# Patient Record
Sex: MALE | Race: WHITE | NOT HISPANIC OR LATINO | Employment: OTHER | URBAN - NONMETROPOLITAN AREA
[De-identification: names, ages, dates, MRNs, and addresses within clinical notes are randomized per-mention and may not be internally consistent; named-entity substitution may affect disease eponyms.]

---

## 2017-03-28 ENCOUNTER — LAB (OUTPATIENT)
Dept: LAB | Facility: OTHER | Age: 67
End: 2017-03-28

## 2017-03-28 DIAGNOSIS — Z12.5 ENCOUNTER FOR SCREENING FOR MALIGNANT NEOPLASM OF PROSTATE: ICD-10-CM

## 2017-03-28 DIAGNOSIS — I10 ESSENTIAL HYPERTENSION: ICD-10-CM

## 2017-03-28 DIAGNOSIS — E11.9 TYPE 2 DIABETES MELLITUS WITHOUT COMPLICATION, WITHOUT LONG-TERM CURRENT USE OF INSULIN (HCC): ICD-10-CM

## 2017-03-28 DIAGNOSIS — E03.9 HYPOTHYROIDISM, UNSPECIFIED TYPE: ICD-10-CM

## 2017-03-28 DIAGNOSIS — N40.0 BENIGN NON-NODULAR PROSTATIC HYPERPLASIA, PRESENCE OF LOWER URINARY TRACT SYMPTOMS UNSPECIFIED: ICD-10-CM

## 2017-03-28 DIAGNOSIS — E78.5 HYPERLIPIDEMIA, UNSPECIFIED HYPERLIPIDEMIA TYPE: ICD-10-CM

## 2017-03-28 LAB
ALBUMIN SERPL-MCNC: 4.1 G/DL (ref 3.2–5.5)
ALBUMIN/GLOB SERPL: 1.2 G/DL (ref 1–3)
ALP SERPL-CCNC: 75 U/L (ref 15–121)
ALT SERPL W P-5'-P-CCNC: 55 U/L (ref 10–60)
ANION GAP SERPL CALCULATED.3IONS-SCNC: 10 MMOL/L (ref 5–15)
AST SERPL-CCNC: 31 U/L (ref 10–60)
BASOPHILS # BLD AUTO: 0.03 10*3/MM3 (ref 0–0.2)
BASOPHILS NFR BLD AUTO: 0.4 % (ref 0–2)
BILIRUB SERPL-MCNC: 0.4 MG/DL (ref 0.2–1)
BUN BLD-MCNC: 15 MG/DL (ref 8–25)
BUN/CREAT SERPL: 15 (ref 7–25)
CALCIUM SPEC-SCNC: 9.5 MG/DL (ref 8.4–10.8)
CHLORIDE SERPL-SCNC: 103 MMOL/L (ref 100–112)
CHOLEST SERPL-MCNC: 130 MG/DL (ref 150–200)
CO2 SERPL-SCNC: 26 MMOL/L (ref 20–32)
CREAT BLD-MCNC: 1 MG/DL (ref 0.4–1.3)
DEPRECATED RDW RBC AUTO: 47.7 FL (ref 35.1–43.9)
EOSINOPHIL # BLD AUTO: 0.41 10*3/MM3 (ref 0–0.7)
EOSINOPHIL NFR BLD AUTO: 4.9 % (ref 0–7)
ERYTHROCYTE [DISTWIDTH] IN BLOOD BY AUTOMATED COUNT: 14.3 % (ref 11.5–14.5)
GFR SERPL CREATININE-BSD FRML MDRD: 74 ML/MIN/1.73 (ref 49–113)
GLOBULIN UR ELPH-MCNC: 3.4 GM/DL (ref 2.5–4.6)
GLUCOSE BLD-MCNC: 128 MG/DL (ref 70–100)
HCT VFR BLD AUTO: 46.4 % (ref 39–49)
HDLC SERPL-MCNC: 24 MG/DL (ref 35–100)
HGB BLD-MCNC: 15.3 G/DL (ref 13.7–17.3)
LDLC SERPL CALC-MCNC: 70 MG/DL
LDLC/HDLC SERPL: 2.9 {RATIO}
LYMPHOCYTES # BLD AUTO: 2.58 10*3/MM3 (ref 0.6–4.2)
LYMPHOCYTES NFR BLD AUTO: 30.8 % (ref 10–50)
MCH RBC QN AUTO: 31 PG (ref 26.5–34)
MCHC RBC AUTO-ENTMCNC: 33 G/DL (ref 31.5–36.3)
MCV RBC AUTO: 93.9 FL (ref 80–98)
MONOCYTES # BLD AUTO: 0.72 10*3/MM3 (ref 0–0.9)
MONOCYTES NFR BLD AUTO: 8.6 % (ref 0–12)
NEUTROPHILS # BLD AUTO: 4.63 10*3/MM3 (ref 2–8.6)
NEUTROPHILS NFR BLD AUTO: 55.3 % (ref 37–80)
PLATELET # BLD AUTO: 281 10*3/MM3 (ref 150–450)
PMV BLD AUTO: 10.8 FL (ref 8–12)
POTASSIUM BLD-SCNC: 4.1 MMOL/L (ref 3.4–5.4)
PROT SERPL-MCNC: 7.5 G/DL (ref 6.7–8.2)
RBC # BLD AUTO: 4.94 10*6/MM3 (ref 4.37–5.74)
SODIUM BLD-SCNC: 139 MMOL/L (ref 134–146)
TRIGL SERPL-MCNC: 182 MG/DL (ref 35–160)
VLDLC SERPL-MCNC: 36.4 MG/DL
WBC NRBC COR # BLD: 8.37 10*3/MM3 (ref 3.2–9.8)

## 2017-03-28 PROCEDURE — 36415 COLL VENOUS BLD VENIPUNCTURE: CPT | Performed by: FAMILY MEDICINE

## 2017-03-28 PROCEDURE — 80061 LIPID PANEL: CPT | Performed by: FAMILY MEDICINE

## 2017-03-28 PROCEDURE — 84443 ASSAY THYROID STIM HORMONE: CPT | Performed by: FAMILY MEDICINE

## 2017-03-28 PROCEDURE — 84439 ASSAY OF FREE THYROXINE: CPT | Performed by: FAMILY MEDICINE

## 2017-03-28 PROCEDURE — 83036 HEMOGLOBIN GLYCOSYLATED A1C: CPT | Performed by: FAMILY MEDICINE

## 2017-03-28 PROCEDURE — 80053 COMPREHEN METABOLIC PANEL: CPT | Performed by: FAMILY MEDICINE

## 2017-03-28 PROCEDURE — 85025 COMPLETE CBC W/AUTO DIFF WBC: CPT | Performed by: FAMILY MEDICINE

## 2017-03-28 PROCEDURE — G0103 PSA SCREENING: HCPCS | Performed by: FAMILY MEDICINE

## 2017-03-29 LAB
HBA1C MFR BLD: 6.22 % (ref 4–5.6)
PSA SERPL-MCNC: 0.85 NG/ML (ref 0–4)
T4 FREE SERPL-MCNC: 1.33 NG/DL (ref 0.78–2.19)
TSH SERPL DL<=0.05 MIU/L-ACNC: 3.53 MIU/ML (ref 0.46–4.68)

## 2017-04-10 ENCOUNTER — OFFICE VISIT (OUTPATIENT)
Dept: FAMILY MEDICINE CLINIC | Facility: CLINIC | Age: 67
End: 2017-04-10

## 2017-04-10 VITALS
SYSTOLIC BLOOD PRESSURE: 138 MMHG | HEIGHT: 67 IN | BODY MASS INDEX: 42.38 KG/M2 | DIASTOLIC BLOOD PRESSURE: 82 MMHG | TEMPERATURE: 97.9 F | OXYGEN SATURATION: 95 % | WEIGHT: 270 LBS | HEART RATE: 69 BPM

## 2017-04-10 DIAGNOSIS — I10 ESSENTIAL HYPERTENSION: Primary | ICD-10-CM

## 2017-04-10 DIAGNOSIS — E78.5 HYPERLIPIDEMIA, UNSPECIFIED HYPERLIPIDEMIA TYPE: ICD-10-CM

## 2017-04-10 DIAGNOSIS — J45.909 UNCOMPLICATED ASTHMA, UNSPECIFIED ASTHMA SEVERITY: ICD-10-CM

## 2017-04-10 DIAGNOSIS — E11.9 TYPE 2 DIABETES MELLITUS WITHOUT COMPLICATION, WITHOUT LONG-TERM CURRENT USE OF INSULIN (HCC): ICD-10-CM

## 2017-04-10 DIAGNOSIS — E03.9 HYPOTHYROIDISM, UNSPECIFIED TYPE: ICD-10-CM

## 2017-04-10 PROCEDURE — 99214 OFFICE O/P EST MOD 30 MIN: CPT | Performed by: FAMILY MEDICINE

## 2017-04-10 RX ORDER — ALBUTEROL SULFATE 90 UG/1
2 AEROSOL, METERED RESPIRATORY (INHALATION) EVERY 4 HOURS PRN
Qty: 18 G | Refills: 3 | Status: SHIPPED | OUTPATIENT
Start: 2017-04-10 | End: 2017-09-25 | Stop reason: SDUPTHER

## 2017-04-10 NOTE — PROGRESS NOTES
Subjective   Nick Austin is a 66 y.o. male who presents to the office for follow-up and review of labs.     History of Present Illness   Patient with history of hypertension, hyperlipidemia, and type 2 diabetes mellitus is in today for reevaluation and to review labs.  Patient is doing well but has noticed some increased cough and shortness of breath.  He denies any chest pain PND orthopnea.  Only using his albuterol.  The following portions of the patient's history were reviewed and updated as appropriate: allergies, current medications, past family history, past medical history, past social history, past surgical history and problem list.    Review of Systems   Constitutional: Negative.    HENT: Negative.    Eyes: Negative.    Respiratory: Positive for cough and shortness of breath.    Cardiovascular: Negative.    Gastrointestinal: Negative.    Endocrine: Negative.    Genitourinary: Negative.    Musculoskeletal: Negative.    Skin: Negative.    Allergic/Immunologic: Negative.    Neurological: Negative.    Hematological: Negative.    Psychiatric/Behavioral: Negative.    All other systems reviewed and are negative.      Objective   Physical Exam   Constitutional: He is oriented to person, place, and time. He appears well-developed and well-nourished.   HENT:   Head: Normocephalic and atraumatic.   Right Ear: External ear normal.   Left Ear: External ear normal.   Nose: Nose normal.   Mouth/Throat: Oropharynx is clear and moist.   Eyes: Conjunctivae and EOM are normal. Pupils are equal, round, and reactive to light.   Neck: Normal range of motion. Neck supple.   Cardiovascular: Normal rate, regular rhythm, normal heart sounds and intact distal pulses.  Exam reveals no gallop and no friction rub.    No murmur heard.  Pulmonary/Chest: Effort normal. He has wheezes. He has no rales.   Abdominal: Soft. Bowel sounds are normal. He exhibits no mass. There is no tenderness. There is no rebound and no guarding.    Obese     Musculoskeletal: Normal range of motion.    Nick had a diabetic foot exam performed today.   During the foot exam he had a monofilament test performed.    Neurological Sensory Findings -  Unaltered sharp/dull right ankle/foot discrimination and unaltered sharp/dull left ankle/foot discrimination.    Vascular Status -  His exam exhibits right foot vasculature normal. His exam exhibits left foot vasculature normal.   Skin Integrity  - He has left foot onychomycosis..  Neurological: He is alert and oriented to person, place, and time. He has normal reflexes. No cranial nerve deficit. He exhibits normal muscle tone.   Skin: Skin is warm and dry. No rash noted.   Psychiatric: He has a normal mood and affect. His behavior is normal. Judgment and thought content normal.   Nursing note and vitals reviewed.      Assessment/Plan   Nick was seen today for hyperlipidemia, hypertension and diabetes.    Diagnoses and all orders for this visit:    Essential hypertension    Hyperlipidemia, unspecified hyperlipidemia type  -     LDL Cholesterol, Direct; Future    Hypothyroidism, unspecified type  -     TSH; Future  -     T4, Free; Future    Type 2 diabetes mellitus without complication, without long-term current use of insulin  -     Comprehensive Metabolic Panel; Future  -     Hemoglobin A1c; Future  -     LDL Cholesterol, Direct; Future  -     MicroAlbumin, Urine, Random; Future    Uncomplicated asthma, unspecified asthma severity    Other orders  -     Fluticasone Furoate-Vilanterol (BREO ELLIPTA) 100-25 MCG/INH aerosol powder ; Inhale 1 dose Daily.  -     albuterol (PROVENTIL HFA;VENTOLIN HFA) 108 (90 BASE) MCG/ACT inhaler; Inhale 2 puffs Every 4 (Four) Hours As Needed (asthma). Take 30 minutes before P.E or exercise.         Labs are reviewed with patient.    PHQ-9 Depression Screening 4/10/2017   Little interest or pleasure in doing things 2   Feeling down, depressed, or hopeless 0   Trouble falling or  staying asleep, or sleeping too much 0   Feeling tired or having little energy 1   Poor appetite or overeating 2   Feeling bad about yourself - or that you are a failure or have let yourself or your family down 0   Trouble concentrating on things, such as reading the newspaper or watching television 0   Moving or speaking so slowly that other people could have noticed. Or the opposite - being so fidgety or restless that you have been moving around a lot more than usual 0   Thoughts that you would be better off dead, or of hurting yourself in some way 0   PHQ-9 Total Score 5   If you checked off any problems, how difficult have these problems made it for you to do your work, take care of things at home, or get along with other people? Not difficult at all         Lab on 03/28/2017   Component Date Value Ref Range Status   • Glucose 03/28/2017 128* 70 - 100 mg/dL Final   • BUN 03/28/2017 15  8 - 25 mg/dL Final   • Creatinine 03/28/2017 1.00  0.40 - 1.30 mg/dL Final   • Sodium 03/28/2017 139  134 - 146 mmol/L Final   • Potassium 03/28/2017 4.1  3.4 - 5.4 mmol/L Final   • Chloride 03/28/2017 103  100 - 112 mmol/L Final   • CO2 03/28/2017 26.0  20.0 - 32.0 mmol/L Final   • Calcium 03/28/2017 9.5  8.4 - 10.8 mg/dL Final   • Total Protein 03/28/2017 7.5  6.7 - 8.2 g/dL Final   • Albumin 03/28/2017 4.10  3.20 - 5.50 g/dL Final   • ALT (SGPT) 03/28/2017 55  10 - 60 U/L Final   • AST (SGOT) 03/28/2017 31  10 - 60 U/L Final   • Alkaline Phosphatase 03/28/2017 75  15 - 121 U/L Final   • Total Bilirubin 03/28/2017 0.4  0.2 - 1.0 mg/dL Final   • eGFR Non African Amer 03/28/2017 74  49 - 113 mL/min/1.73 Final   • Globulin 03/28/2017 3.4  2.5 - 4.6 gm/dL Final   • A/G Ratio 03/28/2017 1.2  1.0 - 3.0 g/dL Final   • BUN/Creatinine Ratio 03/28/2017 15.0  7.0 - 25.0 Final   • Anion Gap 03/28/2017 10.0  5.0 - 15.0 mmol/L Final   • Total Cholesterol 03/28/2017 130* 150 - 200 mg/dL Final   • Triglycerides 03/28/2017 182* 35 - 160 mg/dL  Final   • HDL Cholesterol 03/28/2017 24* 35 - 100 mg/dL Final   • LDL Cholesterol  03/28/2017 70  mg/dL Final   • VLDL Cholesterol 03/28/2017 36.4  mg/dL Final   • LDL/HDL Ratio 03/28/2017 2.90   Final   • Hemoglobin A1C 03/28/2017 6.22* 4 - 5.6 % Final   • TSH 03/28/2017 3.530  0.460 - 4.680 mIU/mL Final   • Free T4 03/28/2017 1.33  0.78 - 2.19 ng/dL Final   • PSA 03/28/2017 0.851  0.000 - 4.000 ng/mL Final   • WBC 03/28/2017 8.37  3.20 - 9.80 10*3/mm3 Final   • RBC 03/28/2017 4.94  4.37 - 5.74 10*6/mm3 Final   • Hemoglobin 03/28/2017 15.3  13.7 - 17.3 g/dL Final   • Hematocrit 03/28/2017 46.4  39.0 - 49.0 % Final   • MCV 03/28/2017 93.9  80.0 - 98.0 fL Final   • MCH 03/28/2017 31.0  26.5 - 34.0 pg Final   • MCHC 03/28/2017 33.0  31.5 - 36.3 g/dL Final   • RDW 03/28/2017 14.3  11.5 - 14.5 % Final   • RDW-SD 03/28/2017 47.7* 35.1 - 43.9 fl Final   • MPV 03/28/2017 10.8  8.0 - 12.0 fL Final   • Platelets 03/28/2017 281  150 - 450 10*3/mm3 Final   • Neutrophil % 03/28/2017 55.3  37.0 - 80.0 % Final   • Lymphocyte % 03/28/2017 30.8  10.0 - 50.0 % Final   • Monocyte % 03/28/2017 8.6  0.0 - 12.0 % Final   • Eosinophil % 03/28/2017 4.9  0.0 - 7.0 % Final   • Basophil % 03/28/2017 0.4  0.0 - 2.0 % Final   • Neutrophils, Absolute 03/28/2017 4.63  2.00 - 8.60 10*3/mm3 Final   • Lymphocytes, Absolute 03/28/2017 2.58  0.60 - 4.20 10*3/mm3 Final   • Monocytes, Absolute 03/28/2017 0.72  0.00 - 0.90 10*3/mm3 Final   • Eosinophils, Absolute 03/28/2017 0.41  0.00 - 0.70 10*3/mm3 Final   • Basophils, Absolute 03/28/2017 0.03  0.00 - 0.20 10*3/mm3 Final   ]

## 2017-04-24 RX ORDER — LISINOPRIL AND HYDROCHLOROTHIAZIDE 25; 20 MG/1; MG/1
TABLET ORAL
Qty: 90 TABLET | Refills: 0 | Status: SHIPPED | OUTPATIENT
Start: 2017-04-24 | End: 2017-06-17 | Stop reason: SDUPTHER

## 2017-05-16 ENCOUNTER — OFFICE VISIT (OUTPATIENT)
Dept: FAMILY MEDICINE CLINIC | Facility: CLINIC | Age: 67
End: 2017-05-16

## 2017-05-16 VITALS
WEIGHT: 270 LBS | BODY MASS INDEX: 42.38 KG/M2 | TEMPERATURE: 97.7 F | RESPIRATION RATE: 16 BRPM | HEART RATE: 84 BPM | SYSTOLIC BLOOD PRESSURE: 140 MMHG | HEIGHT: 67 IN | OXYGEN SATURATION: 94 % | DIASTOLIC BLOOD PRESSURE: 80 MMHG

## 2017-05-16 DIAGNOSIS — J45.40 MODERATE PERSISTENT ASTHMA WITHOUT COMPLICATION: ICD-10-CM

## 2017-05-16 DIAGNOSIS — I10 ESSENTIAL HYPERTENSION: ICD-10-CM

## 2017-05-16 DIAGNOSIS — J32.9 SINUSITIS, UNSPECIFIED CHRONICITY, UNSPECIFIED LOCATION: Primary | ICD-10-CM

## 2017-05-16 PROCEDURE — 99213 OFFICE O/P EST LOW 20 MIN: CPT | Performed by: FAMILY MEDICINE

## 2017-05-16 RX ORDER — ASPIRIN 325 MG
325 TABLET ORAL NIGHTLY
COMMUNITY
End: 2017-09-18 | Stop reason: HOSPADM

## 2017-05-16 RX ORDER — PREDNISONE 20 MG/1
20 TABLET ORAL DAILY
Qty: 14 TABLET | Refills: 0 | Status: SHIPPED | OUTPATIENT
Start: 2017-05-16 | End: 2017-08-29

## 2017-05-16 RX ORDER — CLINDAMYCIN HYDROCHLORIDE 150 MG/1
150 CAPSULE ORAL 3 TIMES DAILY
Qty: 21 CAPSULE | Refills: 0 | Status: SHIPPED | OUTPATIENT
Start: 2017-05-16 | End: 2017-08-29

## 2017-06-19 RX ORDER — LISINOPRIL AND HYDROCHLOROTHIAZIDE 25; 20 MG/1; MG/1
TABLET ORAL
Qty: 90 TABLET | Refills: 0 | Status: ON HOLD | OUTPATIENT
Start: 2017-06-19 | End: 2017-09-07

## 2017-07-23 RX ORDER — ATORVASTATIN CALCIUM 20 MG/1
TABLET, FILM COATED ORAL
Qty: 90 TABLET | Refills: 2 | Status: ON HOLD | OUTPATIENT
Start: 2017-07-23 | End: 2017-09-07

## 2017-07-24 RX ORDER — LEVOTHYROXINE SODIUM 88 MCG
88 TABLET ORAL DAILY
Qty: 90 TABLET | Refills: 2 | Status: SHIPPED | OUTPATIENT
Start: 2017-07-24 | End: 2018-03-22 | Stop reason: SDUPTHER

## 2017-08-29 ENCOUNTER — OFFICE VISIT (OUTPATIENT)
Dept: CARDIOLOGY | Facility: CLINIC | Age: 67
End: 2017-08-29

## 2017-08-29 ENCOUNTER — PREP FOR SURGERY (OUTPATIENT)
Dept: OTHER | Facility: HOSPITAL | Age: 67
End: 2017-08-29

## 2017-08-29 VITALS
HEART RATE: 75 BPM | DIASTOLIC BLOOD PRESSURE: 90 MMHG | WEIGHT: 270 LBS | BODY MASS INDEX: 42.38 KG/M2 | SYSTOLIC BLOOD PRESSURE: 150 MMHG | HEIGHT: 67 IN

## 2017-08-29 DIAGNOSIS — E78.5 HYPERLIPIDEMIA, UNSPECIFIED HYPERLIPIDEMIA TYPE: ICD-10-CM

## 2017-08-29 DIAGNOSIS — I10 ESSENTIAL HYPERTENSION: Primary | ICD-10-CM

## 2017-08-29 DIAGNOSIS — I20.8 ANGINA EFFORT: ICD-10-CM

## 2017-08-29 DIAGNOSIS — I20.9 ANGINA PECTORIS (HCC): Primary | ICD-10-CM

## 2017-08-29 DIAGNOSIS — R06.09 DYSPNEA ON EXERTION: ICD-10-CM

## 2017-08-29 DIAGNOSIS — Z95.1 S/P CABG X 1: ICD-10-CM

## 2017-08-29 PROCEDURE — 93000 ELECTROCARDIOGRAM COMPLETE: CPT | Performed by: INTERNAL MEDICINE

## 2017-08-29 PROCEDURE — 99204 OFFICE O/P NEW MOD 45 MIN: CPT | Performed by: INTERNAL MEDICINE

## 2017-08-29 RX ORDER — METOPROLOL SUCCINATE 25 MG/1
25 TABLET, EXTENDED RELEASE ORAL DAILY
Qty: 90 TABLET | Refills: 3 | Status: SHIPPED | OUTPATIENT
Start: 2017-08-29 | End: 2017-08-29 | Stop reason: SDUPTHER

## 2017-08-29 RX ORDER — CHLORAL HYDRATE 500 MG
1000 CAPSULE ORAL
COMMUNITY
End: 2017-09-12

## 2017-08-29 RX ORDER — METOPROLOL SUCCINATE 25 MG/1
TABLET, EXTENDED RELEASE ORAL
Qty: 30 TABLET | Refills: 3 | Status: SHIPPED | OUTPATIENT
Start: 2017-08-29 | End: 2017-09-12

## 2017-08-29 RX ORDER — ISOSORBIDE MONONITRATE 30 MG/1
30 TABLET, EXTENDED RELEASE ORAL DAILY
Qty: 30 TABLET | Refills: 6 | Status: SHIPPED | OUTPATIENT
Start: 2017-08-29 | End: 2017-08-29 | Stop reason: SDUPTHER

## 2017-08-29 RX ORDER — SODIUM CHLORIDE 0.9 % (FLUSH) 0.9 %
1-10 SYRINGE (ML) INJECTION AS NEEDED
Status: CANCELLED | OUTPATIENT
Start: 2017-09-07

## 2017-08-29 RX ORDER — ISOSORBIDE MONONITRATE 30 MG/1
30 TABLET, EXTENDED RELEASE ORAL DAILY
Qty: 30 TABLET | Refills: 6 | Status: SHIPPED | OUTPATIENT
Start: 2017-08-29 | End: 2017-09-07 | Stop reason: HOSPADM

## 2017-08-29 RX ORDER — SODIUM CHLORIDE 9 MG/ML
125 INJECTION, SOLUTION INTRAVENOUS CONTINUOUS
Status: CANCELLED | OUTPATIENT
Start: 2017-09-07

## 2017-08-29 NOTE — PROGRESS NOTES
Nick Austin  66 y.o. male    08/29/2017  1. Essential hypertension    2. Hyperlipidemia, unspecified hyperlipidemia type    3. Angina effort    4. S/P CABG x 1    5. Dyspnea on exertion        History of Present Illness    Mr. Austin is a 66-year-old  male who is being seen by me for the first time.  He presents for evaluation of chest pressure and shortness of breath which has been going on for the past 3 months.  His dyspnea is NYHA class II and chest pressure was triggered by activity though it has occurred on bending forward.  His history is remarkable for document of coronary artery disease and back in 1989 underwent CABG ×1 vessel in [facility].  He is done well since then and has not followed up with cardiology in more than 20 years.  His risk factors include hypertension, hyperlipidemia, hypothyroidism, obesity and premature coronary artery disease.  He is a nonsmoker.  His records do mention type 2 diabetes though the patient denies it.  He denied any recent fevers or chills and no gastrointestinal or urinary symptoms were noted.  EKG today showed sinus rhythm with no ST-T wave changes diagnostic of ischemia.        SUBJECTIVE    Allergies   Allergen Reactions   • Tetanus Toxoids      And vaccines         Past Medical History:   Diagnosis Date   • Acute bronchitis    • Backache    • Chronic pain    • Coronary arteriosclerosis    • Essential hypertension    • GERD (gastroesophageal reflux disease)    • Hyperglycemia    • Hyperlipidemia    • Hypothyroidism    • Type 2 diabetes mellitus          Past Surgical History:   Procedure Laterality Date   • CHOLECYSTECTOMY     • CORONARY ARTERY BYPASS GRAFT     • INJECTION OF MEDICATION  02/23/2015    dexamethasone   • LEG SURGERY      extensive lower leg   • SHOULDER SURGERY           Family History   Problem Relation Age of Onset   • No Known Problems Mother    • No Known Problems Father    • No Known Problems Sister    • No Known  Problems Brother    • No Known Problems Daughter    • No Known Problems Son    • No Known Problems Maternal Aunt    • No Known Problems Maternal Uncle    • No Known Problems Paternal Aunt    • No Known Problems Paternal Uncle    • No Known Problems Maternal Grandmother    • No Known Problems Maternal Grandfather    • No Known Problems Paternal Grandmother    • No Known Problems Paternal Grandfather          Social History     Social History   • Marital status:      Spouse name: Jocelyne   • Number of children: 2   • Years of education: N/A     Occupational History   • Not on file.     Social History Main Topics   • Smoking status: Never Smoker   • Smokeless tobacco: Never Used   • Alcohol use No   • Drug use: No   • Sexual activity: Yes     Partners: Female     Other Topics Concern   • Not on file     Social History Narrative         Current Outpatient Prescriptions   Medication Sig Dispense Refill   • albuterol (PROVENTIL HFA;VENTOLIN HFA) 108 (90 BASE) MCG/ACT inhaler Inhale 2 puffs Every 4 (Four) Hours As Needed (asthma). Take 30 minutes before P.E or exercise. 18 g 3   • aspirin 325 MG tablet Take 325 mg by mouth Daily.     • atorvastatin (LIPITOR) 20 MG tablet TAKE 1 TABLET DAILY 90 tablet 2   • Fluticasone Furoate-Vilanterol (BREO ELLIPTA) 100-25 MCG/INH aerosol powder  Inhale 1 dose Daily. 3 each 3   • lisinopril-hydrochlorothiazide (PRINZIDE,ZESTORETIC) 20-25 MG per tablet TAKE 1 TABLET DAILY 90 tablet 0   • Omega-3 Fatty Acids (FISH OIL) 1000 MG capsule capsule Take 1,000 mg by mouth Daily With Breakfast.     • omeprazole (priLOSEC) 40 MG capsule TAKE 1 CAPSULE DAILY 90 capsule 2   • SYNTHROID 88 MCG tablet Take 1 tablet by mouth Daily. 90 tablet 2   • tamsulosin (FLOMAX) 0.4 MG capsule 24 hr capsule TAKE 1 CAPSULE DAILY 90 capsule 2   • isosorbide mononitrate (IMDUR) 30 MG 24 hr tablet Take 1 tablet by mouth Daily. 30 tablet 6   • metoprolol succinate XL (TOPROL-XL) 25 MG 24 hr tablet q hs 30 tablet  "3     No current facility-administered medications for this visit.          OBJECTIVE    /90  Pulse 75  Ht 67\" (170.2 cm)  Wt 270 lb (122 kg)  BMI 42.29 kg/m2        Review of Systems     Constitutional:  Denies recent weight loss, weight gain, fever or chills    HENT:  Denies any hearing loss, epistaxis, hoarseness, or difficulty speaking.     Eyes: Wears eyeglasses or contact lenses     Respiratory:  Dyspnea with exertion,no cough, wheezing, or hemoptysis.     Cardiovascular: See history of present illness    Gastrointestinal:  Denies change in bowel habits, dyspepsia, ulcer disease, hematochezia, or melena.     Endocrine: Negative for cold intolerance, heat intolerance, polydipsia, polyphagia and polyuria.    Genitourinary: Negative.      Musculoskeletal: Denies any history of arthritic symptoms or back problems     Skin:  Denies any change in hair or nails, rashes, or skin lesions.     Allergic/Immunologic: Negative.  Negative for environmental allergies, food allergies and immunocompromised state.     Neurological:  Denies any history of recurrent headaches, strokes, TIA, or seizure disorder.     Hematological: Denies any food allergies, seasonal allergies, bleeding disorders, or lymphadenopathy.     Psychiatric/Behavioral: Denies any history of depression, substance abuse, or change in cognitive function.         Physical Exam     Constitutional: Cooperative, alert and oriented, well-developed, in no acute distress.  obese    HENT:   Head: Normocephalic, normal hair patterns, no masses or tenderness.  Ears, Nose, and Throat: No gross abnormalities. No pallor or cyanosis.  Eyes: EOMS intact, PERRL, conjunctivae and lids unremarkable. Fundoscopic exam and visual fields not performed.   Neck: No palpable masses or adenopathy, no thyromegaly, no JVD, carotid pulses are full and equal bilaterally and without  Bruits.     Cardiovascular: Regular rhythm, S1 and S2 normal, no S3 or S4. No murmurs, gallops, " or rubs detected.     Pulmonary/Chest: Chest: normal symmetry, no tenderness to palpation, normal respiratory excursion, no intercostal retraction, no use of accessory muscles.            Pulmonary: Normal breath sounds. No rales or ronchi.    Abdominal: Abdomen soft, bowel sounds normoactive, no masses, no hepatosplenomegaly, non-tender, no bruits.     Musculoskeletal: No deformities, clubbing, cyanosis, erythema, or edema observed. There are no spinal abnormalities noted. Normal muscle strength and tone. Pulses full and equal in all extremities, no bruits auscultated.     Neurological: No gross motor or sensory deficits noted, affect appropriate, oriented to time, person, place.     Skin: Warm and dry to the touch, no apparent skin lesions or masses noted.     Psychiatric: He has a normal mood and affect. His behavior is normal. Judgment and thought content normal.           ECG 12 Lead  Date/Time: 8/29/2017 12:08 PM  Performed by: JAZMINE EARL  Authorized by: JAZMINE EARL   Comparison: not compared with previous ECG   Rhythm: sinus rhythm  Rate: normal  QRS axis: normal  Clinical impression: normal ECG              Lab Results   Component Value Date    WBC 8.37 03/28/2017    HGB 15.3 03/28/2017    HCT 46.4 03/28/2017    MCV 93.9 03/28/2017     03/28/2017     Lab Results   Component Value Date    GLUCOSE 128 (H) 03/28/2017    BUN 15 03/28/2017    CREATININE 1.00 03/28/2017    EGFRIFNONA 74 03/28/2017    BCR 15.0 03/28/2017    CO2 26.0 03/28/2017    CALCIUM 9.5 03/28/2017    ALBUMIN 4.10 03/28/2017    LABIL2 1.2 03/28/2017    AST 31 03/28/2017    ALT 55 03/28/2017     Lab Results   Component Value Date    CHOL 130 (L) 03/28/2017     Lab Results   Component Value Date    TRIG 182 (H) 03/28/2017    TRIG 134 03/21/2016    TRIG 287 (H) 03/11/2015     Lab Results   Component Value Date    HDL 24 (L) 03/28/2017    HDL 25.0 (L) 03/21/2016    HDL 30.0 (L) 03/11/2015     Lab Results    Component Value Date    LDLCALC 70 03/28/2017    LDLCALC 85 03/21/2016    LDLCALC 66 03/11/2015     No results found for: LDL  No results found for: HDLLDLRATIO  No components found for: CHOLHDL  Lab Results   Component Value Date    HGBA1C 6.22 (H) 03/28/2017     Lab Results   Component Value Date    TSH 3.530 03/28/2017    THYROIDAB 7 04/24/2014           ASSESSMENT AND PLAN    Mr. Austin has multiple risk factors for coronary artery disease including obesity, hypertension, hyperlipidemia and previous coronary artery bypass surgery with single-vessel CABG in 1989 and [facility].  His new onset chest pressure and dyspnea is suspicious for exertional angina and to further evaluate his symptoms and echocardiogram to assess left ventricular and valvular function is being arranged.  I believe that definitive evaluation by cardiac catheterization would be appropriate in the setting and all risks and benefits have been explained to him in detail.  He will be an ASA class II and Mallampati II.  Further recommendations will follow.  I have started him on isosorbide mononitrate 30 mg daily and metoprolol succinate but he 5 mg daily.  Lisinopril HCTZ for management of hypertension and statin therapy with Lipitor and antiplatelet therapy with aspirin have been continued.    Diagnoses and all orders for this visit:    Essential hypertension  -     Adult Transthoracic Echo Complete; Future    Hyperlipidemia, unspecified hyperlipidemia type  -     Adult Transthoracic Echo Complete; Future    Angina effort  -     Adult Transthoracic Echo Complete; Future    S/P CABG x 1  -     Adult Transthoracic Echo Complete; Future    Dyspnea on exertion    Other orders  -     Discontinue: isosorbide mononitrate (IMDUR) 30 MG 24 hr tablet; Take 1 tablet by mouth Daily.  -     Discontinue: metoprolol succinate XL (TOPROL-XL) 25 MG 24 hr tablet; Take 1 tablet by mouth Daily.        Perla Jay MD  8/29/2017  12:07  PM

## 2017-08-30 ENCOUNTER — DOCUMENTATION (OUTPATIENT)
Dept: CARDIOLOGY | Facility: CLINIC | Age: 67
End: 2017-08-30

## 2017-08-30 NOTE — PROGRESS NOTES
PT came in today c/o dizziness b/p 130/80 hr 74 Pt instructed to continue Toprol at HS, will stop Imdur per Dr Jay

## 2017-09-05 ENCOUNTER — TELEPHONE (OUTPATIENT)
Dept: CARDIOLOGY | Facility: CLINIC | Age: 67
End: 2017-09-05

## 2017-09-07 ENCOUNTER — PREP FOR SURGERY (OUTPATIENT)
Dept: OTHER | Facility: HOSPITAL | Age: 67
End: 2017-09-07

## 2017-09-07 ENCOUNTER — HOSPITAL ENCOUNTER (OUTPATIENT)
Facility: HOSPITAL | Age: 67
Setting detail: HOSPITAL OUTPATIENT SURGERY
Discharge: HOME | End: 2017-09-07
Attending: INTERNAL MEDICINE | Admitting: INTERNAL MEDICINE

## 2017-09-07 ENCOUNTER — APPOINTMENT (OUTPATIENT)
Dept: GENERAL RADIOLOGY | Facility: HOSPITAL | Age: 67
End: 2017-09-07

## 2017-09-07 ENCOUNTER — APPOINTMENT (OUTPATIENT)
Dept: CT IMAGING | Facility: HOSPITAL | Age: 67
End: 2017-09-07

## 2017-09-07 VITALS
HEART RATE: 61 BPM | OXYGEN SATURATION: 97 % | BODY MASS INDEX: 42.35 KG/M2 | HEIGHT: 67 IN | WEIGHT: 269.84 LBS | SYSTOLIC BLOOD PRESSURE: 125 MMHG | TEMPERATURE: 97 F | RESPIRATION RATE: 18 BRPM | DIASTOLIC BLOOD PRESSURE: 77 MMHG

## 2017-09-07 DIAGNOSIS — I79.8 OTHER DISORDERS OF ARTERIES, ARTERIOLES AND CAPILLARIES IN DISEASES CLASSIFIED ELSEWHERE (HCC): ICD-10-CM

## 2017-09-07 DIAGNOSIS — I20.9 ANGINA PECTORIS (HCC): ICD-10-CM

## 2017-09-07 DIAGNOSIS — I25.118 CORONARY ARTERY DISEASE OF NATIVE ARTERY OF NATIVE HEART WITH STABLE ANGINA PECTORIS (HCC): Primary | ICD-10-CM

## 2017-09-07 PROBLEM — I25.119 CORONARY ARTERY DISEASE INVOLVING NATIVE CORONARY ARTERY OF NATIVE HEART WITH ANGINA PECTORIS: Status: ACTIVE | Noted: 2017-09-07

## 2017-09-07 LAB
ABO GROUP BLD: NORMAL
ALBUMIN SERPL-MCNC: 4.3 G/DL (ref 3.4–4.8)
ALBUMIN/GLOB SERPL: 1.3 G/DL (ref 1.1–1.8)
ALP SERPL-CCNC: 71 U/L (ref 38–126)
ALT SERPL W P-5'-P-CCNC: 56 U/L (ref 21–72)
ANION GAP SERPL CALCULATED.3IONS-SCNC: 11 MMOL/L (ref 5–15)
AST SERPL-CCNC: 36 U/L (ref 17–59)
BILIRUB SERPL-MCNC: 0.8 MG/DL (ref 0.2–1.3)
BLD GP AB SCN SERPL QL: NEGATIVE
BUN BLD-MCNC: 14 MG/DL (ref 7–21)
BUN/CREAT SERPL: 14.1 (ref 7–25)
CALCIUM SPEC-SCNC: 8.9 MG/DL (ref 8.4–10.2)
CHLORIDE SERPL-SCNC: 103 MMOL/L (ref 95–110)
CO2 SERPL-SCNC: 26 MMOL/L (ref 22–31)
CREAT BLD-MCNC: 0.99 MG/DL (ref 0.7–1.3)
DEPRECATED RDW RBC AUTO: 46.4 FL (ref 35.1–43.9)
ERYTHROCYTE [DISTWIDTH] IN BLOOD BY AUTOMATED COUNT: 13.7 % (ref 11.5–14.5)
GFR SERPL CREATININE-BSD FRML MDRD: 75 ML/MIN/1.73 (ref 49–113)
GLOBULIN UR ELPH-MCNC: 3.2 GM/DL (ref 2.3–3.5)
GLUCOSE BLD-MCNC: 118 MG/DL (ref 60–100)
HBA1C MFR BLD: 6.1 % (ref 4–5.6)
HCT VFR BLD AUTO: 44.9 % (ref 39–49)
HGB BLD-MCNC: 15.2 G/DL (ref 13.7–17.3)
INR PPP: 0.98 (ref 0.8–1.2)
Lab: NORMAL
MCH RBC QN AUTO: 31.3 PG (ref 26.5–34)
MCHC RBC AUTO-ENTMCNC: 33.9 G/DL (ref 31.5–36.3)
MCV RBC AUTO: 92.6 FL (ref 80–98)
MRSA DNA SPEC QL NAA+PROBE: NEGATIVE
PLATELET # BLD AUTO: 255 10*3/MM3 (ref 150–450)
PMV BLD AUTO: 12.2 FL (ref 8–12)
POTASSIUM BLD-SCNC: 3.7 MMOL/L (ref 3.5–5.1)
PROT SERPL-MCNC: 7.5 G/DL (ref 6.3–8.6)
PROTHROMBIN TIME: 12.9 SECONDS (ref 11.1–15.3)
RBC # BLD AUTO: 4.85 10*6/MM3 (ref 4.37–5.74)
RH BLD: POSITIVE
SODIUM BLD-SCNC: 140 MMOL/L (ref 137–145)
TSH SERPL DL<=0.05 MIU/L-ACNC: 2.42 MIU/ML (ref 0.46–4.68)
WBC NRBC COR # BLD: 6.75 10*3/MM3 (ref 3.2–9.8)

## 2017-09-07 PROCEDURE — 71250 CT THORAX DX C-: CPT

## 2017-09-07 PROCEDURE — 85610 PROTHROMBIN TIME: CPT | Performed by: INTERNAL MEDICINE

## 2017-09-07 PROCEDURE — 93459 L HRT ART/GRFT ANGIO: CPT | Performed by: INTERNAL MEDICINE

## 2017-09-07 PROCEDURE — C1760 CLOSURE DEV, VASC: HCPCS | Performed by: INTERNAL MEDICINE

## 2017-09-07 PROCEDURE — 99204 OFFICE O/P NEW MOD 45 MIN: CPT | Performed by: NURSE PRACTITIONER

## 2017-09-07 PROCEDURE — 25010000002 MIDAZOLAM PER 1 MG: Performed by: INTERNAL MEDICINE

## 2017-09-07 PROCEDURE — 86850 RBC ANTIBODY SCREEN: CPT | Performed by: NURSE PRACTITIONER

## 2017-09-07 PROCEDURE — 85027 COMPLETE CBC AUTOMATED: CPT | Performed by: INTERNAL MEDICINE

## 2017-09-07 PROCEDURE — 86900 BLOOD TYPING SEROLOGIC ABO: CPT | Performed by: NURSE PRACTITIONER

## 2017-09-07 PROCEDURE — 86901 BLOOD TYPING SEROLOGIC RH(D): CPT | Performed by: NURSE PRACTITIONER

## 2017-09-07 PROCEDURE — 84443 ASSAY THYROID STIM HORMONE: CPT | Performed by: NURSE PRACTITIONER

## 2017-09-07 PROCEDURE — C1769 GUIDE WIRE: HCPCS | Performed by: INTERNAL MEDICINE

## 2017-09-07 PROCEDURE — 80053 COMPREHEN METABOLIC PANEL: CPT | Performed by: INTERNAL MEDICINE

## 2017-09-07 PROCEDURE — 0 IOPAMIDOL PER 1 ML: Performed by: INTERNAL MEDICINE

## 2017-09-07 PROCEDURE — 87641 MR-STAPH DNA AMP PROBE: CPT | Performed by: INTERNAL MEDICINE

## 2017-09-07 PROCEDURE — C1894 INTRO/SHEATH, NON-LASER: HCPCS | Performed by: INTERNAL MEDICINE

## 2017-09-07 PROCEDURE — 83036 HEMOGLOBIN GLYCOSYLATED A1C: CPT | Performed by: NURSE PRACTITIONER

## 2017-09-07 PROCEDURE — 71020 HC CHEST PA AND LATERAL: CPT

## 2017-09-07 RX ORDER — SODIUM CHLORIDE 0.9 % (FLUSH) 0.9 %
1-10 SYRINGE (ML) INJECTION AS NEEDED
Status: CANCELLED | OUTPATIENT
Start: 2017-09-14

## 2017-09-07 RX ORDER — CHLORHEXIDINE GLUCONATE 500 MG/1
1 CLOTH TOPICAL EVERY 12 HOURS PRN
Status: CANCELLED | OUTPATIENT
Start: 2017-09-07

## 2017-09-07 RX ORDER — ATORVASTATIN CALCIUM 20 MG/1
20 TABLET, FILM COATED ORAL NIGHTLY
COMMUNITY
End: 2018-04-24 | Stop reason: SDUPTHER

## 2017-09-07 RX ORDER — MIDAZOLAM HYDROCHLORIDE 1 MG/ML
INJECTION INTRAMUSCULAR; INTRAVENOUS AS NEEDED
Status: DISCONTINUED | OUTPATIENT
Start: 2017-09-07 | End: 2017-09-07 | Stop reason: HOSPADM

## 2017-09-07 RX ORDER — OMEPRAZOLE 40 MG/1
40 CAPSULE, DELAYED RELEASE ORAL NIGHTLY
COMMUNITY
End: 2018-03-23 | Stop reason: SDUPTHER

## 2017-09-07 RX ORDER — TAMSULOSIN HYDROCHLORIDE 0.4 MG/1
1 CAPSULE ORAL NIGHTLY
COMMUNITY
End: 2017-12-27 | Stop reason: SDUPTHER

## 2017-09-07 RX ORDER — CHLORHEXIDINE GLUCONATE 0.12 MG/ML
15 RINSE ORAL EVERY 12 HOURS
Status: CANCELLED | OUTPATIENT
Start: 2017-09-07 | End: 2017-09-08

## 2017-09-07 RX ORDER — SODIUM CHLORIDE 9 MG/ML
125 INJECTION, SOLUTION INTRAVENOUS CONTINUOUS
Status: DISCONTINUED | OUTPATIENT
Start: 2017-09-07 | End: 2017-09-07 | Stop reason: HOSPADM

## 2017-09-07 RX ORDER — SODIUM CHLORIDE 9 MG/ML
100 INJECTION, SOLUTION INTRAVENOUS CONTINUOUS
Status: CANCELLED | OUTPATIENT
Start: 2017-09-14

## 2017-09-07 RX ORDER — NITROGLYCERIN 0.4 MG/1
0.4 TABLET SUBLINGUAL
Status: CANCELLED | OUTPATIENT
Start: 2017-09-14

## 2017-09-07 RX ORDER — LISINOPRIL AND HYDROCHLOROTHIAZIDE 25; 20 MG/1; MG/1
1 TABLET ORAL NIGHTLY
COMMUNITY
End: 2017-09-18 | Stop reason: HOSPADM

## 2017-09-07 RX ORDER — LIDOCAINE HYDROCHLORIDE 20 MG/ML
INJECTION, SOLUTION INFILTRATION; PERINEURAL AS NEEDED
Status: DISCONTINUED | OUTPATIENT
Start: 2017-09-07 | End: 2017-09-07 | Stop reason: HOSPADM

## 2017-09-07 RX ORDER — ACETAMINOPHEN 325 MG/1
650 TABLET ORAL EVERY 4 HOURS PRN
Status: CANCELLED | OUTPATIENT
Start: 2017-09-14

## 2017-09-07 RX ORDER — SODIUM CHLORIDE 0.9 % (FLUSH) 0.9 %
1-10 SYRINGE (ML) INJECTION AS NEEDED
Status: DISCONTINUED | OUTPATIENT
Start: 2017-09-07 | End: 2017-09-07 | Stop reason: HOSPADM

## 2017-09-07 RX ADMIN — SODIUM CHLORIDE 125 ML/HR: 9 INJECTION, SOLUTION INTRAVENOUS at 06:45

## 2017-09-07 NOTE — DISCHARGE INSTRUCTIONS
May remove dressing in 24 hours.  May shower once dressing is removed.  No tub baths, hot tubs, or swimming pools for 1 week.  Patient to be seen by thoracic surgery today.  Discussed with Dr. Morgan.  Patient to go home only after seen by CT surgery.

## 2017-09-07 NOTE — H&P (VIEW-ONLY)
Nick Austin  66 y.o. male    08/29/2017  1. Essential hypertension    2. Hyperlipidemia, unspecified hyperlipidemia type    3. Angina effort    4. S/P CABG x 1    5. Dyspnea on exertion        History of Present Illness    Mr. Austin is a 66-year-old  male who is being seen by me for the first time.  He presents for evaluation of chest pressure and shortness of breath which has been going on for the past 3 months.  His dyspnea is NYHA class II and chest pressure was triggered by activity though it has occurred on bending forward.  His history is remarkable for document of coronary artery disease and back in 1989 underwent CABG ×1 vessel in [facility].  He is done well since then and has not followed up with cardiology in more than 20 years.  His risk factors include hypertension, hyperlipidemia, hypothyroidism, obesity and premature coronary artery disease.  He is a nonsmoker.  His records do mention type 2 diabetes though the patient denies it.  He denied any recent fevers or chills and no gastrointestinal or urinary symptoms were noted.  EKG today showed sinus rhythm with no ST-T wave changes diagnostic of ischemia.        SUBJECTIVE    Allergies   Allergen Reactions   • Tetanus Toxoids      And vaccines         Past Medical History:   Diagnosis Date   • Acute bronchitis    • Backache    • Chronic pain    • Coronary arteriosclerosis    • Essential hypertension    • GERD (gastroesophageal reflux disease)    • Hyperglycemia    • Hyperlipidemia    • Hypothyroidism    • Type 2 diabetes mellitus          Past Surgical History:   Procedure Laterality Date   • CHOLECYSTECTOMY     • CORONARY ARTERY BYPASS GRAFT     • INJECTION OF MEDICATION  02/23/2015    dexamethasone   • LEG SURGERY      extensive lower leg   • SHOULDER SURGERY           Family History   Problem Relation Age of Onset   • No Known Problems Mother    • No Known Problems Father    • No Known Problems Sister    • No Known  Problems Brother    • No Known Problems Daughter    • No Known Problems Son    • No Known Problems Maternal Aunt    • No Known Problems Maternal Uncle    • No Known Problems Paternal Aunt    • No Known Problems Paternal Uncle    • No Known Problems Maternal Grandmother    • No Known Problems Maternal Grandfather    • No Known Problems Paternal Grandmother    • No Known Problems Paternal Grandfather          Social History     Social History   • Marital status:      Spouse name: Jocelyne   • Number of children: 2   • Years of education: N/A     Occupational History   • Not on file.     Social History Main Topics   • Smoking status: Never Smoker   • Smokeless tobacco: Never Used   • Alcohol use No   • Drug use: No   • Sexual activity: Yes     Partners: Female     Other Topics Concern   • Not on file     Social History Narrative         Current Outpatient Prescriptions   Medication Sig Dispense Refill   • albuterol (PROVENTIL HFA;VENTOLIN HFA) 108 (90 BASE) MCG/ACT inhaler Inhale 2 puffs Every 4 (Four) Hours As Needed (asthma). Take 30 minutes before P.E or exercise. 18 g 3   • aspirin 325 MG tablet Take 325 mg by mouth Daily.     • atorvastatin (LIPITOR) 20 MG tablet TAKE 1 TABLET DAILY 90 tablet 2   • Fluticasone Furoate-Vilanterol (BREO ELLIPTA) 100-25 MCG/INH aerosol powder  Inhale 1 dose Daily. 3 each 3   • lisinopril-hydrochlorothiazide (PRINZIDE,ZESTORETIC) 20-25 MG per tablet TAKE 1 TABLET DAILY 90 tablet 0   • Omega-3 Fatty Acids (FISH OIL) 1000 MG capsule capsule Take 1,000 mg by mouth Daily With Breakfast.     • omeprazole (priLOSEC) 40 MG capsule TAKE 1 CAPSULE DAILY 90 capsule 2   • SYNTHROID 88 MCG tablet Take 1 tablet by mouth Daily. 90 tablet 2   • tamsulosin (FLOMAX) 0.4 MG capsule 24 hr capsule TAKE 1 CAPSULE DAILY 90 capsule 2   • isosorbide mononitrate (IMDUR) 30 MG 24 hr tablet Take 1 tablet by mouth Daily. 30 tablet 6   • metoprolol succinate XL (TOPROL-XL) 25 MG 24 hr tablet q hs 30 tablet  "3     No current facility-administered medications for this visit.          OBJECTIVE    /90  Pulse 75  Ht 67\" (170.2 cm)  Wt 270 lb (122 kg)  BMI 42.29 kg/m2        Review of Systems     Constitutional:  Denies recent weight loss, weight gain, fever or chills    HENT:  Denies any hearing loss, epistaxis, hoarseness, or difficulty speaking.     Eyes: Wears eyeglasses or contact lenses     Respiratory:  Dyspnea with exertion,no cough, wheezing, or hemoptysis.     Cardiovascular: See history of present illness    Gastrointestinal:  Denies change in bowel habits, dyspepsia, ulcer disease, hematochezia, or melena.     Endocrine: Negative for cold intolerance, heat intolerance, polydipsia, polyphagia and polyuria.    Genitourinary: Negative.      Musculoskeletal: Denies any history of arthritic symptoms or back problems     Skin:  Denies any change in hair or nails, rashes, or skin lesions.     Allergic/Immunologic: Negative.  Negative for environmental allergies, food allergies and immunocompromised state.     Neurological:  Denies any history of recurrent headaches, strokes, TIA, or seizure disorder.     Hematological: Denies any food allergies, seasonal allergies, bleeding disorders, or lymphadenopathy.     Psychiatric/Behavioral: Denies any history of depression, substance abuse, or change in cognitive function.         Physical Exam     Constitutional: Cooperative, alert and oriented, well-developed, in no acute distress.  obese    HENT:   Head: Normocephalic, normal hair patterns, no masses or tenderness.  Ears, Nose, and Throat: No gross abnormalities. No pallor or cyanosis.  Eyes: EOMS intact, PERRL, conjunctivae and lids unremarkable. Fundoscopic exam and visual fields not performed.   Neck: No palpable masses or adenopathy, no thyromegaly, no JVD, carotid pulses are full and equal bilaterally and without  Bruits.     Cardiovascular: Regular rhythm, S1 and S2 normal, no S3 or S4. No murmurs, gallops, " or rubs detected.     Pulmonary/Chest: Chest: normal symmetry, no tenderness to palpation, normal respiratory excursion, no intercostal retraction, no use of accessory muscles.            Pulmonary: Normal breath sounds. No rales or ronchi.    Abdominal: Abdomen soft, bowel sounds normoactive, no masses, no hepatosplenomegaly, non-tender, no bruits.     Musculoskeletal: No deformities, clubbing, cyanosis, erythema, or edema observed. There are no spinal abnormalities noted. Normal muscle strength and tone. Pulses full and equal in all extremities, no bruits auscultated.     Neurological: No gross motor or sensory deficits noted, affect appropriate, oriented to time, person, place.     Skin: Warm and dry to the touch, no apparent skin lesions or masses noted.     Psychiatric: He has a normal mood and affect. His behavior is normal. Judgment and thought content normal.           ECG 12 Lead  Date/Time: 8/29/2017 12:08 PM  Performed by: JAZMINE EARL  Authorized by: JAZMINE EARL   Comparison: not compared with previous ECG   Rhythm: sinus rhythm  Rate: normal  QRS axis: normal  Clinical impression: normal ECG              Lab Results   Component Value Date    WBC 8.37 03/28/2017    HGB 15.3 03/28/2017    HCT 46.4 03/28/2017    MCV 93.9 03/28/2017     03/28/2017     Lab Results   Component Value Date    GLUCOSE 128 (H) 03/28/2017    BUN 15 03/28/2017    CREATININE 1.00 03/28/2017    EGFRIFNONA 74 03/28/2017    BCR 15.0 03/28/2017    CO2 26.0 03/28/2017    CALCIUM 9.5 03/28/2017    ALBUMIN 4.10 03/28/2017    LABIL2 1.2 03/28/2017    AST 31 03/28/2017    ALT 55 03/28/2017     Lab Results   Component Value Date    CHOL 130 (L) 03/28/2017     Lab Results   Component Value Date    TRIG 182 (H) 03/28/2017    TRIG 134 03/21/2016    TRIG 287 (H) 03/11/2015     Lab Results   Component Value Date    HDL 24 (L) 03/28/2017    HDL 25.0 (L) 03/21/2016    HDL 30.0 (L) 03/11/2015     Lab Results    Component Value Date    LDLCALC 70 03/28/2017    LDLCALC 85 03/21/2016    LDLCALC 66 03/11/2015     No results found for: LDL  No results found for: HDLLDLRATIO  No components found for: CHOLHDL  Lab Results   Component Value Date    HGBA1C 6.22 (H) 03/28/2017     Lab Results   Component Value Date    TSH 3.530 03/28/2017    THYROIDAB 7 04/24/2014           ASSESSMENT AND PLAN    Mr. Austin has multiple risk factors for coronary artery disease including obesity, hypertension, hyperlipidemia and previous coronary artery bypass surgery with single-vessel CABG in 1989 and [facility].  His new onset chest pressure and dyspnea is suspicious for exertional angina and to further evaluate his symptoms and echocardiogram to assess left ventricular and valvular function is being arranged.  I believe that definitive evaluation by cardiac catheterization would be appropriate in the setting and all risks and benefits have been explained to him in detail.  He will be an ASA class II and Mallampati II.  Further recommendations will follow.  I have started him on isosorbide mononitrate 30 mg daily and metoprolol succinate but he 5 mg daily.  Lisinopril HCTZ for management of hypertension and statin therapy with Lipitor and antiplatelet therapy with aspirin have been continued.    Diagnoses and all orders for this visit:    Essential hypertension  -     Adult Transthoracic Echo Complete; Future    Hyperlipidemia, unspecified hyperlipidemia type  -     Adult Transthoracic Echo Complete; Future    Angina effort  -     Adult Transthoracic Echo Complete; Future    S/P CABG x 1  -     Adult Transthoracic Echo Complete; Future    Dyspnea on exertion    Other orders  -     Discontinue: isosorbide mononitrate (IMDUR) 30 MG 24 hr tablet; Take 1 tablet by mouth Daily.  -     Discontinue: metoprolol succinate XL (TOPROL-XL) 25 MG 24 hr tablet; Take 1 tablet by mouth Daily.        Perla Jay MD  8/29/2017  12:07  PM

## 2017-09-07 NOTE — CONSULTS
CVTS CONSULTATION          Patient Care Team:  Nick Morgan MD as PCP - General (Family Medicine)  Nick Morgan MD as PCP - Claims Attributed  Requesting Provider: Perla Jay MD    Chief complaint: CAD      SUBJECTIVE       History of Present Illness:  Mr. Austin is a 66-year-old  male who is being seen by me for the first time.  He presents for evaluation of chest pressure and shortness of breath which has been going on for the past 3 months.  His dyspnea is NYHA class II and chest pressure was triggered by activity though it has occurred on bending forward.  His history is remarkable for document of coronary artery disease and back in 1989 underwent CABG ×1 vessel in [facility].  He is done well since then and has not followed up with cardiology in more than 20 years.  His risk factors include hypertension, hyperlipidemia, hypothyroidism, obesity and premature coronary artery disease.  He is a nonsmoker. Mr. Austin underwent elective cardiac cath today demonstrating severe coronary disease LAD 90%, CIRCUMFLEX 100%, RCA 90%. Dr. Morgan was consulted for consideration of redo revascularization surgery.      The following portions of the patient's history were reviewed and updated as appropriate: allergies, current medications, past family history, past medical history, past social history, past surgical history and problem list.  Recent images independently reviewed.  Available laboratory values reviewed.    Review of Systems   Constitutional: Positive for fatigue. Negative for diaphoresis.   Respiratory: Positive for chest tightness and shortness of breath.    Cardiovascular: Negative for palpitations and leg swelling.   Gastrointestinal: Negative for abdominal pain.   Genitourinary: Negative for dysuria.   Musculoskeletal: Negative for back pain.   Skin: Negative.    Neurological: Negative for dizziness, speech difficulty and numbness.   Hematological: Does not  bruise/bleed easily.   All other systems reviewed and are negative.       Past Medical History:   Diagnosis Date   • Acute bronchitis    • Backache    • Chronic pain    • Coronary arteriosclerosis    • Essential hypertension    • GERD (gastroesophageal reflux disease)    • Hyperglycemia    • Hyperlipidemia    • Hypothyroidism    • Type 2 diabetes mellitus      Past Surgical History:   Procedure Laterality Date   • CHOLECYSTECTOMY     • CORONARY ARTERY BYPASS GRAFT     • INJECTION OF MEDICATION  02/23/2015    dexamethasone   • LEG SURGERY      extensive lower leg   • SHOULDER SURGERY       Family History   Problem Relation Age of Onset   • No Known Problems Mother    • No Known Problems Father    • No Known Problems Sister    • No Known Problems Brother    • No Known Problems Daughter    • No Known Problems Son    • No Known Problems Maternal Aunt    • No Known Problems Maternal Uncle    • No Known Problems Paternal Aunt    • No Known Problems Paternal Uncle    • No Known Problems Maternal Grandmother    • No Known Problems Maternal Grandfather    • No Known Problems Paternal Grandmother    • No Known Problems Paternal Grandfather      Social History   Substance Use Topics   • Smoking status: Never Smoker   • Smokeless tobacco: Never Used   • Alcohol use No     Prescriptions Prior to Admission   Medication Sig Dispense Refill Last Dose   • albuterol (PROVENTIL HFA;VENTOLIN HFA) 108 (90 BASE) MCG/ACT inhaler Inhale 2 puffs Every 4 (Four) Hours As Needed (asthma). Take 30 minutes before P.E or exercise. 18 g 3 9/6/2017 at 1800   • aspirin 325 MG tablet Take 325 mg by mouth Daily.   9/6/2017 at 1800   • atorvastatin (LIPITOR) 20 MG tablet Take 20 mg by mouth Every Night.   9/6/2017 at 1800   • Fluticasone Furoate-Vilanterol (BREO ELLIPTA) 100-25 MCG/INH aerosol powder  Inhale 1 dose Daily. 3 each 3 9/6/2017 at 1800   • isosorbide mononitrate (IMDUR) 30 MG 24 hr tablet Take 1 tablet by mouth Daily. 30 tablet 6 9/6/2017  "at 1800   • lisinopril-hydrochlorothiazide (PRINZIDE,ZESTORETIC) 20-25 MG per tablet Take 1 tablet by mouth Daily.   9/6/2017 at 1800   • metoprolol succinate XL (TOPROL-XL) 25 MG 24 hr tablet q hs (Patient taking differently: 25 mg Daily. q hs) 30 tablet 3 9/6/2017 at 1800   • Omega-3 Fatty Acids (FISH OIL) 1000 MG capsule capsule Take 1,000 mg by mouth Daily With Breakfast.   9/6/2017 at 1800   • omeprazole (priLOSEC) 40 MG capsule Take 40 mg by mouth Daily.   9/6/2017 at 1800   • SYNTHROID 88 MCG tablet Take 1 tablet by mouth Daily. 90 tablet 2 9/6/2017 at 1800   • tamsulosin (FLOMAX) 0.4 MG capsule 24 hr capsule Take 1 capsule by mouth Every Night.   9/6/2017 at 1800        Allergies:  Tetanus toxoids    OBJECTIVE        Vital Signs  Temp:  [97.3 °F (36.3 °C)-97.4 °F (36.3 °C)] 97.3 °F (36.3 °C)  Heart Rate:  [60-64] 63  Resp:  [16-18] 18  BP: ()/(51-76) 107/58    Flowsheet Rows         First Filed Value    Admission Height  67\" (170.2 cm) Documented at 09/07/2017 0635    Admission Weight  269 lb 13.5 oz (122 kg) Documented at 09/07/2017 0635        67\" (170.2 cm)    Physical Exam   Constitutional: He is oriented to person, place, and time. He appears well-developed.   HENT:   Head: Normocephalic.   Eyes: Pupils are equal, round, and reactive to light.   Neck: Neck supple. Carotid bruit is not present.   Cardiovascular: Normal rate and normal heart sounds.    Pulses:       Dorsalis pedis pulses are 2+ on the right side, and 2+ on the left side.        Posterior tibial pulses are 2+ on the right side, and 2+ on the left side.   Pulmonary/Chest: Effort normal and breath sounds normal. No respiratory distress.   Abdominal: Soft. Bowel sounds are normal.   Musculoskeletal: Normal range of motion. He exhibits no edema.   Neurological: He is alert and oriented to person, place, and time. No cranial nerve deficit.   Skin: Skin is warm and dry.   Psychiatric: He has a normal mood and affect. Thought content " normal.   Vitals reviewed.      Results Review:   Lab Results   Component Value Date    WBC 8.37 03/28/2017    HGB 15.3 03/28/2017    HCT 46.4 03/28/2017    MCV 93.9 03/28/2017     03/28/2017     Lab Results   Component Value Date    GLUCOSE 118 (H) 09/07/2017    BUN 14 09/07/2017    CREATININE 0.99 09/07/2017    EGFRIFNONA 75 09/07/2017    BCR 14.1 09/07/2017    K 3.7 09/07/2017    CO2 26.0 09/07/2017    CALCIUM 8.9 09/07/2017    ALBUMIN 4.30 09/07/2017    LABIL2 1.3 09/07/2017    AST 36 09/07/2017    ALT 56 09/07/2017     Results for orders placed during the hospital encounter of 08/30/17   Adult Transthoracic Echo Complete    Narrative · Mild tricuspid valve regurgitation is present.  · Mild pulmonic valve regurgitation is present.  · Left ventricular wall thickness is consistent with borderline concentric   hypertrophy.  · Left atrial cavity size is mildly dilated.  · Left ventricular systolic function is normal. Estimated EF = 55%.  · Left ventricular diastolic dysfunction (grade I) consistent with   impaired relaxation.                ASSESSMENT/PLAN       Principal Problem:    Coronary artery disease involving native coronary artery of native heart with angina pectoris  Active Problems:    Angina pectoris      Assessment:    Condition: In stable condition.   (Mr. Matt cardiac cath demonstrates severe CAD and currently chest pain free. Dr. Morgan independently reviewed cath films and discussed with  and Mr. Matt regarding options for medical management, PCI, CABG.  ).     Plan:   (Coronary Artery Bypass Grafting x 3 vessels is advisable with endoscopic vein harvesting. Mr. Matt is aware of the risks involved, potential for complications, and hoped for benefits. He understands and wishes to proceed with surgical revascularization on 9/14/17. Currently we will complete pre-op work-up: Carotid Duplex Scan, BLE Vein Mapping, 5 Meter Walk Test, Type & Screen, CT Chest. Preoperative  teaching has been completed and all questions have been answered.    ).       Thank you for the opportunity to participate in this patient's care.              This document has been electronically signed by RANDEE Brown on September 7, 2017 11:52 AM

## 2017-09-12 ENCOUNTER — ANESTHESIA EVENT (OUTPATIENT)
Dept: PERIOP | Facility: HOSPITAL | Age: 67
End: 2017-09-12

## 2017-09-12 ENCOUNTER — APPOINTMENT (OUTPATIENT)
Dept: PREADMISSION TESTING | Facility: HOSPITAL | Age: 67
End: 2017-09-12

## 2017-09-12 VITALS
WEIGHT: 267 LBS | OXYGEN SATURATION: 95 % | HEART RATE: 62 BPM | DIASTOLIC BLOOD PRESSURE: 70 MMHG | SYSTOLIC BLOOD PRESSURE: 120 MMHG | HEIGHT: 66 IN | BODY MASS INDEX: 42.91 KG/M2 | RESPIRATION RATE: 18 BRPM

## 2017-09-12 DIAGNOSIS — I25.118 CORONARY ARTERY DISEASE OF NATIVE ARTERY OF NATIVE HEART WITH STABLE ANGINA PECTORIS (HCC): Primary | ICD-10-CM

## 2017-09-12 RX ORDER — METOPROLOL SUCCINATE 25 MG/1
25 TABLET, EXTENDED RELEASE ORAL NIGHTLY
COMMUNITY
End: 2018-02-20 | Stop reason: SINTOL

## 2017-09-12 NOTE — ANESTHESIA PREPROCEDURE EVALUATION
Anesthesia Evaluation     Patient summary reviewed and Nursing notes reviewed   NPO Solid Status: > 8 hours       Airway   Mallampati: II  TM distance: >3 FB  Neck ROM: full  possible difficult intubation  Dental    (+) poor dentation    Comment: Upper caps.     Pulmonary - normal exam    breath sounds clear to auscultation  (+) asthma, shortness of breath,   Cardiovascular - normal exam    ECG reviewed  Patient on routine beta blocker and Beta blocker given within 24 hours of surgery  Rhythm: regular  Rate: normal    (+) hypertension well controlled, CAD, CABG ([address] [place] [address]) > 6 Months, angina, hyperlipidemia    ROS comment: EKG:NSR. EF 55%    Neuro/Psych- negative ROS  GI/Hepatic/Renal/Endo    (+) obesity,  GERD well controlled, diabetes mellitus type 2, hypothyroidism,     Musculoskeletal     Abdominal   (+) obese,    Substance History - negative use     OB/GYN negative ob/gyn ROS         Other   (+) arthritis                                   Anesthesia Plan    ASA 3     general   (Discussed central line, [place] monitoring,arterial line,post op ventilation and patient,wife understand possible complications,risks and agrees.)  intravenous induction   Anesthetic plan and risks discussed with patient and spouse/significant other.

## 2017-09-14 ENCOUNTER — HOSPITAL ENCOUNTER (INPATIENT)
Facility: HOSPITAL | Age: 67
LOS: 4 days | Discharge: HOME HEALTH | End: 2017-09-18
Attending: THORACIC SURGERY (CARDIOTHORACIC VASCULAR SURGERY) | Admitting: THORACIC SURGERY (CARDIOTHORACIC VASCULAR SURGERY)

## 2017-09-14 ENCOUNTER — ANESTHESIA (OUTPATIENT)
Dept: PERIOP | Facility: HOSPITAL | Age: 67
End: 2017-09-14

## 2017-09-14 ENCOUNTER — APPOINTMENT (OUTPATIENT)
Dept: GENERAL RADIOLOGY | Facility: HOSPITAL | Age: 67
End: 2017-09-14

## 2017-09-14 DIAGNOSIS — I25.119 CORONARY ARTERY DISEASE INVOLVING NATIVE CORONARY ARTERY OF NATIVE HEART WITH ANGINA PECTORIS (HCC): Primary | ICD-10-CM

## 2017-09-14 DIAGNOSIS — Z78.9 IMPAIRED MOBILITY AND ADLS: ICD-10-CM

## 2017-09-14 DIAGNOSIS — Z74.09 IMPAIRED PHYSICAL MOBILITY: ICD-10-CM

## 2017-09-14 DIAGNOSIS — R26.89 IMPAIRED GAIT AND MOBILITY: ICD-10-CM

## 2017-09-14 DIAGNOSIS — Z74.09 IMPAIRED MOBILITY AND ADLS: ICD-10-CM

## 2017-09-14 DIAGNOSIS — I25.118 CORONARY ARTERY DISEASE OF NATIVE ARTERY OF NATIVE HEART WITH STABLE ANGINA PECTORIS (HCC): ICD-10-CM

## 2017-09-14 PROBLEM — I25.10 CAD (CORONARY ARTERY DISEASE): Status: ACTIVE | Noted: 2017-09-14

## 2017-09-14 LAB
ABO GROUP BLD: NORMAL
ALBUMIN SERPL-MCNC: 2.9 G/DL (ref 3.4–4.8)
ANION GAP SERPL CALCULATED.3IONS-SCNC: 10 MMOL/L (ref 5–15)
APTT PPP: 38.8 SECONDS (ref 20–40.3)
ARTERIAL PATENCY WRIST A: ABNORMAL
ATMOSPHERIC PRESS: ABNORMAL MMHG
BASE EXCESS BLDA CALC-SCNC: -1.6 MMOL/L (ref -2.4–2.4)
BASE EXCESS BLDA CALC-SCNC: -1.9 MMOL/L (ref -2.4–2.4)
BASE EXCESS BLDA CALC-SCNC: -2 MMOL/L
BASE EXCESS BLDA CALC-SCNC: -3.6 MMOL/L (ref -2.4–2.4)
BASE EXCESS BLDA CALC-SCNC: -3.9 MMOL/L (ref -2.4–2.4)
BASE EXCESS BLDA CALC-SCNC: -5 MMOL/L (ref -2.4–2.4)
BASE EXCESS BLDA CALC-SCNC: -6.9 MMOL/L (ref -2.4–2.4)
BASE EXCESS BLDA CALC-SCNC: 2 MMOL/L
BASE EXCESS BLDA CALC-SCNC: 2 MMOL/L
BASE EXCESS BLDA CALC-SCNC: 3 MMOL/L
BASE EXCESS BLDA CALC-SCNC: 3 MMOL/L
BASE EXCESS BLDA CALC-SCNC: 4 MMOL/L
BASE EXCESS BLDA CALC-SCNC: 5 MMOL/L
BASE EXCESS BLDA CALC-SCNC: 5 MMOL/L
BASE EXCESS BLDA CALC-SCNC: 6 MMOL/L
BASE EXCESS BLDV CALC-SCNC: -2.4 MMOL/L (ref -2.4–2.4)
BASOPHILS # BLD AUTO: 0.01 10*3/MM3 (ref 0–0.2)
BASOPHILS NFR BLD AUTO: 0.1 % (ref 0–2)
BDY SITE: ABNORMAL
BLD GP AB SCN SERPL QL: NEGATIVE
BUN BLD-MCNC: 14 MG/DL (ref 7–21)
BUN/CREAT SERPL: 11.8 (ref 7–25)
CA-I BLD-MCNC: 3.8 MG/DL (ref 4.5–4.9)
CA-I BLD-MCNC: 4.1 MG/DL (ref 4.5–4.9)
CA-I BLD-MCNC: 4.1 MG/DL (ref 4.5–4.9)
CA-I BLD-MCNC: 4.2 MG/DL (ref 4.5–4.9)
CA-I BLD-MCNC: 4.2 MG/DL (ref 4.5–4.9)
CA-I BLD-MCNC: 4.3 MG/DL (ref 4.5–4.9)
CA-I BLD-MCNC: 4.3 MG/DL (ref 4.5–4.9)
CA-I BLD-MCNC: 4.5 MG/DL (ref 4.5–4.9)
CA-I BLDA-SCNC: 3.7 MMOL/L
CA-I BLDA-SCNC: 3.8 MMOL/L
CA-I BLDA-SCNC: 3.8 MMOL/L
CA-I BLDA-SCNC: 3.9 MMOL/L
CA-I BLDA-SCNC: 4.5 MMOL/L
CA-I BLDA-SCNC: 4.7 MMOL/L
CALCIUM SPEC-SCNC: 7.4 MG/DL (ref 8.4–10.2)
CHLORIDE SERPL-SCNC: 104 MMOL/L (ref 95–110)
CO2 BLDA-SCNC: 21.6 MMOL/L (ref 23–27)
CO2 BLDA-SCNC: 22.1 MMOL/L (ref 23–27)
CO2 BLDA-SCNC: 22.3 MMOL/L (ref 23–27)
CO2 BLDA-SCNC: 22.6 MMOL/L (ref 23–27)
CO2 BLDA-SCNC: 24.5 MMOL/L (ref 23–27)
CO2 BLDA-SCNC: 25 MMOL/L (ref 23–27)
CO2 BLDA-SCNC: 25.6 MMOL/L (ref 23–27)
CO2 BLDA-SCNC: 25.9 MMOL/L (ref 23–27)
CO2 BLDA-SCNC: 28 MMOL/L (ref 23–27)
CO2 BLDA-SCNC: 29 MMOL/L (ref 23–27)
CO2 BLDA-SCNC: 29 MMOL/L (ref 23–27)
CO2 BLDA-SCNC: 30 MMOL/L (ref 23–27)
CO2 BLDA-SCNC: 30 MMOL/L (ref 23–27)
CO2 BLDA-SCNC: 32 MMOL/L (ref 23–27)
CO2 SERPL-SCNC: 24 MMOL/L (ref 22–31)
CREAT BLD-MCNC: 1.19 MG/DL (ref 0.7–1.3)
DEPRECATED RDW RBC AUTO: 44.7 FL (ref 35.1–43.9)
EOSINOPHIL # BLD AUTO: 0.02 10*3/MM3 (ref 0–0.7)
EOSINOPHIL NFR BLD AUTO: 0.2 % (ref 0–7)
ERYTHROCYTE [DISTWIDTH] IN BLOOD BY AUTOMATED COUNT: 13.6 % (ref 11.5–14.5)
GFR SERPL CREATININE-BSD FRML MDRD: 61 ML/MIN/1.73 (ref 49–113)
GLUCOSE BLD-MCNC: 135 MG/DL (ref 60–100)
GLUCOSE BLDA-MCNC: 146 MMOL/L
GLUCOSE BLDA-MCNC: 169 MMOL/L
GLUCOSE BLDA-MCNC: 180 MMOL/L
GLUCOSE BLDA-MCNC: 191 MMOL/L
GLUCOSE BLDA-MCNC: 191 MMOL/L
GLUCOSE BLDA-MCNC: 196 MMOL/L
GLUCOSE BLDC GLUCOMTR-MCNC: 119 MG/DL (ref 70–130)
GLUCOSE BLDC GLUCOMTR-MCNC: 125 MG/DL (ref 70–130)
GLUCOSE BLDC GLUCOMTR-MCNC: 126 MG/DL (ref 70–130)
GLUCOSE BLDC GLUCOMTR-MCNC: 126 MG/DL (ref 70–130)
GLUCOSE BLDC GLUCOMTR-MCNC: 128 MG/DL (ref 70–130)
GLUCOSE BLDC GLUCOMTR-MCNC: 128 MG/DL (ref 70–130)
GLUCOSE BLDC GLUCOMTR-MCNC: 130 MG/DL (ref 70–130)
GLUCOSE BLDC GLUCOMTR-MCNC: 148 MG/DL (ref 70–130)
GLUCOSE BLDC GLUCOMTR-MCNC: 170 MG/DL (ref 70–130)
HCO3 BLDA-SCNC: 20.1 MMOL/L (ref 22–26)
HCO3 BLDA-SCNC: 21 MMOL/L (ref 22–26)
HCO3 BLDA-SCNC: 21 MMOL/L (ref 22–26)
HCO3 BLDA-SCNC: 21.5 MMOL/L (ref 22–26)
HCO3 BLDA-SCNC: 23.2 MMOL/L (ref 22–26)
HCO3 BLDA-SCNC: 23.4 MMOL/L
HCO3 BLDA-SCNC: 24.2 MMOL/L (ref 22–26)
HCO3 BLDA-SCNC: 27 MMOL/L
HCO3 BLDA-SCNC: 27.7 MMOL/L
HCO3 BLDA-SCNC: 27.7 MMOL/L
HCO3 BLDA-SCNC: 28.5 MMOL/L
HCO3 BLDA-SCNC: 28.7 MMOL/L
HCO3 BLDA-SCNC: 30.3 MMOL/L
HCO3 BLDA-SCNC: 30.3 MMOL/L
HCO3 BLDA-SCNC: 30.4 MMOL/L
HCO3 BLDV-SCNC: 24.3 MMOL/L
HCT VFR BLD AUTO: 28.7 % (ref 39–49)
HCT VFR BLD CALC: 29 % (ref 40–54)
HCT VFR BLD CALC: 30 % (ref 40–54)
HCT VFR BLD CALC: 31 % (ref 40–54)
HCT VFR BLD CALC: 31 % (ref 40–54)
HCT VFR BLDA CALC: 27 %
HCT VFR BLDA CALC: 28 %
HCT VFR BLDA CALC: 28 %
HCT VFR BLDA CALC: 29 %
HCT VFR BLDA CALC: 30 %
HCT VFR BLDA CALC: 40 %
HGB BLD-MCNC: 9.7 G/DL (ref 13.7–17.3)
HGB BLDA-MCNC: 10 G/DL (ref 14–18)
HGB BLDA-MCNC: 10 G/DL (ref 14–18)
HGB BLDA-MCNC: 10.2 G/DL
HGB BLDA-MCNC: 10.3 G/DL (ref 14–18)
HGB BLDA-MCNC: 10.5 G/DL (ref 14–18)
HGB BLDA-MCNC: 10.6 G/DL (ref 14–18)
HGB BLDA-MCNC: 13.6 G/DL
HGB BLDA-MCNC: 9.2 G/DL
HGB BLDA-MCNC: 9.5 G/DL
HGB BLDA-MCNC: 9.5 G/DL
HGB BLDA-MCNC: 9.7 G/DL (ref 14–18)
HGB BLDA-MCNC: 9.9 G/DL
HGB BLDA-MCNC: 9.9 G/DL (ref 14–18)
IMM GRANULOCYTES # BLD: 0.06 10*3/MM3 (ref 0–0.02)
IMM GRANULOCYTES NFR BLD: 0.5 % (ref 0–0.5)
INR PPP: 1.59 (ref 0.8–1.2)
LYMPHOCYTES # BLD AUTO: 0.55 10*3/MM3 (ref 0.6–4.2)
LYMPHOCYTES NFR BLD AUTO: 4.5 % (ref 10–50)
Lab: NORMAL
MAGNESIUM SERPL-MCNC: 2.1 MG/DL (ref 1.6–2.3)
MAGNESIUM SERPL-MCNC: 2.7 MG/DL (ref 1.6–2.3)
MCH RBC QN AUTO: 30.9 PG (ref 26.5–34)
MCHC RBC AUTO-ENTMCNC: 33.8 G/DL (ref 31.5–36.3)
MCV RBC AUTO: 91.4 FL (ref 80–98)
MODALITY: ABNORMAL
MONOCYTES # BLD AUTO: 0.46 10*3/MM3 (ref 0–0.9)
MONOCYTES NFR BLD AUTO: 3.8 % (ref 0–12)
NEUTROPHILS # BLD AUTO: 11.15 10*3/MM3 (ref 2–8.6)
NEUTROPHILS NFR BLD AUTO: 90.9 % (ref 37–80)
PCO2 BLDA: 37.2 MM HG (ref 35–45)
PCO2 BLDA: 38.6 MM HG (ref 35–45)
PCO2 BLDA: 40.9 MM HG (ref 35–45)
PCO2 BLDA: 42.7 MM HG (ref 35–45)
PCO2 BLDA: 44.2 MM HG
PCO2 BLDA: 45.1 MM HG
PCO2 BLDA: 45.3 MM HG (ref 35–45)
PCO2 BLDA: 46.7 MM HG
PCO2 BLDA: 47.3 MM HG (ref 35–45)
PCO2 BLDA: 47.7 MM HG
PCO2 BLDA: 48.3 MM HG
PCO2 BLDA: 48.6 MM HG
PCO2 BLDA: 50.6 MM HG
PCO2 BLDA: 53.9 MM HG
PCO2 BLDA: 54.8 MM HG
PCO2 BLDV: 51 MM HG
PH BLDA: 7.25 PH UNITS (ref 7.35–7.45)
PH BLDA: 7.31 PH UNITS (ref 7.35–7.45)
PH BLDA: 7.33 PH UNITS
PH BLDA: 7.33 PH UNITS
PH BLDA: 7.34 PH UNITS (ref 7.35–7.45)
PH BLDA: 7.36 PH UNITS
PH BLDA: 7.36 PH UNITS (ref 7.35–7.45)
PH BLDA: 7.37 PH UNITS
PH BLDA: 7.37 PH UNITS (ref 7.35–7.45)
PH BLDA: 7.37 PH UNITS (ref 7.35–7.45)
PH BLDA: 7.38 PH UNITS
PH BLDA: 7.39 PH UNITS
PH BLDA: 7.4 PH UNITS
PH BLDV: 7.3 PH UNITS (ref 7.31–7.42)
PHOSPHATE SERPL-MCNC: 3.6 MG/DL (ref 2.4–4.4)
PLATELET # BLD AUTO: 126 10*3/MM3 (ref 150–450)
PMV BLD AUTO: 11.1 FL (ref 8–12)
PO2 BLDA: 106.1 MM HG (ref 80–105)
PO2 BLDA: 110.7 MM HG (ref 80–105)
PO2 BLDA: 157 MMHG
PO2 BLDA: 251 MMHG
PO2 BLDA: 369 MMHG
PO2 BLDA: 399 MMHG
PO2 BLDA: 422 MMHG
PO2 BLDA: 458 MMHG
PO2 BLDA: 474 MMHG
PO2 BLDA: 481 MMHG
PO2 BLDA: 51 MMHG
PO2 BLDA: 84 MM HG (ref 80–105)
PO2 BLDA: 90.6 MM HG (ref 80–105)
PO2 BLDA: 91.4 MM HG (ref 80–105)
PO2 BLDA: 96 MM HG (ref 80–105)
PO2 BLDV: 38.9 MM HG
POTASSIUM BLD-SCNC: 3.3 MMOL/L (ref 3.5–5.1)
POTASSIUM BLDA-SCNC: 2.8 MMOL/L (ref 3.5–4.9)
POTASSIUM BLDA-SCNC: 3 MMOL/L (ref 3.5–4.9)
POTASSIUM BLDA-SCNC: 3 MMOL/L (ref 3.5–4.9)
POTASSIUM BLDA-SCNC: 3.2 MMOL/L (ref 3.5–4.9)
POTASSIUM BLDA-SCNC: 3.31 MMOL/L (ref 3.6–4.9)
POTASSIUM BLDA-SCNC: 3.49 MMOL/L (ref 3.6–4.9)
POTASSIUM BLDA-SCNC: 3.51 MMOL/L (ref 3.6–4.9)
POTASSIUM BLDA-SCNC: 3.6 MMOL/L (ref 3.5–4.9)
POTASSIUM BLDA-SCNC: 3.6 MMOL/L (ref 3.5–4.9)
POTASSIUM BLDA-SCNC: 3.7 MMOL/L (ref 3.5–4.9)
POTASSIUM BLDA-SCNC: 3.74 MMOL/L (ref 3.6–4.9)
POTASSIUM BLDA-SCNC: 3.8 MMOL/L (ref 3.5–4.9)
POTASSIUM BLDA-SCNC: 3.8 MMOL/L (ref 3.5–4.9)
POTASSIUM BLDA-SCNC: 3.89 MMOL/L (ref 3.6–4.9)
POTASSIUM BLDA-SCNC: 3.93 MMOL/L (ref 3.6–4.9)
POTASSIUM BLDV-SCNC: 3.3 MMOL/L (ref 3.5–4.9)
PROTHROMBIN TIME: 19 SECONDS (ref 11.1–15.3)
RBC # BLD AUTO: 3.14 10*6/MM3 (ref 4.37–5.74)
RH BLD: POSITIVE
SAO2 % BLDCOA: 100 %
SAO2 % BLDCOA: 82 %
SAO2 % BLDCOA: 94.4 % (ref 94–100)
SAO2 % BLDCOA: 94.6 %
SAO2 % BLDCOA: 95.5 % (ref 94–100)
SAO2 % BLDCOA: 96.8 %
SAO2 % BLDCOA: 97.2 %
SAO2 % BLDCOA: 97.3 % (ref 94–100)
SAO2 % BLDCOA: 99 %
SAO2 % BLDCOV: 62.9 %
SODIUM BLD-SCNC: 136 MMOL/L (ref 138–146)
SODIUM BLD-SCNC: 138 MMOL/L (ref 137–145)
SODIUM BLD-SCNC: 138 MMOL/L (ref 138–146)
SODIUM BLD-SCNC: 138 MMOL/L (ref 138–146)
SODIUM BLD-SCNC: 139 MMOL/L (ref 138–146)
SODIUM BLD-SCNC: 140 MMOL/L (ref 138–146)
SODIUM BLD-SCNC: 142 MMOL/L (ref 138–146)
SODIUM BLDA-SCNC: 139.8 MMOL/L (ref 138–146)
SODIUM BLDA-SCNC: 140 MMOL/L (ref 138–146)
SODIUM BLDA-SCNC: 140.2 MMOL/L (ref 138–146)
SODIUM BLDA-SCNC: 141.2 MMOL/L (ref 138–146)
SODIUM BLDA-SCNC: 141.2 MMOL/L (ref 138–146)
SODIUM BLDA-SCNC: 141.4 MMOL/L (ref 138–146)
SODIUM BLDV-SCNC: 140.6 MMOL/L (ref 120–160)
WBC NRBC COR # BLD: 12.25 10*3/MM3 (ref 3.2–9.8)

## 2017-09-14 PROCEDURE — 82947 ASSAY GLUCOSE BLOOD QUANT: CPT | Performed by: THORACIC SURGERY (CARDIOTHORACIC VASCULAR SURGERY)

## 2017-09-14 PROCEDURE — 85025 COMPLETE CBC W/AUTO DIFF WBC: CPT | Performed by: NURSE PRACTITIONER

## 2017-09-14 PROCEDURE — 86923 COMPATIBILITY TEST ELECTRIC: CPT

## 2017-09-14 PROCEDURE — 87070 CULTURE OTHR SPECIMN AEROBIC: CPT | Performed by: THORACIC SURGERY (CARDIOTHORACIC VASCULAR SURGERY)

## 2017-09-14 PROCEDURE — 82330 ASSAY OF CALCIUM: CPT | Performed by: THORACIC SURGERY (CARDIOTHORACIC VASCULAR SURGERY)

## 2017-09-14 PROCEDURE — 87147 CULTURE TYPE IMMUNOLOGIC: CPT | Performed by: THORACIC SURGERY (CARDIOTHORACIC VASCULAR SURGERY)

## 2017-09-14 PROCEDURE — P9019 PLATELETS, EACH UNIT: HCPCS

## 2017-09-14 PROCEDURE — 85014 HEMATOCRIT: CPT | Performed by: THORACIC SURGERY (CARDIOTHORACIC VASCULAR SURGERY)

## 2017-09-14 PROCEDURE — 94799 UNLISTED PULMONARY SVC/PX: CPT

## 2017-09-14 PROCEDURE — 25010000002 DOBUTAMINE PER 250 MG: Performed by: NURSE ANESTHETIST, CERTIFIED REGISTERED

## 2017-09-14 PROCEDURE — 25010000002 HEPARIN (PORCINE) PER 1000 UNITS: Performed by: NURSE ANESTHETIST, CERTIFIED REGISTERED

## 2017-09-14 PROCEDURE — 82803 BLOOD GASES ANY COMBINATION: CPT | Performed by: THORACIC SURGERY (CARDIOTHORACIC VASCULAR SURGERY)

## 2017-09-14 PROCEDURE — 06BQ4ZZ EXCISION OF LEFT SAPHENOUS VEIN, PERCUTANEOUS ENDOSCOPIC APPROACH: ICD-10-PCS | Performed by: THORACIC SURGERY (CARDIOTHORACIC VASCULAR SURGERY)

## 2017-09-14 PROCEDURE — 25010000002 MORPHINE SULFATE (PF) 2 MG/ML SOLUTION: Performed by: NURSE PRACTITIONER

## 2017-09-14 PROCEDURE — 25010000002 FENTANYL CITRATE (PF) 250 MCG/5ML SOLUTION: Performed by: NURSE ANESTHETIST, CERTIFIED REGISTERED

## 2017-09-14 PROCEDURE — 25010000002 PROPOFOL 1000 MG/100ML EMULSION: Performed by: NURSE ANESTHETIST, CERTIFIED REGISTERED

## 2017-09-14 PROCEDURE — 25010000002 ALBUMIN HUMAN 5% PER 50 ML: Performed by: THORACIC SURGERY (CARDIOTHORACIC VASCULAR SURGERY)

## 2017-09-14 PROCEDURE — 93005 ELECTROCARDIOGRAM TRACING: CPT | Performed by: NURSE PRACTITIONER

## 2017-09-14 PROCEDURE — 94003 VENT MGMT INPAT SUBQ DAY: CPT

## 2017-09-14 PROCEDURE — C1725 CATH, TRANSLUMIN NON-LASER: HCPCS | Performed by: THORACIC SURGERY (CARDIOTHORACIC VASCULAR SURGERY)

## 2017-09-14 PROCEDURE — 021009W BYPASS CORONARY ARTERY, ONE ARTERY FROM AORTA WITH AUTOLOGOUS VENOUS TISSUE, OPEN APPROACH: ICD-10-PCS | Performed by: THORACIC SURGERY (CARDIOTHORACIC VASCULAR SURGERY)

## 2017-09-14 PROCEDURE — 25010000002 AMIODARONE IN DEXTROSE 5% 150-4.21 MG/100ML-% SOLUTION: Performed by: NURSE ANESTHETIST, CERTIFIED REGISTERED

## 2017-09-14 PROCEDURE — 94760 N-INVAS EAR/PLS OXIMETRY 1: CPT

## 2017-09-14 PROCEDURE — 87077 CULTURE AEROBIC IDENTIFY: CPT | Performed by: THORACIC SURGERY (CARDIOTHORACIC VASCULAR SURGERY)

## 2017-09-14 PROCEDURE — 94002 VENT MGMT INPAT INIT DAY: CPT

## 2017-09-14 PROCEDURE — A4648 IMPLANTABLE TISSUE MARKER: HCPCS | Performed by: THORACIC SURGERY (CARDIOTHORACIC VASCULAR SURGERY)

## 2017-09-14 PROCEDURE — 82962 GLUCOSE BLOOD TEST: CPT

## 2017-09-14 PROCEDURE — C1713 ANCHOR/SCREW BN/BN,TIS/BN: HCPCS | Performed by: THORACIC SURGERY (CARDIOTHORACIC VASCULAR SURGERY)

## 2017-09-14 PROCEDURE — 86901 BLOOD TYPING SEROLOGIC RH(D): CPT | Performed by: ANESTHESIOLOGY

## 2017-09-14 PROCEDURE — 82495 ASSAY OF CHROMIUM: CPT | Performed by: THORACIC SURGERY (CARDIOTHORACIC VASCULAR SURGERY)

## 2017-09-14 PROCEDURE — 33530 CORONARY ARTERY BYPASS/REOP: CPT | Performed by: THORACIC SURGERY (CARDIOTHORACIC VASCULAR SURGERY)

## 2017-09-14 PROCEDURE — 25010000002 AMIODARONE PER 30 MG: Performed by: NURSE ANESTHETIST, CERTIFIED REGISTERED

## 2017-09-14 PROCEDURE — C1894 INTRO/SHEATH, NON-LASER: HCPCS | Performed by: THORACIC SURGERY (CARDIOTHORACIC VASCULAR SURGERY)

## 2017-09-14 PROCEDURE — 02100Z9 BYPASS CORONARY ARTERY, ONE ARTERY FROM LEFT INTERNAL MAMMARY, OPEN APPROACH: ICD-10-PCS | Performed by: THORACIC SURGERY (CARDIOTHORACIC VASCULAR SURGERY)

## 2017-09-14 PROCEDURE — 25010000002 DOBUTAMINE PER 250 MG: Performed by: NURSE PRACTITIONER

## 2017-09-14 PROCEDURE — 25010000002 HEPARIN (PORCINE) PER 1000 UNITS: Performed by: THORACIC SURGERY (CARDIOTHORACIC VASCULAR SURGERY)

## 2017-09-14 PROCEDURE — 63710000001 INSULIN DETEMIR PER 5 UNITS: Performed by: NURSE PRACTITIONER

## 2017-09-14 PROCEDURE — 87205 SMEAR GRAM STAIN: CPT | Performed by: THORACIC SURGERY (CARDIOTHORACIC VASCULAR SURGERY)

## 2017-09-14 PROCEDURE — 63710000001 INSULIN REGULAR HUMAN PER 5 UNITS: Performed by: NURSE ANESTHETIST, CERTIFIED REGISTERED

## 2017-09-14 PROCEDURE — P9041 ALBUMIN (HUMAN),5%, 50ML: HCPCS | Performed by: THORACIC SURGERY (CARDIOTHORACIC VASCULAR SURGERY)

## 2017-09-14 PROCEDURE — C1896 LEAD, AICD, NON SING/DUAL: HCPCS | Performed by: THORACIC SURGERY (CARDIOTHORACIC VASCULAR SURGERY)

## 2017-09-14 PROCEDURE — 25010000003 CEFAZOLIN PER 500 MG: Performed by: NURSE PRACTITIONER

## 2017-09-14 PROCEDURE — 93010 ELECTROCARDIOGRAM REPORT: CPT | Performed by: INTERNAL MEDICINE

## 2017-09-14 PROCEDURE — 83735 ASSAY OF MAGNESIUM: CPT | Performed by: NURSE PRACTITIONER

## 2017-09-14 PROCEDURE — 36430 TRANSFUSION BLD/BLD COMPNT: CPT

## 2017-09-14 PROCEDURE — 25010000002 PROTAMINE SULFATE PER 10 MG: Performed by: NURSE ANESTHETIST, CERTIFIED REGISTERED

## 2017-09-14 PROCEDURE — 82803 BLOOD GASES ANY COMBINATION: CPT | Performed by: NURSE PRACTITIONER

## 2017-09-14 PROCEDURE — 85018 HEMOGLOBIN: CPT | Performed by: THORACIC SURGERY (CARDIOTHORACIC VASCULAR SURGERY)

## 2017-09-14 PROCEDURE — 25010000002 MIDAZOLAM PER 1 MG: Performed by: NURSE ANESTHETIST, CERTIFIED REGISTERED

## 2017-09-14 PROCEDURE — 84132 ASSAY OF SERUM POTASSIUM: CPT | Performed by: THORACIC SURGERY (CARDIOTHORACIC VASCULAR SURGERY)

## 2017-09-14 PROCEDURE — 86927 PLASMA FRESH FROZEN: CPT

## 2017-09-14 PROCEDURE — 85730 THROMBOPLASTIN TIME PARTIAL: CPT | Performed by: NURSE PRACTITIONER

## 2017-09-14 PROCEDURE — 33512 CABG VEIN THREE: CPT | Performed by: THORACIC SURGERY (CARDIOTHORACIC VASCULAR SURGERY)

## 2017-09-14 PROCEDURE — 84295 ASSAY OF SERUM SODIUM: CPT

## 2017-09-14 PROCEDURE — P9017 PLASMA 1 DONOR FRZ W/IN 8 HR: HCPCS

## 2017-09-14 PROCEDURE — 25010000002 ONDANSETRON PER 1 MG: Performed by: NURSE PRACTITIONER

## 2017-09-14 PROCEDURE — 25010000002 AMIODARONE IN DEXTROSE 5% 360-4.14 MG/200ML-% SOLUTION: Performed by: THORACIC SURGERY (CARDIOTHORACIC VASCULAR SURGERY)

## 2017-09-14 PROCEDURE — 25010000002 SUCCINYLCHOLINE PER 20 MG: Performed by: NURSE ANESTHETIST, CERTIFIED REGISTERED

## 2017-09-14 PROCEDURE — 25010000002 PHENYLEPHRINE PER 1 ML: Performed by: NURSE ANESTHETIST, CERTIFIED REGISTERED

## 2017-09-14 PROCEDURE — 82330 ASSAY OF CALCIUM: CPT | Performed by: NURSE PRACTITIONER

## 2017-09-14 PROCEDURE — C1751 CATH, INF, PER/CENT/MIDLINE: HCPCS | Performed by: ANESTHESIOLOGY

## 2017-09-14 PROCEDURE — 25010000002 INSULIN REGULAR HUMAN PER 5 UNITS: Performed by: NURSE PRACTITIONER

## 2017-09-14 PROCEDURE — 86850 RBC ANTIBODY SCREEN: CPT | Performed by: ANESTHESIOLOGY

## 2017-09-14 PROCEDURE — 33508 ENDOSCOPIC VEIN HARVEST: CPT | Performed by: THORACIC SURGERY (CARDIOTHORACIC VASCULAR SURGERY)

## 2017-09-14 PROCEDURE — 80069 RENAL FUNCTION PANEL: CPT | Performed by: NURSE PRACTITIONER

## 2017-09-14 PROCEDURE — 71010 HC CHEST PA OR AP: CPT

## 2017-09-14 PROCEDURE — 5A1221Z PERFORMANCE OF CARDIAC OUTPUT, CONTINUOUS: ICD-10-PCS | Performed by: THORACIC SURGERY (CARDIOTHORACIC VASCULAR SURGERY)

## 2017-09-14 PROCEDURE — 25010000003 POTASSIUM CHLORIDE 10 MEQ/100ML SOLUTION: Performed by: THORACIC SURGERY (CARDIOTHORACIC VASCULAR SURGERY)

## 2017-09-14 PROCEDURE — 87186 SC STD MICRODIL/AGAR DIL: CPT | Performed by: THORACIC SURGERY (CARDIOTHORACIC VASCULAR SURGERY)

## 2017-09-14 PROCEDURE — 25010000002 AMIODARONE IN DEXTROSE 5% 360-4.14 MG/200ML-% SOLUTION: Performed by: NURSE PRACTITIONER

## 2017-09-14 PROCEDURE — 36600 WITHDRAWAL OF ARTERIAL BLOOD: CPT

## 2017-09-14 PROCEDURE — C1729 CATH, DRAINAGE: HCPCS | Performed by: THORACIC SURGERY (CARDIOTHORACIC VASCULAR SURGERY)

## 2017-09-14 PROCEDURE — 86900 BLOOD TYPING SEROLOGIC ABO: CPT | Performed by: ANESTHESIOLOGY

## 2017-09-14 PROCEDURE — 25010000002 EPINEPHRINE 1 MG/ML SOLUTION 1 ML VIAL: Performed by: THORACIC SURGERY (CARDIOTHORACIC VASCULAR SURGERY)

## 2017-09-14 PROCEDURE — 85610 PROTHROMBIN TIME: CPT | Performed by: NURSE PRACTITIONER

## 2017-09-14 DEVICE — IMPLANTABLE DEVICE: Type: IMPLANTABLE DEVICE | Site: HEART | Status: FUNCTIONAL

## 2017-09-14 RX ORDER — EPINEPHRINE 0.1 MG/ML
SYRINGE (ML) INJECTION AS NEEDED
Status: DISCONTINUED | OUTPATIENT
Start: 2017-09-14 | End: 2017-09-18 | Stop reason: HOSPADM

## 2017-09-14 RX ORDER — ONDANSETRON 2 MG/ML
4 INJECTION INTRAMUSCULAR; INTRAVENOUS EVERY 6 HOURS PRN
Status: DISCONTINUED | OUTPATIENT
Start: 2017-09-14 | End: 2017-09-18 | Stop reason: HOSPADM

## 2017-09-14 RX ORDER — SODIUM CHLORIDE 9 MG/ML
100 INJECTION, SOLUTION INTRAVENOUS CONTINUOUS
Status: DISCONTINUED | OUTPATIENT
Start: 2017-09-14 | End: 2017-09-14

## 2017-09-14 RX ORDER — MIDAZOLAM HYDROCHLORIDE 1 MG/ML
INJECTION INTRAMUSCULAR; INTRAVENOUS AS NEEDED
Status: DISCONTINUED | OUTPATIENT
Start: 2017-09-14 | End: 2017-09-14 | Stop reason: SURG

## 2017-09-14 RX ORDER — SODIUM CHLORIDE 0.9 % (FLUSH) 0.9 %
30 SYRINGE (ML) INJECTION ONCE AS NEEDED
Status: DISCONTINUED | OUTPATIENT
Start: 2017-09-14 | End: 2017-09-15

## 2017-09-14 RX ORDER — SODIUM CHLORIDE, SODIUM GLUCONATE, SODIUM ACETATE, POTASSIUM CHLORIDE, AND MAGNESIUM CHLORIDE 526; 502; 368; 37; 30 MG/100ML; MG/100ML; MG/100ML; MG/100ML; MG/100ML
INJECTION, SOLUTION INTRAVENOUS CONTINUOUS PRN
Status: DISCONTINUED | OUTPATIENT
Start: 2017-09-14 | End: 2017-09-14 | Stop reason: SURG

## 2017-09-14 RX ORDER — SODIUM CHLORIDE 9 MG/ML
30 INJECTION, SOLUTION INTRAVENOUS CONTINUOUS PRN
Status: DISCONTINUED | OUTPATIENT
Start: 2017-09-14 | End: 2017-09-15

## 2017-09-14 RX ORDER — SUCCINYLCHOLINE CHLORIDE 20 MG/ML
INJECTION INTRAMUSCULAR; INTRAVENOUS AS NEEDED
Status: DISCONTINUED | OUTPATIENT
Start: 2017-09-14 | End: 2017-09-14 | Stop reason: SURG

## 2017-09-14 RX ORDER — SODIUM CHLORIDE 0.9 % (FLUSH) 0.9 %
1-10 SYRINGE (ML) INJECTION AS NEEDED
Status: DISCONTINUED | OUTPATIENT
Start: 2017-09-14 | End: 2017-09-14 | Stop reason: HOSPADM

## 2017-09-14 RX ORDER — MORPHINE SULFATE 2 MG/ML
2 INJECTION, SOLUTION INTRAMUSCULAR; INTRAVENOUS
Status: DISCONTINUED | OUTPATIENT
Start: 2017-09-14 | End: 2017-09-15

## 2017-09-14 RX ORDER — PROTAMINE SULFATE 10 MG/ML
INJECTION, SOLUTION INTRAVENOUS AS NEEDED
Status: DISCONTINUED | OUTPATIENT
Start: 2017-09-14 | End: 2017-09-14 | Stop reason: SURG

## 2017-09-14 RX ORDER — CHLORHEXIDINE GLUCONATE 500 MG/1
1 CLOTH TOPICAL EVERY 12 HOURS PRN
Status: DISCONTINUED | OUTPATIENT
Start: 2017-09-14 | End: 2017-09-14 | Stop reason: RX

## 2017-09-14 RX ORDER — HEPARIN SODIUM 1000 [USP'U]/ML
INJECTION, SOLUTION INTRAVENOUS; SUBCUTANEOUS AS NEEDED
Status: DISCONTINUED | OUTPATIENT
Start: 2017-09-14 | End: 2017-09-14 | Stop reason: SURG

## 2017-09-14 RX ORDER — MAGNESIUM SULFATE HEPTAHYDRATE 40 MG/ML
2 INJECTION, SOLUTION INTRAVENOUS AS NEEDED
Status: DISCONTINUED | OUTPATIENT
Start: 2017-09-14 | End: 2017-09-15

## 2017-09-14 RX ORDER — SENNA AND DOCUSATE SODIUM 50; 8.6 MG/1; MG/1
1 TABLET, FILM COATED ORAL 2 TIMES DAILY
Status: DISCONTINUED | OUTPATIENT
Start: 2017-09-14 | End: 2017-09-18 | Stop reason: HOSPADM

## 2017-09-14 RX ORDER — HYDROCODONE BITARTRATE AND ACETAMINOPHEN 10; 325 MG/1; MG/1
1 TABLET ORAL EVERY 4 HOURS PRN
Status: DISCONTINUED | OUTPATIENT
Start: 2017-09-14 | End: 2017-09-16

## 2017-09-14 RX ORDER — PROPOFOL 10 MG/ML
INJECTION, EMULSION INTRAVENOUS AS NEEDED
Status: DISCONTINUED | OUTPATIENT
Start: 2017-09-14 | End: 2017-09-14 | Stop reason: SURG

## 2017-09-14 RX ORDER — AMIODARONE HYDROCHLORIDE 50 MG/ML
INJECTION, SOLUTION INTRAVENOUS AS NEEDED
Status: DISCONTINUED | OUTPATIENT
Start: 2017-09-14 | End: 2017-09-14 | Stop reason: SURG

## 2017-09-14 RX ORDER — ALBUMIN, HUMAN INJ 5% 5 %
25 SOLUTION INTRAVENOUS ONCE
Status: COMPLETED | OUTPATIENT
Start: 2017-09-14 | End: 2017-09-14

## 2017-09-14 RX ORDER — ROCURONIUM BROMIDE 10 MG/ML
INJECTION, SOLUTION INTRAVENOUS AS NEEDED
Status: DISCONTINUED | OUTPATIENT
Start: 2017-09-14 | End: 2017-09-14 | Stop reason: SURG

## 2017-09-14 RX ORDER — NITROGLYCERIN 0.4 MG/1
0.4 TABLET SUBLINGUAL
Status: DISCONTINUED | OUTPATIENT
Start: 2017-09-14 | End: 2017-09-14 | Stop reason: HOSPADM

## 2017-09-14 RX ORDER — CHLORHEXIDINE GLUCONATE 0.12 MG/ML
15 RINSE ORAL EVERY 12 HOURS
Status: DISCONTINUED | OUTPATIENT
Start: 2017-09-14 | End: 2017-09-14

## 2017-09-14 RX ORDER — ACETAMINOPHEN 325 MG/1
650 TABLET ORAL EVERY 4 HOURS PRN
Status: DISCONTINUED | OUTPATIENT
Start: 2017-09-14 | End: 2017-09-14 | Stop reason: HOSPADM

## 2017-09-14 RX ORDER — VECURONIUM BROMIDE 20 MG/20ML
INJECTION, POWDER, LYOPHILIZED, FOR SOLUTION INTRAVENOUS AS NEEDED
Status: DISCONTINUED | OUTPATIENT
Start: 2017-09-14 | End: 2017-09-14 | Stop reason: SURG

## 2017-09-14 RX ORDER — POTASSIUM CHLORIDE 7.45 MG/ML
10 INJECTION INTRAVENOUS
Status: DISCONTINUED | OUTPATIENT
Start: 2017-09-14 | End: 2017-09-15

## 2017-09-14 RX ORDER — ASPIRIN 81 MG/1
81 TABLET ORAL DAILY
Status: DISCONTINUED | OUTPATIENT
Start: 2017-09-15 | End: 2017-09-18 | Stop reason: HOSPADM

## 2017-09-14 RX ORDER — HYDROCODONE BITARTRATE AND ACETAMINOPHEN 5; 325 MG/1; MG/1
1 TABLET ORAL EVERY 4 HOURS PRN
Status: DISCONTINUED | OUTPATIENT
Start: 2017-09-14 | End: 2017-09-18 | Stop reason: HOSPADM

## 2017-09-14 RX ORDER — FENTANYL CITRATE 50 UG/ML
INJECTION, SOLUTION INTRAMUSCULAR; INTRAVENOUS AS NEEDED
Status: DISCONTINUED | OUTPATIENT
Start: 2017-09-14 | End: 2017-09-14 | Stop reason: SURG

## 2017-09-14 RX ORDER — DOBUTAMINE HYDROCHLORIDE 100 MG/100ML
INJECTION INTRAVENOUS CONTINUOUS PRN
Status: DISCONTINUED | OUTPATIENT
Start: 2017-09-14 | End: 2017-09-14 | Stop reason: SURG

## 2017-09-14 RX ORDER — POTASSIUM CHLORIDE 29.8 MG/ML
20 INJECTION INTRAVENOUS
Status: DISCONTINUED | OUTPATIENT
Start: 2017-09-14 | End: 2017-09-14 | Stop reason: RX

## 2017-09-14 RX ORDER — DEXTROSE AND SODIUM CHLORIDE 5; .45 G/100ML; G/100ML
30 INJECTION, SOLUTION INTRAVENOUS CONTINUOUS
Status: DISCONTINUED | OUTPATIENT
Start: 2017-09-14 | End: 2017-09-15

## 2017-09-14 RX ORDER — DOBUTAMINE HYDROCHLORIDE 100 MG/100ML
2-20 INJECTION INTRAVENOUS CONTINUOUS PRN
Status: DISCONTINUED | OUTPATIENT
Start: 2017-09-14 | End: 2017-09-15 | Stop reason: CLARIF

## 2017-09-14 RX ORDER — NITROGLYCERIN 20 MG/100ML
5-200 INJECTION INTRAVENOUS CONTINUOUS PRN
Status: DISCONTINUED | OUTPATIENT
Start: 2017-09-14 | End: 2017-09-15

## 2017-09-14 RX ORDER — SODIUM CHLORIDE, SODIUM LACTATE, POTASSIUM CHLORIDE, CALCIUM CHLORIDE 600; 310; 30; 20 MG/100ML; MG/100ML; MG/100ML; MG/100ML
INJECTION, SOLUTION INTRAVENOUS CONTINUOUS PRN
Status: DISCONTINUED | OUTPATIENT
Start: 1840-12-31 | End: 2017-09-18 | Stop reason: HOSPADM

## 2017-09-14 RX ADMIN — FENTANYL CITRATE 400 MCG: 50 INJECTION, SOLUTION INTRAMUSCULAR; INTRAVENOUS at 07:34

## 2017-09-14 RX ADMIN — DEXTROSE AND SODIUM CHLORIDE 30 ML/HR: 5; 450 INJECTION, SOLUTION INTRAVENOUS at 16:31

## 2017-09-14 RX ADMIN — DEXMEDETOMIDINE HYDROCHLORIDE 0.4 MCG/KG/HR: 100 INJECTION, SOLUTION INTRAVENOUS at 20:29

## 2017-09-14 RX ADMIN — AMIODARONE HYDROCHLORIDE 1 MG/MIN: 1.8 INJECTION, SOLUTION INTRAVENOUS at 16:30

## 2017-09-14 RX ADMIN — MORPHINE SULFATE 2 MG: 2 INJECTION, SOLUTION INTRAMUSCULAR; INTRAVENOUS at 19:59

## 2017-09-14 RX ADMIN — POTASSIUM CHLORIDE 10 MEQ: 7.46 INJECTION, SOLUTION INTRAVENOUS at 16:24

## 2017-09-14 RX ADMIN — SODIUM CHLORIDE 100 ML/HR: 9 INJECTION, SOLUTION INTRAVENOUS at 05:52

## 2017-09-14 RX ADMIN — ONDANSETRON 4 MG: 2 INJECTION INTRAMUSCULAR; INTRAVENOUS at 21:28

## 2017-09-14 RX ADMIN — INSULIN DETEMIR 36 UNITS: 100 INJECTION, SOLUTION SUBCUTANEOUS at 21:45

## 2017-09-14 RX ADMIN — MIDAZOLAM 2 MG: 1 INJECTION INTRAMUSCULAR; INTRAVENOUS at 07:24

## 2017-09-14 RX ADMIN — POTASSIUM CHLORIDE 10 MEQ: 7.46 INJECTION, SOLUTION INTRAVENOUS at 19:22

## 2017-09-14 RX ADMIN — CEFAZOLIN SODIUM 2 G: 1 INJECTION, POWDER, FOR SOLUTION INTRAMUSCULAR; INTRAVENOUS at 23:17

## 2017-09-14 RX ADMIN — MIDAZOLAM 2 MG: 1 INJECTION INTRAMUSCULAR; INTRAVENOUS at 14:39

## 2017-09-14 RX ADMIN — MIDAZOLAM 2 MG: 1 INJECTION INTRAMUSCULAR; INTRAVENOUS at 07:30

## 2017-09-14 RX ADMIN — PHENYLEPHRINE HYDROCHLORIDE 0.2 MCG/KG/MIN: 10 INJECTION INTRAVENOUS at 16:33

## 2017-09-14 RX ADMIN — EPHEDRINE SULFATE 10 MG: 50 INJECTION INTRAMUSCULAR; INTRAVENOUS; SUBCUTANEOUS at 07:46

## 2017-09-14 RX ADMIN — FENTANYL CITRATE 50 MCG: 50 INJECTION, SOLUTION INTRAMUSCULAR; INTRAVENOUS at 13:43

## 2017-09-14 RX ADMIN — VECURONIUM BROMIDE 20 MG: 1 INJECTION, POWDER, LYOPHILIZED, FOR SOLUTION INTRAVENOUS at 07:50

## 2017-09-14 RX ADMIN — EPHEDRINE SULFATE 10 MG: 50 INJECTION INTRAMUSCULAR; INTRAVENOUS; SUBCUTANEOUS at 07:40

## 2017-09-14 RX ADMIN — FENTANYL CITRATE 100 MCG: 50 INJECTION, SOLUTION INTRAMUSCULAR; INTRAVENOUS at 09:20

## 2017-09-14 RX ADMIN — DOBUTAMINE HYDROCHLORIDE 5 MCG/KG/MIN: 100 INJECTION INTRAVENOUS at 13:11

## 2017-09-14 RX ADMIN — FENTANYL CITRATE 200 MCG: 50 INJECTION, SOLUTION INTRAMUSCULAR; INTRAVENOUS at 08:43

## 2017-09-14 RX ADMIN — DOBUTAMINE HYDROCHLORIDE 5 MCG/KG/MIN: 100 INJECTION INTRAVENOUS at 20:41

## 2017-09-14 RX ADMIN — ALBUMIN HUMAN 25 G: 0.05 INJECTION, SOLUTION INTRAVENOUS at 18:43

## 2017-09-14 RX ADMIN — POTASSIUM CHLORIDE 10 MEQ: 7.46 INJECTION, SOLUTION INTRAVENOUS at 17:47

## 2017-09-14 RX ADMIN — MUPIROCIN 1 APPLICATION: 20 OINTMENT TOPICAL at 06:06

## 2017-09-14 RX ADMIN — PROTAMINE SULFATE 300 MG: 10 INJECTION, SOLUTION INTRAVENOUS at 13:35

## 2017-09-14 RX ADMIN — SODIUM CHLORIDE, SODIUM GLUCONATE, SODIUM ACETATE, POTASSIUM CHLORIDE, AND MAGNESIUM CHLORIDE: 526; 502; 368; 37; 30 INJECTION, SOLUTION INTRAVENOUS at 08:04

## 2017-09-14 RX ADMIN — AMIODARONE HYDROCHLORIDE 300 MG: 50 INJECTION, SOLUTION INTRAVENOUS at 13:07

## 2017-09-14 RX ADMIN — FENTANYL CITRATE 150 MCG: 50 INJECTION, SOLUTION INTRAMUSCULAR; INTRAVENOUS at 08:50

## 2017-09-14 RX ADMIN — POTASSIUM CHLORIDE 10 MEQ: 7.46 INJECTION, SOLUTION INTRAVENOUS at 23:53

## 2017-09-14 RX ADMIN — POTASSIUM CHLORIDE 10 MEQ: 7.46 INJECTION, SOLUTION INTRAVENOUS at 20:09

## 2017-09-14 RX ADMIN — HEPARIN SODIUM 5000 UNITS: 1000 INJECTION, SOLUTION INTRAVENOUS; SUBCUTANEOUS at 08:29

## 2017-09-14 RX ADMIN — PHENYLEPHRINE HYDROCHLORIDE 0.1 MCG/KG/MIN: 10 INJECTION INTRAVENOUS at 08:09

## 2017-09-14 RX ADMIN — SODIUM CHLORIDE 4 UNITS/HR: 9 INJECTION, SOLUTION INTRAVENOUS at 16:33

## 2017-09-14 RX ADMIN — AMIODARONE HYDROCHLORIDE 0.5 MG/MIN: 1.8 INJECTION, SOLUTION INTRAVENOUS at 19:58

## 2017-09-14 RX ADMIN — POTASSIUM CHLORIDE 10 MEQ: 7.46 INJECTION, SOLUTION INTRAVENOUS at 17:10

## 2017-09-14 RX ADMIN — SUCCINYLCHOLINE CHLORIDE 160 MG: 20 INJECTION, SOLUTION INTRAMUSCULAR; INTRAVENOUS at 07:35

## 2017-09-14 RX ADMIN — PROPOFOL 90 MG: 10 INJECTION, EMULSION INTRAVENOUS at 07:35

## 2017-09-14 RX ADMIN — HEPARIN SODIUM 30000 UNITS: 1000 INJECTION, SOLUTION INTRAVENOUS; SUBCUTANEOUS at 10:06

## 2017-09-14 RX ADMIN — CEFAZOLIN SODIUM 2 G: 1 INJECTION, POWDER, FOR SOLUTION INTRAMUSCULAR; INTRAVENOUS at 08:05

## 2017-09-14 RX ADMIN — DOBUTAMINE HYDROCHLORIDE 5 MCG/KG/MIN: 100 INJECTION INTRAVENOUS at 16:29

## 2017-09-14 RX ADMIN — ROCURONIUM BROMIDE 20 MG: 10 INJECTION INTRAVENOUS at 12:36

## 2017-09-14 RX ADMIN — CEFAZOLIN SODIUM 2 G: 1 INJECTION, POWDER, FOR SOLUTION INTRAMUSCULAR; INTRAVENOUS at 18:27

## 2017-09-14 RX ADMIN — AMIODARONE HYDROCHLORIDE 1000 MCG/MIN: 1.5 INJECTION, SOLUTION INTRAVENOUS at 13:10

## 2017-09-14 RX ADMIN — DEXMEDETOMIDINE HYDROCHLORIDE 0.2 MCG/KG/HR: 100 INJECTION, SOLUTION INTRAVENOUS at 16:29

## 2017-09-14 RX ADMIN — MIDAZOLAM 1 MG: 1 INJECTION INTRAMUSCULAR; INTRAVENOUS at 14:08

## 2017-09-14 RX ADMIN — FENTANYL CITRATE 50 MCG: 50 INJECTION, SOLUTION INTRAMUSCULAR; INTRAVENOUS at 14:01

## 2017-09-14 RX ADMIN — SODIUM CHLORIDE, SODIUM GLUCONATE, SODIUM ACETATE, POTASSIUM CHLORIDE, AND MAGNESIUM CHLORIDE: 526; 502; 368; 37; 30 INJECTION, SOLUTION INTRAVENOUS at 14:36

## 2017-09-14 RX ADMIN — FENTANYL CITRATE 150 MCG: 50 INJECTION, SOLUTION INTRAMUSCULAR; INTRAVENOUS at 08:57

## 2017-09-14 RX ADMIN — CALCIUM CHLORIDE 1 G: 100 INJECTION, SOLUTION INTRAVENOUS at 23:17

## 2017-09-14 RX ADMIN — POTASSIUM CHLORIDE 10 MEQ: 7.46 INJECTION, SOLUTION INTRAVENOUS at 22:15

## 2017-09-14 RX ADMIN — DEXMEDETOMIDINE HYDROCHLORIDE 0.2 MCG/HR: 100 INJECTION, SOLUTION INTRAVENOUS at 14:30

## 2017-09-14 RX ADMIN — AMINOCAPROIC ACID 6 G/HR: 250 INJECTION, SOLUTION INTRAVENOUS at 08:08

## 2017-09-14 RX ADMIN — SODIUM CHLORIDE 2 UNITS/HR: 9 INJECTION, SOLUTION INTRAVENOUS at 08:33

## 2017-09-14 RX ADMIN — MIDAZOLAM 1 MG: 1 INJECTION INTRAMUSCULAR; INTRAVENOUS at 13:38

## 2017-09-14 RX ADMIN — CALCIUM CHLORIDE 1 G: 100 INJECTION, SOLUTION INTRAVENOUS at 15:48

## 2017-09-14 RX ADMIN — SODIUM CHLORIDE 500 ML: 9 INJECTION, SOLUTION INTRAVENOUS at 16:32

## 2017-09-14 RX ADMIN — SODIUM BICARBONATE 50 MEQ: 84 INJECTION, SOLUTION INTRAVENOUS at 18:29

## 2017-09-14 RX ADMIN — SODIUM BICARBONATE 50 MEQ: 84 INJECTION, SOLUTION INTRAVENOUS at 17:25

## 2017-09-14 RX ADMIN — POTASSIUM CHLORIDE 10 MEQ: 7.46 INJECTION, SOLUTION INTRAVENOUS at 21:14

## 2017-09-14 NOTE — OP NOTE
OPERATIVE NOTE  Nick Austin  1950 9/14/2017    PREOP DIAGNOSES:  Coronary artery disease of native artery of native heart with stable angina pectoris [I25.118]  Prior CABG    POSTOP DIAGNOSES:  Coronary artery disease of native artery of native heart with stable angina pectoris [I25.118]    PROCEDURE:   REDO CORONARY ARTERY BYPASS GRAFTING  ENDOSCOPIC VEIN HARVEST   SAPHENOUS VEIN GRAFT FROM ASCENDING AORTA TO FIRST DIAGONAL BRANCH  SAPHENOUS VEIN GRAFT FROM ASCENDING AORTA TO RAMUS INTERMEDIUS BRANCH  SAPHENOUS VEIN GRAFT FROM ASCENDING AORTA TO POSTERIOR LATERAL BRANCH   RIGHT common femoral arterial line  Vascular ultrasound guidance    SURGEON: JOSE Morgan MD FACS RPVI    ASSISTANT: Kenny Camarena CFA    ANESTHESIA: General ET  General    ESTIMATED BLOOD LOSS: 500 ml     COMPLICATIONS: none    DESCRIPTION OF OPERATION:   Patient taken to the operating room placed in supine position. general endotracheal anesthesia was induced. patient was prepped and draped in sterile fashion. radial arterial line and right internal jugular central venous line and pulmonary catheter were placed by anesthesia.Vascular ultrasound used to identify RIGHT common femoral artery which was 9mm diameter, mild posterior plaque.  Catheter placed RIGHT femoral artery via modified seldinger technique under ultrasound guidance. Good quality saphenous vein was harvested from LEFT leg using endoscopic technique.  Secondary median sternotomy was performed. All transverse wires removed.   Sternum dissected 3cm on each side. Dense adhesions in pericardial space.  LIMA-LAD graft dissected out carefully for short segment.  Inferior wall dissected free from diaphragm.  Blunt and sharp dissection used to free RIGHT lateral wall, aorta, anterolateral  and posterior wall. systemic heparin was given. Aorta was cannulated with 8fr soft flow aortic cannula. Venous cannulation was performed with 34/46 two-stage venous cannula.  15Fr Retrograde cardioplegia cannula was inserted into the coronary sinus. Cardiopulmonary bypass was instituted at a flow of 2.2L/min/m2 186min with the patient at 32°F throughout much of the perfusion. Aorta was crossclamped for a total of 171min. the antegrade cardioplegia needle was inserted into the anterolateral aspect ascending aorta. Cold blood cardioplegia was infused into the aortic root and the coronary sinus for a total of 500 mL each. subsequent doses of antegrade, retrograde and vein graft cardioplegia were given at 15-20 minute intervals. A warm dose of antegrade and retrograde cartilage were given prior to removal of the cross-clamp. The left ventricle was enlarged grade 2, right ventricle enlarged grade 3. LIMA-LAD graft was patent.  D1 was 2mm and moderately diffusely diseased, ramus intermedius was 1.75 mm and moderately diffusely diseased, PLB was 1.75mm, moderately diffusely diseased.  M2 was less than 1mm and not graftable. Suction device was placed to the apex to expose PLB. End segment of vein was anastomosed end to side to the PLB using 6-0 Prolene. Heart was returned to neutral position, Vein graft cut to length at the ascending aorta. Suction device was placed to the apex to expose ramus intermedius branch. End segment of vein was anastomosed end to side to the ramus intermedius using 6-0 Prolene. Heart was returned to neutral position, Vein graft cut to length at the ascending aorta .Suction device was placed to the apex to expose D1. End segment of vein was anastomosed end to side to the D1 using 6-0 Prolene. Heart was returned to neutral position, Vein graft cut to length at the ascending aorta. The proximal end of the vein graft was anastomosed to a punched-out site on the ascending aorta using 6-0 Prolene. Usual debubbling techniques were employed. .vein graft marker was used. hot shot was given. 2 atrial pacing wires were placed, 2 ventricular pacing wires were placed. Two 28 Italian  multihole chest tubes were placed in the mediastinum and one in the inferior pericardium one in the anterior pericardium , a right pleural tube was placed. After resumption of regular rhythm, cardiopulmonary bypass was gradually discontinued, the antegrade and retrograde cannulas were removed. the venous cannula was removed. after maintaining satisfactory hemodynamics, the aortic cannula was removed. Full dose protamine reversal was used, Amicar infusion was used. The left pericardium and pleura were repaired, mediastinal fat was closed over the ascending aorta and graft. hemostasis was obtained. incision was closed in layers of sternal wires, double wires, 2-0 PDS, 2-0 PDS, 4-0 Monocryl in the skin. Moderate tissue edema.  Prevena negative pressure wound dressing was applied. patient transferred to CCU in critical condition.          This document has been electronically signed by Greg Morgan MD on September 14, 2017 2:19 PM

## 2017-09-14 NOTE — ANESTHESIA PROCEDURE NOTES
Arterial Line    Patient location during procedure: OR   Line placed for hemodynamic monitoring, ABGs/Labs/ISTAT and MD/Surgeon request.  Performed By   Anesthesiologist: CADEN GONZALEZ  Preanesthetic Checklist  Completed: patient identified, site marked, surgical consent, pre-op evaluation, timeout performed, IV checked, risks and benefits discussed and monitors and equipment checked  Arterial Line Prep   Sterile Tech: gloves, gown, mask and cap  Prep: ChloraPrep  Patient monitoring: blood pressure monitoring, continuous pulse oximetry and EKG  Arterial Line Procedure   Laterality:left  Location:  radial artery  Catheter size: 20 G   Guidance: palpation technique  Number of attempts: 1  Successful placement: yes

## 2017-09-14 NOTE — ANESTHESIA PROCEDURE NOTES
Central Line    Patient location during procedure: OR  Indications: vascular access, central pressure monitoring and MD/Surgeon request  Staff  CRNA: MARY TREJO  Preanesthetic Checklist  Completed: patient identified, site marked, surgical consent, pre-op evaluation, timeout performed, IV checked, risks and benefits discussed and monitors and equipment checked  Central Line Prep  Sterile Tech:cap, gloves, gown, mask and sterile barriers  Prep: chloraprep  Patient monitoring: EKG, continuous pulse oximetry and blood pressure monitoring  Central Line Procedure  Laterality:right  Location:internal jugular  Catheter Type:Cordis  Catheter Size:9 Fr  Guidance:ultrasound guided  PROCEDURE NOTE/ULTRASOUND INTERPRETATION.  Using ultrasound guidance the potential vascular sites for insertion of the catheter were visualized to determine the patency of the vessel to be used for vascular access.  After selecting the appropriate site for insertion, the needle was visualized under ultrasound being inserted into the internal jugular vein, followed by ultrasound confirmation of wire and catheter placement. There were no abnormalities seen on ultrasound; an image was taken; and the patient tolerated the procedure with no complications.   Assessment  Post procedure:biopatch applied, line sutured and occlusive dressing applied  Assessement:blood return through all ports and free fluid flow  Complications:no  Patient Tolerance:patient tolerated the procedure well with no apparent complications

## 2017-09-14 NOTE — INTERVAL H&P NOTE
H&P reviewed. The patient was examined and there are no changes to the H&P.  Detailed discussion with Mr Austin regarding situation options and plans.  severe symptomatic CAD.  Coronary Artery Bypass Grafting is advisable.      Risks including but not limited to Mortality 2-3%, Stroke 1-2%, bleeding (return to surgery), transfusion, infection, pulmonary (prolonged mechanical ventilation, tracheostomy), renal dysfunction (dialysis).  Benefits:  relief of symptoms, reduction in cardiac events and hospitalization. improved survival.  Options:  medical therapy, multivessel PCI discussed. Understands and wishes to proceed.    Redo Coronary Artery Bypass Grafting, , SVG to RI, SVG to M2, SVG to PDA, ON, amicar, ancef, radial arterial line, pulmonary artery catheter.  GEN.  SDS.  9/14/2017

## 2017-09-14 NOTE — ANESTHESIA PROCEDURE NOTES
Airway  Urgency: elective    Airway not difficult    General Information and Staff    Patient location during procedure: OR  CRNA: MARY TREJO    Indications and Patient Condition  Indications for airway management: airway protection    Preoxygenated: yes  MILS maintained throughout  Mask difficulty assessment: 2 - vent by mask + OA or adjuvant +/- NMBA    Final Airway Details  Final airway type: endotracheal airway      Successful airway: ETT  Cuffed: yes   Successful intubation technique: video laryngoscopy  Endotracheal tube insertion site: oral  Blade: Steph  Blade size: #3  ETT size: 8.0 mm  Cormack-Lehane Classification: grade I - full view of glottis  Placement verified by: chest auscultation and capnometry   Cuff volume (mL): 7  Measured from: lips  ETT to lips (cm): 20  Number of attempts at approach: 1

## 2017-09-14 NOTE — ANESTHESIA POSTPROCEDURE EVALUATION
Patient: Nick Austin    Procedure Summary     Date Anesthesia Start Anesthesia Stop Room / Location    09/14/17 0725 1456  [facility] OR 04 / [facility]       Procedure Diagnosis Surgeon Provider    REDO CORONARY ARTERY BYPASS GRAFTINGX X 3-4, ENDOSCOPIC VEIN HARVEST      (CELL SAVER)  (N/A Chest) Coronary artery disease of native artery of native heart with stable angina pectoris  (Coronary artery disease of native artery of native heart with stable angina pectoris [I25.118]) MD Nakul Cisneros MD          Anesthesia Type: general  Last vitals  BP        Temp        Pulse       Resp        SpO2          Post Anesthesia Care and Evaluation    Patient location during evaluation: ICU  Patient participation: complete - patient cannot participate  Pain management: adequate  Airway patency: patent  Anesthetic complications: No anesthetic complications    Cardiovascular status: acceptable  Respiratory status: ETT and intubated  Hydration status: stable    Comments: Patient unqable to participate. Intubated and sedated.

## 2017-09-14 NOTE — ANESTHESIA PROCEDURE NOTES
Central Line    Patient location during procedure: OR  Indications: vascular access, central pressure monitoring and MD/Surgeon request  Staff  CRNA: MARY TREJO  Preanesthetic Checklist  Completed: patient identified, site marked, surgical consent, pre-op evaluation, timeout performed, IV checked, risks and benefits discussed and monitors and equipment checked  Central Line Prep  Sterile Tech:cap, gloves, gown, mask and sterile barriers  Prep: chloraprep  Patient monitoring: EKG, continuous pulse oximetry and blood pressure monitoring  Central Line Procedure  Laterality:right  Location:internal jugular  Catheter Type:[place]  PROCEDURE NOTE/ULTRASOUND INTERPRETATION.  Using ultrasound guidance the potential vascular sites for insertion of the catheter were visualized to determine the patency of the vessel to be used for vascular access.  After selecting the appropriate site for insertion, the needle was visualized under ultrasound being inserted into the internal jugular vein, followed by ultrasound confirmation of wire and catheter placement. There were no abnormalities seen on ultrasound; an image was taken; and the patient tolerated the procedure with no complications.   Assessment  Post procedure:biopatch applied, line sutured and occlusive dressing applied  Assessement:blood return through all ports, free fluid flow and chest x-ray ordered  Complications:no  Patient Tolerance:patient tolerated the procedure well with no apparent complications

## 2017-09-14 NOTE — H&P (VIEW-ONLY)
CVTS CONSULTATION          Patient Care Team:  Nick Morgan MD as PCP - General (Family Medicine)  Nick Morgan MD as PCP - Claims Attributed  Requesting Provider: Perla Jay MD    Chief complaint: CAD      SUBJECTIVE       History of Present Illness:  Mr. Austin is a 66-year-old  male who is being seen by me for the first time.  He presents for evaluation of chest pressure and shortness of breath which has been going on for the past 3 months.  His dyspnea is NYHA class II and chest pressure was triggered by activity though it has occurred on bending forward.  His history is remarkable for document of coronary artery disease and back in 1989 underwent CABG ×1 vessel in [facility].  He is done well since then and has not followed up with cardiology in more than 20 years.  His risk factors include hypertension, hyperlipidemia, hypothyroidism, obesity and premature coronary artery disease.  He is a nonsmoker. Mr. Austin underwent elective cardiac cath today demonstrating severe coronary disease LAD 90%, CIRCUMFLEX 100%, RCA 90%. Dr. Morgan was consulted for consideration of redo revascularization surgery.      The following portions of the patient's history were reviewed and updated as appropriate: allergies, current medications, past family history, past medical history, past social history, past surgical history and problem list.  Recent images independently reviewed.  Available laboratory values reviewed.    Review of Systems   Constitutional: Positive for fatigue. Negative for diaphoresis.   Respiratory: Positive for chest tightness and shortness of breath.    Cardiovascular: Negative for palpitations and leg swelling.   Gastrointestinal: Negative for abdominal pain.   Genitourinary: Negative for dysuria.   Musculoskeletal: Negative for back pain.   Skin: Negative.    Neurological: Negative for dizziness, speech difficulty and numbness.   Hematological: Does not  bruise/bleed easily.   All other systems reviewed and are negative.       Past Medical History:   Diagnosis Date   • Acute bronchitis    • Backache    • Chronic pain    • Coronary arteriosclerosis    • Essential hypertension    • GERD (gastroesophageal reflux disease)    • Hyperglycemia    • Hyperlipidemia    • Hypothyroidism    • Type 2 diabetes mellitus      Past Surgical History:   Procedure Laterality Date   • CHOLECYSTECTOMY     • CORONARY ARTERY BYPASS GRAFT     • INJECTION OF MEDICATION  02/23/2015    dexamethasone   • LEG SURGERY      extensive lower leg   • SHOULDER SURGERY       Family History   Problem Relation Age of Onset   • No Known Problems Mother    • No Known Problems Father    • No Known Problems Sister    • No Known Problems Brother    • No Known Problems Daughter    • No Known Problems Son    • No Known Problems Maternal Aunt    • No Known Problems Maternal Uncle    • No Known Problems Paternal Aunt    • No Known Problems Paternal Uncle    • No Known Problems Maternal Grandmother    • No Known Problems Maternal Grandfather    • No Known Problems Paternal Grandmother    • No Known Problems Paternal Grandfather      Social History   Substance Use Topics   • Smoking status: Never Smoker   • Smokeless tobacco: Never Used   • Alcohol use No     Prescriptions Prior to Admission   Medication Sig Dispense Refill Last Dose   • albuterol (PROVENTIL HFA;VENTOLIN HFA) 108 (90 BASE) MCG/ACT inhaler Inhale 2 puffs Every 4 (Four) Hours As Needed (asthma). Take 30 minutes before P.E or exercise. 18 g 3 9/6/2017 at 1800   • aspirin 325 MG tablet Take 325 mg by mouth Daily.   9/6/2017 at 1800   • atorvastatin (LIPITOR) 20 MG tablet Take 20 mg by mouth Every Night.   9/6/2017 at 1800   • Fluticasone Furoate-Vilanterol (BREO ELLIPTA) 100-25 MCG/INH aerosol powder  Inhale 1 dose Daily. 3 each 3 9/6/2017 at 1800   • isosorbide mononitrate (IMDUR) 30 MG 24 hr tablet Take 1 tablet by mouth Daily. 30 tablet 6 9/6/2017  "at 1800   • lisinopril-hydrochlorothiazide (PRINZIDE,ZESTORETIC) 20-25 MG per tablet Take 1 tablet by mouth Daily.   9/6/2017 at 1800   • metoprolol succinate XL (TOPROL-XL) 25 MG 24 hr tablet q hs (Patient taking differently: 25 mg Daily. q hs) 30 tablet 3 9/6/2017 at 1800   • Omega-3 Fatty Acids (FISH OIL) 1000 MG capsule capsule Take 1,000 mg by mouth Daily With Breakfast.   9/6/2017 at 1800   • omeprazole (priLOSEC) 40 MG capsule Take 40 mg by mouth Daily.   9/6/2017 at 1800   • SYNTHROID 88 MCG tablet Take 1 tablet by mouth Daily. 90 tablet 2 9/6/2017 at 1800   • tamsulosin (FLOMAX) 0.4 MG capsule 24 hr capsule Take 1 capsule by mouth Every Night.   9/6/2017 at 1800        Allergies:  Tetanus toxoids    OBJECTIVE        Vital Signs  Temp:  [97.3 °F (36.3 °C)-97.4 °F (36.3 °C)] 97.3 °F (36.3 °C)  Heart Rate:  [60-64] 63  Resp:  [16-18] 18  BP: ()/(51-76) 107/58    Flowsheet Rows         First Filed Value    Admission Height  67\" (170.2 cm) Documented at 09/07/2017 0635    Admission Weight  269 lb 13.5 oz (122 kg) Documented at 09/07/2017 0635        67\" (170.2 cm)    Physical Exam   Constitutional: He is oriented to person, place, and time. He appears well-developed.   HENT:   Head: Normocephalic.   Eyes: Pupils are equal, round, and reactive to light.   Neck: Neck supple. Carotid bruit is not present.   Cardiovascular: Normal rate and normal heart sounds.    Pulses:       Dorsalis pedis pulses are 2+ on the right side, and 2+ on the left side.        Posterior tibial pulses are 2+ on the right side, and 2+ on the left side.   Pulmonary/Chest: Effort normal and breath sounds normal. No respiratory distress.   Abdominal: Soft. Bowel sounds are normal.   Musculoskeletal: Normal range of motion. He exhibits no edema.   Neurological: He is alert and oriented to person, place, and time. No cranial nerve deficit.   Skin: Skin is warm and dry.   Psychiatric: He has a normal mood and affect. Thought content " normal.   Vitals reviewed.      Results Review:   Lab Results   Component Value Date    WBC 8.37 03/28/2017    HGB 15.3 03/28/2017    HCT 46.4 03/28/2017    MCV 93.9 03/28/2017     03/28/2017     Lab Results   Component Value Date    GLUCOSE 118 (H) 09/07/2017    BUN 14 09/07/2017    CREATININE 0.99 09/07/2017    EGFRIFNONA 75 09/07/2017    BCR 14.1 09/07/2017    K 3.7 09/07/2017    CO2 26.0 09/07/2017    CALCIUM 8.9 09/07/2017    ALBUMIN 4.30 09/07/2017    LABIL2 1.3 09/07/2017    AST 36 09/07/2017    ALT 56 09/07/2017     Results for orders placed during the hospital encounter of 08/30/17   Adult Transthoracic Echo Complete    Narrative · Mild tricuspid valve regurgitation is present.  · Mild pulmonic valve regurgitation is present.  · Left ventricular wall thickness is consistent with borderline concentric   hypertrophy.  · Left atrial cavity size is mildly dilated.  · Left ventricular systolic function is normal. Estimated EF = 55%.  · Left ventricular diastolic dysfunction (grade I) consistent with   impaired relaxation.                ASSESSMENT/PLAN       Principal Problem:    Coronary artery disease involving native coronary artery of native heart with angina pectoris  Active Problems:    Angina pectoris      Assessment:    Condition: In stable condition.   (Mr. Matt cardiac cath demonstrates severe CAD and currently chest pain free. Dr. Morgan independently reviewed cath films and discussed with  and Mr. Matt regarding options for medical management, PCI, CABG.  ).     Plan:   (Coronary Artery Bypass Grafting x 3 vessels is advisable with endoscopic vein harvesting. Mr. Matt is aware of the risks involved, potential for complications, and hoped for benefits. He understands and wishes to proceed with surgical revascularization on 9/14/17. Currently we will complete pre-op work-up: Carotid Duplex Scan, BLE Vein Mapping, 5 Meter Walk Test, Type & Screen, CT Chest. Preoperative  teaching has been completed and all questions have been answered.    ).       Thank you for the opportunity to participate in this patient's care.              This document has been electronically signed by RANDEE Brown on September 7, 2017 11:52 AM

## 2017-09-15 ENCOUNTER — APPOINTMENT (OUTPATIENT)
Dept: GENERAL RADIOLOGY | Facility: HOSPITAL | Age: 67
End: 2017-09-15

## 2017-09-15 LAB
ABO + RH BLD: NORMAL
ANION GAP SERPL CALCULATED.3IONS-SCNC: 10 MMOL/L (ref 5–15)
BASOPHILS # BLD AUTO: 0 10*3/MM3 (ref 0–0.2)
BASOPHILS NFR BLD AUTO: 0 % (ref 0–2)
BH BB BLOOD EXPIRATION DATE: NORMAL
BH BB BLOOD TYPE BARCODE: 5100
BH BB BLOOD TYPE BARCODE: 5100
BH BB BLOOD TYPE BARCODE: NORMAL
BH BB DISPENSE STATUS: NORMAL
BH BB PRODUCT CODE: NORMAL
BH BB UNIT NUMBER: NORMAL
BUN BLD-MCNC: 13 MG/DL (ref 7–21)
BUN/CREAT SERPL: 12.9 (ref 7–25)
CA-I BLD-MCNC: 4.3 MG/DL (ref 4.5–4.9)
CALCIUM SPEC-SCNC: 8.2 MG/DL (ref 8.4–10.2)
CHLORIDE SERPL-SCNC: 106 MMOL/L (ref 95–110)
CO2 SERPL-SCNC: 25 MMOL/L (ref 22–31)
CREAT BLD-MCNC: 1.01 MG/DL (ref 0.7–1.3)
DEPRECATED RDW RBC AUTO: 46.4 FL (ref 35.1–43.9)
EOSINOPHIL # BLD AUTO: 0 10*3/MM3 (ref 0–0.7)
EOSINOPHIL NFR BLD AUTO: 0 % (ref 0–7)
ERYTHROCYTE [DISTWIDTH] IN BLOOD BY AUTOMATED COUNT: 13.9 % (ref 11.5–14.5)
GFR SERPL CREATININE-BSD FRML MDRD: 73 ML/MIN/1.73 (ref 49–113)
GLUCOSE BLD-MCNC: 136 MG/DL (ref 60–100)
GLUCOSE BLDC GLUCOMTR-MCNC: 116 MG/DL (ref 70–130)
GLUCOSE BLDC GLUCOMTR-MCNC: 126 MG/DL (ref 70–130)
GLUCOSE BLDC GLUCOMTR-MCNC: 132 MG/DL (ref 70–130)
GLUCOSE BLDC GLUCOMTR-MCNC: 133 MG/DL (ref 70–130)
GLUCOSE BLDC GLUCOMTR-MCNC: 136 MG/DL (ref 70–130)
GLUCOSE BLDC GLUCOMTR-MCNC: 139 MG/DL (ref 70–130)
GLUCOSE BLDC GLUCOMTR-MCNC: 140 MG/DL (ref 70–130)
GLUCOSE BLDC GLUCOMTR-MCNC: 142 MG/DL (ref 70–130)
GLUCOSE BLDC GLUCOMTR-MCNC: 151 MG/DL (ref 70–130)
GLUCOSE BLDC GLUCOMTR-MCNC: 153 MG/DL (ref 70–130)
GLUCOSE BLDC GLUCOMTR-MCNC: 155 MG/DL (ref 70–130)
GLUCOSE BLDC GLUCOMTR-MCNC: 162 MG/DL (ref 70–130)
GLUCOSE BLDC GLUCOMTR-MCNC: 186 MG/DL (ref 70–130)
GLUCOSE BLDC GLUCOMTR-MCNC: 192 MG/DL (ref 70–130)
HCT VFR BLD AUTO: 25.6 % (ref 39–49)
HGB BLD-MCNC: 8.6 G/DL (ref 13.7–17.3)
IMM GRANULOCYTES # BLD: 0.05 10*3/MM3 (ref 0–0.02)
IMM GRANULOCYTES NFR BLD: 0.4 % (ref 0–0.5)
LYMPHOCYTES # BLD AUTO: 0.49 10*3/MM3 (ref 0.6–4.2)
LYMPHOCYTES NFR BLD AUTO: 4.1 % (ref 10–50)
MAGNESIUM SERPL-MCNC: 1.9 MG/DL (ref 1.6–2.3)
MCH RBC QN AUTO: 30.8 PG (ref 26.5–34)
MCHC RBC AUTO-ENTMCNC: 33.6 G/DL (ref 31.5–36.3)
MCV RBC AUTO: 91.8 FL (ref 80–98)
MONOCYTES # BLD AUTO: 0.77 10*3/MM3 (ref 0–0.9)
MONOCYTES NFR BLD AUTO: 6.4 % (ref 0–12)
NEUTROPHILS # BLD AUTO: 10.74 10*3/MM3 (ref 2–8.6)
NEUTROPHILS NFR BLD AUTO: 89.1 % (ref 37–80)
PLATELET # BLD AUTO: 144 10*3/MM3 (ref 150–450)
PMV BLD AUTO: 11.2 FL (ref 8–12)
POTASSIUM BLD-SCNC: 4 MMOL/L (ref 3.5–5.1)
RBC # BLD AUTO: 2.79 10*6/MM3 (ref 4.37–5.74)
SODIUM BLD-SCNC: 141 MMOL/L (ref 137–145)
UNIT  ABO: NORMAL
UNIT  RH: NORMAL
WBC NRBC COR # BLD: 12.05 10*3/MM3 (ref 3.2–9.8)

## 2017-09-15 PROCEDURE — 71010 HC CHEST PA OR AP: CPT

## 2017-09-15 PROCEDURE — 25010000003 POTASSIUM CHLORIDE 10 MEQ/100ML SOLUTION: Performed by: THORACIC SURGERY (CARDIOTHORACIC VASCULAR SURGERY)

## 2017-09-15 PROCEDURE — G8978 MOBILITY CURRENT STATUS: HCPCS

## 2017-09-15 PROCEDURE — 25010000003 CEFAZOLIN PER 500 MG: Performed by: NURSE PRACTITIONER

## 2017-09-15 PROCEDURE — 97530 THERAPEUTIC ACTIVITIES: CPT

## 2017-09-15 PROCEDURE — G8987 SELF CARE CURRENT STATUS: HCPCS

## 2017-09-15 PROCEDURE — 63710000001 INSULIN DETEMIR PER 5 UNITS: Performed by: NURSE PRACTITIONER

## 2017-09-15 PROCEDURE — 94760 N-INVAS EAR/PLS OXIMETRY 1: CPT

## 2017-09-15 PROCEDURE — 80048 BASIC METABOLIC PNL TOTAL CA: CPT | Performed by: NURSE PRACTITIONER

## 2017-09-15 PROCEDURE — 25010000002 ONDANSETRON PER 1 MG: Performed by: NURSE PRACTITIONER

## 2017-09-15 PROCEDURE — 94640 AIRWAY INHALATION TREATMENT: CPT

## 2017-09-15 PROCEDURE — 82962 GLUCOSE BLOOD TEST: CPT

## 2017-09-15 PROCEDURE — 94799 UNLISTED PULMONARY SVC/PX: CPT

## 2017-09-15 PROCEDURE — G8988 SELF CARE GOAL STATUS: HCPCS

## 2017-09-15 PROCEDURE — 97162 PT EVAL MOD COMPLEX 30 MIN: CPT

## 2017-09-15 PROCEDURE — 83735 ASSAY OF MAGNESIUM: CPT | Performed by: NURSE PRACTITIONER

## 2017-09-15 PROCEDURE — 93005 ELECTROCARDIOGRAM TRACING: CPT | Performed by: NURSE PRACTITIONER

## 2017-09-15 PROCEDURE — G8979 MOBILITY GOAL STATUS: HCPCS

## 2017-09-15 PROCEDURE — 85025 COMPLETE CBC W/AUTO DIFF WBC: CPT | Performed by: NURSE PRACTITIONER

## 2017-09-15 PROCEDURE — 97166 OT EVAL MOD COMPLEX 45 MIN: CPT

## 2017-09-15 PROCEDURE — 82330 ASSAY OF CALCIUM: CPT | Performed by: THORACIC SURGERY (CARDIOTHORACIC VASCULAR SURGERY)

## 2017-09-15 PROCEDURE — 93010 ELECTROCARDIOGRAM REPORT: CPT | Performed by: INTERNAL MEDICINE

## 2017-09-15 PROCEDURE — 63710000001 INSULIN ASPART PER 5 UNITS: Performed by: NURSE PRACTITIONER

## 2017-09-15 RX ORDER — DEXTROSE MONOHYDRATE 25 G/50ML
25 INJECTION, SOLUTION INTRAVENOUS
Status: DISCONTINUED | OUTPATIENT
Start: 2017-09-15 | End: 2017-09-18 | Stop reason: HOSPADM

## 2017-09-15 RX ORDER — DOBUTAMINE HYDROCHLORIDE 200 MG/100ML
2-20 INJECTION INTRAVENOUS CONTINUOUS PRN
Status: DISCONTINUED | OUTPATIENT
Start: 2017-09-15 | End: 2017-09-15

## 2017-09-15 RX ORDER — NICOTINE POLACRILEX 4 MG
15 LOZENGE BUCCAL
Status: DISCONTINUED | OUTPATIENT
Start: 2017-09-15 | End: 2017-09-18 | Stop reason: HOSPADM

## 2017-09-15 RX ORDER — TAMSULOSIN HYDROCHLORIDE 0.4 MG/1
0.4 CAPSULE ORAL NIGHTLY
Status: DISCONTINUED | OUTPATIENT
Start: 2017-09-15 | End: 2017-09-18 | Stop reason: HOSPADM

## 2017-09-15 RX ORDER — CLOPIDOGREL BISULFATE 75 MG/1
75 TABLET ORAL DAILY
Status: DISCONTINUED | OUTPATIENT
Start: 2017-09-15 | End: 2017-09-17

## 2017-09-15 RX ORDER — BUDESONIDE AND FORMOTEROL FUMARATE DIHYDRATE 80; 4.5 UG/1; UG/1
2 AEROSOL RESPIRATORY (INHALATION)
Status: DISCONTINUED | OUTPATIENT
Start: 2017-09-15 | End: 2017-09-18 | Stop reason: HOSPADM

## 2017-09-15 RX ORDER — ASCORBIC ACID 500 MG
500 TABLET ORAL 2 TIMES DAILY
Status: DISCONTINUED | OUTPATIENT
Start: 2017-09-15 | End: 2017-09-18 | Stop reason: HOSPADM

## 2017-09-15 RX ORDER — AMIODARONE HYDROCHLORIDE 200 MG/1
400 TABLET ORAL EVERY 12 HOURS SCHEDULED
Status: DISCONTINUED | OUTPATIENT
Start: 2017-09-15 | End: 2017-09-18 | Stop reason: HOSPADM

## 2017-09-15 RX ORDER — ATORVASTATIN CALCIUM 20 MG/1
20 TABLET, FILM COATED ORAL NIGHTLY
Status: DISCONTINUED | OUTPATIENT
Start: 2017-09-15 | End: 2017-09-18 | Stop reason: HOSPADM

## 2017-09-15 RX ORDER — DOBUTAMINE HYDROCHLORIDE 200 MG/100ML
2-20 INJECTION INTRAVENOUS CONTINUOUS PRN
Status: DISCONTINUED | OUTPATIENT
Start: 2017-09-15 | End: 2017-09-15 | Stop reason: CLARIF

## 2017-09-15 RX ORDER — LEVOTHYROXINE SODIUM 88 UG/1
88 TABLET ORAL NIGHTLY
Status: DISCONTINUED | OUTPATIENT
Start: 2017-09-15 | End: 2017-09-18 | Stop reason: HOSPADM

## 2017-09-15 RX ORDER — PRENATAL VIT/IRON FUM/FOLIC AC 27MG-0.8MG
1 TABLET ORAL DAILY
Status: DISCONTINUED | OUTPATIENT
Start: 2017-09-15 | End: 2017-09-18 | Stop reason: HOSPADM

## 2017-09-15 RX ORDER — IPRATROPIUM BROMIDE AND ALBUTEROL SULFATE 2.5; .5 MG/3ML; MG/3ML
3 SOLUTION RESPIRATORY (INHALATION)
Status: DISCONTINUED | OUTPATIENT
Start: 2017-09-15 | End: 2017-09-18 | Stop reason: HOSPADM

## 2017-09-15 RX ADMIN — TAMSULOSIN HYDROCHLORIDE 0.4 MG: 0.4 CAPSULE ORAL at 21:00

## 2017-09-15 RX ADMIN — ASPIRIN 81 MG: 81 TABLET ORAL at 09:57

## 2017-09-15 RX ADMIN — BUDESONIDE AND FORMOTEROL FUMARATE DIHYDRATE 2 PUFF: 80; 4.5 AEROSOL RESPIRATORY (INHALATION) at 18:38

## 2017-09-15 RX ADMIN — CEFAZOLIN SODIUM 2 G: 1 INJECTION, POWDER, FOR SOLUTION INTRAMUSCULAR; INTRAVENOUS at 09:56

## 2017-09-15 RX ADMIN — AMIODARONE HYDROCHLORIDE 400 MG: 200 TABLET ORAL at 21:46

## 2017-09-15 RX ADMIN — OXYCODONE HYDROCHLORIDE AND ACETAMINOPHEN 500 MG: 500 TABLET ORAL at 17:09

## 2017-09-15 RX ADMIN — INSULIN ASPART 6 UNITS: 100 INJECTION, SOLUTION INTRAVENOUS; SUBCUTANEOUS at 09:57

## 2017-09-15 RX ADMIN — TAMSULOSIN HYDROCHLORIDE 0.4 MG: 0.4 CAPSULE ORAL at 17:09

## 2017-09-15 RX ADMIN — MUPIROCIN 1 APPLICATION: 20 OINTMENT TOPICAL at 09:57

## 2017-09-15 RX ADMIN — IPRATROPIUM BROMIDE AND ALBUTEROL SULFATE 3 ML: 2.5; .5 SOLUTION RESPIRATORY (INHALATION) at 14:47

## 2017-09-15 RX ADMIN — LEVOTHYROXINE SODIUM 88 MCG: 88 TABLET ORAL at 21:46

## 2017-09-15 RX ADMIN — BUDESONIDE AND FORMOTEROL FUMARATE DIHYDRATE 2 PUFF: 80; 4.5 AEROSOL RESPIRATORY (INHALATION) at 14:53

## 2017-09-15 RX ADMIN — HYDROCODONE BITARTRATE AND ACETAMINOPHEN 1 TABLET: 10; 325 TABLET ORAL at 00:11

## 2017-09-15 RX ADMIN — CEFAZOLIN SODIUM 2 G: 1 INJECTION, POWDER, FOR SOLUTION INTRAMUSCULAR; INTRAVENOUS at 17:09

## 2017-09-15 RX ADMIN — HYDROCODONE BITARTRATE AND ACETAMINOPHEN 1 TABLET: 5; 325 TABLET ORAL at 21:45

## 2017-09-15 RX ADMIN — CLOPIDOGREL BISULFATE 75 MG: 75 TABLET ORAL at 17:08

## 2017-09-15 RX ADMIN — HYDROCODONE BITARTRATE AND ACETAMINOPHEN 1 TABLET: 10; 325 TABLET ORAL at 07:59

## 2017-09-15 RX ADMIN — INSULIN DETEMIR 36 UNITS: 100 INJECTION, SOLUTION SUBCUTANEOUS at 22:03

## 2017-09-15 RX ADMIN — METOPROLOL TARTRATE 12.5 MG: 25 TABLET, FILM COATED ORAL at 21:46

## 2017-09-15 RX ADMIN — HYDROCODONE BITARTRATE AND ACETAMINOPHEN 1 TABLET: 5; 325 TABLET ORAL at 13:19

## 2017-09-15 RX ADMIN — AMIODARONE HYDROCHLORIDE 400 MG: 200 TABLET ORAL at 09:57

## 2017-09-15 RX ADMIN — POTASSIUM CHLORIDE 10 MEQ: 7.46 INJECTION, SOLUTION INTRAVENOUS at 02:30

## 2017-09-15 RX ADMIN — SENNOSIDES AND DOCUSATE SODIUM 1 TABLET: 8.6; 5 TABLET ORAL at 09:57

## 2017-09-15 RX ADMIN — POTASSIUM CHLORIDE 10 MEQ: 7.46 INJECTION, SOLUTION INTRAVENOUS at 01:28

## 2017-09-15 RX ADMIN — POTASSIUM CHLORIDE 10 MEQ: 7.46 INJECTION, SOLUTION INTRAVENOUS at 03:29

## 2017-09-15 RX ADMIN — HYDROCODONE BITARTRATE AND ACETAMINOPHEN 1 TABLET: 5; 325 TABLET ORAL at 17:08

## 2017-09-15 RX ADMIN — MAGNESIUM OXIDE TAB 400 MG (241.3 MG ELEMENTAL MG) 400 MG: 400 (241.3 MG) TAB at 17:08

## 2017-09-15 RX ADMIN — ATORVASTATIN CALCIUM 20 MG: 20 TABLET, FILM COATED ORAL at 21:46

## 2017-09-15 RX ADMIN — IPRATROPIUM BROMIDE AND ALBUTEROL SULFATE 3 ML: 2.5; .5 SOLUTION RESPIRATORY (INHALATION) at 18:38

## 2017-09-15 RX ADMIN — ONDANSETRON 4 MG: 2 INJECTION INTRAMUSCULAR; INTRAVENOUS at 09:57

## 2017-09-15 RX ADMIN — SENNOSIDES AND DOCUSATE SODIUM 1 TABLET: 8.6; 5 TABLET ORAL at 17:09

## 2017-09-15 NOTE — PROGRESS NOTES
Discharge Planning Assessment  [facility]     Patient Name: Nick Austin  MRN: [id number]  Today's Date: 9/15/2017    Admit Date: 9/14/2017          Discharge Needs Assessment       09/15/17 0916    Living Environment    Lives With spouse    Living Arrangements house    Home Accessibility no concerns    Transportation Available family or friend will provide;car    Living Environment    Provides Primary Care For no one    Quality Of Family Relationships supportive;involved;helpful    Able to Return to Prior Living Arrangements yes    Living Arrangement Comments Pt resides at home with his spouse. pt reports good family support system.    Discharge Needs Assessment    Concerns To Be Addressed adjustment to diagnosis/illness concerns    Equipment Currently Used at Home none    Discharge Facility/Level Of Care Needs home with home health    Current Discharge Risk chronically ill    Discharge Disposition still a patient            Discharge Plan       09/15/17 0918    Case Management/Social Work Plan    Additional Comments LSW assesment complete. pt resides at home with spouse. pt reports good family support system. pt plans to return home at d/c and may benefit from home health follow up. LSW awaiting additional recomendations from MD and therapy. LSW/case mgt will follow up as consulted and complete arrangements as ordered.        Discharge Placement     No information found        Expected Discharge Date and Time     Expected Discharge Date Expected Discharge Time    Sep 18, 2017               Demographic Summary       09/15/17 0914    Referral Information    Admission Type inpatient    Referral Source high risk screening;physician    Reason For Consult discharge planning    Record Reviewed medical record    Contact Information    Permission Granted to Share Information With     Primary Care Physician Information    Name Elizabeth Morgan            Functional Status       09/15/17 0915     Functional Status Prior    Ambulation 0-->independent    Transferring 0-->independent    Toileting 0-->independent    Bathing 0-->independent    Dressing 0-->independent    Eating 0-->independent    Communication 0-->understands/communicates without difficulty    Swallowing 0-->swallows foods/liquids without difficulty    IADL    Medications independent    Meal Preparation independent    Housekeeping assistive person    Laundry assistive person    Shopping assistive person    Oral Care independent    Activity Tolerance    Current Activity Limitations --   S/P CABG    Usual Activity Tolerance moderate    Current Activity Tolerance poor    Cognitive/Perceptual/Developmental    Current Mental Status/Cognitive Functioning no deficits noted    Recent Changes in Mental Status/Cognitive Functioning no changes    Developmental Stage (Eriksson's Stages of Development) Stage 8 (65 years-death/Late Adulthood) Integrity vs. Despair            Psychosocial     None            Abuse/Neglect     None            Legal     None            Substance Abuse     None            Patient Forms     None          PATI Watkins

## 2017-09-15 NOTE — CONSULTS
"Adult Nutrition  Assessment    Patient Name:  Nick Austin  YOB: 1950  MRN: [id number]  Admit Date:  9/14/2017    Assessment Date:  9/15/2017          Reason for Assessment       09/15/17 1542    Reason for Assessment    Reason For Assessment/Visit identified at risk by screening criteria    Identified At Risk By Screening Criteria MST SCORE 2+;diagnosis    Cardiac CAD                Nutrition/Diet History       09/15/17 1543    Nutrition/Diet History    Typical Food/Fluid Intake Pt alert and states that he intentionally tried to cut down on his eating pta.  Does not really follow any diet at home              Labs/Tests/Procedures/Meds       09/15/17 1543    Labs/Tests/Procedures/Meds    Labs/Tests Review Reviewed    Medication Review Reviewed, pertinent            Physical Findings       09/15/17 1543    Physical Findings/Assessment    Additional Documentation Physical Appearance (Group)    Physical Appearance    Overall Physical Appearance on oxygen therapy            Estimated/Assessed Needs       09/15/17 1543    Calculation Measurements    Height Used for Calculations 1.715 m (5' 7.5\")            Nutrition Prescription Ordered       09/15/17 1543    Nutrition Prescription PO    Current PO Diet Full Liquid   to begin at lunch              Comments:  Pt is s/p Re-Do CABG.  He is currently NPO but full liquids to begin at lunch.  He reports that he follows a regular diet at home.  His spouse is present and she wanted information on Cardiac diet.  Information provided with written diet copy and contact number provided.  Encouraged nutrient dense foods after d/c from the hospital to promote healing.  Rd will monitor        Electronically signed by:  Rachel Vargas RD  09/15/17 3:46 PM  "

## 2017-09-15 NOTE — PROGRESS NOTES
"  Cardiothoracic - Vascular Surgery Daily Note        LOS: 1 day   Patient Care Team:  Nick Morgan MD as PCP - General (Family Medicine)  Nick Morgan MD as PCP - Claims Attributed    POD1: Redo CABG x 3  REDO CORONARY ARTERY BYPASS GRAFTING  ENDOSCOPIC VEIN HARVEST   SAPHENOUS VEIN GRAFT FROM ASCENDING AORTA TO FIRST DIAGONAL BRANCH  SAPHENOUS VEIN GRAFT FROM ASCENDING AORTA TO RAMUS INTERMEDIUS BRANCH  SAPHENOUS VEIN GRAFT FROM ASCENDING AORTA TO POSTERIOR LATERAL BRANCH   RIGHT common femoral arterial line  Vascular ultrasound guidance      Chief Complaint: CAD, Nausea      Subjective         Subjective:  Symptoms:  Stable.  He reports chest pain (surgical).  No shortness of breath.    Diet:  Adequate intake.  He reports  nausea.    Activity level: Impaired due to weakness.    Pain:  He complains of pain that is moderate.  Pain is well controlled and requiring pain medication.          Objective     Vital Signs  Temp:  [98.8 °F (37.1 °C)-99 °F (37.2 °C)] 98.8 °F (37.1 °C)  Heart Rate:  [68-95] 83  Resp:  [14-20] 18  BP: ()/(54-61) 118/58  Arterial Line BP: ()/(43-61) 113/56  FiO2 (%):  [40 %-70 %] 40 %  Body mass index is 42.76 kg/(m^2).    Intake/Output Summary (Last 24 hours) at 09/15/17 0856  Last data filed at 09/15/17 0500   Gross per 24 hour   Intake          5885.77 ml   Output             3798 ml   Net          2087.77 ml          Wt Readings from Last 3 Encounters:   09/15/17 277 lb 1.9 oz (126 kg)   09/12/17 267 lb (121 kg)   09/07/17 269 lb 13.5 oz (122 kg)     CT 765cc serosanginous. No air leak     Physical Exam   Objective:  General Appearance:  Comfortable.    Vital signs: (most recent): Blood pressure 118/58, pulse 83, temperature 98.8 °F (37.1 °C), temperature source Core, resp. rate 18, height 67.5\" (171.5 cm), weight 277 lb 1.9 oz (126 kg), SpO2 94 %.  Vital signs are normal.  No fever.    Output: Producing urine and no stool output.    HEENT: Normal HEENT exam.  "   Lungs:  Normal respiratory rate and normal effort.  There are decreased breath sounds (LLL).    Heart: Normal rate.  Regular rhythm.    Chest: (CT No Air leak. -20sx  AV Wires to chest)  Abdomen: Abdomen is soft and non-distended.  Bowel sounds are normal.     Extremities: Decreased range of motion.  There is dependent edema.  (D/t surgical pain)  Pulses: Distal pulses are intact.    Neurological: Patient is alert and oriented to person, place and time.    Skin:  Warm and dry.  (M/S INC: [place]. Intact  LLE EVH: ACE. CDI)              Results Review:      Results from last 7 days  Lab Units 09/15/17  0448 09/14/17  2236 09/14/17  2025  09/14/17  1511 09/14/17  1509   SODIUM mmol/L 141  --   --   --  138  --    SODIUM, VENOUS mmol/L  --   --   --   --   --  140.6   SODIUM, ARTERIAL mmol/L  --  141.2 141.4  < >  --  139.8   POTASSIUM mmol/L 4.0  --   --   --  3.3*  --    POTASSIUM, VENOUS mmol/L  --   --   --   --   --  3.3*   CHLORIDE mmol/L 106  --   --   --  104  --    CO2 mmol/L 25.0  --   --   --  24.0  --    BUN mg/dL 13  --   --   --  14  --    CREATININE mg/dL 1.01  --   --   --  1.19  --    CALCIUM mg/dL 8.2*  --   --   --  7.4*  --    GLUCOSE mg/dL 136*  --   --   --  135*  --    GLUCOSE, ARTERIAL mmol/L  --  169 196  < >  --  146   < > = values in this interval not displayed.    Estimated Creatinine Clearance: 89.9 mL/min (by C-G formula based on Cr of 1.01).      Results from last 7 days  Lab Units 09/15/17  0448 09/14/17  2026 09/14/17  1511   MAGNESIUM mg/dL 1.9 2.1 2.7*   PHOSPHORUS mg/dL  --   --  3.6               Results from last 7 days  Lab Units 09/15/17  0448 09/14/17  1511 09/14/17  1343 09/14/17  1255 09/14/17  1226   WBC 10*3/mm3 12.05* 12.25*  --   --   --    HEMOGLOBIN g/dL 8.6* 9.7*  --   --   --    HEMOGLOBIN, POC g/dL  --   --  9.2 9.2 9.2   PLATELETS 10*3/mm3 144* 126*  --   --   --          Results from last 7 days  Lab Units 09/14/17  1511   INR  1.59*       Imaging Results (last  24 hours)     Procedure Component Value Units Date/Time    XR Chest 1 View [[id number]] Collected:  09/14/17 1539     Updated:  09/14/17 1609    Narrative:       Patient Name:  MR. ARIANE ROBERTSON  Patient ID:  [id number]   Ordering:  ROSS KENT  Attending:  VIOLA SNYDER  Referring:  ROSS KENT  ------------------------------------------------    PORTABLE CHEST    HISTORY: Immediate postoperative study    Portable AP supine film of the chest was obtained at 3:47 PM.  COMPARISON: September 7, 2017    Now postoperative changes in the sternum and mediastinum.  Endotracheal tube with tip well above the shaheen.  Nasogastric tube with distal end looped in the proximal stomach.  Right internal jugular [place] catheter with tip in the main  pulmonary artery outflow tract.  Thoracotomy tubes or mediastinal drainage tubes in place.  Widened mediastinum with prominence of the aortic arch.  Subsegmental atelectasis, infiltrate or edema left lung base.  The heart size is upper normal.  The pulmonary vasculature is not increased.  No pleural effusion.  No pneumothorax.  No acute osseous abnormality.      Impression:       CONCLUSION:  Immediate postop coronary artery bypass.  Endotracheal tube with tip well above the shaheen.  Nasogastric tube with distal end looped in the proximal stomach.  Right internal jugular [place] catheter with tip in the main  pulmonary artery outflow tract.  Thoracotomy tubes or mediastinal drainage tubes in place.  Widened mediastinum with prominence of the aortic arch.  Could consider CT for further evaluation.  Subsegmental atelectasis, infiltrate or edema left lung base.    [id number]    Electronically signed by:  Adán Sevilla MD  9/14/2017 4:08 PM CDT  Workstation: [id number]    XR Chest 1 View [[id number]] Collected:  09/15/17 0440     Updated:  09/15/17 0602    Narrative:         Chest single view on  9/15/2017     CLINICAL INDICATION: Postop open heart  surgery    COMPARISON: 9/14/2017    FINDINGS: ET tube and NG tube have been removed. The patient is  status post median sternotomy and CABG. Right IJ [place]  catheter has been retracted and now likely terminates in the  right atrium. Mediastinal drain and right chest tube are noted in  place. Cardiomegaly is noted. There is marked prominence in the  left upper mediastinum along the aortic arch that may be  postoperative in nature. There has been no significant change in  left lower lung opacity consistent with likely atelectasis and  small left pleural effusion. There is no pneumothorax.      Impression:       Interval extubation with retraction of the [place]  catheter as above with otherwise no significant change.    Electronically signed by:  Lc Crain  9/15/2017 6:01 AM  CDT Workstation: [id number]                                amiodarone 0.5 mg/min Intravenous Once   aspirin 81 mg Oral Daily   ceFAZolin 2 g Intravenous Q8H   insulin detemir 36 Units Subcutaneous Nightly   metoprolol tartrate 12.5 mg Oral Q12H   mupirocin 1 application Topical Daily   sennosides-docusate sodium 1 tablet Oral BID       dexmedetomidine 0.2-1.5 mcg/kg/hr Last Rate: Stopped (09/15/17 0329)   dextrose 5 % and sodium chloride 0.45 % 30 mL/hr Last Rate: 30 mL/hr (09/14/17 1631)   DOBUTamine 2-20 mcg/kg/min Last Rate: 5 mcg/kg/min (09/15/17 0044)   insulin regular infusion 1 unit/mL (CCU use) 0-50 Units/hr Last Rate: 7.2 Units/hr (09/15/17 0618)   lactated ringers     nitroglycerin 5-200 mcg/min    phenylephrine 0.5-3 mcg/kg/min Last Rate: Stopped (09/15/17 0001)   sodium chloride 30 mL/hr              ASSESSMENT/PLAN     Principal Problem:    Coronary artery disease of native artery of native heart with stable angina pectoris  Active Problems:    CAD (coronary artery disease)      Assessment:    Condition: In stable condition.   (Stable Post op CABG: Progressing well. OOB chair.     CAD: ASA. Start PLAVIX, STATIN.  BETA as tolerated with BP. Hold ACE.     Resp: Wean O2, Incentive. Nebs. CT H2O seal. Extubated 2125    Pain: [place]    Rehab: OOB, Ambulate. PT/OT    Transient Post Op Hyperglycemia: DC Insulin gtt. FSBS 126. SSI Coverage. Continue Levemir).     Plan:   Transfer to floor.  Encourage ambulation and out of bed and up to chair.  Wean off oxygen, start/continue incentive spirometry and continue respiratory treatments.  Advance diet as tolerated.  Resume oral medications and administer medications as ordered.   (Stable transfer 3W telemetry. ).                 This document has been electronically signed by RANDEE Brown on September 15, 2017 8:56 AM
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
## 2017-09-15 NOTE — PLAN OF CARE
Problem: Patient Care Overview (Adult)  Goal: Plan of Care Review  Outcome: Ongoing (interventions implemented as appropriate)    09/15/17 1640   Coping/Psychosocial Response Interventions   Plan Of Care Reviewed With patient;spouse   Outcome Evaluation   Outcome Summary/Follow up Plan PT evaluation completed. Completed bed evaluation only as pt just recently back to bed from chair . Function limtied primarily by decreased strength and tolerance for functional mobility and activities following CABG redo. Pt will benefit from skilled PT to gradually regain lost function. Anticipate pt will require home with assistance at discharge to progress to cardiac rehab when MD deems appropriate.         Problem: Inpatient Physical Therapy  Goal: Transfer Training Goal 1 LTG- PT  Outcome: Ongoing (interventions implemented as appropriate)    09/15/17 1640   Transfer Training PT LTG   Transfer Training PT LTG, Date Established 09/15/17   Transfer Training PT LTG, Time to Achieve by discharge   Transfer Training PT LTG, Activity Type bed to chair /chair to bed;sit to stand/stand to sit   Transfer Training PT LTG, [place] Level independent;conditional independence   Transfer Training PT LTG, Outcome goal ongoing       Goal: Gait Training Goal LTG- PT  Outcome: Ongoing (interventions implemented as appropriate)    09/15/17 1640   Gait Training PT LTG   Gait Training Goal PT LTG, Date Established 09/15/17   Gait Training Goal PT LTG, Time to Achieve by discharge   Gait Training Goal PT LTG, [place] Level independent;conditional independence   Gait Training Goal PT LTG, Distance to Achieve 300ft   Gait Training Goal PT LTG, Outcome goal ongoing       Goal: Stair Training Goal LTG- PT  Outcome: Ongoing (interventions implemented as appropriate)    09/15/17 1640   Stair Training PT LTG   Stair Training Goal PT LTG, Date Established 09/15/17   Stair Training Goal PT LTG, Time to Achieve by discharge   Stair Training Goal PT  LTG, Number of Steps 3   Stair Training Goal PT LTG, [place] Level conditional independence   Stair Training Goal PT LTG, Assist Device 1 handrail;2 handrails       Goal: Patient Education Goal LTG- PT  Outcome: Ongoing (interventions implemented as appropriate)    09/15/17 1640   Patient Education PT LTG   Patient Education PT LTG, Date Established 09/15/17   Patient Education PT LTG, Time to Achieve by discharge   Patient Education PT LTG, Education Type HEP;precaution per surgeon;gait;transfers;home safety;benefits of activity   Patient Education PT LTG Outcome goal ongoing

## 2017-09-15 NOTE — PLAN OF CARE
Problem: Patient Care Overview (Adult)  Goal: Plan of Care Review  Outcome: Ongoing (interventions implemented as appropriate)    09/15/17 0767   Coping/Psychosocial Response Interventions   Plan Of Care Reviewed With patient   Patient Care Overview   Progress improving   Outcome Evaluation   Outcome Summary/Follow up Plan pt had uneventful night, hemodynamically stable, pt extubated at 2125, dangled at 0145 , neosynephrine dc'd, precedex dc'd, electrolytes replaced through night. managed pain appropirately. skin w,d,p. no resp distress. pt monitoring will continue.        Goal: Adult Individualization and Mutuality  Outcome: Ongoing (interventions implemented as appropriate)    Problem: Perioperative Period (Adult)  Goal: Signs and Symptoms of Listed Potential Problems Will be Absent or Manageable (Perioperative Period)  Outcome: Ongoing (interventions implemented as appropriate)    Problem: Pressure Ulcer Risk (Joo Scale) (Adult,Obstetrics,Pediatric)  Goal: Identify Related Risk Factors and Signs and Symptoms  Outcome: Outcome(s) achieved Date Met:  09/15/17  Goal: Skin Integrity  Outcome: Ongoing (interventions implemented as appropriate)    Problem: Fall Risk (Adult)  Goal: Identify Related Risk Factors and Signs and Symptoms  Outcome: Outcome(s) achieved Date Met:  09/15/17  Goal: Absence of Falls  Outcome: Ongoing (interventions implemented as appropriate)    Problem: Cardiac Surgery (Adult)  Goal: Signs and Symptoms of Listed Potential Problems Will be Absent or Manageable (Cardiac Surgery)  Outcome: Ongoing (interventions implemented as appropriate)
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
## 2017-09-15 NOTE — PLAN OF CARE
Problem: Patient Care Overview (Adult)  Goal: Plan of Care Review  Outcome: Ongoing (interventions implemented as appropriate)    09/15/17 1014   Coping/Psychosocial Response Interventions   Plan Of Care Reviewed With patient   Outcome Evaluation   Outcome Summary/Follow up Plan OT evaluation completed this date. Pt. required average mod A (through observation) with t/f, bed mobility, and functional mobility. Pt. O2 sat levels decreased with increased movement and with speech. Required mod VC for adaptive breathing techniques. ROM limited by UE edema, but grossly WFL with GM movements, unable to make full fist in B hands. Pt. would benefit from continued skilled OT to address safety and adherence to CABG precautions with t/f, ADL performance, bed mobility, and functional mobility, as well as independence with daily tasks. Recommend d/c home with 24/7 care and  therapy.          Problem: Inpatient Occupational Therapy  Goal: Bed Mobility Goal LTG- OT  Outcome: Ongoing (interventions implemented as appropriate)    09/15/17 1014   Bed Mobility OT LTG   Bed Mobility OT LTG, Date Established 09/15/17   Bed Mobility OT LTG, Time to Achieve by discharge   Bed Mobility OT LTG, Activity Type all bed mobility   Bed Mobility OT LTG, [place] Level conditional independence       Goal: Transfer Training Goal 1 LTG- OT  Outcome: Ongoing (interventions implemented as appropriate)  Goal: Patient Education Goal LTG- OT  Outcome: Ongoing (interventions implemented as appropriate)    09/15/17 1014   Patient Education OT LTG   Patient Education OT LTG, Date Established 09/15/17   Patient Education OT LTG, Time to Achieve by discharge   Patient Education OT LTG, Education Type HEP;positioning;posture/body mechanics;home safety;adaptive equipment mgmt;energy conservation;adaptive breathing   Patient Education OT LTG, Education Understanding independent;demonstrates adequately;verbalizes understanding       Goal: ADL Goal LTG-  OT  Outcome: Ongoing (interventions implemented as appropriate)    09/15/17 1014   ADL OT LTG   ADL OT LTG, Date Established 09/15/17   ADL OT LTG, Time to Achieve by discharge   ADL OT LTG, Activity Type ADL skills   ADL OT LTG, Additional Goal Set-up A with self-feeding, grooming; mod I with UB dressing and bathing and toileting; min/mod A for LB dressing and bathing

## 2017-09-16 ENCOUNTER — APPOINTMENT (OUTPATIENT)
Dept: GENERAL RADIOLOGY | Facility: HOSPITAL | Age: 67
End: 2017-09-16

## 2017-09-16 LAB
GLUCOSE BLDC GLUCOMTR-MCNC: 118 MG/DL (ref 70–130)
GLUCOSE BLDC GLUCOMTR-MCNC: 122 MG/DL (ref 70–130)
GLUCOSE BLDC GLUCOMTR-MCNC: 125 MG/DL (ref 70–130)
GLUCOSE BLDC GLUCOMTR-MCNC: 149 MG/DL (ref 70–130)
MAGNESIUM SERPL-MCNC: 2.2 MG/DL (ref 1.6–2.3)
POTASSIUM BLD-SCNC: 3.6 MMOL/L (ref 3.5–5.1)
WHOLE BLOOD HOLD SPECIMEN: NORMAL

## 2017-09-16 PROCEDURE — 25010000002 FUROSEMIDE PER 20 MG: Performed by: NURSE PRACTITIONER

## 2017-09-16 PROCEDURE — 82962 GLUCOSE BLOOD TEST: CPT

## 2017-09-16 PROCEDURE — 97110 THERAPEUTIC EXERCISES: CPT

## 2017-09-16 PROCEDURE — 97535 SELF CARE MNGMENT TRAINING: CPT

## 2017-09-16 PROCEDURE — 84132 ASSAY OF SERUM POTASSIUM: CPT | Performed by: NURSE PRACTITIONER

## 2017-09-16 PROCEDURE — 25010000003 CEFAZOLIN PER 500 MG: Performed by: NURSE PRACTITIONER

## 2017-09-16 PROCEDURE — 25010000002 ONDANSETRON PER 1 MG: Performed by: NURSE PRACTITIONER

## 2017-09-16 PROCEDURE — 94799 UNLISTED PULMONARY SVC/PX: CPT

## 2017-09-16 PROCEDURE — 97116 GAIT TRAINING THERAPY: CPT

## 2017-09-16 PROCEDURE — 97530 THERAPEUTIC ACTIVITIES: CPT

## 2017-09-16 PROCEDURE — 71010 HC CHEST PA OR AP: CPT

## 2017-09-16 PROCEDURE — 99024 POSTOP FOLLOW-UP VISIT: CPT | Performed by: NURSE PRACTITIONER

## 2017-09-16 PROCEDURE — 94760 N-INVAS EAR/PLS OXIMETRY 1: CPT

## 2017-09-16 PROCEDURE — 83735 ASSAY OF MAGNESIUM: CPT | Performed by: NURSE PRACTITIONER

## 2017-09-16 RX ORDER — TRAMADOL HYDROCHLORIDE 50 MG/1
50 TABLET ORAL EVERY 4 HOURS PRN
Status: DISCONTINUED | OUTPATIENT
Start: 2017-09-16 | End: 2017-09-18 | Stop reason: HOSPADM

## 2017-09-16 RX ORDER — BISACODYL 10 MG
10 SUPPOSITORY, RECTAL RECTAL ONCE
Status: COMPLETED | OUTPATIENT
Start: 2017-09-16 | End: 2017-09-17

## 2017-09-16 RX ORDER — FUROSEMIDE 10 MG/ML
20 INJECTION INTRAMUSCULAR; INTRAVENOUS ONCE
Status: COMPLETED | OUTPATIENT
Start: 2017-09-16 | End: 2017-09-16

## 2017-09-16 RX ADMIN — IPRATROPIUM BROMIDE AND ALBUTEROL SULFATE 3 ML: 2.5; .5 SOLUTION RESPIRATORY (INHALATION) at 11:45

## 2017-09-16 RX ADMIN — AMIODARONE HYDROCHLORIDE 400 MG: 200 TABLET ORAL at 21:20

## 2017-09-16 RX ADMIN — SENNOSIDES AND DOCUSATE SODIUM 1 TABLET: 8.6; 5 TABLET ORAL at 09:07

## 2017-09-16 RX ADMIN — OXYCODONE HYDROCHLORIDE AND ACETAMINOPHEN 500 MG: 500 TABLET ORAL at 17:45

## 2017-09-16 RX ADMIN — BUDESONIDE AND FORMOTEROL FUMARATE DIHYDRATE 2 PUFF: 80; 4.5 AEROSOL RESPIRATORY (INHALATION) at 06:45

## 2017-09-16 RX ADMIN — LEVOTHYROXINE SODIUM 88 MCG: 88 TABLET ORAL at 21:20

## 2017-09-16 RX ADMIN — IPRATROPIUM BROMIDE AND ALBUTEROL SULFATE 3 ML: 2.5; .5 SOLUTION RESPIRATORY (INHALATION) at 19:12

## 2017-09-16 RX ADMIN — IPRATROPIUM BROMIDE AND ALBUTEROL SULFATE 3 ML: 2.5; .5 SOLUTION RESPIRATORY (INHALATION) at 06:40

## 2017-09-16 RX ADMIN — BUDESONIDE AND FORMOTEROL FUMARATE DIHYDRATE 2 PUFF: 80; 4.5 AEROSOL RESPIRATORY (INHALATION) at 19:12

## 2017-09-16 RX ADMIN — AMIODARONE HYDROCHLORIDE 400 MG: 200 TABLET ORAL at 09:07

## 2017-09-16 RX ADMIN — ASPIRIN 81 MG: 81 TABLET ORAL at 09:08

## 2017-09-16 RX ADMIN — METOPROLOL TARTRATE 12.5 MG: 25 TABLET, FILM COATED ORAL at 09:07

## 2017-09-16 RX ADMIN — TAMSULOSIN HYDROCHLORIDE 0.4 MG: 0.4 CAPSULE ORAL at 21:20

## 2017-09-16 RX ADMIN — ATORVASTATIN CALCIUM 20 MG: 20 TABLET, FILM COATED ORAL at 21:20

## 2017-09-16 RX ADMIN — PRENATAL VIT W/ FE FUMARATE-FA TAB 27-0.8 MG 1 TABLET: 27-0.8 TAB at 09:07

## 2017-09-16 RX ADMIN — FUROSEMIDE 20 MG: 10 INJECTION, SOLUTION INTRAVENOUS at 09:54

## 2017-09-16 RX ADMIN — SENNOSIDES AND DOCUSATE SODIUM 1 TABLET: 8.6; 5 TABLET ORAL at 17:45

## 2017-09-16 RX ADMIN — CEFAZOLIN SODIUM 2 G: 1 INJECTION, POWDER, FOR SOLUTION INTRAMUSCULAR; INTRAVENOUS at 00:19

## 2017-09-16 RX ADMIN — ONDANSETRON 4 MG: 2 INJECTION INTRAMUSCULAR; INTRAVENOUS at 09:08

## 2017-09-16 RX ADMIN — MAGNESIUM OXIDE TAB 400 MG (241.3 MG ELEMENTAL MG) 400 MG: 400 (241.3 MG) TAB at 09:08

## 2017-09-16 RX ADMIN — METOPROLOL TARTRATE 12.5 MG: 25 TABLET, FILM COATED ORAL at 21:21

## 2017-09-16 RX ADMIN — CLOPIDOGREL BISULFATE 75 MG: 75 TABLET ORAL at 09:07

## 2017-09-16 RX ADMIN — HYDROCODONE BITARTRATE AND ACETAMINOPHEN 1 TABLET: 5; 325 TABLET ORAL at 05:28

## 2017-09-16 RX ADMIN — CEFAZOLIN SODIUM 2 G: 1 INJECTION, POWDER, FOR SOLUTION INTRAMUSCULAR; INTRAVENOUS at 09:54

## 2017-09-16 RX ADMIN — OXYCODONE HYDROCHLORIDE AND ACETAMINOPHEN 500 MG: 500 TABLET ORAL at 09:08

## 2017-09-16 RX ADMIN — MAGNESIUM OXIDE TAB 400 MG (241.3 MG ELEMENTAL MG) 400 MG: 400 (241.3 MG) TAB at 17:46

## 2017-09-16 RX ADMIN — IPRATROPIUM BROMIDE AND ALBUTEROL SULFATE 3 ML: 2.5; .5 SOLUTION RESPIRATORY (INHALATION) at 14:29

## 2017-09-16 NOTE — THERAPY TREATMENT NOTE
Acute Care - Physical Therapy Treatment Note  [facility]     Patient Name: Nick Austin  : 1950  MRN: [id number]  Today's Date: 2017  Onset of Illness/Injury or Date of Surgery Date: 17  Date of Referral to PT: 17  Referring Physician: RANDEE Knott    Admit Date: 2017    Visit Dx:    ICD-10-CM ICD-9-CM   1. Coronary artery disease involving native coronary artery of native heart with angina pectoris I25.119 414.01     413.9   2. Coronary artery disease of native artery of native heart with stable angina pectoris I25.118 414.01     413.9   3. Impaired mobility and ADLs Z74.09 799.89   4. Impaired physical mobility Z74.09 781.99     Patient Active Problem List   Diagnosis   • Essential hypertension   • Hyperlipidemia   • Hypothyroidism   • Type 2 diabetes mellitus without complication, without long-term current use of insulin   • Benign non-nodular prostatic hyperplasia   • Generalized OA   • Uncomplicated asthma   • Dyspnea on exertion   • Angina effort   • S/P CABG x 1   • Angina pectoris   • Coronary artery disease of native artery of native heart with stable angina pectoris   • Coronary artery disease involving native coronary artery of native heart with angina pectoris   • CAD (coronary artery disease)               Adult Rehabilitation Note       17 1040 17 0753       Rehab Assessment/Intervention    Discipline physical therapy assistant  -EM occupational therapy assistant  -CS     Document Type therapy note (daily note)  -EM therapy note (daily note)  -CS     Subjective Information agree to therapy  -EM agree to therapy  -CS     Precautions/Limitations cardiac precautions;oxygen therapy device and L/min;other (see comments)   wound vac  -EM cardiac precautions;fall precautions;oxygen therapy device and L/min;spinal precautions  -CS     Recorded by [EM] Vu Sevilla PTA [CS] DEBBI Louis/L     Vital Signs    Pre Systolic BP Rehab 124   -  -CS     Pre Treatment Diastolic BP 65  -EM 52  -CS     Pretreatment Heart Rate (beats/min) 83  -EM 82  -CS     Posttreatment Heart Rate (beats/min)  82  -CS     Pre SpO2 (%) 94  -EM 92  -CS     O2 Delivery Pre Treatment supplemental O2  -EM supplemental O2  -CS     Post SpO2 (%)  92  -CS     O2 Delivery Post Treatment  supplemental O2  -CS     Pre Patient Position Sitting  -EM Sitting  -CS     Post Patient Position  Sitting  -CS     Recorded by [EM] Vu Sevilla PTA [CS] DEBBI Louis/SELENE     Pain Assessment    Pain Assessment 0-10  -EM 0-10  -CS     Pain Score 5  -EM 2  -CS     Post Pain Score 4  -EM 4  -CS     Pain Type Surgical pain  -EM Surgical pain  -CS     Pain Location Chest  -EM Chest  -CS     Recorded by [EM] Vu Sevilla PTA [CS] DEBBI Louis/L     Cognitive Assessment/Intervention    Current Cognitive/Communication Assessment functional  -EM functional  -CS     Orientation Status oriented x 4  -EM oriented x 4  -CS     Follows Commands/Answers Questions 100% of the time   Pt reports hallucinations of spiders on the wall.   -% of the time  -CS     Recorded by [EM] Vu Sevilla PTA [CS] DEBBI Louis/L     Bed Mobility, Assessment/Treatment    Bed Mob, Supine to Sit, [place] minimum assist (75% patient effort)  -EM      Recorded by [EM] Vu Sevilla PTA      Transfer Assessment/Treatment    Transfers, Chair-Bed [place] contact guard assist  -EM      Transfers, Bed-Chair-Bed, Assist Device rolling walker  -EM      Toilet Transfer, [place] contact guard assist  -EM      Toilet Transfer, Assistive Device rolling walker  -EM      Recorded by [EM] Vu Sevilla PTA      Gait Assessment/Treatment    Gait, [place] Level contact guard assist;1 person + 1 person to manage equipment  -EM      Gait, Assistive Device rolling walker   followed with recliner  -EM      Gait, Distance (Feet) 84   3' EOB to bedside commode  -EM      Gait, Comment  followed with chair  -EM      Recorded by [EM] Vu Sevilla PTA      Grooming Assessment/Training    Grooming Assess/Train, Position  sitting  -CS     Grooming Assess/Train, Indepen Level  set up required  -CS     Recorded by  [CS] DEAN Louis     Therapy Exercises    Bilateral Lower Extremities AROM:;20 reps;sitting;ankle pumps/circles;LAQ;hip flexion  -EM      Bilateral Upper Extremity  AROM:;20 reps;sitting;elbow flexion/extension;hand pumps;pronation/supination;shoulder extension/flexion;shoulder ER/IR  -CS     BUE Resistance  manual resistance- minimal  -CS     Recorded by [EM] Vu Sevilla PTA [CS] DEAN Louis     Positioning and Restraints    Pre-Treatment Position in bed  -EM sitting in chair/recliner  -CS     Post Treatment Position chair  -EM chair  -CS     In Chair notified nsg;sitting;call light within reach;encouraged to call for assist  -EM reclined;call light within reach;with family/caregiver  -CS     Recorded by [EM] Vu Sevilla PTA [CS] DEAN Louis       User Key  (r) = Recorded By, (t) = Taken By, (c) = Cosigned By    Initials Name Effective Dates    EM Vu Sevilla PTA 08/11/15 -     CS DEAN Louis 10/17/16 -                 IP PT Goals       09/15/17 1640          Transfer Training PT LTG    Transfer Training PT LTG, Date Established 09/15/17  -NICKOLAS      Transfer Training PT LTG, Time to Achieve by discharge  -NICKOLAS      Transfer Training PT LTG, Activity Type bed to chair /chair to bed;sit to stand/stand to sit  -NICKOLAS      Transfer Training PT LTG, [place] Level independent;conditional independence  -NICKOLAS      Transfer Training PT LTG, Outcome goal ongoing  -NICKOLAS      Gait Training PT LTG    Gait Training Goal PT LTG, Date Established 09/15/17  -NICKOLAS      Gait Training Goal PT LTG, Time to Achieve by discharge  -NICKOLAS      Gait Training Goal PT LTG, [place] Level independent;conditional independence  -NICKOLAS      Gait Training Goal PT LTG,  Distance to Achieve 300ft  -      Gait Training Goal PT LTG, Outcome goal ongoing  -      Stair Training PT LTG    Stair Training Goal PT LTG, Date Established 09/15/17  -      Stair Training Goal PT LTG, Time to Achieve by discharge  -      Stair Training Goal PT LTG, Number of Steps 3  -      Stair Training Goal PT LTG, [place] Level conditional independence  -      Stair Training Goal PT LTG, Assist Device 1 handrail;2 handrails  -      Patient Education PT LTG    Patient Education PT LTG, Date Established 09/15/17  -      Patient Education PT LTG, Time to Achieve by discharge  -      Patient Education PT LTG, Education Type HEP;precaution per surgeon;gait;transfers;home safety;benefits of activity  -      Patient Education PT LTG Outcome goal ongoing  -        User Key  (r) = Recorded By, (t) = Taken By, (c) = Cosigned By    Initials Name Provider Type    NICKOLAS Torres PT Physical Therapist          Physical Therapy Education     Title: PT OT SLP Therapies (Active)     Topic: Physical Therapy (Active)     Point: Precautions (Active)    Learning Progress Summary    Learner Readiness Method Response Comment Documented by Status   Patient Acceptance E NR   09/15/17 1639 Active   Significant Other Acceptance E NR   09/15/17 1639 Active                      User Key     Initials Effective Dates Name Provider Type Discipline     10/17/16 -  Luli Torres PT Physical Therapist PT                    PT Recommendation and Plan  Anticipated Discharge Disposition: home with assist  Planned Therapy Interventions: balance training, bed mobility training, gait training, home exercise program, patient/family education, stair training, strengthening, transfer training  PT Frequency: other (see comments) (5-14 times per week)  Plan of Care Review  Outcome Summary/Follow up Plan: Pt shine tx well with significant improvements with mobility and gt distance/quality. Pt progressing torwards unmet  goals.           Outcome Measures       09/16/17 1100 09/16/17 1040 09/15/17 1113    How much help from another person do you currently need...    Turning from your back to your side while in flat bed without using bedrails?  3  -EM 2  -NICKOLAS    Moving from lying on back to sitting on the side of a flat bed without bedrails?  3  -EM 2  -NICKOLAS    Moving to and from a bed to a chair (including a wheelchair)?  3  -EM 2  -NICKOLAS    Standing up from a chair using your arms (e.g., wheelchair, bedside chair)?  3  -EM 2  -NICKOLAS    Climbing 3-5 steps with a railing?  2  -EM 1  -NICKOLAS    To walk in hospital room?  3  -EM 2  -NICKOLAS    AM-PAC 6 Clicks Score  17  -EM 11  -NICKOLAS    How much help from another is currently needed...    Putting on and taking off regular lower body clothing? 2  -CS      Bathing (including washing, rinsing, and drying) 2  -CS      Toileting (which includes using toilet bed pan or urinal) 2  -CS      Putting on and taking off regular upper body clothing 2  -CS      Taking care of personal grooming (such as brushing teeth) 3  -CS      Eating meals 3  -CS      Score 14  -CS      Functional Assessment    Outcome Measure Options AM-PAC 6 Clicks Daily Activity (OT)  -CS AM-PAC 6 Clicks Basic Mobility (PT)  -[facility]-PAC 6 Clicks Basic Mobility (PT)  -NICKOLAS      09/15/17 1014          How much help from another is currently needed...    Putting on and taking off regular lower body clothing? 2  -BH (r) SP (t) BH (c)      Bathing (including washing, rinsing, and drying) 2  -BH (r) SP (t) BH (c)      Toileting (which includes using toilet bed pan or urinal) 2  -BH (r) SP (t) BH (c)      Putting on and taking off regular upper body clothing 2  -BH (r) SP (t) BH (c)      Taking care of personal grooming (such as brushing teeth) 3  -BH (r) SP (t) BH (c)      Eating meals 3  -BH (r) SP (t) BH (c)      Score 14  -BH (r) SP (t)      Functional Assessment    Outcome Measure Options AM-PAC 6 Clicks Daily Activity (OT)  -BH (r) SP (t) BH (c)         User Key  (r) = Recorded By, (t) = Taken By, (c) = Cosigned By    Initials Name Provider Type    EM Vu Sevilla PTA Physical Therapy Assistant     Ольга Bell, OTR/L Occupational Therapist    NICKOLAS Torres, PT Physical Therapist    CS Diana French, WERNER/L Occupational Therapy Assistant    SP Argenis Garcia, OT Student OT Student           Time Calculation:         PT Charges       09/16/17 1323          Time Calculation    Start Time 1040  -EM      Stop Time 1122  -EM      Time Calculation (min) 42 min  -EM      Time Calculation- PT    Total Timed Code Minutes- PT 42 minute(s)  -EM        User Key  (r) = Recorded By, (t) = Taken By, (c) = Cosigned By    Initials Name Provider Type    EM Vu Sevilla PTA Physical Therapy Assistant          Therapy Charges for Today     Code Description Service Date Service Provider Modifiers Qty    60055007257 HC PT THER PROC EA 15 MIN 9/16/2017 Vu Sevilla, PTA GP 1    56307736872 HC PT THERAPEUTIC ACT EA 15 MIN 9/16/2017 Vu Sevilla PTA GP 1    30960701627 HC GAIT TRAINING EA 15 MIN 9/16/2017 Vu Sevilla, PTA GP 1          PT G-Codes  PT Professional Judgement Used?: Yes  Outcome Measure Options: AM-PAC 6 Clicks Daily Activity (OT)  Score: 11  Functional Limitation: Mobility: Walking and moving around  Mobility: Walking and Moving Around Current Status (): At least 60 percent but less than 80 percent impaired, limited or restricted  Mobility: Walking and Moving Around Goal Status (): 0 percent impaired, limited or restricted    Vu Sevilla PTA  9/16/2017

## 2017-09-16 NOTE — PLAN OF CARE
Problem: Patient Care Overview (Adult)  Goal: Plan of Care Review  Outcome: Ongoing (interventions implemented as appropriate)    09/16/17 1129   Coping/Psychosocial Response Interventions   Plan Of Care Reviewed With patient   Patient Care Overview   Progress improving   Outcome Evaluation   Outcome Summary/Follow up Plan Pt tolerated tx well this date. Pt completed an grooming task; set-up required. No goals met this date. Continue POC.         Problem: Inpatient Occupational Therapy  Goal: Bed Mobility Goal LTG- OT  Outcome: Ongoing (interventions implemented as appropriate)    09/15/17 1014 09/16/17 1129   Bed Mobility OT LTG   Bed Mobility OT LTG, Date Established 09/15/17 --    Bed Mobility OT LTG, Time to Achieve by discharge --    Bed Mobility OT LTG, Activity Type all bed mobility --    Bed Mobility OT LTG, [place] Level conditional independence --    Bed Mobility OT LTG, Date Goal Reviewed --  09/16/17   Bed Mobility OT LTG, Outcome --  goal ongoing       Goal: Transfer Training Goal 1 LTG- OT  Outcome: Ongoing (interventions implemented as appropriate)    09/15/17 1014 09/16/17 1129   Transfer Training OT LTG   Transfer Training OT LTG, Date Established 09/15/17 --    Transfer Training OT LTG, Time to Achieve by discharge --    Transfer Training OT LTG, Activity Type all transfers --    Transfer Training OT LTG, [place] Level supervision required;contact guard assist --    Transfer Training OT LTG, Date Goal Reviewed --  09/16/17   Transfer Training OT LTG, Outcome --  goal ongoing       Goal: Patient Education Goal LTG- OT  Outcome: Ongoing (interventions implemented as appropriate)    09/15/17 1014 09/16/17 1129   Patient Education OT LTG   Patient Education OT LTG, Date Established 09/15/17 --    Patient Education OT LTG, Time to Achieve by discharge --    Patient Education OT LTG, Education Type HEP;positioning;posture/body mechanics;home safety;adaptive equipment mgmt;energy  conservation;adaptive breathing --    Patient Education OT LTG, Education Understanding independent;demonstrates adequately;verbalizes understanding --    Patient Education OT LTG, Date Goal Reviewed --  09/16/17   Patient Education OT LTG Outcome --  goal ongoing       Goal: ADL Goal LTG- OT  Outcome: Ongoing (interventions implemented as appropriate)    09/15/17 1014 09/16/17 1129   ADL OT LTG   ADL OT LTG, Date Established 09/15/17 --    ADL OT LTG, Time to Achieve by discharge --    ADL OT LTG, Activity Type ADL skills --    ADL OT LTG, Additional Goal Set-up A with self-feeding, grooming; mod I with UB dressing and bathing and toileting; min/mod A for LB dressing and bathing --    ADL OT LTG, Date Goal Reviewed --  09/16/17   ADL OT LTG, Outcome --  goal ongoing

## 2017-09-16 NOTE — PLAN OF CARE
Problem: Patient Care Overview (Adult)  Goal: Discharge Needs Assessment  Outcome: Ongoing (interventions implemented as appropriate)    09/15/17 0727 09/15/17 0916 09/15/17 1113   Discharge Needs Assessment   Concerns To Be Addressed --  adjustment to diagnosis/illness concerns --    Readmission Within The Last 30 Days no previous admission in last 30 days --  --    Discharge Facility/Level Of Care Needs --  home with home health --    Current Discharge Risk --  chronically ill --    Discharge Disposition --  still a patient --    Current Health   Anticipated Changes Related to Illness inability to care for self --  --    Self-Care   Equipment Currently Used at Home --  --  grab bar;raised toilet;shower chair  (has RW used by wife in the past)   Living Environment   Transportation Available --  --  family or friend will provide         Problem: Perioperative Period (Adult)  Goal: Signs and Symptoms of Listed Potential Problems Will be Absent or Manageable (Perioperative Period)  Outcome: Ongoing (interventions implemented as appropriate)    09/16/17 0358   Perioperative Period   Problems Assessed (Perioperative Period) all   Problems Present (Perioperative Period) pain         Problem: Pressure Ulcer Risk (Joo Scale) (Adult,Obstetrics,Pediatric)  Goal: Skin Integrity  Outcome: Ongoing (interventions implemented as appropriate)    Problem: Fall Risk (Adult)  Goal: Absence of Falls  Outcome: Ongoing (interventions implemented as appropriate)    09/16/17 0358   Fall Risk (Adult)   Absence of Falls making progress toward outcome         Problem: Cardiac Surgery (Adult)  Goal: Signs and Symptoms of Listed Potential Problems Will be Absent or Manageable (Cardiac Surgery)  Outcome: Ongoing (interventions implemented as appropriate)    09/16/17 0358   Cardiac Surgery   Problems Assessed (Cardiac Surgery) all   Problems Present (Cardiac Surgery) fluid imbalance

## 2017-09-16 NOTE — PROGRESS NOTES
"  Cardiothoracic - Vascular Surgery Daily Note        LOS: 2 days   Patient Care Team:  Nick Morgan MD as PCP - General (Family Medicine)  Nick Morgan MD as PCP - Claims Attributed    POD2: Redo CABG x 3  REDO CORONARY ARTERY BYPASS GRAFTING  ENDOSCOPIC VEIN HARVEST   SAPHENOUS VEIN GRAFT FROM ASCENDING AORTA TO FIRST DIAGONAL BRANCH  SAPHENOUS VEIN GRAFT FROM ASCENDING AORTA TO RAMUS INTERMEDIUS BRANCH  SAPHENOUS VEIN GRAFT FROM ASCENDING AORTA TO POSTERIOR LATERAL BRANCH   RIGHT common femoral arterial line  Vascular ultrasound guidance      Chief Complaint: CAD, Nausea      Subjective         Subjective:  Symptoms:  Stable.  He reports chest pain (surgical).  No shortness of breath.    Diet:  Adequate intake.    Activity level: Impaired due to weakness.    Pain:  He complains of pain that is moderate.  Pain is well controlled and requiring pain medication.          Objective     Vital Signs  Temp:  [97.4 °F (36.3 °C)-98.1 °F (36.7 °C)] 97.6 °F (36.4 °C)  Heart Rate:  [72-91] 82  Resp:  [18-22] 18  BP: ()/(52-74) 110/52  Body mass index is 43.67 kg/(m^2).    Intake/Output Summary (Last 24 hours) at 09/16/17 1000  Last data filed at 09/16/17 0500   Gross per 24 hour   Intake              900 ml   Output             1310 ml   Net             -410 ml          Wt Readings from Last 3 Encounters:   09/16/17 283 lb (128 kg)   09/12/17 267 lb (121 kg)   09/07/17 269 lb 13.5 oz (122 kg)     CT 260cc serous No air leak     Physical Exam   Objective:  General Appearance:  Comfortable.    Vital signs: (most recent): Blood pressure 110/52, pulse 82, temperature 97.6 °F (36.4 °C), temperature source Temporal Artery , resp. rate 18, height 67.5\" (171.5 cm), weight 283 lb (128 kg), SpO2 92 %.  Vital signs are normal.  No fever.    Output: Producing urine and no stool output.    HEENT: Normal HEENT exam.    Lungs:  Normal respiratory rate and normal effort.  There are decreased breath sounds (LLL).    Heart: " Normal rate.  Regular rhythm.    Chest: (CT No Air leak. - DC'd  AV Wires-DC'd)  Abdomen: Abdomen is soft and non-distended.  Bowel sounds are normal.     Extremities: Decreased range of motion.  There is dependent edema.  (D/t surgical pain)  Pulses: Distal pulses are intact.    Neurological: Patient is alert and oriented to person, place and time.    Skin:  Warm and dry.  (M/S INC: [place]. Intact  LLE EVH:  CDI)              Results Review:      Results from last 7 days  Lab Units 09/16/17  0857 09/15/17  0448 09/14/17  2236 09/14/17 2025 09/14/17  1511   SODIUM mmol/L  --  141  --   --   --  138   SODIUM, ARTERIAL mmol/L  --   --  141.2 141.4  < >  --    POTASSIUM mmol/L 3.6 4.0  --   --   --  3.3*   CHLORIDE mmol/L  --  106  --   --   --  104   CO2 mmol/L  --  25.0  --   --   --  24.0   BUN mg/dL  --  13  --   --   --  14   CREATININE mg/dL  --  1.01  --   --   --  1.19   CALCIUM mg/dL  --  8.2*  --   --   --  7.4*   GLUCOSE mg/dL  --  136*  --   --   --  135*   GLUCOSE, ARTERIAL mmol/L  --   --  169 196  < >  --    < > = values in this interval not displayed.    Estimated Creatinine Clearance: 90.7 mL/min (by C-G formula based on Cr of 1.01).      Results from last 7 days  Lab Units 09/16/17  0857 09/15/17  0448 09/14/17  2026 09/14/17  1511   MAGNESIUM mg/dL 2.2 1.9 2.1 2.7*   PHOSPHORUS mg/dL  --   --   --  3.6               Results from last 7 days  Lab Units 09/15/17  0448 09/14/17  1511 09/14/17  1343 09/14/17  1255 09/14/17  1226   WBC 10*3/mm3 12.05* 12.25*  --   --   --    HEMOGLOBIN g/dL 8.6* 9.7*  --   --   --    HEMOGLOBIN, POC g/dL  --   --  9.2 9.2 9.2   PLATELETS 10*3/mm3 144* 126*  --   --   --          Results from last 7 days  Lab Units 09/14/17  1511   INR  1.59*       Imaging Results (last 24 hours)     ** No results found for the last 24 hours. **                                  amiodarone 400 mg Oral Q12H   aspirin 81 mg Oral Daily   atorvastatin 20 mg Oral Nightly   bisacodyl 10 mg  Rectal Once   budesonide-formoterol 2 puff Inhalation BID - RT   ceFAZolin 2 g Intravenous Q8H   clopidogrel 75 mg Oral Daily   insulin aspart 0-24 Units Subcutaneous 4x Daily AC & at Bedtime   insulin detemir 36 Units Subcutaneous Nightly   ipratropium-albuterol 3 mL Nebulization 4x Daily - RT   levothyroxine 88 mcg Oral Nightly   magnesium oxide 400 mg Oral BID   metoprolol tartrate 12.5 mg Oral Q12H   prenatal vitamin 27-0.8 1 tablet Oral Daily   sennosides-docusate sodium 1 tablet Oral BID   tamsulosin 0.4 mg Oral Nightly   vitamin C 500 mg Oral BID       lactated ringers              ASSESSMENT/PLAN     Principal Problem:    Coronary artery disease of native artery of native heart with stable angina pectoris  Active Problems:    CAD (coronary artery disease)      Assessment:    Condition: In stable condition.   (Stable Post op CABG: Progressing well. OOB chair.     CAD: ASA. Start PLAVIX, STATIN. BETA as tolerated with BP. Hold ACE.     Resp: Wean O2, Incentive. Nebs. CT DC. lasix Extubated 2125    Pain: Ultram/Jewell    Rehab: OOB, Ambulate. PT/OT    Transient Post Op Hyperglycemia: SSI Coverage. Continue Levemir).     Plan:   Encourage ambulation and out of bed and up to chair.  Wean off oxygen, start/continue incentive spirometry and continue respiratory treatments.  Advance diet as tolerated.  Resume oral medications and administer medications as ordered.   (Stable continue 3W telemetry. CXR today. Labs/EKG AM. Dc Planning for AM).                 This document has been electronically signed by RANDEE Brown on September 16, 2017 10:00 AM

## 2017-09-16 NOTE — PLAN OF CARE
Problem: Patient Care Overview (Adult)  Goal: Plan of Care Review    09/16/17 1129 09/16/17 1323   Coping/Psychosocial Response Interventions   Plan Of Care Reviewed With patient --    Patient Care Overview   Progress improving --    Outcome Evaluation   Outcome Summary/Follow up Plan --  Pt shine tx well with significant improvements with mobility and gt distance/quality. Pt progressing torwards unmet goals.

## 2017-09-16 NOTE — THERAPY TREATMENT NOTE
Acute Care - Occupational Therapy Treatment Note  [facility]     Patient Name: Nick Austin  : 1950  MRN: [id number]  Today's Date: 2017  Onset of Illness/Injury or Date of Surgery Date: 17  Date of Referral to OT: 17  Referring Physician: RANDEE Knott      Admit Date: 2017    Visit Dx:     ICD-10-CM ICD-9-CM   1. Coronary artery disease involving native coronary artery of native heart with angina pectoris I25.119 414.01     413.9   2. Coronary artery disease of native artery of native heart with stable angina pectoris I25.118 414.01     413.9   3. Impaired mobility and ADLs Z74.09 799.89   4. Impaired physical mobility Z74.09 781.99     Patient Active Problem List   Diagnosis   • Essential hypertension   • Hyperlipidemia   • Hypothyroidism   • Type 2 diabetes mellitus without complication, without long-term current use of insulin   • Benign non-nodular prostatic hyperplasia   • Generalized OA   • Uncomplicated asthma   • Dyspnea on exertion   • Angina effort   • S/P CABG x 1   • Angina pectoris   • Coronary artery disease of native artery of native heart with stable angina pectoris   • Coronary artery disease involving native coronary artery of native heart with angina pectoris   • CAD (coronary artery disease)             Adult Rehabilitation Note       17 0753          Rehab Assessment/Intervention    Discipline occupational therapy assistant  -CS      Document Type therapy note (daily note)  -CS      Subjective Information agree to therapy  -CS      Precautions/Limitations cardiac precautions;fall precautions;oxygen therapy device and L/min;spinal precautions  -CS      Recorded by [CS] DEBBI Louis/SELENE      Vital Signs    Pre Systolic BP Rehab 110  -CS      Pre Treatment Diastolic BP 52  -CS      Pretreatment Heart Rate (beats/min) 82  -CS      Posttreatment Heart Rate (beats/min) 82  -CS      Pre SpO2 (%) 92  -CS      O2 Delivery Pre Treatment  supplemental O2  -CS      Post SpO2 (%) 92  -CS      O2 Delivery Post Treatment supplemental O2  -CS      Pre Patient Position Sitting  -CS      Post Patient Position Sitting  -CS      Recorded by [CS] DEAN Louis      Pain Assessment    Pain Assessment 0-10  -CS      Pain Score 2  -CS      Post Pain Score 4  -CS      Pain Type Surgical pain  -CS      Pain Location Chest  -CS      Recorded by [CS] DEAN Louis      Cognitive Assessment/Intervention    Current Cognitive/Communication Assessment functional  -CS      Orientation Status oriented x 4  -CS      Follows Commands/Answers Questions 100% of the time  -CS      Recorded by [CS] DEAN Louis      Grooming Assessment/Training    Grooming Assess/Train, Position sitting  -CS      Grooming Assess/Train, Indepen Level set up required  -CS      Recorded by [CS] DEAN Louis      Therapy Exercises    Bilateral Upper Extremity AROM:;20 reps;sitting;elbow flexion/extension;hand pumps;pronation/supination;shoulder extension/flexion;shoulder ER/IR  -CS      BUE Resistance manual resistance- minimal  -CS      Recorded by [CS] DEAN Louis      Positioning and Restraints    Pre-Treatment Position sitting in chair/recliner  -CS      Post Treatment Position chair  -CS      In Chair reclined;call light within reach;with family/caregiver  -CS      Recorded by [CS] DEAN Louis        User Key  (r) = Recorded By, (t) = Taken By, (c) = Cosigned By    Initials Name Effective Dates     DEAN Louis 10/17/16 -                 OT Goals       09/16/17 1129 09/15/17 1014       Bed Mobility OT LTG    Bed Mobility OT LTG, Date Established  09/15/17  -BH (r) SP (t) BH (c)     Bed Mobility OT LTG, Time to Achieve  by discharge  -BH (r) SP (t) BH (c)     Bed Mobility OT LTG, Activity Type  all bed mobility  -BH (r) SP (t) BH (c)     Bed Mobility OT LTG, [place] Level  conditional independence  -BH (r)  SP (t) BH (c)     Bed Mobility OT LTG, Date Goal Reviewed 09/16/17  -CS      Bed Mobility OT LTG, Outcome goal ongoing  -CS      Transfer Training OT LTG    Transfer Training OT LTG, Date Established  09/15/17  -BH (r) SP (t) BH (c)     Transfer Training OT LTG, Time to Achieve  by discharge  -BH (r) SP (t) BH (c)     Transfer Training OT LTG, Activity Type  all transfers  -BH (r) SP (t) BH (c)     Transfer Training OT LTG, [place] Level  supervision required;contact guard assist  -BH (r) SP (t) BH (c)     Transfer Training OT LTG, Date Goal Reviewed 09/16/17  -CS      Transfer Training OT LTG, Outcome goal ongoing  -CS      Patient Education OT LTG    Patient Education OT LTG, Date Established  09/15/17  -BH (r) SP (t) BH (c)     Patient Education OT LTG, Time to Achieve  by discharge  -BH (r) SP (t) BH (c)     Patient Education OT LTG, Education Type  HEP;positioning;posture/body mechanics;home safety;adaptive equipment mgmt;energy conservation;adaptive breathing  -BH (r) SP (t) BH (c)     Patient Education OT LTG, Education Understanding  independent;demonstrates adequately;verbalizes understanding  -BH (r) SP (t) BH (c)     Patient Education OT LTG, Date Goal Reviewed 09/16/17  -CS      Patient Education OT LTG Outcome goal ongoing  -CS      ADL OT LTG    ADL OT LTG, Date Established  09/15/17  -BH (r) SP (t) BH (c)     ADL OT LTG, Time to Achieve  by discharge  -BH (r) SP (t) BH (c)     ADL OT LTG, Activity Type  ADL skills  -BH (r) SP (t) BH (c)     ADL OT LTG, Additional Goal  Set-up A with self-feeding, grooming; mod I with UB dressing and bathing and toileting; min/mod A for LB dressing and bathing  -BH (r) SP (t) BH (c)     ADL OT LTG, Date Goal Reviewed 09/16/17  -CS      ADL OT LTG, Outcome goal ongoing  -CS        User Key  (r) = Recorded By, (t) = Taken By, (c) = Cosigned By    Initials Name Provider Type    ISMAEL Bell, OTR/L Occupational Therapist    DEBBI Heredia/L  Occupational Therapy Assistant    SABRINA Garcia, OT Student OT Student          Occupational Therapy Education     Title: PT OT SLP Therapies (Active)     Topic: Occupational Therapy (Active)     Point: ADL training (Active)    Description: Instruct learner(s) on proper safety adaptation and remediation techniques during self care or transfers.   Instruct in proper use of assistive devices.    Learning Progress Summary    Learner Readiness Method Response Comment Documented by Status   Patient Acceptance E,TB,H NR Pt was educated on cardiac precautions and home safety.  09/16/17 1128 Active    Acceptance E VU,NR Educated about OT and POC. Extensively educated about CABG precautions. Educated about safety with ADL, t/f, functional mobility. Educated about using appropriate AE/AD. Educated to call for assist.  09/15/17 1334 Done   Significant Other Acceptance E VU,NR Educated about OT and POC. Extensively educated about CABG precautions. Educated about safety with ADL, t/f, functional mobility. Educated about using appropriate AE/AD. Educated to call for assist.  09/15/17 1334 Done               Point: Home exercise program (Active)    Description: Instruct learner(s) on appropriate technique for monitoring, assisting and/or progressing therapeutic exercises/activities.    Learning Progress Summary    Learner Readiness Method Response Comment Documented by Status   Patient Acceptance E,TB,H NR Pt was educated on cardiac precautions and home safety.  09/16/17 1128 Active               Point: Precautions (Active)    Description: Instruct learner(s) on prescribed precautions during self-care and functional transfers.    Learning Progress Summary    Learner Readiness Method Response Comment Documented by Status   Patient Acceptance E,TB,H NR Pt was educated on cardiac precautions and home safety.  09/16/17 1128 Active    Acceptance E VU,NR Educated about OT and POC. Extensively educated about CABG  precautions. Educated about safety with ADL, t/f, functional mobility. Educated about using appropriate AE/AD. Educated to call for assist.  09/15/17 1334 Done   Significant Other Acceptance E VU,NR Educated about OT and POC. Extensively educated about CABG precautions. Educated about safety with ADL, t/f, functional mobility. Educated about using appropriate AE/AD. Educated to call for assist.  09/15/17 1334 Done               Point: Body mechanics (Active)    Description: Instruct learner(s) on proper positioning and spine alignment during self-care, functional mobility activities and/or exercises.    Learning Progress Summary    Learner Readiness Method Response Comment Documented by Status   Patient Acceptance E,TB,H NR Pt was educated on cardiac precautions and home safety.  09/16/17 1128 Active    Acceptance E VU,NR Educated about OT and POC. Extensively educated about CABG precautions. Educated about safety with ADL, t/f, functional mobility. Educated about using appropriate AE/AD. Educated to call for assist.  09/15/17 1334 Done   Significant Other Acceptance E VU,NR Educated about OT and POC. Extensively educated about CABG precautions. Educated about safety with ADL, t/f, functional mobility. Educated about using appropriate AE/AD. Educated to call for assist.  09/15/17 1334 Done                      User Key     Initials Effective Dates Name Provider Type Discipline     10/17/16 -  DEAN Louis Occupational Therapy Assistant OT     01/31/17 -  Argenis Garcia OT Student OT Student OT                  OT Recommendation and Plan  Anticipated Discharge Disposition: home with assist, home with home health, home with 24/7 care  Planned Therapy Interventions: activity intolerance, adaptive equipment training, ADL retraining, balance training, bed mobility training, energy conservation, edema management, fine motor coordination training, home exercise program, motor coordination  training, strengthening, transfer training  Therapy Frequency: other (see comments) (3-14x/wk)  Plan of Care Review  Plan Of Care Reviewed With: patient  Progress: improving  Outcome Summary/Follow up Plan: Pt tolerated tx well this date. Pt completed an grooming task; set-up required. No goals met this date. Continue POC.        Outcome Measures       09/16/17 1100 09/15/17 1113 09/15/17 1014    How much help from another person do you currently need...    Turning from your back to your side while in flat bed without using bedrails?  2  -NICKOLAS     Moving from lying on back to sitting on the side of a flat bed without bedrails?  2  -NICKOLAS     Moving to and from a bed to a chair (including a wheelchair)?  2  -NICKOLAS     Standing up from a chair using your arms (e.g., wheelchair, bedside chair)?  2  -NICKOLAS     Climbing 3-5 steps with a railing?  1  -NICKOLAS     To walk in hospital room?  2  -NICKOLAS     AM-PAC 6 Clicks Score  11  -NICKOLAS     How much help from another is currently needed...    Putting on and taking off regular lower body clothing? 2  -CS  2  -BH (r) SP (t) BH (c)    Bathing (including washing, rinsing, and drying) 2  -CS  2  -BH (r) SP (t) BH (c)    Toileting (which includes using toilet bed pan or urinal) 2  -CS  2  -BH (r) SP (t) BH (c)    Putting on and taking off regular upper body clothing 2  -CS  2  -BH (r) SP (t) BH (c)    Taking care of personal grooming (such as brushing teeth) 3  -CS  3  -BH (r) SP (t) BH (c)    Eating meals 3  -CS  3  -BH (r) SP (t) BH (c)    Score 14  -CS  14  -BH (r) SP (t)    Functional Assessment    Outcome Measure Options AM-PAC 6 Clicks Daily Activity (OT)  -CS AM-PAC 6 Clicks Basic Mobility (PT)  -NICKOLAS AM-PAC 6 Clicks Daily Activity (OT)  -BH (r) SP (t) BH (c)      User Key  (r) = Recorded By, (t) = Taken By, (c) = Cosigned By    Initials Name Provider Type     Ольга Bell, OTR/L Occupational Therapist    NICKOLAS Luli Torres, PT Physical Therapist    CS Diana French WERNER/L Occupational  Therapy Assistant    SP Argenis Garcia, OT Student OT Student           Time Calculation:         Time Calculation- OT       09/16/17 1132          Time Calculation- OT    OT Start Time 0753  -CS      OT Stop Time 0850  -CS      OT Time Calculation (min) 57 min  -CS      Total Timed Code Minutes- OT 57 minute(s)  -CS      OT Received On 09/16/17  -        User Key  (r) = Recorded By, (t) = Taken By, (c) = Cosigned By    Initials Name Provider Type     DEBBI Louis/SELENE Occupational Therapy Assistant           Therapy Charges for Today     Code Description Service Date Service Provider Modifiers Qty    93627562089 HC OT SELF CARE/MGMT/TRAIN EA 15 MIN 9/16/2017 DEBBI Louis/L GO 2    79702088406 HC OT THER PROC EA 15 MIN 9/16/2017 DEBBI Louis/L GO 2          OT G-codes  OT Professional Judgement Used?: Yes  OT Functional Scales Options: AM-PAC 6 Clicks Daily Activity (OT)  Score: 14  Functional Limitation: Self care  Self Care Current Status (): At least 40 percent but less than 60 percent impaired, limited or restricted  Self Care Goal Status (): At least 20 percent but less than 40 percent impaired, limited or restricted    DEAN Louis  9/16/2017

## 2017-09-16 NOTE — PLAN OF CARE
Problem: Patient Care Overview (Adult)  Goal: Plan of Care Review  Outcome: Ongoing (interventions implemented as appropriate)    09/16/17 0359   Coping/Psychosocial Response Interventions   Plan Of Care Reviewed With patient   Patient Care Overview   Progress improving       Goal: Adult Individualization and Mutuality  Outcome: Ongoing (interventions implemented as appropriate)    09/15/17 0727   Mutuality/Individual Preferences   What Information Would Help Us Give You More Personalized Care? pt needs glasses to see well, dizzy without classess

## 2017-09-17 LAB
ABO GROUP BLD: NORMAL
ALBUMIN SERPL-MCNC: 3.2 G/DL (ref 3.4–4.8)
ALBUMIN/GLOB SERPL: 1.3 G/DL (ref 1.1–1.8)
ALP SERPL-CCNC: 47 U/L (ref 38–126)
ALT SERPL W P-5'-P-CCNC: 37 U/L (ref 21–72)
ANION GAP SERPL CALCULATED.3IONS-SCNC: 7 MMOL/L (ref 5–15)
AST SERPL-CCNC: 38 U/L (ref 17–59)
BILIRUB SERPL-MCNC: 0.7 MG/DL (ref 0.2–1.3)
BLD GP AB SCN SERPL QL: NEGATIVE
BUN BLD-MCNC: 23 MG/DL (ref 7–21)
BUN/CREAT SERPL: 24.5 (ref 7–25)
CALCIUM SPEC-SCNC: 7.9 MG/DL (ref 8.4–10.2)
CHLORIDE SERPL-SCNC: 98 MMOL/L (ref 95–110)
CO2 SERPL-SCNC: 30 MMOL/L (ref 22–31)
CREAT BLD-MCNC: 0.94 MG/DL (ref 0.7–1.3)
DEPRECATED RDW RBC AUTO: 50 FL (ref 35.1–43.9)
ERYTHROCYTE [DISTWIDTH] IN BLOOD BY AUTOMATED COUNT: 14.9 % (ref 11.5–14.5)
GFR SERPL CREATININE-BSD FRML MDRD: 80 ML/MIN/1.73 (ref 60–113)
GLOBULIN UR ELPH-MCNC: 2.4 GM/DL (ref 2.3–3.5)
GLUCOSE BLD-MCNC: 105 MG/DL (ref 60–100)
GLUCOSE BLDC GLUCOMTR-MCNC: 119 MG/DL (ref 70–130)
GLUCOSE BLDC GLUCOMTR-MCNC: 120 MG/DL (ref 70–130)
GLUCOSE BLDC GLUCOMTR-MCNC: 169 MG/DL (ref 70–130)
GLUCOSE BLDC GLUCOMTR-MCNC: 176 MG/DL (ref 70–130)
HCT VFR BLD AUTO: 21.4 % (ref 39–49)
HGB BLD-MCNC: 7.3 G/DL (ref 13.7–17.3)
Lab: NORMAL
MCH RBC QN AUTO: 31.5 PG (ref 26.5–34)
MCHC RBC AUTO-ENTMCNC: 34.1 G/DL (ref 31.5–36.3)
MCV RBC AUTO: 92.2 FL (ref 80–98)
PLATELET # BLD AUTO: 158 10*3/MM3 (ref 150–450)
PMV BLD AUTO: 11.4 FL (ref 8–12)
POTASSIUM BLD-SCNC: 3.9 MMOL/L (ref 3.5–5.1)
PROT SERPL-MCNC: 5.6 G/DL (ref 6.3–8.6)
RBC # BLD AUTO: 2.32 10*6/MM3 (ref 4.37–5.74)
RH BLD: POSITIVE
SODIUM BLD-SCNC: 135 MMOL/L (ref 137–145)
WBC NRBC COR # BLD: 16.78 10*3/MM3 (ref 3.2–9.8)

## 2017-09-17 PROCEDURE — 93010 ELECTROCARDIOGRAM REPORT: CPT | Performed by: INTERNAL MEDICINE

## 2017-09-17 PROCEDURE — 99024 POSTOP FOLLOW-UP VISIT: CPT | Performed by: NURSE PRACTITIONER

## 2017-09-17 PROCEDURE — 86850 RBC ANTIBODY SCREEN: CPT | Performed by: THORACIC SURGERY (CARDIOTHORACIC VASCULAR SURGERY)

## 2017-09-17 PROCEDURE — 94799 UNLISTED PULMONARY SVC/PX: CPT

## 2017-09-17 PROCEDURE — 85027 COMPLETE CBC AUTOMATED: CPT | Performed by: NURSE PRACTITIONER

## 2017-09-17 PROCEDURE — 86900 BLOOD TYPING SEROLOGIC ABO: CPT

## 2017-09-17 PROCEDURE — 94760 N-INVAS EAR/PLS OXIMETRY 1: CPT

## 2017-09-17 PROCEDURE — 82962 GLUCOSE BLOOD TEST: CPT

## 2017-09-17 PROCEDURE — 63710000001 INSULIN ASPART PER 5 UNITS: Performed by: NURSE PRACTITIONER

## 2017-09-17 PROCEDURE — 97110 THERAPEUTIC EXERCISES: CPT

## 2017-09-17 PROCEDURE — 93005 ELECTROCARDIOGRAM TRACING: CPT | Performed by: NURSE PRACTITIONER

## 2017-09-17 PROCEDURE — P9021 RED BLOOD CELLS UNIT: HCPCS

## 2017-09-17 PROCEDURE — 97116 GAIT TRAINING THERAPY: CPT

## 2017-09-17 PROCEDURE — 25010000002 FUROSEMIDE PER 20 MG: Performed by: NURSE PRACTITIONER

## 2017-09-17 PROCEDURE — P9016 RBC LEUKOCYTES REDUCED: HCPCS

## 2017-09-17 PROCEDURE — 86901 BLOOD TYPING SEROLOGIC RH(D): CPT | Performed by: THORACIC SURGERY (CARDIOTHORACIC VASCULAR SURGERY)

## 2017-09-17 PROCEDURE — 25010000002 METHYLPREDNISOLONE PER 125 MG: Performed by: NURSE PRACTITIONER

## 2017-09-17 PROCEDURE — 36430 TRANSFUSION BLD/BLD COMPNT: CPT

## 2017-09-17 PROCEDURE — 86923 COMPATIBILITY TEST ELECTRIC: CPT

## 2017-09-17 PROCEDURE — 63710000001 INSULIN DETEMIR PER 5 UNITS: Performed by: NURSE PRACTITIONER

## 2017-09-17 PROCEDURE — 80053 COMPREHEN METABOLIC PANEL: CPT | Performed by: NURSE PRACTITIONER

## 2017-09-17 PROCEDURE — 86900 BLOOD TYPING SEROLOGIC ABO: CPT | Performed by: THORACIC SURGERY (CARDIOTHORACIC VASCULAR SURGERY)

## 2017-09-17 RX ORDER — POTASSIUM CHLORIDE 750 MG/1
20 CAPSULE, EXTENDED RELEASE ORAL ONCE
Status: COMPLETED | OUTPATIENT
Start: 2017-09-17 | End: 2017-09-17

## 2017-09-17 RX ORDER — FUROSEMIDE 10 MG/ML
40 INJECTION INTRAMUSCULAR; INTRAVENOUS ONCE
Status: COMPLETED | OUTPATIENT
Start: 2017-09-17 | End: 2017-09-17

## 2017-09-17 RX ORDER — METHYLPREDNISOLONE SODIUM SUCCINATE 125 MG/2ML
80 INJECTION, POWDER, LYOPHILIZED, FOR SOLUTION INTRAMUSCULAR; INTRAVENOUS ONCE
Status: COMPLETED | OUTPATIENT
Start: 2017-09-17 | End: 2017-09-17

## 2017-09-17 RX ORDER — FUROSEMIDE 10 MG/ML
20 INJECTION INTRAMUSCULAR; INTRAVENOUS ONCE
Status: DISCONTINUED | OUTPATIENT
Start: 2017-09-17 | End: 2017-09-17

## 2017-09-17 RX ADMIN — IPRATROPIUM BROMIDE AND ALBUTEROL SULFATE 3 ML: 2.5; .5 SOLUTION RESPIRATORY (INHALATION) at 10:58

## 2017-09-17 RX ADMIN — AMIODARONE HYDROCHLORIDE 400 MG: 200 TABLET ORAL at 08:24

## 2017-09-17 RX ADMIN — INSULIN ASPART 4 UNITS: 100 INJECTION, SOLUTION INTRAVENOUS; SUBCUTANEOUS at 17:55

## 2017-09-17 RX ADMIN — POTASSIUM CHLORIDE 20 MEQ: 750 CAPSULE, EXTENDED RELEASE ORAL at 16:45

## 2017-09-17 RX ADMIN — INSULIN ASPART 4 UNITS: 100 INJECTION, SOLUTION INTRAVENOUS; SUBCUTANEOUS at 20:32

## 2017-09-17 RX ADMIN — CLOPIDOGREL BISULFATE 75 MG: 75 TABLET ORAL at 08:25

## 2017-09-17 RX ADMIN — SENNOSIDES AND DOCUSATE SODIUM 1 TABLET: 8.6; 5 TABLET ORAL at 17:55

## 2017-09-17 RX ADMIN — IPRATROPIUM BROMIDE AND ALBUTEROL SULFATE 3 ML: 2.5; .5 SOLUTION RESPIRATORY (INHALATION) at 20:10

## 2017-09-17 RX ADMIN — OXYCODONE HYDROCHLORIDE AND ACETAMINOPHEN 500 MG: 500 TABLET ORAL at 17:55

## 2017-09-17 RX ADMIN — IPRATROPIUM BROMIDE AND ALBUTEROL SULFATE 3 ML: 2.5; .5 SOLUTION RESPIRATORY (INHALATION) at 14:59

## 2017-09-17 RX ADMIN — LEVOTHYROXINE SODIUM 88 MCG: 88 TABLET ORAL at 20:31

## 2017-09-17 RX ADMIN — BUDESONIDE AND FORMOTEROL FUMARATE DIHYDRATE 2 PUFF: 80; 4.5 AEROSOL RESPIRATORY (INHALATION) at 07:03

## 2017-09-17 RX ADMIN — AMIODARONE HYDROCHLORIDE 400 MG: 200 TABLET ORAL at 20:30

## 2017-09-17 RX ADMIN — INSULIN DETEMIR 36 UNITS: 100 INJECTION, SOLUTION SUBCUTANEOUS at 20:32

## 2017-09-17 RX ADMIN — HYDROCODONE BITARTRATE AND ACETAMINOPHEN 1 TABLET: 5; 325 TABLET ORAL at 05:00

## 2017-09-17 RX ADMIN — MAGNESIUM OXIDE TAB 400 MG (241.3 MG ELEMENTAL MG) 400 MG: 400 (241.3 MG) TAB at 17:55

## 2017-09-17 RX ADMIN — METOPROLOL TARTRATE 12.5 MG: 25 TABLET, FILM COATED ORAL at 20:32

## 2017-09-17 RX ADMIN — METHYLPREDNISOLONE SODIUM SUCCINATE 80 MG: 125 INJECTION, POWDER, FOR SOLUTION INTRAMUSCULAR; INTRAVENOUS at 10:01

## 2017-09-17 RX ADMIN — MAGNESIUM OXIDE TAB 400 MG (241.3 MG ELEMENTAL MG) 400 MG: 400 (241.3 MG) TAB at 08:23

## 2017-09-17 RX ADMIN — ATORVASTATIN CALCIUM 20 MG: 20 TABLET, FILM COATED ORAL at 20:31

## 2017-09-17 RX ADMIN — BUDESONIDE AND FORMOTEROL FUMARATE DIHYDRATE 2 PUFF: 80; 4.5 AEROSOL RESPIRATORY (INHALATION) at 20:11

## 2017-09-17 RX ADMIN — TAMSULOSIN HYDROCHLORIDE 0.4 MG: 0.4 CAPSULE ORAL at 20:31

## 2017-09-17 RX ADMIN — PRENATAL VIT W/ FE FUMARATE-FA TAB 27-0.8 MG 1 TABLET: 27-0.8 TAB at 08:22

## 2017-09-17 RX ADMIN — ASPIRIN 81 MG: 81 TABLET ORAL at 08:23

## 2017-09-17 RX ADMIN — IPRATROPIUM BROMIDE AND ALBUTEROL SULFATE 3 ML: 2.5; .5 SOLUTION RESPIRATORY (INHALATION) at 06:58

## 2017-09-17 RX ADMIN — METOPROLOL TARTRATE 12.5 MG: 25 TABLET, FILM COATED ORAL at 08:23

## 2017-09-17 RX ADMIN — OXYCODONE HYDROCHLORIDE AND ACETAMINOPHEN 500 MG: 500 TABLET ORAL at 08:23

## 2017-09-17 RX ADMIN — BISACODYL 10 MG: 10 SUPPOSITORY RECTAL at 08:23

## 2017-09-17 RX ADMIN — SENNOSIDES AND DOCUSATE SODIUM 1 TABLET: 8.6; 5 TABLET ORAL at 08:23

## 2017-09-17 RX ADMIN — FUROSEMIDE 40 MG: 10 INJECTION, SOLUTION INTRAMUSCULAR; INTRAVENOUS at 16:45

## 2017-09-17 NOTE — PROGRESS NOTES
"  Cardiothoracic - Vascular Surgery Daily Note        LOS: 3 days   Patient Care Team:  Nick Morgan MD as PCP - General (Family Medicine)  Nick Morgan MD as PCP - Claims Attributed    POD3: Redo CABG x 3  REDO CORONARY ARTERY BYPASS GRAFTING  ENDOSCOPIC VEIN HARVEST   SAPHENOUS VEIN GRAFT FROM ASCENDING AORTA TO FIRST DIAGONAL BRANCH  SAPHENOUS VEIN GRAFT FROM ASCENDING AORTA TO RAMUS INTERMEDIUS BRANCH  SAPHENOUS VEIN GRAFT FROM ASCENDING AORTA TO POSTERIOR LATERAL BRANCH   RIGHT common femoral arterial line  Vascular ultrasound guidance      Chief Complaint: CAD, Surgical Pain    Subjective         Subjective:  Symptoms:  Stable.  He reports chest pain (surgical).  No shortness of breath.    Diet:  Adequate intake.    Activity level: Impaired due to weakness.    Pain:  He complains of pain that is moderate.  Pain is well controlled and requiring pain medication.          Objective     Vital Signs  Temp:  [97.6 °F (36.4 °C)-100.1 °F (37.8 °C)] 97.6 °F (36.4 °C)  Heart Rate:  [70-85] 74  Resp:  [16-20] 18  BP: (110-152)/(52-72) 114/58  Body mass index is 43.3 kg/(m^2).    Intake/Output Summary (Last 24 hours) at 09/17/17 0753  Last data filed at 09/17/17 0600   Gross per 24 hour   Intake             1180 ml   Output             1275 ml   Net              -95 ml          Wt Readings from Last 3 Encounters:   09/17/17 280 lb 9.6 oz (127 kg)   09/12/17 267 lb (121 kg)   09/07/17 269 lb 13.5 oz (122 kg)         Physical Exam   Objective:  General Appearance:  Comfortable.    Vital signs: (most recent): Blood pressure 114/58, pulse 74, temperature 97.6 °F (36.4 °C), temperature source Temporal Artery , resp. rate 18, height 67.5\" (171.5 cm), weight 280 lb 9.6 oz (127 kg), SpO2 95 %.  Vital signs are normal.  No fever.    Output: Producing urine and no stool output.    HEENT: Normal HEENT exam.    Lungs:  Normal respiratory rate and normal effort.  There are wheezes and decreased breath sounds (LLL).  "   Heart: Normal rate.  Regular rhythm.    Abdomen: Abdomen is soft and non-distended.  Bowel sounds are normal.     Extremities: Decreased range of motion.  There is dependent edema.  (D/t surgical pain)  Pulses: Distal pulses are intact.    Neurological: Patient is alert and oriented to person, place and time.    Skin:  Warm and dry.  (M/S INC: [place] WCINDA. Intact  LLE EVH:  CDI)              Results Review:      Results from last 7 days  Lab Units 09/16/17  0857 09/15/17  0448 09/14/17  2236 09/14/17  2025  09/14/17  1511   SODIUM mmol/L  --  141  --   --   --  138   SODIUM, ARTERIAL mmol/L  --   --  141.2 141.4  < >  --    POTASSIUM mmol/L 3.6 4.0  --   --   --  3.3*   CHLORIDE mmol/L  --  106  --   --   --  104   CO2 mmol/L  --  25.0  --   --   --  24.0   BUN mg/dL  --  13  --   --   --  14   CREATININE mg/dL  --  1.01  --   --   --  1.19   CALCIUM mg/dL  --  8.2*  --   --   --  7.4*   GLUCOSE mg/dL  --  136*  --   --   --  135*   GLUCOSE, ARTERIAL mmol/L  --   --  169 196  < >  --    < > = values in this interval not displayed.    Estimated Creatinine Clearance: 90.3 mL/min (by C-G formula based on Cr of 1.01).      Results from last 7 days  Lab Units 09/16/17  0857 09/15/17  0448 09/14/17  2026 09/14/17  1511   MAGNESIUM mg/dL 2.2 1.9 2.1 2.7*   PHOSPHORUS mg/dL  --   --   --  3.6               Results from last 7 days  Lab Units 09/15/17  0448 09/14/17  1511 09/14/17  1343 09/14/17  1255 09/14/17  1226   WBC 10*3/mm3 12.05* 12.25*  --   --   --    HEMOGLOBIN g/dL 8.6* 9.7*  --   --   --    HEMOGLOBIN, POC g/dL  --   --  9.2 9.2 9.2   PLATELETS 10*3/mm3 144* 126*  --   --   --          Results from last 7 days  Lab Units 09/14/17  1511   INR  1.59*       Imaging Results (last 24 hours)     Procedure Component Value Units Date/Time    SCANNED - IMAGING [[id number]] Resulted:  09/14/17      Updated:  09/16/17 1349    XR Chest 1 View [[id number]] Collected:  09/16/17 1345     Updated:  09/16/17 1416    Narrative:        Patient Name:  MR. ARIANE ROBERTSON  Patient ID:  [id number]   Ordering:  ROSS KENT  Attending:  VIOLA SNYDER  Referring:  ROSS KENT  ------------------------------------------------    PORTABLE CHEST    HISTORY: Coronary bypass chest. Removal of chest tube    Portable AP upright film of the chest was obtained at 1:45 PM.  COMPARISON: September 15, 2017    Right thoracotomy tube and mediastinal drainage tube has been  removed.  Goodells-Rosetta catheter has been removed.  Right internal jugular catheter sheath remains in place.  No pneumothorax.  Left basilar subsegmental atelectasis or infiltrate and small  left pleural effusion.  Coronary artery bypass.  Mediastinum appears less prominent.  The heart is not enlarged.  The pulmonary vasculature is not increased.  No pleural effusion.  No acute osseous abnormality.      Impression:       CONCLUSION:  Right thoracotomy tube and mediastinal drainage tube has been  removed.  Goodells-Rosetta catheter has been removed.  Right internal jugular catheter sheath remains in place.  No pneumothorax.  Left basilar subsegmental atelectasis or infiltrate and small  left pleural effusion.  Coronary artery bypass.  Mediastinum appears less prominent.    [id number]    Electronically signed by:  Adán Sevilla MD  9/16/2017 2:15 PM CDT  Workstation: [id number]                                  amiodarone 400 mg Oral Q12H   aspirin 81 mg Oral Daily   atorvastatin 20 mg Oral Nightly   bisacodyl 10 mg Rectal Once   budesonide-formoterol 2 puff Inhalation BID - RT   clopidogrel 75 mg Oral Daily   furosemide 40 mg Intravenous Once   insulin aspart 0-24 Units Subcutaneous 4x Daily AC & at Bedtime   insulin detemir 36 Units Subcutaneous Nightly   ipratropium-albuterol 3 mL Nebulization 4x Daily - RT   levothyroxine 88 mcg Oral Nightly   magnesium oxide 400 mg Oral BID   methylPREDNISolone sodium succinate 80 mg Intravenous Once   metoprolol tartrate 12.5 mg Oral Q12H    potassium chloride 20 mEq Oral Once   prenatal vitamin 27-0.8 1 tablet Oral Daily   sennosides-docusate sodium 1 tablet Oral BID   tamsulosin 0.4 mg Oral Nightly   vitamin C 500 mg Oral BID       lactated ringers              ASSESSMENT/PLAN     Principal Problem:    Coronary artery disease of native artery of native heart with stable angina pectoris  Active Problems:    CAD (coronary artery disease)      Assessment:    Condition: In stable condition.   (Stable Post op CABG: Progressing well. OOB chair.     CAD: ASA. PLAVIX, STATIN. BETA  BP. Hold ACE.     Resp: Wean O2, Incentive. Nebs. Lasix. Steroid IV x 1 dose. Extubated 2125    Pain: Ultram/[place] (Not taking)    Rehab: OOB, Ambulate. PT/OT    Transient Post Op Hyperglycemia: SSI Coverage. Continue Levemir    Constipation: Dulcolax suppository today.).     Plan:   Encourage ambulation and out of bed and up to chair.  Wean off oxygen, start/continue incentive spirometry and continue respiratory treatments.  Consults: physical therapy.  Advance diet as tolerated.  Administer medications as ordered and add diuretic.   (Stable continue 3W telemetry. Aggressive pulmonary toilet. Lasix/Steroid x 1 for wheezing/fluid overload/ Dc Planning for Monday.).                 This document has been electronically signed by RANDEE Brown on September 17, 2017 7:53 AM

## 2017-09-17 NOTE — PLAN OF CARE
Problem: Patient Care Overview (Adult)  Goal: Plan of Care Review  Outcome: Ongoing (interventions implemented as appropriate)    09/16/17 1129 09/17/17 1523   Coping/Psychosocial Response Interventions   Plan Of Care Reviewed With patient --    Patient Care Overview   Progress improving --    Outcome Evaluation   Outcome Summary/Follow up Plan --  Pt shine tx well with good participation and increased gt distance. PTA deferred further gt due to receiving blood.          Problem: Inpatient Physical Therapy  Goal: Transfer Training Goal 1 LTG- PT  Outcome: Ongoing (interventions implemented as appropriate)    09/15/17 1640   Transfer Training PT LTG   Transfer Training PT LTG, Date Established 09/15/17   Transfer Training PT LTG, Time to Achieve by discharge   Transfer Training PT LTG, Activity Type bed to chair /chair to bed;sit to stand/stand to sit   Transfer Training PT LTG, [place] Level independent;conditional independence   Transfer Training PT LTG, Outcome goal ongoing       Goal: Gait Training Goal LTG- PT  Outcome: Ongoing (interventions implemented as appropriate)    09/15/17 1640   Gait Training PT LTG   Gait Training Goal PT LTG, Date Established 09/15/17   Gait Training Goal PT LTG, Time to Achieve by discharge   Gait Training Goal PT LTG, [place] Level independent;conditional independence   Gait Training Goal PT LTG, Distance to Achieve 300ft   Gait Training Goal PT LTG, Outcome goal ongoing       Goal: Stair Training Goal LTG- PT  Outcome: Ongoing (interventions implemented as appropriate)    09/15/17 1640   Stair Training PT LTG   Stair Training Goal PT LTG, Date Established 09/15/17   Stair Training Goal PT LTG, Time to Achieve by discharge   Stair Training Goal PT LTG, Number of Steps 3   Stair Training Goal PT LTG, [place] Level conditional independence   Stair Training Goal PT LTG, Assist Device 1 handrail;2 handrails       Goal: Patient Education Goal LTG- PT  Outcome: Ongoing  (interventions implemented as appropriate)    09/15/17 1640   Patient Education PT LTG   Patient Education PT LTG, Date Established 09/15/17   Patient Education PT LTG, Time to Achieve by discharge   Patient Education PT LTG, Education Type HEP;precaution per surgeon;gait;transfers;home safety;benefits of activity   Patient Education PT LTG Outcome goal ongoing

## 2017-09-17 NOTE — PLAN OF CARE
Problem: Fall Risk (Adult)  Goal: Absence of Falls  Outcome: Ongoing (interventions implemented as appropriate)    Problem: Cardiac Surgery (Adult)  Goal: Signs and Symptoms of Listed Potential Problems Will be Absent or Manageable (Cardiac Surgery)  Outcome: Ongoing (interventions implemented as appropriate)

## 2017-09-17 NOTE — THERAPY TREATMENT NOTE
Acute Care - Physical Therapy Treatment Note  [facility]     Patient Name: Nick Austin  : 1950  MRN: [id number]  Today's Date: 2017  Onset of Illness/Injury or Date of Surgery Date: 17  Date of Referral to PT: 17  Referring Physician: RANDEE Knott    Admit Date: 2017    Visit Dx:    ICD-10-CM ICD-9-CM   1. Coronary artery disease involving native coronary artery of native heart with angina pectoris I25.119 414.01     413.9   2. Coronary artery disease of native artery of native heart with stable angina pectoris I25.118 414.01     413.9   3. Impaired mobility and ADLs Z74.09 799.89   4. Impaired physical mobility Z74.09 781.99     Patient Active Problem List   Diagnosis   • Essential hypertension   • Hyperlipidemia   • Hypothyroidism   • Type 2 diabetes mellitus without complication, without long-term current use of insulin   • Benign non-nodular prostatic hyperplasia   • Generalized OA   • Uncomplicated asthma   • Dyspnea on exertion   • Angina effort   • S/P CABG x 1   • Angina pectoris   • Coronary artery disease of native artery of native heart with stable angina pectoris   • Coronary artery disease involving native coronary artery of native heart with angina pectoris   • CAD (coronary artery disease)               Adult Rehabilitation Note       17 1430 17 1040 17 0753    Rehab Assessment/Intervention    Discipline physical therapy assistant  -EM physical therapy assistant  -EM occupational therapy assistant  -CS    Document Type therapy note (daily note)  -EM therapy note (daily note)  -EM therapy note (daily note)  -CS    Subjective Information agree to therapy   nsg ok gt. Pt receiving blood  -EM agree to therapy  -EM agree to therapy  -CS    Patient Effort, Rehab Treatment excellent  -EM      Precautions/Limitations cardiac precautions;oxygen therapy device and L/min  -EM cardiac precautions;oxygen therapy device and L/min;other (see  comments)   wound vac  -EM cardiac precautions;fall precautions;oxygen therapy device and L/min;spinal precautions  -CS    Recorded by [EM] Vu Sevilla PTA [EM] Vu Sevilla PTA [CS] Diana French, WERNER/L    Vital Signs    Pre Systolic BP Rehab 137  -  -  -CS    Pre Treatment Diastolic BP 65  -EM 65  -EM 52  -CS    Intra Treatment Diastolic   -EM      Post Systolic BP Rehab 55  -EM      Pretreatment Heart Rate (beats/min) 77  -EM 83  -EM 82  -CS    Intratreatment Heart Rate (beats/min) 93  -EM      Posttreatment Heart Rate (beats/min) 91  -EM  82  -CS    Pre SpO2 (%) 96  -EM 94  -EM 92  -CS    O2 Delivery Pre Treatment supplemental O2  -EM supplemental O2  -EM supplemental O2  -CS    Intra SpO2 (%) 97  -EM      O2 Delivery Intra Treatment supplemental O2  -EM      Post SpO2 (%) 91  -EM  92  -CS    O2 Delivery Post Treatment supplemental O2  -EM  supplemental O2  -CS    Pre Patient Position Sitting  -EM Sitting  -EM Sitting  -CS    Intra Patient Position Standing  -EM      Post Patient Position Sitting  -EM  Sitting  -CS    Recorded by [EM] Vu Sevilla PTA [EM] Vu Sevilla PTA [CS] Diana French, WERNER/L    Pain Assessment    Pain Assessment 0-10  -EM 0-10  -EM 0-10  -CS    Pain Score 4  -EM 5  -EM 2  -CS    Post Pain Score 4  -EM 4  -EM 4  -CS    Pain Type Surgical pain  -EM Surgical pain  -EM Surgical pain  -CS    Pain Location Chest  -EM Chest  -EM Chest  -CS    Recorded by [EM] Vu Sevilla PTA [EM] Vu Sevilla PTA [CS] Diana French, WERNER/L    Cognitive Assessment/Intervention    Current Cognitive/Communication Assessment functional  -EM functional  -EM functional  -CS    Orientation Status oriented x 4  -EM oriented x 4  -EM oriented x 4  -CS    Follows Commands/Answers Questions 100% of the time  -% of the time   Pt reports hallucinations of spiders on the wall.   -% of the time  -CS    Recorded by [EM] Vu Sevilla PTA [EM] Vu Sevilla PTA [CS] Diana  L Manolo, WERNER/L    Bed Mobility, Assessment/Treatment    Bed Mob, Supine to Sit, [place]  minimum assist (75% patient effort)  -EM     Recorded by  [EM] Vu Sevilla PTA     Transfer Assessment/Treatment    Transfers, Chair-Bed [place]  contact guard assist  -EM     Transfers, Bed-Chair-Bed, Assist Device  rolling walker  -EM     Transfers, Sit-Stand [place] contact guard assist  -EM      Transfers, Stand-Sit [place] contact guard assist  -EM      Toilet Transfer, [place]  contact guard assist  -EM     Toilet Transfer, Assistive Device rolling walker  -EM rolling walker  -EM     Recorded by [EM] Vu Sevilla PTA [EM] Vu Sevilla PTA     Gait Assessment/Treatment    Gait, [place] Level contact guard assist  -EM contact guard assist;1 person + 1 person to manage equipment  -EM     Gait, Assistive Device rolling walker  -EM rolling walker   followed with recliner  -EM     Gait, Distance (Feet) 172  -EM 84   3' EOB to bedside commode  -EM     Gait, Comment nsg present  -EM followed with chair  -EM     Recorded by [EM] Vu Sevilla PTA [EM] Vu Sevilla PTA     Grooming Assessment/Training    Grooming Assess/Train, Position   sitting  -CS    Grooming Assess/Train, Indepen Level   set up required  -CS    Recorded by   [CS] DEBBI Louis/L    Therapy Exercises    Bilateral Lower Extremities AROM:;20 reps;sitting;ankle pumps/circles;LAQ;hip flexion   add pillow squeeze  -EM AROM:;20 reps;sitting;ankle pumps/circles;LAQ;hip flexion  -EM     Bilateral Upper Extremity   AROM:;20 reps;sitting;elbow flexion/extension;hand pumps;pronation/supination;shoulder extension/flexion;shoulder ER/IR  -CS    BUE Resistance   manual resistance- minimal  -CS    Recorded by [EM] Vu Sevilla PTA [EM] Vu Sevilla PTA [CS] DEBBI Louis/L    Positioning and Restraints    Pre-Treatment Position sitting in chair/recliner  -EM in bed  -EM sitting in chair/recliner  -CS    Post  Treatment Position chair  -EM chair  -EM chair  -CS    In Chair call light within reach;encouraged to call for assist;sitting;with family/caregiver  -EM notified nsg;sitting;call light within reach;encouraged to call for assist  -EM reclined;call light within reach;with family/caregiver  -CS    Recorded by [EM] Vu Sevilla, PHI [EM] Vu Sevilla, PTA [CS] Diana French, WERNER/L      User Key  (r) = Recorded By, (t) = Taken By, (c) = Cosigned By    Initials Name Effective Dates    EM Vu Sevilla, PTA 08/11/15 -     CS Diana French, WERNER/L 10/17/16 -                 IP PT Goals       09/15/17 1640          Transfer Training PT LTG    Transfer Training PT LTG, Date Established 09/15/17  -NICKOLAS      Transfer Training PT LTG, Time to Achieve by discharge  -NICKOLAS      Transfer Training PT LTG, Activity Type bed to chair /chair to bed;sit to stand/stand to sit  -NICKOLAS      Transfer Training PT LTG, [place] Level independent;conditional independence  -NICKOLAS      Transfer Training PT LTG, Outcome goal ongoing  -NICKOLAS      Gait Training PT LTG    Gait Training Goal PT LTG, Date Established 09/15/17  -NICKOLAS      Gait Training Goal PT LTG, Time to Achieve by discharge  -NICKOLAS      Gait Training Goal PT LTG, [place] Level independent;conditional independence  -NICKOLAS      Gait Training Goal PT LTG, Distance to Achieve 300ft  -NICKOLAS      Gait Training Goal PT LTG, Outcome goal ongoing  -NICKOLAS      Stair Training PT LTG    Stair Training Goal PT LTG, Date Established 09/15/17  -NICKOLAS      Stair Training Goal PT LTG, Time to Achieve by discharge  -NICKOLAS      Stair Training Goal PT LTG, Number of Steps 3  -NICKOLAS      Stair Training Goal PT LTG, [place] Level conditional independence  -NICKOLAS      Stair Training Goal PT LTG, Assist Device 1 handrail;2 handrails  -      Patient Education PT LTG    Patient Education PT LTG, Date Established 09/15/17  -      Patient Education PT LTG, Time to Achieve by discharge  -NICKOLAS      Patient Education PT LTG,  Education Type HEP;precaution per surgeon;gait;transfers;home safety;benefits of activity  -      Patient Education PT LTG Outcome goal ongoing  -        User Key  (r) = Recorded By, (t) = Taken By, (c) = Cosigned By    Initials Name Provider Type    NICKOLAS Torres, PT Physical Therapist          Physical Therapy Education     Title: PT OT SLP Therapies (Active)     Topic: Physical Therapy (Active)     Point: Precautions (Active)    Learning Progress Summary    Learner Readiness Method Response Comment Documented by Status   Patient Acceptance E NR   09/15/17 1639 Active   Significant Other Acceptance E NR   09/15/17 1639 Active                      User Key     Initials Effective Dates Name Provider Type Discipline     10/17/16 -  Luli Torres PT Physical Therapist PT                    PT Recommendation and Plan  Anticipated Discharge Disposition: home with assist  Planned Therapy Interventions: balance training, bed mobility training, gait training, home exercise program, patient/family education, stair training, strengthening, transfer training  PT Frequency: other (see comments) (5-14 times per week)  Plan of Care Review  Outcome Summary/Follow up Plan: Pt shine tx well with good participation and  increased gt distance. PTA deferred further gt due to receiving blood.           Outcome Measures       09/17/17 1430 09/16/17 1100 09/16/17 1040    How much help from another person do you currently need...    Turning from your back to your side while in flat bed without using bedrails? 3  -EM  3  -EM    Moving from lying on back to sitting on the side of a flat bed without bedrails? 3  -EM  3  -EM    Moving to and from a bed to a chair (including a wheelchair)? 3  -EM  3  -EM    Standing up from a chair using your arms (e.g., wheelchair, bedside chair)? 3  -EM  3  -EM    Climbing 3-5 steps with a railing? 3  -EM  2  -EM    To walk in hospital room? 3  -EM  3  -EM    AM-PAC 6 Clicks Score 18  -EM  17   -EM    How much help from another is currently needed...    Putting on and taking off regular lower body clothing?  2  -CS     Bathing (including washing, rinsing, and drying)  2  -CS     Toileting (which includes using toilet bed pan or urinal)  2  -CS     Putting on and taking off regular upper body clothing  2  -CS     Taking care of personal grooming (such as brushing teeth)  3  -CS     Eating meals  3  -CS     Score  14  -CS     Functional Assessment    Outcome Measure Options AM-PAC 6 Clicks Daily Activity (OT)  -EM AM-PAC 6 Clicks Daily Activity (OT)  -CS AM-PAC 6 Clicks Basic Mobility (PT)  -EM      09/15/17 1113 09/15/17 1014       How much help from another person do you currently need...    Turning from your back to your side while in flat bed without using bedrails? 2  -NICKOLAS      Moving from lying on back to sitting on the side of a flat bed without bedrails? 2  -NICKOLAS      Moving to and from a bed to a chair (including a wheelchair)? 2  -NICKOLAS      Standing up from a chair using your arms (e.g., wheelchair, bedside chair)? 2  -NICKOLAS      Climbing 3-5 steps with a railing? 1  -NICKOLAS      To walk in hospital room? 2  -NICKOLAS      AM-PAC 6 Clicks Score 11  -NICKOLAS      How much help from another is currently needed...    Putting on and taking off regular lower body clothing?  2  -BH (r) SP (t) BH (c)     Bathing (including washing, rinsing, and drying)  2  -BH (r) SP (t) BH (c)     Toileting (which includes using toilet bed pan or urinal)  2  -BH (r) SP (t) BH (c)     Putting on and taking off regular upper body clothing  2  -BH (r) SP (t) BH (c)     Taking care of personal grooming (such as brushing teeth)  3  -BH (r) SP (t) BH (c)     Eating meals  3  -BH (r) SP (t) BH (c)     Score  14  -BH (r) SP (t)     Functional Assessment    Outcome Measure Options AM-PAC 6 Clicks Basic Mobility (PT)  -NICKOLAS AM-PAC 6 Clicks Daily Activity (OT)  -BH (r) SP (t) BH (c)       User Key  (r) = Recorded By, (t) = Taken By, (c) = Cosigned By     Initials Name Provider Type    EM Vu Sevilla PTA Physical Therapy Assistant     Ольга Bell, OTR/L Occupational Therapist    NICKOLAS Luli Torres, PT Physical Therapist    CS Diana French, WERNER/L Occupational Therapy Assistant    SP Argenis Garcia, OT Student OT Student           Time Calculation:         PT Charges       09/17/17 1525          Time Calculation    Start Time 1430  -EM      Stop Time 1455  -EM      Time Calculation (min) 25 min  -EM      Time Calculation- PT    Total Timed Code Minutes- PT 25 minute(s)  -EM        User Key  (r) = Recorded By, (t) = Taken By, (c) = Cosigned By    Initials Name Provider Type    EM Vu Sevilla PTA Physical Therapy Assistant          Therapy Charges for Today     Code Description Service Date Service Provider Modifiers Qty    59534916991 HC PT THER PROC EA 15 MIN 9/16/2017 Vu Sevilla, PTA GP 1    72248247877 HC PT THERAPEUTIC ACT EA 15 MIN 9/16/2017 Vu Sevilla, PTA GP 1    06795544513 HC GAIT TRAINING EA 15 MIN 9/16/2017 Vu Sevilla, PTA GP 1    14065301563 HC GAIT TRAINING EA 15 MIN 9/17/2017 Vu Sevilla, PTA GP 1    49885583592 HC PT THER PROC EA 15 MIN 9/17/2017 Vu Sevilla, PTA GP 1          PT G-Codes  PT Professional Judgement Used?: Yes  Outcome Measure Options: AM-PAC 6 Clicks Daily Activity (OT)  Score: 11  Functional Limitation: Mobility: Walking and moving around  Mobility: Walking and Moving Around Current Status (): At least 60 percent but less than 80 percent impaired, limited or restricted  Mobility: Walking and Moving Around Goal Status (): 0 percent impaired, limited or restricted    Vu Sevilla PTA  9/17/2017

## 2017-09-17 NOTE — PLAN OF CARE
Problem: Patient Care Overview (Adult)  Goal: Plan of Care Review  Outcome: Ongoing (interventions implemented as appropriate)    Problem: Perioperative Period (Adult)  Goal: Signs and Symptoms of Listed Potential Problems Will be Absent or Manageable (Perioperative Period)  Outcome: Ongoing (interventions implemented as appropriate)    Problem: Pressure Ulcer Risk (Joo Scale) (Adult,Obstetrics,Pediatric)  Goal: Skin Integrity  Outcome: Outcome(s) achieved Date Met:  09/16/17    Problem: Fall Risk (Adult)  Goal: Absence of Falls  Outcome: Ongoing (interventions implemented as appropriate)    Problem: Cardiac Surgery (Adult)  Goal: Signs and Symptoms of Listed Potential Problems Will be Absent or Manageable (Cardiac Surgery)  Outcome: Ongoing (interventions implemented as appropriate)

## 2017-09-18 VITALS
DIASTOLIC BLOOD PRESSURE: 62 MMHG | HEART RATE: 62 BPM | BODY MASS INDEX: 41.71 KG/M2 | TEMPERATURE: 97.7 F | WEIGHT: 275.2 LBS | OXYGEN SATURATION: 93 % | SYSTOLIC BLOOD PRESSURE: 114 MMHG | HEIGHT: 68 IN | RESPIRATION RATE: 16 BRPM

## 2017-09-18 PROBLEM — I25.118 CORONARY ARTERY DISEASE OF NATIVE ARTERY OF NATIVE HEART WITH STABLE ANGINA PECTORIS: Status: ACTIVE | Noted: 2017-09-07

## 2017-09-18 LAB
ABO + RH BLD: NORMAL
BH BB BLOOD EXPIRATION DATE: NORMAL
BH BB BLOOD TYPE BARCODE: 5100
BH BB DISPENSE STATUS: NORMAL
BH BB PRODUCT CODE: NORMAL
BH BB UNIT NUMBER: NORMAL
GLUCOSE BLDC GLUCOMTR-MCNC: 122 MG/DL (ref 70–130)
GLUCOSE BLDC GLUCOMTR-MCNC: 137 MG/DL (ref 70–130)
HCT VFR BLD AUTO: 25.6 % (ref 39–49)
HGB BLD-MCNC: 8.7 G/DL (ref 13.7–17.3)
HOLD SPECIMEN: NORMAL
UNIT  ABO: NORMAL
UNIT  RH: NORMAL

## 2017-09-18 PROCEDURE — 94760 N-INVAS EAR/PLS OXIMETRY 1: CPT

## 2017-09-18 PROCEDURE — 94799 UNLISTED PULMONARY SVC/PX: CPT

## 2017-09-18 PROCEDURE — 97530 THERAPEUTIC ACTIVITIES: CPT

## 2017-09-18 PROCEDURE — 99024 POSTOP FOLLOW-UP VISIT: CPT | Performed by: NURSE PRACTITIONER

## 2017-09-18 PROCEDURE — 85014 HEMATOCRIT: CPT | Performed by: THORACIC SURGERY (CARDIOTHORACIC VASCULAR SURGERY)

## 2017-09-18 PROCEDURE — 97535 SELF CARE MNGMENT TRAINING: CPT

## 2017-09-18 PROCEDURE — 82962 GLUCOSE BLOOD TEST: CPT

## 2017-09-18 PROCEDURE — 97110 THERAPEUTIC EXERCISES: CPT

## 2017-09-18 PROCEDURE — 85018 HEMOGLOBIN: CPT | Performed by: THORACIC SURGERY (CARDIOTHORACIC VASCULAR SURGERY)

## 2017-09-18 PROCEDURE — 25010000002 FUROSEMIDE PER 20 MG: Performed by: NURSE PRACTITIONER

## 2017-09-18 PROCEDURE — 97116 GAIT TRAINING THERAPY: CPT

## 2017-09-18 RX ORDER — FUROSEMIDE 20 MG/1
20 TABLET ORAL DAILY
Qty: 30 TABLET | Refills: 0 | Status: SHIPPED | OUTPATIENT
Start: 2017-09-18 | End: 2017-09-25 | Stop reason: SDUPTHER

## 2017-09-18 RX ORDER — ASPIRIN 81 MG/1
81 TABLET ORAL DAILY
Start: 2017-09-18

## 2017-09-18 RX ORDER — SENNA AND DOCUSATE SODIUM 50; 8.6 MG/1; MG/1
1 TABLET, FILM COATED ORAL 2 TIMES DAILY
Start: 2017-09-18 | End: 2018-05-07

## 2017-09-18 RX ORDER — AMIODARONE HYDROCHLORIDE 400 MG/1
400 TABLET ORAL DAILY
Qty: 14 TABLET | Refills: 0 | Status: SHIPPED | OUTPATIENT
Start: 2017-09-18 | End: 2017-10-02

## 2017-09-18 RX ORDER — PRENATAL VIT/IRON FUM/FOLIC AC 27MG-0.8MG
1 TABLET ORAL DAILY
Start: 2017-09-18 | End: 2017-10-02

## 2017-09-18 RX ORDER — CLOPIDOGREL BISULFATE 75 MG/1
75 TABLET ORAL DAILY
Qty: 30 TABLET | Refills: 11 | Status: SHIPPED | OUTPATIENT
Start: 2017-09-18 | End: 2017-10-17 | Stop reason: SDUPTHER

## 2017-09-18 RX ORDER — TRAMADOL HYDROCHLORIDE 50 MG/1
50 TABLET ORAL EVERY 4 HOURS PRN
Qty: 40 TABLET | Refills: 0 | Status: SHIPPED | OUTPATIENT
Start: 2017-09-18 | End: 2017-10-02

## 2017-09-18 RX ORDER — FUROSEMIDE 10 MG/ML
20 INJECTION INTRAMUSCULAR; INTRAVENOUS ONCE
Status: COMPLETED | OUTPATIENT
Start: 2017-09-18 | End: 2017-09-18

## 2017-09-18 RX ORDER — ASCORBIC ACID 500 MG
500 TABLET ORAL 2 TIMES DAILY
Start: 2017-09-18 | End: 2017-10-17

## 2017-09-18 RX ADMIN — FUROSEMIDE 20 MG: 10 INJECTION, SOLUTION INTRAVENOUS at 08:35

## 2017-09-18 RX ADMIN — METOPROLOL TARTRATE 12.5 MG: 25 TABLET, FILM COATED ORAL at 08:29

## 2017-09-18 RX ADMIN — TRAMADOL HYDROCHLORIDE 50 MG: 50 TABLET, FILM COATED ORAL at 08:35

## 2017-09-18 RX ADMIN — AMIODARONE HYDROCHLORIDE 400 MG: 200 TABLET ORAL at 08:29

## 2017-09-18 RX ADMIN — BUDESONIDE AND FORMOTEROL FUMARATE DIHYDRATE 2 PUFF: 80; 4.5 AEROSOL RESPIRATORY (INHALATION) at 06:48

## 2017-09-18 RX ADMIN — MAGNESIUM OXIDE TAB 400 MG (241.3 MG ELEMENTAL MG) 400 MG: 400 (241.3 MG) TAB at 08:31

## 2017-09-18 RX ADMIN — ASPIRIN 81 MG: 81 TABLET ORAL at 08:29

## 2017-09-18 RX ADMIN — IPRATROPIUM BROMIDE AND ALBUTEROL SULFATE 3 ML: 2.5; .5 SOLUTION RESPIRATORY (INHALATION) at 06:48

## 2017-09-18 RX ADMIN — SENNOSIDES AND DOCUSATE SODIUM 1 TABLET: 8.6; 5 TABLET ORAL at 08:29

## 2017-09-18 RX ADMIN — PRENATAL VIT W/ FE FUMARATE-FA TAB 27-0.8 MG 1 TABLET: 27-0.8 TAB at 08:30

## 2017-09-18 RX ADMIN — OXYCODONE HYDROCHLORIDE AND ACETAMINOPHEN 500 MG: 500 TABLET ORAL at 08:30

## 2017-09-18 NOTE — DISCHARGE SUMMARY
CVTS DISCHARGE SUMMARY  Date of Admission: 9/14/2017  5:12 AM  Date of Discharge:  9/18/2017    Admission Diagnosis:   Coronary artery disease of native artery of native heart with stable angina pectoris [I25.118]  Acute Blood Loss Anemia  Respiratory Failure with Hypoxia  Dyslipidemia    Discharge Diagnosis:   Post-Op Diagnosis Codes:     * Coronary artery disease of native artery of native heart with stable angina pectoris [I25.118]  Coronary artery disease of native artery of native heart with stable angina pectoris [I25.118]  Acute Blood Loss Anemia  Respiratory Failure with Hypoxia  Dyslipidemia    Consults:   Consults     Date and Time Order Name Status Description    9/14/2017 1506 Inpatient Consult to Cardiology      9/7/2017 0909 Inpatient Consult to Cardiothoracic Surgery Completed           Procedures Performed  Procedure(s):  REDO CORONARY ARTERY BYPASS GRAFTINGX X 3-4, ENDOSCOPIC VEIN HARVEST      (CELL SAVER)      REDO CORONARY ARTERY BYPASS GRAFTING  ENDOSCOPIC VEIN HARVEST   SAPHENOUS VEIN GRAFT FROM ASCENDING AORTA TO FIRST DIAGONAL BRANCH  SAPHENOUS VEIN GRAFT FROM ASCENDING AORTA TO RAMUS INTERMEDIUS BRANCH  SAPHENOUS VEIN GRAFT FROM ASCENDING AORTA TO POSTERIOR LATERAL BRANCH   RIGHT common femoral arterial line  Vascular ultrasound guidance    Discharge Medications   Nick Austin   Home Medication Instructions EVANS:[id number]    Printed on:09/18/17 1200   Medication Information                      albuterol (PROVENTIL HFA;VENTOLIN HFA) 108 (90 BASE) MCG/ACT inhaler  Inhale 2 puffs Every 4 (Four) Hours As Needed (asthma). Take 30 minutes before P.E or exercise.             amiodarone (PACERONE) 400 MG tablet  Take 1 tablet by mouth Daily for 14 days. Then decrease dose to 200mg daily x 21 days             aspirin 81 MG EC tablet  Take 1 tablet by mouth Daily.             atorvastatin (LIPITOR) 20 MG tablet  Take 20 mg by mouth Every Night.             clopidogrel (PLAVIX) 75 MG  tablet  Take 1 tablet by mouth Daily.             Fluticasone Furoate-Vilanterol (BREO ELLIPTA) 100-25 MCG/INH aerosol powder   Inhale 1 dose Daily.             furosemide (LASIX) 20 MG tablet  Take 1 tablet by mouth Daily.             magnesium oxide (MAGOX) 400 (241.3 Mg) MG tablet tablet  Take 1 tablet by mouth 2 (Two) Times a Day.             metoprolol succinate XL (TOPROL-XL) 25 MG 24 hr tablet  Take 25 mg by mouth Every Night.             Omega-3 Fatty Acids (FISH OIL) 1200 MG capsule delayed-release  Take  by mouth Every Night.             omeprazole (priLOSEC) 40 MG capsule  Take 40 mg by mouth Every Night.             Prenatal Vit-Fe Fumarate-FA (PRENATAL VITAMIN 27-0.8) 27-0.8 MG tablet tablet  Take 1 tablet by mouth Daily.             sennosides-docusate sodium (SENOKOT-S) 8.6-50 MG tablet  Take 1 tablet by mouth 2 (Two) Times a Day.             SYNTHROID 88 MCG tablet  Take 1 tablet by mouth Daily.             tamsulosin (FLOMAX) 0.4 MG capsule 24 hr capsule  Take 1 capsule by mouth Every Night.             traMADol (ULTRAM) 50 MG tablet  Take 1 tablet by mouth Every 4 (Four) Hours As Needed for Moderate Pain  (Surgical Pain).             vitamin C (VITAMIN C) 500 MG tablet  Take 1 tablet by mouth 2 (Two) Times a Day.               Medical Management: ASA,PLAVIX,STATIN, BETA. HOLD ACE. Reviewed risks, benefits, and habit forming potential and weaning from narcotic medication. Patient understands and wishes to receive prescription.  Prescription written for Ultram # 40 for post-surgical pain after Les placed on file.    Discharge Diet:   Diet Instructions     Diet: Cardiac, Consistent Carbohydrate       Discharge Diet:   Cardiac  Consistent Carbohydrate                    Discharge Disposition: Home in stable condition with follow up appointments with CVTS Nurse Practitioner 1 week, Dr. Morgan/Favian 2 weeks, Cardiology/PCP as scheduled.     History of Present Illness/Hospital Course:     Norman is a 66-year-old  male who is being seen by me for the first time.  He presents for evaluation of chest pressure and shortness of breath which has been going on for the past 3 months.  His dyspnea is NYHA class II and chest pressure was triggered by activity though it has occurred on bending forward.  His history is remarkable for document of coronary artery disease and back in 1989 underwent CABG ×1 vessel in [facility].  He is done well since then and has not followed up with cardiology in more than 20 years.  His risk factors include hypertension, hyperlipidemia, hypothyroidism, obesity and premature coronary artery disease.  He is a nonsmoker. Mr. Austin underwent elective cardiac cath today demonstrating severe coronary disease LAD 90%, CIRCUMFLEX 100%, RCA 90%. Dr. Morgan was consulted for consideration of redo revascularization surgery which was performed on 9/14/17.  8/30/17: Echo: EF 55% LVSD 31 LVEDD 45. Grade 1 DD.  9/2017: Carotid Duplex: 0-49%  Admitted through [facility], taken to the operating suite placed under general anesthesia underwent said procedure without complications or difficulty.  Weaned easily from cardiopulmonary bypass with the assistance in drips of LINDA,NTG,DOBUTAMINE and transferred to CCU in stable condition.  They began weaning mechanical ventilation and was extubated in 6.5 hrs of intubation and placed on oxygen per Nasal cannula.  A total of 2 units in blood products, plus 2 units FFP, 6 pack platelets were given during their hospital stay.  POD1 they were out of bed to the chair and tolerated breakfast with stable glucose between the hours of 18-24 postoperatively.  Insulin drip was discontinued and they were continued on sliding scale insulin coverage as well as Lantus nightly.  Hemodynamics and neuro status remained stable for transfer to 3W telemetry after 22 hours stay in the CCU. They continued with PT/OT participation to satisfactory levels with  "activities of daily living. Chest tubes were without air leak and removed with satisfactory post removal CXR viewed on 916/17.  Hemodynamics remained stable, they remained afebrile postoperatively, and in regular sinus rhythm for satisfactory discharge home in stable condition on POD 4 with home oxygen.      Vital Signs  Temp:  [96.9 °F (36.1 °C)-99.8 °F (37.7 °C)] 97.7 °F (36.5 °C)  Heart Rate:  [52-83] 62  Resp:  [16-20] 16  BP: (114-166)/(59-84) 114/62  Last 3 weights    09/16/17  0615 09/17/17  0445 09/18/17  0500   Weight: 283 lb (128 kg) 280 lb 9.6 oz (127 kg) 275 lb 3.2 oz (125 kg)       Pertinent Test Results:  Lab Results   Component Value Date    WBC 16.78 (H) 09/17/2017    HGB 8.7 (L) 09/18/2017    HCT 25.6 (L) 09/18/2017    MCV 92.2 09/17/2017     09/17/2017     Lab Results   Component Value Date    GLUCOSE 105 (H) 09/17/2017    BUN 23 (H) 09/17/2017    CREATININE 0.94 09/17/2017    EGFRIFNONA 80 09/17/2017    BCR 24.5 09/17/2017    K 3.9 09/17/2017    CO2 30.0 09/17/2017    CALCIUM 7.9 (L) 09/17/2017    ALBUMIN 3.20 (L) 09/17/2017    LABIL2 1.3 09/17/2017    AST 38 09/17/2017    ALT 37 09/17/2017       Physical Exam:  Physical Exam   General Appearance:  Comfortable.    Vital signs: (most recent): Blood pressure 114/62, pulse 62, temperature 97.7 °F (36.5 °C), temperature source Oral, resp. rate 16, height 67.5\" (171.5 cm), weight 275 lb 3.2 oz (125 kg), SpO2 92 %.  Vital signs are normal.  No fever.    Output: Producing urine and no stool output.    HEENT: Normal HEENT exam.    Lungs:  Normal respiratory rate and normal effort.  There are wheezes and decreased breath sounds (LLL).    Heart: Normal rate.  Regular rhythm.    Abdomen: Abdomen is soft and non-distended.  Bowel sounds are normal.     Extremities: Decreased range of motion.  There is dependent edema.  (D/t surgical pain)  Pulses: Distal pulses are intact.    Neurological: Patient is alert and oriented to person, place and time.  "   Skin:  Warm and dry.  (M/S INC: [place]. Intact  LLE EVH:  CDI)     Additional Instructions for the Follow-ups that You Need to Schedule     Discharge Follow-up with Specialty    As directed    Specialty:  Cardiothoracic Surgery   Follow Up:  1 Week   Follow Up Details:  Rayne Jansen APRN 9/25/17 9:20       Referral to Home Health    As directed    Face to Face Visit Date:  9/18/2017   Follow-up Provider for Plan of Care?:  I will be treating the patient on an ongoing basis.  Please send me the Plan of Care for signature.   Follow-up Provider:  VIOLA SNYDER   Reason/Clinical Findings:  post op CABG   Describe mobility limitations that make leaving home difficult:  lifting/driving restrictions   Nursing/Therapeutic Services Requested:   Skilled Nursing  Physical Therapy  Occupational Therapy      Skilled nursing orders:   Post CABG care  Neurovascular assessments  Cardiopulmonary assessments      PT orders:   Strengthening  Home safety assessment  Therapeutic exercise      Occupational orders:   Activities of daily living  Home safety assessment  Strengthening      Frequency:  1 Week 1                 Discharge Instructions: Discharge instructions include no heavy lifting anything greater than 10lbs for approximately 12 weeks.  No sex or driving for 3-6 weeks. Printed information given to the patient with advancement of activities weekly.  Risks and benefits of narcotic medications and weaning postoperatively have been discussed. Clean operative site with antibacterial soap/water, pat dry. Keep open to air unless draining, then may apply dry dressing.  No ointments or creams unless prescribed by provider. Signs and symptoms of infection including drainage from operative site, redness, swelling, with associated fever and/or chills notify [facility] center immediately for wound check.  Patient verbalizes understanding of discharge instructions, all questions are answered, follow up appointments  have been made, they are discharged home in stable condition.         Follow-up Appointments  Future Appointments  Date Time Provider Department Center   10/17/2017 9:00 AM Nick Morgan MD [facility] None       Test Results Pending at Discharge   Order Current Status    Hardware / Foreign Body Culture Preliminary result                 This document has been electronically signed by RANDEE Brown on September 18, 2017 12:03 PM      Time: Discharge 60 min

## 2017-09-18 NOTE — PLAN OF CARE
Problem: Patient Care Overview (Adult)  Goal: Adult Individualization and Mutuality  Outcome: Outcome(s) achieved Date Met:  09/18/17

## 2017-09-18 NOTE — DISCHARGE PLACEMENT REQUEST
"Nick Robertson (66 y.o. Male)     Date of Birth Social Security Number Address Home Phone MRN    1950  [id number]  [facility] [address] [phone] [id number]    [employer] Mar[employer] Status          [employer]        Admission Date Admission Type Admitting Provider Attending Provider Department, Room/Bed    9/14/17 Elective Greg Morgan MD [place], Thomas Mark, MD [address], 301/1    Discharge Date Discharge Disposition Discharge Destination         Home or Self Care             Attending Provider: Greg Morgan MD     Allergies:  Tetanus Toxoids    Isolation:  None   Infection:  None   Code Status:  FULL    Ht:  67.5\" (171.5 cm)   Wt:  275 lb 3.2 oz (125 kg)    Admission Cmt:  None   Principal Problem:  Coronary artery disease of native artery of native heart with stable angina pectoris [I25.118] More...                 Active Insurance as of 9/14/2017     Primary Coverage     Payor Plan Insurance Group Employer/Plan Group    MEDICARE MEDICARE A & B      Payor Plan Address Payor Plan Phone Number Effective From Effective To    [address] [phone] 4/1/2012     [place] [address]       Subscriber Name Subscriber Birth Date Member ID       NICK ROBERTSON 1950 [id number]           Secondary Coverage     Payor Plan Insurance Group Employer/Plan Group    [facility]     Payor Plan Address Payor Plan Phone Number Effective From Effective To    [address] [address]  10/2/2016     [place] [address]       Subscriber Name Subscriber Birth Date Member ID       NICK ROBERTSON 1950 [id number]                 Emergency Contacts      (Rel.) Home Phone Work Phone Mobile Phone    NormanVeronika (Spouse) -- -- [phone]    ArvindEstrada piña (Son) [phone] -- --            Insurance Information                MEDICARE/MEDICARE A & B Phone: [phone]    Subscriber: Nick Robertson " Shari Subscriber#: [id number]    Group#:  Precert#:         UMWA/UMWA [facility] SUP Phone:     Subscriber: Nick Austin Subscriber#: [id number]    Group#: [id number] Precert#:              History & Physical      Rayne JansenRANDEE at 9/7/2017 11:52 AM          CVTS CONSULTATION          Patient Care Team:  Nick Morgan MD as PCP - General (Family Medicine)  Nick Morgan MD as PCP - Claims Attributed  Requesting Provider: Perla Jay MD    Chief complaint: CAD      SUBJECTIVE       History of Present Illness:  Mr. Asutin is a 66-year-old  male who is being seen by me for the first time.  He presents for evaluation of chest pressure and shortness of breath which has been going on for the past 3 months.  His dyspnea is NYHA class II and chest pressure was triggered by activity though it has occurred on bending forward.  His history is remarkable for document of coronary artery disease and back in 1989 underwent CABG ×1 vessel in [facility].  He is done well since then and has not followed up with cardiology in more than 20 years.  His risk factors include hypertension, hyperlipidemia, hypothyroidism, obesity and premature coronary artery disease.  He is a nonsmoker. Mr. Austin underwent elective cardiac cath today demonstrating severe coronary disease LAD 90%, CIRCUMFLEX 100%, RCA 90%. Dr. Morgan was consulted for consideration of redo revascularization surgery.      The following portions of the patient's history were reviewed and updated as appropriate: allergies, current medications, past family history, past medical history, past social history, past surgical history and problem list.  Recent images independently reviewed.  Available laboratory values reviewed.    Review of Systems   Constitutional: Positive for fatigue. Negative for diaphoresis.   Respiratory: Positive for chest tightness and shortness of breath.    Cardiovascular: Negative for palpitations and  leg swelling.   Gastrointestinal: Negative for abdominal pain.   Genitourinary: Negative for dysuria.   Musculoskeletal: Negative for back pain.   Skin: Negative.    Neurological: Negative for dizziness, speech difficulty and numbness.   Hematological: Does not bruise/bleed easily.   All other systems reviewed and are negative.       Past Medical History:   Diagnosis Date   • Acute bronchitis    • Backache    • Chronic pain    • Coronary arteriosclerosis    • Essential hypertension    • GERD (gastroesophageal reflux disease)    • Hyperglycemia    • Hyperlipidemia    • Hypothyroidism    • Type 2 diabetes mellitus      Past Surgical History:   Procedure Laterality Date   • CHOLECYSTECTOMY     • CORONARY ARTERY BYPASS GRAFT     • INJECTION OF MEDICATION  02/23/2015    dexamethasone   • LEG SURGERY      extensive lower leg   • SHOULDER SURGERY       Family History   Problem Relation Age of Onset   • No Known Problems Mother    • No Known Problems Father    • No Known Problems Sister    • No Known Problems Brother    • No Known Problems Daughter    • No Known Problems Son    • No Known Problems Maternal Aunt    • No Known Problems Maternal Uncle    • No Known Problems Paternal Aunt    • No Known Problems Paternal Uncle    • No Known Problems Maternal Grandmother    • No Known Problems Maternal Grandfather    • No Known Problems Paternal Grandmother    • No Known Problems Paternal Grandfather      Social History   Substance Use Topics   • Smoking status: Never Smoker   • Smokeless tobacco: Never Used   • Alcohol use No     Prescriptions Prior to Admission   Medication Sig Dispense Refill Last Dose   • albuterol (PROVENTIL HFA;VENTOLIN HFA) 108 (90 BASE) MCG/ACT inhaler Inhale 2 puffs Every 4 (Four) Hours As Needed (asthma). Take 30 minutes before P.E or exercise. 18 g 3 9/6/2017 at 1800   • aspirin 325 MG tablet Take 325 mg by mouth Daily.   9/6/2017 at 1800   • atorvastatin (LIPITOR) 20 MG tablet Take 20 mg by mouth  "Every Night.   9/6/2017 at 1800   • Fluticasone Furoate-Vilanterol (BREO ELLIPTA) 100-25 MCG/INH aerosol powder  Inhale 1 dose Daily. 3 each 3 9/6/2017 at 1800   • isosorbide mononitrate (IMDUR) 30 MG 24 hr tablet Take 1 tablet by mouth Daily. 30 tablet 6 9/6/2017 at 1800   • lisinopril-hydrochlorothiazide (PRINZIDE,ZESTORETIC) 20-25 MG per tablet Take 1 tablet by mouth Daily.   9/6/2017 at 1800   • metoprolol succinate XL (TOPROL-XL) 25 MG 24 hr tablet q hs (Patient taking differently: 25 mg Daily. q hs) 30 tablet 3 9/6/2017 at 1800   • Omega-3 Fatty Acids (FISH OIL) 1000 MG capsule capsule Take 1,000 mg by mouth Daily With Breakfast.   9/6/2017 at 1800   • omeprazole (priLOSEC) 40 MG capsule Take 40 mg by mouth Daily.   9/6/2017 at 1800   • SYNTHROID 88 MCG tablet Take 1 tablet by mouth Daily. 90 tablet 2 9/6/2017 at 1800   • tamsulosin (FLOMAX) 0.4 MG capsule 24 hr capsule Take 1 capsule by mouth Every Night.   9/6/2017 at 1800        Allergies:  Tetanus toxoids    OBJECTIVE        Vital Signs  Temp:  [97.3 °F (36.3 °C)-97.4 °F (36.3 °C)] 97.3 °F (36.3 °C)  Heart Rate:  [60-64] 63  Resp:  [16-18] 18  BP: ()/(51-76) 107/58    Flowsheet Rows         First Filed Value    Admission Height  67\" (170.2 cm) Documented at 09/07/2017 0635    Admission Weight  269 lb 13.5 oz (122 kg) Documented at 09/07/2017 0635        67\" (170.2 cm)    Physical Exam   Constitutional: He is oriented to person, place, and time. He appears well-developed.   HENT:   Head: Normocephalic.   Eyes: Pupils are equal, round, and reactive to light.   Neck: Neck supple. Carotid bruit is not present.   Cardiovascular: Normal rate and normal heart sounds.    Pulses:       Dorsalis pedis pulses are 2+ on the right side, and 2+ on the left side.        Posterior tibial pulses are 2+ on the right side, and 2+ on the left side.   Pulmonary/Chest: Effort normal and breath sounds normal. No respiratory distress.   Abdominal: Soft. Bowel sounds are " normal.   Musculoskeletal: Normal range of motion. He exhibits no edema.   Neurological: He is alert and oriented to person, place, and time. No cranial nerve deficit.   Skin: Skin is warm and dry.   Psychiatric: He has a normal mood and affect. Thought content normal.   Vitals reviewed.      Results Review:   Lab Results   Component Value Date    WBC 8.37 03/28/2017    HGB 15.3 03/28/2017    HCT 46.4 03/28/2017    MCV 93.9 03/28/2017     03/28/2017     Lab Results   Component Value Date    GLUCOSE 118 (H) 09/07/2017    BUN 14 09/07/2017    CREATININE 0.99 09/07/2017    EGFRIFNONA 75 09/07/2017    BCR 14.1 09/07/2017    K 3.7 09/07/2017    CO2 26.0 09/07/2017    CALCIUM 8.9 09/07/2017    ALBUMIN 4.30 09/07/2017    LABIL2 1.3 09/07/2017    AST 36 09/07/2017    ALT 56 09/07/2017     Results for orders placed during the hospital encounter of 08/30/17   Adult Transthoracic Echo Complete    Narrative · Mild tricuspid valve regurgitation is present.  · Mild pulmonic valve regurgitation is present.  · Left ventricular wall thickness is consistent with borderline concentric   hypertrophy.  · Left atrial cavity size is mildly dilated.  · Left ventricular systolic function is normal. Estimated EF = 55%.  · Left ventricular diastolic dysfunction (grade I) consistent with   impaired relaxation.                ASSESSMENT/PLAN       Principal Problem:    Coronary artery disease involving native coronary artery of native heart with angina pectoris  Active Problems:    Angina pectoris      Assessment:    Condition: In stable condition.   (Mr. Matt cardiac cath demonstrates severe CAD and currently chest pain free. Dr. Morgan independently reviewed cath films and discussed with  and Mr. Matt regarding options for medical management, PCI, CABG.  ).     Plan:   (Coronary Artery Bypass Grafting x 3 vessels is advisable with endoscopic vein harvesting. Mr. Matt is aware of the risks involved, potential for  complications, and hoped for benefits. He understands and wishes to proceed with surgical revascularization on 9/14/17. Currently we will complete pre-op work-up: Carotid Duplex Scan, BLE Vein Mapping, 5 Meter Walk Test, Type & Screen, CT Chest. Preoperative teaching has been completed and all questions have been answered.    ).       Thank you for the opportunity to participate in this patient's care.              This document has been electronically signed by RANDEE Brown on September 7, 2017 11:52 AM         Electronically signed by Greg Morgan MD at 9/11/2017  5:17 PM      Greg Morgan MD at 9/14/2017  7:15 AM            H&P reviewed. The patient was examined and there are no changes to the H&P.  Detailed discussion with Mr Austin regarding situation options and plans.  severe symptomatic CAD.  Coronary Artery Bypass Grafting is advisable.      Risks including but not limited to Mortality 2-3%, Stroke 1-2%, bleeding (return to surgery), transfusion, infection, pulmonary (prolonged mechanical ventilation, tracheostomy), renal dysfunction (dialysis).  Benefits:  relief of symptoms, reduction in cardiac events and hospitalization. improved survival.  Options:  medical therapy, multivessel PCI discussed. Understands and wishes to proceed.    Redo Coronary Artery Bypass Grafting, , SVG to RI, SVG to M2, SVG to PDA, ON, amicar, ancef, radial arterial line, pulmonary artery catheter.  GEN.  SDS.  9/14/2017         Electronically signed by Greg Morgan MD at 9/14/2017  7:16 AM    Source Note             CVTS CONSULTATION          Patient Care Team:  Nick Morgan MD as PCP - General (Family Medicine)  Nick Morgan MD as PCP - Claims Attributed  Requesting Provider: Perla Jay MD    Chief complaint: CAD      SUBJECTIVE       History of Present Illness:  Mr. Austin is a 66-year-old  male who is being seen by me for the first time.  He  presents for evaluation of chest pressure and shortness of breath which has been going on for the past 3 months.  His dyspnea is NYHA class II and chest pressure was triggered by activity though it has occurred on bending forward.  His history is remarkable for document of coronary artery disease and back in 1989 underwent CABG ×1 vessel in [facility].  He is done well since then and has not followed up with cardiology in more than 20 years.  His risk factors include hypertension, hyperlipidemia, hypothyroidism, obesity and premature coronary artery disease.  He is a nonsmoker. Mr. Austin underwent elective cardiac cath today demonstrating severe coronary disease LAD 90%, CIRCUMFLEX 100%, RCA 90%. Dr. Morgan was consulted for consideration of redo revascularization surgery.      The following portions of the patient's history were reviewed and updated as appropriate: allergies, current medications, past family history, past medical history, past social history, past surgical history and problem list.  Recent images independently reviewed.  Available laboratory values reviewed.    Review of Systems   Constitutional: Positive for fatigue. Negative for diaphoresis.   Respiratory: Positive for chest tightness and shortness of breath.    Cardiovascular: Negative for palpitations and leg swelling.   Gastrointestinal: Negative for abdominal pain.   Genitourinary: Negative for dysuria.   Musculoskeletal: Negative for back pain.   Skin: Negative.    Neurological: Negative for dizziness, speech difficulty and numbness.   Hematological: Does not bruise/bleed easily.   All other systems reviewed and are negative.       Past Medical History:   Diagnosis Date   • Acute bronchitis    • Backache    • Chronic pain    • Coronary arteriosclerosis    • Essential hypertension    • GERD (gastroesophageal reflux disease)    • Hyperglycemia    • Hyperlipidemia    • Hypothyroidism    • Type 2 diabetes mellitus      Past Surgical  History:   Procedure Laterality Date   • CHOLECYSTECTOMY     • CORONARY ARTERY BYPASS GRAFT     • INJECTION OF MEDICATION  02/23/2015    dexamethasone   • LEG SURGERY      extensive lower leg   • SHOULDER SURGERY       Family History   Problem Relation Age of Onset   • No Known Problems Mother    • No Known Problems Father    • No Known Problems Sister    • No Known Problems Brother    • No Known Problems Daughter    • No Known Problems Son    • No Known Problems Maternal Aunt    • No Known Problems Maternal Uncle    • No Known Problems Paternal Aunt    • No Known Problems Paternal Uncle    • No Known Problems Maternal Grandmother    • No Known Problems Maternal Grandfather    • No Known Problems Paternal Grandmother    • No Known Problems Paternal Grandfather      Social History   Substance Use Topics   • Smoking status: Never Smoker   • Smokeless tobacco: Never Used   • Alcohol use No     Prescriptions Prior to Admission   Medication Sig Dispense Refill Last Dose   • albuterol (PROVENTIL HFA;VENTOLIN HFA) 108 (90 BASE) MCG/ACT inhaler Inhale 2 puffs Every 4 (Four) Hours As Needed (asthma). Take 30 minutes before P.E or exercise. 18 g 3 9/6/2017 at 1800   • aspirin 325 MG tablet Take 325 mg by mouth Daily.   9/6/2017 at 1800   • atorvastatin (LIPITOR) 20 MG tablet Take 20 mg by mouth Every Night.   9/6/2017 at 1800   • Fluticasone Furoate-Vilanterol (BREO ELLIPTA) 100-25 MCG/INH aerosol powder  Inhale 1 dose Daily. 3 each 3 9/6/2017 at 1800   • isosorbide mononitrate (IMDUR) 30 MG 24 hr tablet Take 1 tablet by mouth Daily. 30 tablet 6 9/6/2017 at 1800   • lisinopril-hydrochlorothiazide (PRINZIDE,ZESTORETIC) 20-25 MG per tablet Take 1 tablet by mouth Daily.   9/6/2017 at 1800   • metoprolol succinate XL (TOPROL-XL) 25 MG 24 hr tablet q hs (Patient taking differently: 25 mg Daily. q hs) 30 tablet 3 9/6/2017 at 1800   • Omega-3 Fatty Acids (FISH OIL) 1000 MG capsule capsule Take 1,000 mg by mouth Daily With Breakfast.  "  9/6/2017 at 1800   • omeprazole (priLOSEC) 40 MG capsule Take 40 mg by mouth Daily.   9/6/2017 at 1800   • SYNTHROID 88 MCG tablet Take 1 tablet by mouth Daily. 90 tablet 2 9/6/2017 at 1800   • tamsulosin (FLOMAX) 0.4 MG capsule 24 hr capsule Take 1 capsule by mouth Every Night.   9/6/2017 at 1800        Allergies:  Tetanus toxoids    OBJECTIVE        Vital Signs  Temp:  [97.3 °F (36.3 °C)-97.4 °F (36.3 °C)] 97.3 °F (36.3 °C)  Heart Rate:  [60-64] 63  Resp:  [16-18] 18  BP: ()/(51-76) 107/58    Flowsheet Rows         First Filed Value    Admission Height  67\" (170.2 cm) Documented at 09/07/2017 0635    Admission Weight  269 lb 13.5 oz (122 kg) Documented at 09/07/2017 0635        67\" (170.2 cm)    Physical Exam   Constitutional: He is oriented to person, place, and time. He appears well-developed.   HENT:   Head: Normocephalic.   Eyes: Pupils are equal, round, and reactive to light.   Neck: Neck supple. Carotid bruit is not present.   Cardiovascular: Normal rate and normal heart sounds.    Pulses:       Dorsalis pedis pulses are 2+ on the right side, and 2+ on the left side.        Posterior tibial pulses are 2+ on the right side, and 2+ on the left side.   Pulmonary/Chest: Effort normal and breath sounds normal. No respiratory distress.   Abdominal: Soft. Bowel sounds are normal.   Musculoskeletal: Normal range of motion. He exhibits no edema.   Neurological: He is alert and oriented to person, place, and time. No cranial nerve deficit.   Skin: Skin is warm and dry.   Psychiatric: He has a normal mood and affect. Thought content normal.   Vitals reviewed.      Results Review:   Lab Results   Component Value Date    WBC 8.37 03/28/2017    HGB 15.3 03/28/2017    HCT 46.4 03/28/2017    MCV 93.9 03/28/2017     03/28/2017     Lab Results   Component Value Date    GLUCOSE 118 (H) 09/07/2017    BUN 14 09/07/2017    CREATININE 0.99 09/07/2017    EGFRIFNONA 75 09/07/2017    BCR 14.1 09/07/2017    K 3.7 " 09/07/2017    CO2 26.0 09/07/2017    CALCIUM 8.9 09/07/2017    ALBUMIN 4.30 09/07/2017    LABIL2 1.3 09/07/2017    AST 36 09/07/2017    ALT 56 09/07/2017     Results for orders placed during the hospital encounter of 08/30/17   Adult Transthoracic Echo Complete    Narrative · Mild tricuspid valve regurgitation is present.  · Mild pulmonic valve regurgitation is present.  · Left ventricular wall thickness is consistent with borderline concentric   hypertrophy.  · Left atrial cavity size is mildly dilated.  · Left ventricular systolic function is normal. Estimated EF = 55%.  · Left ventricular diastolic dysfunction (grade I) consistent with   impaired relaxation.                ASSESSMENT/PLAN       Principal Problem:    Coronary artery disease involving native coronary artery of native heart with angina pectoris  Active Problems:    Angina pectoris      Assessment:    Condition: In stable condition.   (Mr. Matt cardiac cath demonstrates severe CAD and currently chest pain free. Dr. Morgan independently reviewed cath films and discussed with  and Mr. Matt regarding options for medical management, PCI, CABG.  ).     Plan:   (Coronary Artery Bypass Grafting x 3 vessels is advisable with endoscopic vein harvesting. Mr. Matt is aware of the risks involved, potential for complications, and hoped for benefits. He understands and wishes to proceed with surgical revascularization on 9/14/17. Currently we will complete pre-op work-up: Carotid Duplex Scan, BLE Vein Mapping, 5 Meter Walk Test, Type & Screen, CT Chest. Preoperative teaching has been completed and all questions have been answered.    ).       Thank you for the opportunity to participate in this patient's care.              This document has been electronically signed by RANDEE Brown on September 7, 2017 11:52 AM          Electronically signed by Greg Morgan MD at 9/11/2017  5:17 PM                   Discharge Summary       RANDEE Brown at 9/18/2017 12:02 PM          CVTS DISCHARGE SUMMARY  Date of Admission: 9/14/2017  5:12 AM  Date of Discharge:  9/18/2017    Admission Diagnosis:   Coronary artery disease of native artery of native heart with stable angina pectoris [I25.118]  Acute Blood Loss Anemia  Respiratory Failure with Hypoxia  Dyslipidemia    Discharge Diagnosis:   Post-Op Diagnosis Codes:     * Coronary artery disease of native artery of native heart with stable angina pectoris [I25.118]  Coronary artery disease of native artery of native heart with stable angina pectoris [I25.118]  Acute Blood Loss Anemia  Respiratory Failure with Hypoxia  Dyslipidemia    Consults:   Consults     Date and Time Order Name Status Description    9/14/2017 1506 Inpatient Consult to Cardiology      9/7/2017 0909 Inpatient Consult to Cardiothoracic Surgery Completed           Procedures Performed  Procedure(s):  REDO CORONARY ARTERY BYPASS GRAFTINGX X 3-4, ENDOSCOPIC VEIN HARVEST      (CELL SAVER)      REDO CORONARY ARTERY BYPASS GRAFTING  ENDOSCOPIC VEIN HARVEST   SAPHENOUS VEIN GRAFT FROM ASCENDING AORTA TO FIRST DIAGONAL BRANCH  SAPHENOUS VEIN GRAFT FROM ASCENDING AORTA TO RAMUS INTERMEDIUS BRANCH  SAPHENOUS VEIN GRAFT FROM ASCENDING AORTA TO POSTERIOR LATERAL BRANCH   RIGHT common femoral arterial line  Vascular ultrasound guidance    Discharge Medications   Nick Austin Shari   Home Medication Instructions EVANS:[id number]    Printed on:09/18/17 1203   Medication Information                      albuterol (PROVENTIL HFA;VENTOLIN HFA) 108 (90 BASE) MCG/ACT inhaler  Inhale 2 puffs Every 4 (Four) Hours As Needed (asthma). Take 30 minutes before P.E or exercise.             amiodarone (PACERONE) 400 MG tablet  Take 1 tablet by mouth Daily for 14 days. Then decrease dose to 200mg daily x 21 days             aspirin 81 MG EC tablet  Take 1 tablet by mouth Daily.             atorvastatin (LIPITOR) 20 MG tablet  Take 20 mg by  mouth Every Night.             clopidogrel (PLAVIX) 75 MG tablet  Take 1 tablet by mouth Daily.             Fluticasone Furoate-Vilanterol (BREO ELLIPTA) 100-25 MCG/INH aerosol powder   Inhale 1 dose Daily.             furosemide (LASIX) 20 MG tablet  Take 1 tablet by mouth Daily.             magnesium oxide (MAGOX) 400 (241.3 Mg) MG tablet tablet  Take 1 tablet by mouth 2 (Two) Times a Day.             metoprolol succinate XL (TOPROL-XL) 25 MG 24 hr tablet  Take 25 mg by mouth Every Night.             Omega-3 Fatty Acids (FISH OIL) 1200 MG capsule delayed-release  Take  by mouth Every Night.             omeprazole (priLOSEC) 40 MG capsule  Take 40 mg by mouth Every Night.             Prenatal Vit-Fe Fumarate-FA (PRENATAL VITAMIN 27-0.8) 27-0.8 MG tablet tablet  Take 1 tablet by mouth Daily.             sennosides-docusate sodium (SENOKOT-S) 8.6-50 MG tablet  Take 1 tablet by mouth 2 (Two) Times a Day.             SYNTHROID 88 MCG tablet  Take 1 tablet by mouth Daily.             tamsulosin (FLOMAX) 0.4 MG capsule 24 hr capsule  Take 1 capsule by mouth Every Night.             traMADol (ULTRAM) 50 MG tablet  Take 1 tablet by mouth Every 4 (Four) Hours As Needed for Moderate Pain  (Surgical Pain).             vitamin C (VITAMIN C) 500 MG tablet  Take 1 tablet by mouth 2 (Two) Times a Day.               Medical Management: ASA,PLAVIX,STATIN, BETA. HOLD ACE. Reviewed risks, benefits, and habit forming potential and weaning from narcotic medication. Patient understands and wishes to receive prescription.  Prescription written for Ultram # 40 for post-surgical pain after Les placed on file.    Discharge Diet:   Diet Instructions     Diet: Cardiac, Consistent Carbohydrate       Discharge Diet:   Cardiac  Consistent Carbohydrate                    Discharge Disposition: Home in stable condition with follow up appointments with CVTS Nurse Practitioner 1 week, Dr. Morgan/Favian 2 weeks, Cardiology/PCP as scheduled.      History of Present Illness/Hospital Course:   Mr. Austin is a 66-year-old  male who is being seen by me for the first time.  He presents for evaluation of chest pressure and shortness of breath which has been going on for the past 3 months.  His dyspnea is NYHA class II and chest pressure was triggered by activity though it has occurred on bending forward.  His history is remarkable for document of coronary artery disease and back in 1989 underwent CABG ×1 vessel in [facility].  He is done well since then and has not followed up with cardiology in more than 20 years.  His risk factors include hypertension, hyperlipidemia, hypothyroidism, obesity and premature coronary artery disease.  He is a nonsmoker. Mr. Austin underwent elective cardiac cath today demonstrating severe coronary disease LAD 90%, CIRCUMFLEX 100%, RCA 90%. Dr. Morgan was consulted for consideration of redo revascularization surgery which was performed on 9/14/17.  8/30/17: Echo: EF 55% LVSD 31 LVEDD 45. Grade 1 DD.  9/2017: Carotid Duplex: 0-49%  Admitted through [facility], taken to the operating suite placed under general anesthesia underwent said procedure without complications or difficulty.  Weaned easily from cardiopulmonary bypass with the assistance in drips of LINDA,NTG,DOBUTAMINE and transferred to CCU in stable condition.  They began weaning mechanical ventilation and was extubated in 6.5 hrs of intubation and placed on oxygen per Nasal cannula.  A total of 2 units in blood products, plus 2 units FFP, 6 pack platelets were given during their hospital stay.  POD1 they were out of bed to the chair and tolerated breakfast with stable glucose between the hours of 18-24 postoperatively.  Insulin drip was discontinued and they were continued on sliding scale insulin coverage as well as Lantus nightly.  Hemodynamics and neuro status remained stable for transfer to 3W telemetry after 22 hours stay in the CCU. They continued with  "PT/OT participation to satisfactory levels with activities of daily living. Chest tubes were without air leak and removed with satisfactory post removal CXR viewed on 916/17.  Hemodynamics remained stable, they remained afebrile postoperatively, and in regular sinus rhythm for satisfactory discharge home in stable condition on POD 4 with home oxygen.      Vital Signs  Temp:  [96.9 °F (36.1 °C)-99.8 °F (37.7 °C)] 97.7 °F (36.5 °C)  Heart Rate:  [52-83] 62  Resp:  [16-20] 16  BP: (114-166)/(59-84) 114/62  Last 3 weights    09/16/17  0615 09/17/17  0445 09/18/17  0500   Weight: 283 lb (128 kg) 280 lb 9.6 oz (127 kg) 275 lb 3.2 oz (125 kg)       Pertinent Test Results:  Lab Results   Component Value Date    WBC 16.78 (H) 09/17/2017    HGB 8.7 (L) 09/18/2017    HCT 25.6 (L) 09/18/2017    MCV 92.2 09/17/2017     09/17/2017     Lab Results   Component Value Date    GLUCOSE 105 (H) 09/17/2017    BUN 23 (H) 09/17/2017    CREATININE 0.94 09/17/2017    EGFRIFNONA 80 09/17/2017    BCR 24.5 09/17/2017    K 3.9 09/17/2017    CO2 30.0 09/17/2017    CALCIUM 7.9 (L) 09/17/2017    ALBUMIN 3.20 (L) 09/17/2017    LABIL2 1.3 09/17/2017    AST 38 09/17/2017    ALT 37 09/17/2017       Physical Exam:  Physical Exam   General Appearance:  Comfortable.    Vital signs: (most recent): Blood pressure 114/62, pulse 62, temperature 97.7 °F (36.5 °C), temperature source Oral, resp. rate 16, height 67.5\" (171.5 cm), weight 275 lb 3.2 oz (125 kg), SpO2 92 %.  Vital signs are normal.  No fever.    Output: Producing urine and no stool output.    HEENT: Normal HEENT exam.    Lungs:  Normal respiratory rate and normal effort.  There are wheezes and decreased breath sounds (LLL).    Heart: Normal rate.  Regular rhythm.    Abdomen: Abdomen is soft and non-distended.  Bowel sounds are normal.     Extremities: Decreased range of motion.  There is dependent edema.  (D/t surgical pain)  Pulses: Distal pulses are intact.    Neurological: Patient is " alert and oriented to person, place and time.    Skin:  Warm and dry.  (M/S INC: [place]. Intact  LLE EVH:  CDI)     Additional Instructions for the Follow-ups that You Need to Schedule     Discharge Follow-up with Specialty    As directed    Specialty:  Cardiothoracic Surgery   Follow Up:  1 Week   Follow Up Details:  Rayne Jansen APRN 9/25/17 9:20       Referral to Home Health    As directed    Face to Face Visit Date:  9/18/2017   Follow-up Provider for Plan of Care?:  I will be treating the patient on an ongoing basis.  Please send me the Plan of Care for signature.   Follow-up Provider:  VIOLA SNYDER   Reason/Clinical Findings:  post op CABG   Describe mobility limitations that make leaving home difficult:  lifting/driving restrictions   Nursing/Therapeutic Services Requested:   Skilled Nursing  Physical Therapy  Occupational Therapy      Skilled nursing orders:   Post CABG care  Neurovascular assessments  Cardiopulmonary assessments      PT orders:   Strengthening  Home safety assessment  Therapeutic exercise      Occupational orders:   Activities of daily living  Home safety assessment  Strengthening      Frequency:  1 Week 1                 Discharge Instructions: Discharge instructions include no heavy lifting anything greater than 10lbs for approximately 12 weeks.  No sex or driving for 3-6 weeks. Printed information given to the patient with advancement of activities weekly.  Risks and benefits of narcotic medications and weaning postoperatively have been discussed. Clean operative site with antibacterial soap/water, pat dry. Keep open to air unless draining, then may apply dry dressing.  No ointments or creams unless prescribed by provider. Signs and symptoms of infection including drainage from operative site, redness, swelling, with associated fever and/or chills notify [facility] center immediately for wound check.  Patient verbalizes understanding of discharge instructions, all  questions are answered, follow up appointments have been made, they are discharged home in stable condition.         Follow-up Appointments  Future Appointments  Date Time Provider Department Center   10/17/2017 9:00 AM Nick Morgan MD [facility] None       Test Results Pending at Discharge   Order Current Status    Hardware / Foreign Body Culture Preliminary result                 This document has been electronically signed by RANDEE Brown on September 18, 2017 12:03 PM      Time: Discharge 60 min       Electronically signed by RANDEE Brown at 9/18/2017 12:11 PM        Referral to Home Health [[id number]] (Order [id number])   Outpatient Referral    Date: 9/18/2017   Department: [address]   Ordering/Authorizing: RANDEE Brown           Order History  Outpatient       Date/Time Action Taken User Additional Information       09/18/17 1202 Sign RANDEE Brown            Order Details        Frequency Duration Priority Order Class       None None Routine Internal Referral           Start Date/Time        Start Date       09/18/17           Order Questions        Question Answer Comment       Face to Face Visit Date 9/18/2017        Follow-up Provider for Plan of Care? I will be treating the patient on an ongoing basis.  Please send me the Plan of Care for signature.        Follow-up Provider VIOLA SNYDER        Reason/Clinical Findings post op CABG        Describe mobility limitations that make leaving home difficult lifting/driving restrictions        Nursing/Therapeutic Services Requested Skilled Nursing         Physical Therapy         Occupational Therapy        Skilled nursing orders: Post CABG care         Neurovascular assessments         Cardiopulmonary assessments        PT orders: Strengthening         Home safety assessment         Therapeutic exercise        Occupational orders: Activities of daily living         Home safety  assessment         Strengthening        Frequency: 1 Week 1            Source Order Set / Preference List        Order Set        IP GEN EXPRESS DISCHARGE [[id number]]           Clinical Indications           ICD-10-CM ICD-9-CM       Coronary artery disease involving native coronary artery of native heart with angina pectoris  - Primary     I25.119 414.01  413.9           Reprint Order Requisition        AMB REFERRAL TO HOME HEALTH (Order #[id number]) on 9/18/17         Encounter-Level Cardiology Documents:        There are no encounter-level cardiology documents.         Encounter        View Encounter         Order Provider Info            Office phone Pager E-mail       Ordering User RANDEE Brown [phone] -- --       Authorizing Provider RANDEE Brown [phone] -- --       Attending Provider Greg Morgan [phone] -- --           Linked Charges        No charges linked to this procedure         Order Transmittal Tracking        AMB REFERRAL TO HOME HEALTH (Order #[id number]) on 9/18/17         Authorized by:  RANDEE Brown  (NPI: [id number])

## 2017-09-18 NOTE — PROGRESS NOTES
"  Cardiothoracic - Vascular Surgery Daily Note        LOS: 4 days   Patient Care Team:  Nick Morgan MD as PCP - General (Family Medicine)  Nick Morgan MD as PCP - Claims Attributed    POD4: Redo CABG x 3  REDO CORONARY ARTERY BYPASS GRAFTING  ENDOSCOPIC VEIN HARVEST   SAPHENOUS VEIN GRAFT FROM ASCENDING AORTA TO FIRST DIAGONAL BRANCH  SAPHENOUS VEIN GRAFT FROM ASCENDING AORTA TO RAMUS INTERMEDIUS BRANCH  SAPHENOUS VEIN GRAFT FROM ASCENDING AORTA TO POSTERIOR LATERAL BRANCH   RIGHT common femoral arterial line  Vascular ultrasound guidance      Chief Complaint: CAD, Surgical Pain    Subjective         Subjective:  Symptoms:  Stable.  He reports chest pain (surgical).  No shortness of breath.    Diet:  Adequate intake.    Activity level: Impaired due to weakness.    Pain:  He complains of pain that is moderate.  Pain is well controlled and requiring pain medication.          Objective     Vital Signs  Temp:  [96.9 °F (36.1 °C)-99.8 °F (37.7 °C)] 97.7 °F (36.5 °C)  Heart Rate:  [52-83] 62  Resp:  [16-20] 16  BP: (114-166)/(59-84) 114/62  Body mass index is 42.47 kg/(m^2).    Intake/Output Summary (Last 24 hours) at 09/18/17 1142  Last data filed at 09/18/17 1000   Gross per 24 hour   Intake              643 ml   Output             2850 ml   Net            -2207 ml     I/O this shift:  In: -   Out: 400 [Urine:400]    Wt Readings from Last 3 Encounters:   09/18/17 275 lb 3.2 oz (125 kg)   09/12/17 267 lb (121 kg)   09/07/17 269 lb 13.5 oz (122 kg)         Physical Exam   Objective:  General Appearance:  Comfortable.    Vital signs: (most recent): Blood pressure 114/62, pulse 62, temperature 97.7 °F (36.5 °C), temperature source Oral, resp. rate 16, height 67.5\" (171.5 cm), weight 275 lb 3.2 oz (125 kg), SpO2 92 %.  Vital signs are normal.  No fever.    Output: Producing urine and no stool output.    HEENT: Normal HEENT exam.    Lungs:  Normal respiratory rate and normal effort.  There are wheezes and " decreased breath sounds (LLL).    Heart: Normal rate.  Regular rhythm.    Abdomen: Abdomen is soft and non-distended.  Bowel sounds are normal.     Extremities: Decreased range of motion.  There is dependent edema.  (D/t surgical pain)  Pulses: Distal pulses are intact.    Neurological: Patient is alert and oriented to person, place and time.    Skin:  Warm and dry.  (M/S INC: [place]. Intact  LLE EVH:  CDI)              Results Review:      Results from last 7 days  Lab Units 09/17/17  0803 09/16/17  0857 09/15/17  0448 09/14/17  2236  09/14/17  1511   SODIUM mmol/L 135*  --  141  --   --  138   SODIUM, ARTERIAL mmol/L  --   --   --  141.2  < >  --    POTASSIUM mmol/L 3.9 3.6 4.0  --   --  3.3*   CHLORIDE mmol/L 98  --  106  --   --  104   CO2 mmol/L 30.0  --  25.0  --   --  24.0   BUN mg/dL 23*  --  13  --   --  14   CREATININE mg/dL 0.94  --  1.01  --   --  1.19   CALCIUM mg/dL 7.9*  --  8.2*  --   --  7.4*   BILIRUBIN mg/dL 0.7  --   --   --   --   --    ALK PHOS U/L 47  --   --   --   --   --    ALT (SGPT) U/L 37  --   --   --   --   --    AST (SGOT) U/L 38  --   --   --   --   --    GLUCOSE mg/dL 105*  --  136*  --   --  135*   GLUCOSE, ARTERIAL mmol/L  --   --   --  169  < >  --    < > = values in this interval not displayed.    Estimated Creatinine Clearance: 96.2 mL/min (by C-G formula based on Cr of 0.94).      Results from last 7 days  Lab Units 09/16/17  0857 09/15/17  0448 09/14/17  2026 09/14/17  1511   MAGNESIUM mg/dL 2.2 1.9 2.1 2.7*   PHOSPHORUS mg/dL  --   --   --  3.6               Results from last 7 days  Lab Units 09/18/17  0302 09/17/17  0803 09/15/17  0448 09/14/17  1511 09/14/17  1343   WBC 10*3/mm3  --  16.78* 12.05* 12.25*  --    HEMOGLOBIN g/dL 8.7* 7.3* 8.6* 9.7*  --    HEMOGLOBIN, POC g/dL  --   --   --   --  9.2   PLATELETS 10*3/mm3  --  158 144* 126*  --          Results from last 7 days  Lab Units 09/14/17  1511   INR  1.59*       Imaging Results (last 24 hours)     ** No results  found for the last 24 hours. **                                  amiodarone 400 mg Oral Q12H   aspirin 81 mg Oral Daily   atorvastatin 20 mg Oral Nightly   budesonide-formoterol 2 puff Inhalation BID - RT   insulin aspart 0-24 Units Subcutaneous 4x Daily AC & at Bedtime   insulin detemir 36 Units Subcutaneous Nightly   ipratropium-albuterol 3 mL Nebulization 4x Daily - RT   levothyroxine 88 mcg Oral Nightly   magnesium oxide 400 mg Oral BID   metoprolol tartrate 12.5 mg Oral Q12H   prenatal vitamin 27-0.8 1 tablet Oral Daily   sennosides-docusate sodium 1 tablet Oral BID   tamsulosin 0.4 mg Oral Nightly   vitamin C 500 mg Oral BID       lactated ringers              ASSESSMENT/PLAN     Principal Problem:    Coronary artery disease of native artery of native heart with stable angina pectoris  Active Problems:    CAD (coronary artery disease)      Assessment:    Condition: In stable condition.   (Stable Post op CABG: Progressing well.     CAD: ASA. PLAVIX, STATIN. BETA  BP. Hold ACE.     Resp: Wean O2, Incentive. Nebs. Lasix.  Extubated 2125    Pain: Ultram/Alexander    Rehab: OOB, Ambulate. PT/OT    Transient Post Op Hyperglycemia: SSI Coverage.     Constipation: Resolved    Acute Blood loss Anemia: S/P 2 units PRBC (3w), (OR)2 units FFP + 6 pack platets).     Plan:   Discharge home.  Encourage ambulation and out of bed and up to chair.  Wean off oxygen, start/continue incentive spirometry and continue respiratory treatments.  Consults: physical therapy.  Advance diet as tolerated.  Administer medications as ordered and add diuretic.   (DC Home with follow up appointment and HH.).                 This document has been electronically signed by RANDEE Brown on September 18, 2017 11:42 AM

## 2017-09-18 NOTE — THERAPY TREATMENT NOTE
Acute Care - Physical Therapy Treatment Note  [facility]     Patient Name: Nick Austin  : 1950  MRN: [id number]  Today's Date: 2017  Onset of Illness/Injury or Date of Surgery Date: 17  Date of Referral to PT: 17  Referring Physician: RANDEE Knott    Admit Date: 2017    Visit Dx:    ICD-10-CM ICD-9-CM   1. Coronary artery disease involving native coronary artery of native heart with angina pectoris I25.119 414.01     413.9   2. Coronary artery disease of native artery of native heart with stable angina pectoris I25.118 414.01     413.9   3. Impaired mobility and ADLs Z74.09 799.89   4. Impaired physical mobility Z74.09 781.99   5. Impaired gait and mobility R26.89 781.2     Patient Active Problem List   Diagnosis   • Essential hypertension   • Hyperlipidemia   • Hypothyroidism   • Type 2 diabetes mellitus without complication, without long-term current use of insulin   • Benign non-nodular prostatic hyperplasia   • Generalized OA   • Uncomplicated asthma   • Dyspnea on exertion   • Angina effort   • S/P CABG x 1   • Angina pectoris   • Coronary artery disease of native artery of native heart with stable angina pectoris   • Coronary artery disease involving native coronary artery of native heart with angina pectoris   • CAD (coronary artery disease)               Adult Rehabilitation Note       17 1310 17 0845 17 0745    Rehab Assessment/Intervention    Discipline physical therapy assistant  -LN physical therapy assistant  -LN occupational therapy assistant  -KD    Document Type therapy note (daily note)  -LN therapy note (daily note)  -LN therapy note (daily note)  -KD    Subjective Information agree to therapy;complains of;pain  -LN agree to therapy;no complaints  -LN agree to therapy  -KD    Precautions/Limitations cardiac precautions;fall precautions;oxygen therapy device and L/min;sternal precautions  -LN cardiac precautions;fall  precautions;oxygen therapy device and L/min;sternal precautions  -LN cardiac precautions  -KD    Recorded by [LN] Aruna Nichols, PHI [LN] Aruna Nichols, PTA [KD] Martha Alonso, WERNER/L    Vital Signs    Pre Systolic BP Rehab 128  -  -  -KD    Pre Treatment Diastolic BP 66  -LN 74  -LN 65  -KD    Post Systolic BP Rehab 147  -  -LN     Post Treatment Diastolic BP 76  -LN 70  -LN     Pretreatment Heart Rate (beats/min) 66  -LN 74  -LN 70  -KD    Intratreatment Heart Rate (beats/min)  80  -LN     Posttreatment Heart Rate (beats/min) 68  -LN 73  -LN 85  -KD    Pre SpO2 (%) 92  -LN 92  -LN 91  -KD    O2 Delivery Pre Treatment supplemental O2  -LN supplemental O2  -LN supplemental O2  -KD    Intra SpO2 (%) 97  -LN 92  -LN 89  -KD    O2 Delivery Intra Treatment   supplemental O2   PLB tech performed 02 increased to 93%  -KD    Post SpO2 (%) 92  -LN 93  -LN 93  -KD    O2 Delivery Post Treatment   supplemental O2  -KD    Pre Patient Position Sitting  -LN Sitting  -LN Sitting  -KD    Intra Patient Position Standing  -LN Standing  -LN Standing  -KD    Post Patient Position Supine  -LN Sitting  -LN Sitting  -KD    Recorded by [LN] Aruna Nichols, PTA [LN] Aruna Nichols, PTA [KD] Martha Alonso, WERNER/L    Pain Assessment    Pain Assessment 0-10  -LN 0-10  -LN 0-10  -KD    Pain Score 0  -LN 0  -LN 2  -KD    Post Pain Score 3  -LN 0  -LN 2  -KD    Pain Type   Surgical pain  -KD    Pain Location   Chest  -KD    Pain Intervention(s) Declines  -LN      Recorded by [LN] Aruna Nichols, PTA [LN] Aruna Nichols, PTA [KD] Martha Alonso, WERNER/L    Cognitive Assessment/Intervention    Current Cognitive/Communication Assessment   functional  -KD    Orientation Status oriented to;person     -LN oriented to;person     -LN oriented x 4  -KD    Follows Commands/Answers Questions   100% of the time  -KD    Personal Safety Interventions   fall prevention program maintained  -KD    Recorded by [LN] Aruna Nichols, PTA [LN] Aruna WHITE  Magdal[place], PTA [KD] Martha Alonso, WERNER/L    Bed Mobility, Assessment/Treatment    Bed Mob, Supine to Sit, [place] not tested  -LN not tested  -LN     Bed Mob, Sit to Supine, [place] minimum assist (75% patient effort)  -LN not tested  -LN     Recorded by [LN] Aruna Nichols, PTA [LN] Aruna Nichols, PTA     Transfer Assessment/Treatment    Transfers, Bed-Chair [place] not tested  -LN      Transfers, Chair-Bed [place] contact guard assist  -LN      Transfers, Bed-Chair-Bed, Assist Device rolling walker  -LN      Transfers, Sit-Stand [place] stand by assist;verbal cues required  -LN stand by assist;verbal cues required  -LN stand by assist  -KD    Transfers, Stand-Sit [place] stand by assist;verbal cues required  -LN stand by assist;verbal cues required  -LN stand by assist   vc's to use Heart hugger  -KD    Transfers, Sit-Stand-Sit, Assist Device --   none  -LN --   none  -LN --   none  -KD    Toilet Transfer, [place] not tested  -LN contact guard assist  -LN     Toilet Transfer, Assistive Device  rolling walker  -LN     Recorded by [LN] Aruna Nichols, PTA [LN] Arnua Nichols, PTA [KD] TRAVIS NietoA/L    Gait Assessment/Treatment    Gait, [place] Level contact guard assist  -LN contact guard assist  -LN     Gait, Assistive Device rolling walker  -LN rolling walker  -LN     Gait, Distance (Feet) 256  -   48',15'  -LN     Recorded by [LN] Aruna Nichols, PTA [LN] Aruna Nichols PTA     Stairs Assessment/Treatment    Number of Stairs 5  -LN      Stairs, Handrail Location left side (ascending)  -LN      Stairs, [place] Level independent  -LN      Stairs, Technique Used step over step (ascending);step over step (descending)  -LN      Recorded by [LN] Aruna Nichols PTA      Lower Body Dressing Assessment/Training    LB Dressing Assess/Train, Clothing Type   doffing:;donning:;slipper socks  -KD    LB Dressing Assess/Train, Assist Device   reacher;sock-aid  -KD    LB Dressing  Assess/Train, Position   sitting  -KD    LB Dressing Assess/Train, [place]   minimum assist (75% patient effort)  -KD    LB Dressing Assess/Train, Impairments   strength decreased  -KD    Recorded by   [KD] DEBBI Nieto/L    Balance Skills Training    Standing-Level of Assistance   Close supervision  -KD    Standing-Balance Activities   --   static  -KD    Standing Balance # of Minutes   10 mins  -KD    Recorded by   [KD] DEBBI Nieto/L    Therapy Exercises    Bilateral Lower Extremities  AROM:;20 reps;sitting;ankle pumps/circles;hip flexion;LAQ  -LN     Recorded by  [LN] Aruna Nichols PTA     Positioning and Restraints    Pre-Treatment Position   sitting in chair/recliner  -KD    Post Treatment Position bed  -LN chair  -LN chair  -KD    In Bed notified nsg;supine;call light within reach;encouraged to call for assist;with family/caregiver  -LN      In Chair  sitting;notified nsg;call light within reach;encouraged to call for assist;with family/caregiver  -LN sitting;call light within reach;encouraged to call for assist;with nsg  -KD    Recorded by [LN] Aruna Nichols PTA [LN] Aruna Nichols PTA [KD] DEBBI Nieto/SELENE      09/17/17 1430 09/16/17 1040 09/16/17 0753    Rehab Assessment/Intervention    Discipline physical therapy assistant  -EM physical therapy assistant  -EM occupational therapy assistant  -CS    Document Type therapy note (daily note)  -EM therapy note (daily note)  -EM therapy note (daily note)  -CS    Subjective Information agree to therapy   nsg ok gt. Pt receiving blood  -EM agree to therapy  -EM agree to therapy  -CS    Patient Effort, Rehab Treatment excellent  -EM      Precautions/Limitations cardiac precautions;oxygen therapy device and L/min  -EM cardiac precautions;oxygen therapy device and L/min;other (see comments)   wound vac  -EM cardiac precautions;fall precautions;oxygen therapy device and L/min;spinal precautions  -CS    Recorded by [EM] Vu Sevilla PTA [EM]  Vu Sevilla PTA [CS] TRAVIS LouisA/L    Vital Signs    Pre Systolic BP Rehab 137  -  -  -CS    Pre Treatment Diastolic BP 65  -EM 65  -EM 52  -CS    Intra Treatment Diastolic   -EM      Post Systolic BP Rehab 55  -EM      Pretreatment Heart Rate (beats/min) 77  -EM 83  -EM 82  -CS    Intratreatment Heart Rate (beats/min) 93  -EM      Posttreatment Heart Rate (beats/min) 91  -EM  82  -CS    Pre SpO2 (%) 96  -EM 94  -EM 92  -CS    O2 Delivery Pre Treatment supplemental O2  -EM supplemental O2  -EM supplemental O2  -CS    Intra SpO2 (%) 97  -EM      O2 Delivery Intra Treatment supplemental O2  -EM      Post SpO2 (%) 91  -EM  92  -CS    O2 Delivery Post Treatment supplemental O2  -EM  supplemental O2  -CS    Pre Patient Position Sitting  -EM Sitting  -EM Sitting  -CS    Intra Patient Position Standing  -EM      Post Patient Position Sitting  -EM  Sitting  -CS    Recorded by [EM] Vu Sevilla PTA [EM] Vu Sevilla PTA [CS] TRAVIS LouisA/L    Pain Assessment    Pain Assessment 0-10  -EM 0-10  -EM 0-10  -CS    Pain Score 4  -EM 5  -EM 2  -CS    Post Pain Score 4  -EM 4  -EM 4  -CS    Pain Type Surgical pain  -EM Surgical pain  -EM Surgical pain  -CS    Pain Location Chest  -EM Chest  -EM Chest  -CS    Recorded by [EM] Vu Sevilla PTA [EM] Vu Sevilla PTA [CS] TRAVIS LouisA/L    Cognitive Assessment/Intervention    Current Cognitive/Communication Assessment functional  -EM functional  -EM functional  -CS    Orientation Status oriented x 4  -EM oriented x 4  -EM oriented x 4  -CS    Follows Commands/Answers Questions 100% of the time  -% of the time   Pt reports hallucinations of spiders on the wall.   -% of the time  -CS    Recorded by [EM] Vu Sevilla PTA [EM] Vu Sevilla PTA [CS] TRAVIS LouisA/L    Bed Mobility, Assessment/Treatment    Bed Mob, Supine to Sit, [place]  minimum assist (75% patient effort)  -EM     Recorded by  [EM]  Vu Sevilla PTA     Transfer Assessment/Treatment    Transfers, Chair-Bed [place]  contact guard assist  -EM     Transfers, Bed-Chair-Bed, Assist Device  rolling walker  -EM     Transfers, Sit-Stand [place] contact guard assist  -EM      Transfers, Stand-Sit [place] contact guard assist  -EM      Toilet Transfer, [place]  contact guard assist  -EM     Toilet Transfer, Assistive Device rolling walker  -EM rolling walker  -EM     Recorded by [EM] Vu Sevilla PTA [EM] Vu Sevilla PTA     Gait Assessment/Treatment    Gait, [place] Level contact guard assist  -EM contact guard assist;1 person + 1 person to manage equipment  -EM     Gait, Assistive Device rolling walker  -EM rolling walker   followed with recliner  -EM     Gait, Distance (Feet) 172  -EM 84   3' EOB to bedside commode  -EM     Gait, Comment nsg present  -EM followed with chair  -EM     Recorded by [EM] Vu Sevilla PTA [EM] Vu Sevilla PTA     Grooming Assessment/Training    Grooming Assess/Train, Position   sitting  -CS    Grooming Assess/Train, Indepen Level   set up required  -CS    Recorded by   [CS] DEBBI Louis/L    Therapy Exercises    Bilateral Lower Extremities AROM:;20 reps;sitting;ankle pumps/circles;LAQ;hip flexion   add pillow squeeze  -EM AROM:;20 reps;sitting;ankle pumps/circles;LAQ;hip flexion  -EM     Bilateral Upper Extremity   AROM:;20 reps;sitting;elbow flexion/extension;hand pumps;pronation/supination;shoulder extension/flexion;shoulder ER/IR  -CS    BUE Resistance   manual resistance- minimal  -CS    Recorded by [EM] Vu Sevilla PTA [EM] Vu Sevilla PTA [CS] TRAVIS LouisA/L    Positioning and Restraints    Pre-Treatment Position sitting in chair/recliner  -EM in bed  -EM sitting in chair/recliner  -CS    Post Treatment Position chair  -EM chair  -EM chair  -CS    In Chair call light within reach;encouraged to call for assist;sitting;with family/caregiver  -EM notified  nsg;sitting;call light within reach;encouraged to call for assist  -EM reclined;call light within reach;with family/caregiver  -CS    Recorded by [EM] Vu Sevilla, PTA [EM] Vu Sevilla, PTA [CS] Diana French, WERNER/L      User Key  (r) = Recorded By, (t) = Taken By, (c) = Cosigned By    Initials Name Effective Dates    EM Vu Sevilla, PTA 08/11/15 -     LN Aruna Nichols, PTA 10/17/16 -     KD Martha Alonso, WERNER/L 10/17/16 -     CS Diana French, WERNER/L 10/17/16 -                 IP PT Goals       09/18/17 1310 09/18/17 0845 09/15/17 1640    Transfer Training PT LTG    Transfer Training PT LTG, Date Established   09/15/17  -NICKOLAS    Transfer Training PT LTG, Time to Achieve   by discharge  -NICKOLAS    Transfer Training PT LTG, Activity Type   bed to chair /chair to bed;sit to stand/stand to sit  -NICKOLAS    Transfer Training PT LTG, [place] Level   independent;conditional independence  -NICKOLAS    Transfer Training PT  LTG, Date Goal Reviewed 09/18/17  -LN 09/18/17  -LN     Transfer Training PT LTG, Outcome goal not met  -LN goal not met  -LN goal ongoing  -NICKOLAS    Gait Training PT LTG    Gait Training Goal PT LTG, Date Established   09/15/17  -NICKOLAS    Gait Training Goal PT LTG, Time to Achieve   by discharge  -NICKOLAS    Gait Training Goal PT LTG, [place] Level   independent;conditional independence  -NICKOLAS    Gait Training Goal PT LTG, Distance to Achieve   300ft  -NICKOLAS    Gait Training Goal PT LTG, Date Goal Reviewed 09/18/17  -LN 09/18/17  -LN     Gait Training Goal PT LTG, Outcome goal not met  -LN goal not met  -LN goal ongoing  -NICKOLAS    Stair Training PT LTG    Stair Training Goal PT LTG, Date Established   09/15/17  -NICKOLAS    Stair Training Goal PT LTG, Time to Achieve   by discharge  -NICKOLAS    Stair Training Goal PT LTG, Number of Steps   3  -NICKOLAS    Stair Training Goal PT LTG, [place] Level   conditional independence  -NICKOLAS    Stair Training Goal PT LTG, Assist Device   1 handrail;2 handrails  -NICKOLAS    Stair Training Goal PT  LTG, Date Goal Reviewed 09/18/17  -LN 09/18/17  -LN     Stair Training Goal PT LTG, Outcome goal met  -LN goal not met  -LN     Patient Education PT LTG    Patient Education PT LTG, Date Established 09/18/17  -LN  09/15/17  -NICKOLAS    Patient Education PT LTG, Time to Achieve   by discharge  -    Patient Education PT LTG, Education Type   HEP;precaution per surgeon;gait;transfers;home safety;benefits of activity  -    Patient Education PT LTG, Date Goal Reviewed 09/18/17  -LN 09/18/17  -LN     Patient Education PT LTG Outcome goal not met  -LN goal not met  -LN goal ongoing  -      User Key  (r) = Recorded By, (t) = Taken By, (c) = Cosigned By    Initials Name Provider Type    NICKOLAS Torres, PT Physical Therapist    TERE Nichols, PTA Physical Therapy Assistant          Physical Therapy Education     Title: PT OT SLP Therapies (Active)     Topic: Physical Therapy (Active)     Point: Mobility training (Done)    Learning Progress Summary    Learner Readiness Method Response Comment Documented by Status   Patient Acceptance E VU,DU,NR handouts given of hep,home safety and cardiac/sternal precautions LN 09/18/17 1412 Done    Acceptance E NR,VU cardiac/sternal precautions LN 09/18/17 1108 Done               Point: Home exercise program (Done)    Learning Progress Summary    Learner Readiness Method Response Comment Documented by Status   Patient Acceptance E VU,DU,NR handouts given of hep,home safety and cardiac/sternal precautions LN 09/18/17 1412 Done    Acceptance E NR,VU cardiac/sternal precautions LN 09/18/17 1108 Done               Point: Body mechanics (Done)    Learning Progress Summary    Learner Readiness Method Response Comment Documented by Status   Patient Acceptance E VU,DU,NR handouts given of hep,home safety and cardiac/sternal precautions LN 09/18/17 1412 Done    Acceptance E NR,VU cardiac/sternal precautions LN 09/18/17 1108 Done               Point: Precautions (Active)    Learning Progress  Summary    Learner Readiness Method Response Comment Documented by Status   Patient Acceptance E VU,DU,NR handouts given of hep,home safety and cardiac/sternal precautions  09/18/17 1412 Done    Acceptance E NR,JESUSITA cardiac/sternal precautions  09/18/17 1108 Done    Acceptance E NR   09/15/17 1639 Active   Significant Other Acceptance E NR   09/15/17 1639 Active                      User Key     Initials Effective Dates Name Provider Type Discipline     10/17/16 -  Luli Torres, PT Physical Therapist PT     10/17/16 -  Aruna Nichols PTA Physical Therapy Assistant PT                    PT Recommendation and Plan  Anticipated Discharge Disposition: home with assist  Planned Therapy Interventions: balance training, bed mobility training, gait training, home exercise program, patient/family education, stair training, strengthening, transfer training  PT Frequency: other (see comments) (5-14 times per week)  Plan of Care Review  Plan Of Care Reviewed With: patient  Progress: improving  Outcome Summary/Follow up Plan: sit-stand-sit sba of 1,sit-sup min of 1,correct use hh with t/f's,amb 256' with rw and cga of 1,up/down 5 steps with hr ind-copies given of hep,home safety and cardiac /sternal precautions,benefits of increased acitivity;2/4 goals met-d/c home with hh pt/ot followed by cardiac rehab          Outcome Measures       09/18/17 1310 09/18/17 0845 09/18/17 0745    How much help from another person do you currently need...    Turning from your back to your side while in flat bed without using bedrails? 3  -LN 3  -LN     Moving from lying on back to sitting on the side of a flat bed without bedrails? 3  -LN 3  -LN     Moving to and from a bed to a chair (including a wheelchair)? 3  -LN 3  -LN     Standing up from a chair using your arms (e.g., wheelchair, bedside chair)? 3  -LN 3  -LN     Climbing 3-5 steps with a railing? 4  -LN 3  -LN     To walk in hospital room? 3  -LN 3  -LN     AM-PAC 6 Clicks Score  19  -LN 18  -LN     How much help from another is currently needed...    Putting on and taking off regular lower body clothing?   2  -KD    Bathing (including washing, rinsing, and drying)   2  -KD    Toileting (which includes using toilet bed pan or urinal)   2  -KD    Putting on and taking off regular upper body clothing   2  -KD    Taking care of personal grooming (such as brushing teeth)   3  -KD    Eating meals   3  -KD    Score   14  -KD    Functional Assessment    Outcome Measure Options AM-PAC 6 Clicks Basic Mobility (PT)  -LN AM-PAC 6 Clicks Basic Mobility (PT)  -LN       09/17/17 1430 09/16/17 1100 09/16/17 1040    How much help from another person do you currently need...    Turning from your back to your side while in flat bed without using bedrails? 3  -EM  3  -EM    Moving from lying on back to sitting on the side of a flat bed without bedrails? 3  -EM  3  -EM    Moving to and from a bed to a chair (including a wheelchair)? 3  -EM  3  -EM    Standing up from a chair using your arms (e.g., wheelchair, bedside chair)? 3  -EM  3  -EM    Climbing 3-5 steps with a railing? 3  -EM  2  -EM    To walk in hospital room? 3  -EM  3  -EM    AM-PAC 6 Clicks Score 18  -EM  17  -EM    How much help from another is currently needed...    Putting on and taking off regular lower body clothing?  2  -CS     Bathing (including washing, rinsing, and drying)  2  -CS     Toileting (which includes using toilet bed pan or urinal)  2  -CS     Putting on and taking off regular upper body clothing  2  -CS     Taking care of personal grooming (such as brushing teeth)  3  -CS     Eating meals  3  -CS     Score  14  -CS     Functional Assessment    Outcome Measure Options AM-PAC 6 Clicks Daily Activity (OT)  -EM AM-PAC 6 Clicks Daily Activity (OT)  -CS AM-PAC 6 Clicks Basic Mobility (PT)  -EM      User Key  (r) = Recorded By, (t) = Taken By, (c) = Cosigned By    Initials Name Provider Type    EM Vu Sevilla PTA Physical Therapy  Assistant    LN Aruna Nihcols PTA Physical Therapy Assistant    SHERI Alonso, WERNER/L Occupational Therapy Assistant    AGATA French WERNER/L Occupational Therapy Assistant           Time Calculation:         PT Charges       09/18/17 1310 09/18/17 0845       Time Calculation    Start Time 1310  -LN 0845  -LN     Stop Time 1355  -LN 0925  -LN     Time Calculation (min) 45 min  -LN 40 min  -LN     PT Received On 09/18/17  -LN 09/18/17  -LN     Time Calculation- PT    Total Timed Code Minutes- PT 45 minute(s)  -LN 40 minute(s)  -LN       User Key  (r) = Recorded By, (t) = Taken By, (c) = Cosigned By    Initials Name Provider Type    TERE Nichols PTA Physical Therapy Assistant          Therapy Charges for Today     Code Description Service Date Service Provider Modifiers Qty    13777161534 HC GAIT TRAINING EA 15 MIN 9/18/2017 Aruna S Magdalena, PTA GP 1    34079589539 HC PT THERAPEUTIC ACT EA 15 MIN 9/18/2017 Aruna S [place], PTA GP 1    70801941742 HC PT THER PROC EA 15 MIN 9/18/2017 Aruna S [place], PTA GP 1    84666724536 HC GAIT TRAINING EA 15 MIN 9/18/2017 Aruna S [place], PTA GP 1    34384503056 HC PT THERAPEUTIC ACT EA 15 MIN 9/18/2017 Aruna S [place], PTA GP 1    15979600697 HC PT SELF CARE/MGMT/TRAIN EA 15 MIN 9/18/2017 Aruna S [place], PTA GP 1          PT G-Codes  PT Professional Judgement Used?: Yes  Outcome Measure Options: AM-PAC 6 Clicks Basic Mobility (PT)  Score: 11  Functional Limitation: Mobility: Walking and moving around  Mobility: Walking and Moving Around Current Status (): At least 60 percent but less than 80 percent impaired, limited or restricted  Mobility: Walking and Moving Around Goal Status (): 0 percent impaired, limited or restricted    Aruna Nichols PTA  9/18/2017

## 2017-09-18 NOTE — THERAPY TREATMENT NOTE
Acute Care - Physical Therapy Treatment Note  [facility]     Patient Name: Nick Austin  : 1950  MRN: [id number]  Today's Date: 2017  Onset of Illness/Injury or Date of Surgery Date: 17  Date of Referral to PT: 17  Referring Physician: RANDEE Knott    Admit Date: 2017    Visit Dx:    ICD-10-CM ICD-9-CM   1. Coronary artery disease involving native coronary artery of native heart with angina pectoris I25.119 414.01     413.9   2. Coronary artery disease of native artery of native heart with stable angina pectoris I25.118 414.01     413.9   3. Impaired mobility and ADLs Z74.09 799.89   4. Impaired physical mobility Z74.09 781.99   5. Impaired gait and mobility R26.89 781.2     Patient Active Problem List   Diagnosis   • Essential hypertension   • Hyperlipidemia   • Hypothyroidism   • Type 2 diabetes mellitus without complication, without long-term current use of insulin   • Benign non-nodular prostatic hyperplasia   • Generalized OA   • Uncomplicated asthma   • Dyspnea on exertion   • Angina effort   • S/P CABG x 1   • Angina pectoris   • Coronary artery disease of native artery of native heart with stable angina pectoris   • Coronary artery disease involving native coronary artery of native heart with angina pectoris   • CAD (coronary artery disease)               Adult Rehabilitation Note       17 0845 17 0745 17 1430    Rehab Assessment/Intervention    Discipline physical therapy assistant  -LN occupational therapy assistant  -KD physical therapy assistant  -EM    Document Type therapy note (daily note)  -LN therapy note (daily note)  -KD therapy note (daily note)  -EM    Subjective Information agree to therapy;no complaints  -LN agree to therapy  -KD agree to therapy   nsg ok gt. Pt receiving blood  -EM    Patient Effort, Rehab Treatment   excellent  -EM    Precautions/Limitations cardiac precautions;fall precautions;oxygen therapy device and  L/min;sternal precautions  -LN cardiac precautions  -KD cardiac precautions;oxygen therapy device and L/min  -EM    Recorded by [LN] Aruna Nichols PTA [KD] TRAVIS NietoA/L [EM] Vu Sevilla PTA    Vital Signs    Pre Systolic BP Rehab 150  -  -  -EM    Pre Treatment Diastolic BP 74  -LN 65  -KD 65  -EM    Intra Treatment Diastolic BP   112  -EM    Post Systolic BP Rehab 140  -LN  55  -EM    Post Treatment Diastolic BP 70  -LN      Pretreatment Heart Rate (beats/min) 74  -LN 70  -KD 77  -EM    Intratreatment Heart Rate (beats/min) 80  -LN  93  -EM    Posttreatment Heart Rate (beats/min) 73  -LN 85  -KD 91  -EM    Pre SpO2 (%) 92  -LN 91  -KD 96  -EM    O2 Delivery Pre Treatment supplemental O2  -LN supplemental O2  -KD supplemental O2  -EM    Intra SpO2 (%) 92  -LN 89  -KD 97  -EM    O2 Delivery Intra Treatment  supplemental O2   PLB tech performed 02 increased to 93%  -KD supplemental O2  -EM    Post SpO2 (%) 93  -LN 93  -KD 91  -EM    O2 Delivery Post Treatment  supplemental O2  -KD supplemental O2  -EM    Pre Patient Position Sitting  -LN Sitting  -KD Sitting  -EM    Intra Patient Position Standing  -LN Standing  -KD Standing  -EM    Post Patient Position Sitting  -LN Sitting  -KD Sitting  -EM    Recorded by [LN] Aruna Nichols PTA [KD] TRAVIS NietoA/L [EM] Vu Sevilla PTA    Pain Assessment    Pain Assessment 0-10  -LN 0-10  -KD 0-10  -EM    Pain Score 0  -LN 2  -KD 4  -EM    Post Pain Score 0  -LN 2  -KD 4  -EM    Pain Type  Surgical pain  -KD Surgical pain  -EM    Pain Location  Chest  -KD Chest  -EM    Recorded by [LN] Aruna Nichols PTA [KD] TRAVIS NietoA/L [EM] Vu Sevilla PTA    Cognitive Assessment/Intervention    Current Cognitive/Communication Assessment  functional  -KD functional  -EM    Orientation Status oriented to;person     -LN oriented x 4  -KD oriented x 4  -EM    Follows Commands/Answers Questions  100% of the time  -% of the time  -EM    Personal  Safety Interventions  fall prevention program maintained  -KD     Recorded by [LN] Aruna Nichols, PTA [KD] Martha Alonso, WERNER/L [EM] Vu Sevilla PTA    Bed Mobility, Assessment/Treatment    Bed Mob, Supine to Sit, Ju[place] not tested  -LN      Bed Mob, Sit to Supine, Ju[place] not tested  -LN      Recorded by [LN] Aruna Nichols, PTA      Transfer Assessment/Treatment    Transfers, Sit-Stand [place] stand by assist;verbal cues required  -LN stand by assist  -KD contact guard assist  -EM    Transfers, Stand-Sit [place] stand by assist;verbal cues required  -LN stand by assist   vc's to use Heart hugger  -KD contact guard assist  -EM    Transfers, Sit-Stand-Sit, Assist Device --   none  -LN --   none  -KD     Toilet Transfer, [place] contact guard assist  -LN      Toilet Transfer, Assistive Device rolling walker  -LN  rolling walker  -EM    Recorded by [LN] Aruna Nichols, PTA [KD] Martha Alonso, WERNER/L [EM] Vu Sevilla PTA    Gait Assessment/Treatment    Gait, [place] Level contact guard assist  -LN  contact guard assist  -EM    Gait, Assistive Device rolling walker  -LN  rolling walker  -EM    Gait, Distance (Feet) 178   48',15'  -LN  172  -EM    Gait, Comment   nsg present  -EM    Recorded by [LN] Aruna Nichols, PHI  [EM] Vu Sevilla PTA    Lower Body Dressing Assessment/Training    LB Dressing Assess/Train, Clothing Type  doffing:;donning:;slipper socks  -KD     LB Dressing Assess/Train, Assist Device  reacher;sock-aid  -KD     LB Dressing Assess/Train, Position  sitting  -KD     LB Dressing Assess/Train, [place]  minimum assist (75% patient effort)  -KD     LB Dressing Assess/Train, Impairments  strength decreased  -KD     Recorded by  [KD] Martha Alonso, WERNER/L     Balance Skills Training    Standing-Level of Assistance  Close supervision  -KD     Standing-Balance Activities  --   static  -KD     Standing Balance # of Minutes  10 mins  -KD     Recorded by  [KD] Martha NARAYANAN  DEBBI Alonso/L     Therapy Exercises    Bilateral Lower Extremities AROM:;20 reps;sitting;ankle pumps/circles;hip flexion;LAQ  -LN  AROM:;20 reps;sitting;ankle pumps/circles;LAQ;hip flexion   add pillow squeeze  -EM    Recorded by [LN] Aruna Nichols PTA  [EM] Vu Sevilla PTA    Positioning and Restraints    Pre-Treatment Position  sitting in chair/recliner  -KD sitting in chair/recliner  -EM    Post Treatment Position chair  -LN chair  -KD chair  -EM    In Chair sitting;notified nsg;call light within reach;encouraged to call for assist;with family/caregiver  -LN sitting;call light within reach;encouraged to call for assist;with nsg  -KD call light within reach;encouraged to call for assist;sitting;with family/caregiver  -EM    Recorded by [LN] Aruna Nichols PTA [KD] TRAVIS NietoA/SELENE [EM] Vu Sevilla PTA      09/16/17 1040 09/16/17 0753       Rehab Assessment/Intervention    Discipline physical therapy assistant  -EM occupational therapy assistant  -CS     Document Type therapy note (daily note)  -EM therapy note (daily note)  -CS     Subjective Information agree to therapy  -EM agree to therapy  -CS     Precautions/Limitations cardiac precautions;oxygen therapy device and L/min;other (see comments)   wound vac  -EM cardiac precautions;fall precautions;oxygen therapy device and L/min;spinal precautions  -CS     Recorded by [EM] Vu Sevilla PTA [CS] Diana French, WERNER/L     Vital Signs    Pre Systolic BP Rehab 124  -  -CS     Pre Treatment Diastolic BP 65  -EM 52  -CS     Pretreatment Heart Rate (beats/min) 83  -EM 82  -CS     Posttreatment Heart Rate (beats/min)  82  -CS     Pre SpO2 (%) 94  -EM 92  -CS     O2 Delivery Pre Treatment supplemental O2  -EM supplemental O2  -CS     Post SpO2 (%)  92  -CS     O2 Delivery Post Treatment  supplemental O2  -CS     Pre Patient Position Sitting  -EM Sitting  -CS     Post Patient Position  Sitting  -CS     Recorded by [EM] Vu Sevilla PTA [CS] Diana  DEBBI Contreras/SELENE     Pain Assessment    Pain Assessment 0-10  -EM 0-10  -CS     Pain Score 5  -EM 2  -CS     Post Pain Score 4  -EM 4  -CS     Pain Type Surgical pain  -EM Surgical pain  -CS     Pain Location Chest  -EM Chest  -CS     Recorded by [EM] Vu Sevilla PTA [CS] DEBBI Louis/L     Cognitive Assessment/Intervention    Current Cognitive/Communication Assessment functional  -EM functional  -CS     Orientation Status oriented x 4  -EM oriented x 4  -CS     Follows Commands/Answers Questions 100% of the time   Pt reports hallucinations of spiders on the wall.   -% of the time  -CS     Recorded by [EM] Vu Sevilla PTA [CS] DEBBI Louis/L     Bed Mobility, Assessment/Treatment    Bed Mob, Supine to Sit, [place] minimum assist (75% patient effort)  -EM      Recorded by [EM] Vu Sevilla PTA      Transfer Assessment/Treatment    Transfers, Chair-Bed [place] contact guard assist  -EM      Transfers, Bed-Chair-Bed, Assist Device rolling walker  -EM      Toilet Transfer, [place] contact guard assist  -EM      Toilet Transfer, Assistive Device rolling walker  -EM      Recorded by [EM] Vu Sevilla PTA      Gait Assessment/Treatment    Gait, [place] Level contact guard assist;1 person + 1 person to manage equipment  -EM      Gait, Assistive Device rolling walker   followed with recliner  -EM      Gait, Distance (Feet) 84   3' EOB to bedside commode  -EM      Gait, Comment followed with chair  -EM      Recorded by [EM] Vu Sevilla PTA      Grooming Assessment/Training    Grooming Assess/Train, Position  sitting  -CS     Grooming Assess/Train, Indepen Level  set up required  -CS     Recorded by  [CS] DEBBI Louis/L     Therapy Exercises    Bilateral Lower Extremities AROM:;20 reps;sitting;ankle pumps/circles;LAQ;hip flexion  -EM      Bilateral Upper Extremity  AROM:;20 reps;sitting;elbow flexion/extension;hand pumps;pronation/supination;shoulder  extension/flexion;shoulder ER/IR  -CS     BUE Resistance  manual resistance- minimal  -CS     Recorded by [EM] Vu Sevilla, PTA [CS] Diana French, WERNER/L     Positioning and Restraints    Pre-Treatment Position in bed  -EM sitting in chair/recliner  -CS     Post Treatment Position chair  -EM chair  -CS     In Chair notified nsg;sitting;call light within reach;encouraged to call for assist  -EM reclined;call light within reach;with family/caregiver  -CS     Recorded by [EM] Vu Sevilla, PTA [CS] Diana French, WERNRE/L       User Key  (r) = Recorded By, (t) = Taken By, (c) = Cosigned By    Initials Name Effective Dates    EM Vu Sevilla, PTA 08/11/15 -     LN Aruna Nichols, PTA 10/17/16 -     KD Martha Alonso, WERNER/L 10/17/16 -     CS Diana French, WERNER/L 10/17/16 -                 IP PT Goals       09/18/17 0845 09/15/17 1640       Transfer Training PT LTG    Transfer Training PT LTG, Date Established  09/15/17  -NICKOLAS     Transfer Training PT LTG, Time to Achieve  by discharge  -NICKOLAS     Transfer Training PT LTG, Activity Type  bed to chair /chair to bed;sit to stand/stand to sit  -NICKOLAS     Transfer Training PT LTG, [place] Level  independent;conditional independence  -NICKOLAS     Transfer Training PT  LTG, Date Goal Reviewed 09/18/17  -LN      Transfer Training PT LTG, Outcome goal not met  -LN goal ongoing  -NICKOLAS     Gait Training PT LTG    Gait Training Goal PT LTG, Date Established  09/15/17  -NICKOLAS     Gait Training Goal PT LTG, Time to Achieve  by discharge  -NICOKLAS     Gait Training Goal PT LTG, [place] Level  independent;conditional independence  -NICKOLAS     Gait Training Goal PT LTG, Distance to Achieve  300ft  -NICKOLAS     Gait Training Goal PT LTG, Date Goal Reviewed 09/18/17  -LN      Gait Training Goal PT LTG, Outcome goal not met  -LN goal ongoing  -NICKOLAS     Stair Training PT LTG    Stair Training Goal PT LTG, Date Established  09/15/17  -NICKOLAS     Stair Training Goal PT LTG, Time to Achieve  by discharge  -NICKOLAS      Stair Training Goal PT LTG, Number of Steps  3  -     Stair Training Goal PT LTG, [place] Level  conditional independence  -     Stair Training Goal PT LTG, Assist Device  1 handrail;2 handrails  -     Stair Training Goal PT LTG, Date Goal Reviewed 09/18/17  -      Stair Training Goal PT LTG, Outcome goal not met  -LN      Patient Education PT LTG    Patient Education PT LTG, Date Established  09/15/17  -     Patient Education PT LTG, Time to Achieve  by discharge  -     Patient Education PT LTG, Education Type  HEP;precaution per surgeon;gait;transfers;home safety;benefits of activity  -     Patient Education PT LTG, Date Goal Reviewed 09/18/17  -LN      Patient Education PT LTG Outcome goal not met  -LN goal ongoing  -       User Key  (r) = Recorded By, (t) = Taken By, (c) = Cosigned By    Initials Name Provider Type    NICKOLAS Torres, PT Physical Therapist    LN Aruna Nichols, PTA Physical Therapy Assistant          Physical Therapy Education     Title: PT OT SLP Therapies (Active)     Topic: Physical Therapy (Active)     Point: Mobility training (Done)    Learning Progress Summary    Learner Readiness Method Response Comment Documented by Status   Patient Acceptance E NR,VU cardiac/sternal precautions LN 09/18/17 1108 Done               Point: Home exercise program (Done)    Learning Progress Summary    Learner Readiness Method Response Comment Documented by Status   Patient Acceptance E NR,VU cardiac/sternal precautions LN 09/18/17 1108 Done               Point: Body mechanics (Done)    Learning Progress Summary    Learner Readiness Method Response Comment Documented by Status   Patient Acceptance E NR,VU cardiac/sternal precautions LN 09/18/17 1108 Done               Point: Precautions (Active)    Learning Progress Summary    Learner Readiness Method Response Comment Documented by Status   Patient Acceptance E NR,VU cardiac/sternal precautions LN 09/18/17 1108 Done    Acceptance E NR    09/15/17 1639 Active   Significant Other Acceptance E NR   09/15/17 1639 Active                      User Key     Initials Effective Dates Name Provider Type Discipline     10/17/16 -  Luli Torres, PT Physical Therapist PT    LN 10/17/16 -  Aruna Nichols PTA Physical Therapy Assistant PT                    PT Recommendation and Plan  Anticipated Discharge Disposition: home with assist  Planned Therapy Interventions: balance training, bed mobility training, gait training, home exercise program, patient/family education, stair training, strengthening, transfer training  PT Frequency: other (see comments) (5-14 times per week)  Plan of Care Review  Plan Of Care Reviewed With: patient  Progress: improving  Outcome Summary/Follow up Plan: sit-stand-sit sba of 1,to br with rw and cga of 1,amb 178',48',15' with rw and cga of 1,verbalized ind with cardiac/sternal precautions-no new goals met ;if d/c home today would benefit from hh pt/ot          Outcome Measures       09/18/17 0845 09/18/17 0745 09/17/17 1430    How much help from another person do you currently need...    Turning from your back to your side while in flat bed without using bedrails? 3  -LN  3  -EM    Moving from lying on back to sitting on the side of a flat bed without bedrails? 3  -LN  3  -EM    Moving to and from a bed to a chair (including a wheelchair)? 3  -LN  3  -EM    Standing up from a chair using your arms (e.g., wheelchair, bedside chair)? 3  -LN  3  -EM    Climbing 3-5 steps with a railing? 3  -LN  3  -EM    To walk in hospital room? 3  -LN  3  -EM    AM-PAC 6 Clicks Score 18  -LN  18  -EM    How much help from another is currently needed...    Putting on and taking off regular lower body clothing?  2  -KD     Bathing (including washing, rinsing, and drying)  2  -KD     Toileting (which includes using toilet bed pan or urinal)  2  -KD     Putting on and taking off regular upper body clothing  2  -KD     Taking care of personal grooming  (such as brushing teeth)  3  -KD     Eating meals  3  -KD     Score  14  -KD     Functional Assessment    Outcome Measure Options AM-PAC 6 Clicks Basic Mobility (PT)  -LN  AM-PAC 6 Clicks Daily Activity (OT)  -EM      09/16/17 1100 09/16/17 1040       How much help from another person do you currently need...    Turning from your back to your side while in flat bed without using bedrails?  3  -EM     Moving from lying on back to sitting on the side of a flat bed without bedrails?  3  -EM     Moving to and from a bed to a chair (including a wheelchair)?  3  -EM     Standing up from a chair using your arms (e.g., wheelchair, bedside chair)?  3  -EM     Climbing 3-5 steps with a railing?  2  -EM     To walk in hospital room?  3  -EM     AM-PAC 6 Clicks Score  17  -EM     How much help from another is currently needed...    Putting on and taking off regular lower body clothing? 2  -CS      Bathing (including washing, rinsing, and drying) 2  -CS      Toileting (which includes using toilet bed pan or urinal) 2  -CS      Putting on and taking off regular upper body clothing 2  -CS      Taking care of personal grooming (such as brushing teeth) 3  -CS      Eating meals 3  -CS      Score 14  -CS      Functional Assessment    Outcome Measure Options AM-PAC 6 Clicks Daily Activity (OT)  -CS AM-PAC 6 Clicks Basic Mobility (PT)  -EM       User Key  (r) = Recorded By, (t) = Taken By, (c) = Cosigned By    Initials Name Provider Type    EM Vu Sevilla PTA Physical Therapy Assistant    LN Aruna Nichols PTA Physical Therapy Assistant    DEBBI [place]/L Occupational Therapy Assistant    DEBBI Heredia/L Occupational Therapy Assistant           Time Calculation:         PT Charges       09/18/17 0845          Time Calculation    Start Time 0845  -LN      Stop Time 0925  -LN      Time Calculation (min) 40 min  -LN      PT Received On 09/18/17  -LN      Time Calculation- PT    Total Timed Code Minutes- PT 40  minute(s)  -LN        User Key  (r) = Recorded By, (t) = Taken By, (c) = Cosigned By    Initials Name Provider Type    TERE Nichols PTA Physical Therapy Assistant          Therapy Charges for Today     Code Description Service Date Service Provider Modifiers Qty    46994906979 HC GAIT TRAINING EA 15 MIN 9/18/2017 Aruna Nichols, PTA GP 1    54565344637 HC PT THERAPEUTIC ACT EA 15 MIN 9/18/2017 Aruna Nichols, PTA GP 1    64843548059 HC PT THER PROC EA 15 MIN 9/18/2017 Aruna Nichols, PTA GP 1          PT G-Codes  PT Professional Judgement Used?: Yes  Outcome Measure Options: AM-PAC 6 Clicks Basic Mobility (PT)  Score: 11  Functional Limitation: Mobility: Walking and moving around  Mobility: Walking and Moving Around Current Status (): At least 60 percent but less than 80 percent impaired, limited or restricted  Mobility: Walking and Moving Around Goal Status (): 0 percent impaired, limited or restricted    Aruna Nichols PTA  9/18/2017

## 2017-09-18 NOTE — THERAPY TREATMENT NOTE
Acute Care - Occupational Therapy Treatment Note  [facility]     Patient Name: Nick Austin  : 1950  MRN: [id number]  Today's Date: 2017  Onset of Illness/Injury or Date of Surgery Date: 17  Date of Referral to OT: 17  Referring Physician: RANDEE Knott      Admit Date: 2017    Visit Dx:     ICD-10-CM ICD-9-CM   1. Coronary artery disease involving native coronary artery of native heart with angina pectoris I25.119 414.01     413.9   2. Coronary artery disease of native artery of native heart with stable angina pectoris I25.118 414.01     413.9   3. Impaired mobility and ADLs Z74.09 799.89   4. Impaired physical mobility Z74.09 781.99     Patient Active Problem List   Diagnosis   • Essential hypertension   • Hyperlipidemia   • Hypothyroidism   • Type 2 diabetes mellitus without complication, without long-term current use of insulin   • Benign non-nodular prostatic hyperplasia   • Generalized OA   • Uncomplicated asthma   • Dyspnea on exertion   • Angina effort   • S/P CABG x 1   • Angina pectoris   • Coronary artery disease of native artery of native heart with stable angina pectoris   • Coronary artery disease involving native coronary artery of native heart with angina pectoris   • CAD (coronary artery disease)             Adult Rehabilitation Note       17 0745 17 1430 17 1040    Rehab Assessment/Intervention    Discipline occupational therapy assistant  -KD physical therapy assistant  -EM physical therapy assistant  -EM    Document Type therapy note (daily note)  -KD therapy note (daily note)  -EM therapy note (daily note)  -EM    Subjective Information agree to therapy  -KD agree to therapy   nsg ok gt. Pt receiving blood  -EM agree to therapy  -EM    Patient Effort, Rehab Treatment  excellent  -EM     Precautions/Limitations cardiac precautions  -KD cardiac precautions;oxygen therapy device and L/min  -EM cardiac precautions;oxygen therapy  device and L/min;other (see comments)   wound vac  -EM    Recorded by [KD] TRAVIS NietoA/L [EM] Vu Sevilla PTA [EM] Vu Sevilla PTA    Vital Signs    Pre Systolic BP Rehab 137  -  -  -EM    Pre Treatment Diastolic BP 65  -KD 65  -EM 65  -EM    Intra Treatment Diastolic BP  112  -EM     Post Systolic BP Rehab  55  -EM     Pretreatment Heart Rate (beats/min) 70  -KD 77  -EM 83  -EM    Intratreatment Heart Rate (beats/min)  93  -EM     Posttreatment Heart Rate (beats/min) 85  -KD 91  -EM     Pre SpO2 (%) 91  -KD 96  -EM 94  -EM    O2 Delivery Pre Treatment supplemental O2  -KD supplemental O2  -EM supplemental O2  -EM    Intra SpO2 (%) 89  -KD 97  -EM     O2 Delivery Intra Treatment supplemental O2   PLB tech performed 02 increased to 93%  -KD supplemental O2  -EM     Post SpO2 (%) 93  -KD 91  -EM     O2 Delivery Post Treatment supplemental O2  -KD supplemental O2  -EM     Pre Patient Position Sitting  -KD Sitting  -EM Sitting  -EM    Intra Patient Position Standing  -KD Standing  -EM     Post Patient Position Sitting  -KD Sitting  -EM     Recorded by [KD] TRAVIS NietoA/L [EM] Vu Sevilla PTA [EM] Vu Sevilla PTA    Pain Assessment    Pain Assessment 0-10  -KD 0-10  -EM 0-10  -EM    Pain Score 2  -KD 4  -EM 5  -EM    Post Pain Score 2  -KD 4  -EM 4  -EM    Pain Type Surgical pain  -KD Surgical pain  -EM Surgical pain  -EM    Pain Location Chest  -KD Chest  -EM Chest  -EM    Recorded by [KD] TRAVIS NietoA/L [EM] Vu Sevilla PTA [EM] Vu Sevilla PTA    Cognitive Assessment/Intervention    Current Cognitive/Communication Assessment functional  -KD functional  -EM functional  -EM    Orientation Status oriented x 4  -KD oriented x 4  -EM oriented x 4  -EM    Follows Commands/Answers Questions 100% of the time  -% of the time  -% of the time   Pt reports hallucinations of spiders on the wall.   -EM    Personal Safety Interventions fall prevention program  maintained  -KD      Recorded by [KD] DEBBI Nieto/SELENE [EM] Vu Sevilla PTA [EM] Vu Sevilla PTA    Bed Mobility, Assessment/Treatment    Bed Mob, Supine to Sit, [place]   minimum assist (75% patient effort)  -EM    Recorded by   [EM] Vu Sevilla PTA    Transfer Assessment/Treatment    Transfers, Chair-Bed [place]   contact guard assist  -EM    Transfers, Bed-Chair-Bed, Assist Device   rolling walker  -EM    Transfers, Sit-Stand [place] stand by assist  -KD contact guard assist  -EM     Transfers, Stand-Sit [place] stand by assist   vc's to use Heart hugger  -KD contact guard assist  -EM     Transfers, Sit-Stand-Sit, Assist Device --   none  -KD      Toilet Transfer, [place]   contact guard assist  -EM    Toilet Transfer, Assistive Device  rolling walker  -EM rolling walker  -EM    Recorded by [KD] DEBBI Nieto/SELENE [EM] Vu Sevilla PTA [EM] Vu Sevilla PTA    Gait Assessment/Treatment    Gait, [place] Level  contact guard assist  -EM contact guard assist;1 person + 1 person to manage equipment  -EM    Gait, Assistive Device  rolling walker  -EM rolling walker   followed with recliner  -EM    Gait, Distance (Feet)  172  -EM 84   3' EOB to bedside commode  -EM    Gait, Comment  nsg present  -EM followed with chair  -EM    Recorded by  [EM] Vu Sevilla PTA [EM] Vu Sevilla PTA    Lower Body Dressing Assessment/Training    LB Dressing Assess/Train, Clothing Type doffing:;donning:;slipper socks  -KD      LB Dressing Assess/Train, Assist Device reacher;sock-aid  -KD      LB Dressing Assess/Train, Position sitting  -KD      LB Dressing Assess/Train, [place] minimum assist (75% patient effort)  -KD      LB Dressing Assess/Train, Impairments strength decreased  -KD      Recorded by [KD] DEBBI Nieto/L      Balance Skills Training    Standing-Level of Assistance Close supervision  -KD      Standing-Balance Activities --   static  -KD      Standing  Balance # of Minutes 10 mins  -KD      Recorded by [KD] DEAN Nieto      Therapy Exercises    Bilateral Lower Extremities  AROM:;20 reps;sitting;ankle pumps/circles;LAQ;hip flexion   add pillow squeeze  -EM AROM:;20 reps;sitting;ankle pumps/circles;LAQ;hip flexion  -EM    Recorded by  [EM] Vu Sevilla PTA [EM] Vu Sevilla PTA    Positioning and Restraints    Pre-Treatment Position sitting in chair/recliner  -KD sitting in chair/recliner  -EM in bed  -EM    Post Treatment Position chair  -KD chair  -EM chair  -EM    In Chair sitting;call light within reach;encouraged to call for assist;with nsg  -KD call light within reach;encouraged to call for assist;sitting;with family/caregiver  -EM notified nsg;sitting;call light within reach;encouraged to call for assist  -EM    Recorded by [KD] DEBBI Nieto/SELENE [EM] Vu Sevilla PTA [EM] Vu Sevilla PTA      09/16/17 0753          Rehab Assessment/Intervention    Discipline occupational therapy assistant  -CS      Document Type therapy note (daily note)  -CS      Subjective Information agree to therapy  -CS      Precautions/Limitations cardiac precautions;fall precautions;oxygen therapy device and L/min;spinal precautions  -CS      Recorded by [CS] DEAN Louis      Vital Signs    Pre Systolic BP Rehab 110  -CS      Pre Treatment Diastolic BP 52  -CS      Pretreatment Heart Rate (beats/min) 82  -CS      Posttreatment Heart Rate (beats/min) 82  -CS      Pre SpO2 (%) 92  -CS      O2 Delivery Pre Treatment supplemental O2  -CS      Post SpO2 (%) 92  -CS      O2 Delivery Post Treatment supplemental O2  -CS      Pre Patient Position Sitting  -CS      Post Patient Position Sitting  -CS      Recorded by [CS] DENA Louis      Pain Assessment    Pain Assessment 0-10  -CS      Pain Score 2  -CS      Post Pain Score 4  -CS      Pain Type Surgical pain  -CS      Pain Location Chest  -CS      Recorded by [CS] DEAN Louis       Cognitive Assessment/Intervention    Current Cognitive/Communication Assessment functional  -CS      Orientation Status oriented x 4  -CS      Follows Commands/Answers Questions 100% of the time  -CS      Recorded by [CS] DEAN Louis      Grooming Assessment/Training    Grooming Assess/Train, Position sitting  -CS      Grooming Assess/Train, Indepen Level set up required  -CS      Recorded by [CS] DEBBI Louis/L      Therapy Exercises    Bilateral Upper Extremity AROM:;20 reps;sitting;elbow flexion/extension;hand pumps;pronation/supination;shoulder extension/flexion;shoulder ER/IR  -CS      BUE Resistance manual resistance- minimal  -CS      Recorded by [CS] DEAN Louis      Positioning and Restraints    Pre-Treatment Position sitting in chair/recliner  -CS      Post Treatment Position chair  -CS      In Chair reclined;call light within reach;with family/caregiver  -CS      Recorded by [CS] DEBBI Louis/L        User Key  (r) = Recorded By, (t) = Taken By, (c) = Cosigned By    Initials Name Effective Dates    DARRION Sevilla, PTA 08/11/15 -     KD Martha Alonso WERNER/L 10/17/16 -     CS TRAVIS LouisA/SELENE 10/17/16 -                 OT Goals       09/18/17 0745 09/16/17 1129 09/15/17 1014    Bed Mobility OT LTG    Bed Mobility OT LTG, Date Established   09/15/17  -BH (r) SP (t) BH (c)    Bed Mobility OT LTG, Time to Achieve   by discharge  -BH (r) SP (t) BH (c)    Bed Mobility OT LTG, Activity Type   all bed mobility  -BH (r) SP (t) BH (c)    Bed Mobility OT LTG, [place] Level   conditional independence  -BH (r) SP (t) BH (c)    Bed Mobility OT LTG, Date Goal Reviewed 09/18/17  -KD 09/16/17  -CS     Bed Mobility OT LTG, Outcome goal not met  -KD goal ongoing  -CS     Transfer Training OT LTG    Transfer Training OT LTG, Date Established   09/15/17  -BH (r) SP (t) BH (c)    Transfer Training OT LTG, Time to Achieve   by discharge  -BH (r) SP (t) BH (c)     Transfer Training OT LTG, Activity Type   all transfers  -BH (r) SP (t) BH (c)    Transfer Training OT LTG, [place] Level   supervision required;contact guard assist  -BH (r) SP (t) BH (c)    Transfer Training OT LTG, Date Goal Reviewed 09/18/17  -KD 09/16/17  -     Transfer Training OT LTG, Outcome goal not met  -KD goal ongoing  -CS     Patient Education OT LTG    Patient Education OT LTG, Date Established   09/15/17  -BH (r) SP (t) BH (c)    Patient Education OT LTG, Time to Achieve   by discharge  -BH (r) SP (t) BH (c)    Patient Education OT LTG, Education Type   HEP;positioning;posture/body mechanics;home safety;adaptive equipment mgmt;energy conservation;adaptive breathing  -BH (r) SP (t) BH (c)    Patient Education OT LTG, Education Understanding   independent;demonstrates adequately;verbalizes understanding  -BH (r) SP (t) BH (c)    Patient Education OT LTG, Date Goal Reviewed 09/18/17  -KD 09/16/17  -CS     Patient Education OT LTG Outcome goal not met  -KD goal ongoing  -CS     ADL OT LTG    ADL OT LTG, Date Established   09/15/17  -BH (r) SP (t) BH (c)    ADL OT LTG, Time to Achieve   by discharge  -BH (r) SP (t) BH (c)    ADL OT LTG, Activity Type   ADL skills  -BH (r) SP (t) BH (c)    ADL OT LTG, Additional Goal   Set-up A with self-feeding, grooming; mod I with UB dressing and bathing and toileting; min/mod A for LB dressing and bathing  -BH (r) SP (t) BH (c)    ADL OT LTG, Date Goal Reviewed 09/18/17  -KD 09/16/17  -     ADL OT LTG, Outcome goal not met  -KD goal ongoing  -CS       User Key  (r) = Recorded By, (t) = Taken By, (c) = Cosigned By    Initials Name Provider Type     Ольга Bell, OTANTHONY/L Occupational Therapist    DEBBI Houston/L Occupational Therapy Assistant    TRAVIS HerediaA/L Occupational Therapy Assistant    SABRINA Garcia, OT Student OT Student          Occupational Therapy Education     Title: PT OT SLP Therapies (Active)     Topic: Occupational  Therapy (Done)     Point: ADL training (Done)    Description: Instruct learner(s) on proper safety adaptation and remediation techniques during self care or transfers.   Instruct in proper use of assistive devices.    Learning Progress Summary    Learner Readiness Method Response Comment Documented by Status   Patient Acceptance E,H,TB DU,VU  KD 09/18/17 0943 Done    Acceptance E,TB,H NR Pt was educated on cardiac precautions and home safety.  09/16/17 1128 Active    Acceptance E VU,NR Educated about OT and POC. Extensively educated about CABG precautions. Educated about safety with ADL, t/f, functional mobility. Educated about using appropriate AE/AD. Educated to call for assist.  09/15/17 1334 Done   Significant Other Acceptance E VU,NR Educated about OT and POC. Extensively educated about CABG precautions. Educated about safety with ADL, t/f, functional mobility. Educated about using appropriate AE/AD. Educated to call for assist.  09/15/17 1334 Done               Point: Home exercise program (Done)    Description: Instruct learner(s) on appropriate technique for monitoring, assisting and/or progressing therapeutic exercises/activities.    Learning Progress Summary    Learner Readiness Method Response Comment Documented by Status   Patient Acceptance E,H,TB DU,VU  KD 09/18/17 0943 Done    Acceptance E,TB,H NR Pt was educated on cardiac precautions and home safety.  09/16/17 1128 Active               Point: Precautions (Done)    Description: Instruct learner(s) on prescribed precautions during self-care and functional transfers.    Learning Progress Summary    Learner Readiness Method Response Comment Documented by Status   Patient Acceptance E,H,TB DU,VU  KD 09/18/17 0943 Done    Acceptance E,TB,H NR Pt was educated on cardiac precautions and home safety.  09/16/17 1128 Active    Acceptance E VU,NR Educated about OT and POC. Extensively educated about CABG precautions. Educated about safety with ADL,  t/f, functional mobility. Educated about using appropriate AE/AD. Educated to call for assist. SP 09/15/17 1334 Done   Significant Other Acceptance E VU,NR Educated about OT and POC. Extensively educated about CABG precautions. Educated about safety with ADL, t/f, functional mobility. Educated about using appropriate AE/AD. Educated to call for assist. SP 09/15/17 1334 Done               Point: Body mechanics (Done)    Description: Instruct learner(s) on proper positioning and spine alignment during self-care, functional mobility activities and/or exercises.    Learning Progress Summary    Learner Readiness Method Response Comment Documented by Status   Patient Acceptance E,H,TB DU,VU  KD 09/18/17 0943 Done    Acceptance E,TB,H NR Pt was educated on cardiac precautions and home safety.  09/16/17 1128 Active    Acceptance E VU,NR Educated about OT and POC. Extensively educated about CABG precautions. Educated about safety with ADL, t/f, functional mobility. Educated about using appropriate AE/AD. Educated to call for assist. SP 09/15/17 1334 Done   Significant Other Acceptance E VU,NR Educated about OT and POC. Extensively educated about CABG precautions. Educated about safety with ADL, t/f, functional mobility. Educated about using appropriate AE/AD. Educated to call for assist.  09/15/17 1334 Done                      User Key     Initials Effective Dates Name Provider Type Discipline     10/17/16 -  DEBBI Nieto/L Occupational Therapy Assistant OT     10/17/16 -  DEBBI Louis/SELENE Occupational Therapy Assistant OT     01/31/17 -  Argenis Garcia OT Student OT Student OT                  OT Recommendation and Plan  Anticipated Discharge Disposition: home with assist, home with home health, home with 24/7 care  Planned Therapy Interventions: activity intolerance, adaptive equipment training, ADL retraining, balance training, bed mobility training, energy conservation, edema management, fine  motor coordination training, home exercise program, motor coordination training, strengthening, transfer training  Therapy Frequency: other (see comments) (3-14x/wk)  Plan of Care Review  Outcome Summary/Follow up Plan: Pt shine tx well with good participation. Pt educated in dept on CABG percautions and literature was given.  Pt  reported he was to D/C this date.        Outcome Measures       09/18/17 0745 09/17/17 1430 09/16/17 1100    How much help from another person do you currently need...    Turning from your back to your side while in flat bed without using bedrails?  3  -EM     Moving from lying on back to sitting on the side of a flat bed without bedrails?  3  -EM     Moving to and from a bed to a chair (including a wheelchair)?  3  -EM     Standing up from a chair using your arms (e.g., wheelchair, bedside chair)?  3  -EM     Climbing 3-5 steps with a railing?  3  -EM     To walk in hospital room?  3  -EM     AM-PAC 6 Clicks Score  18  -EM     How much help from another is currently needed...    Putting on and taking off regular lower body clothing? 2  -KD  2  -CS    Bathing (including washing, rinsing, and drying) 2  -KD  2  -CS    Toileting (which includes using toilet bed pan or urinal) 2  -KD  2  -CS    Putting on and taking off regular upper body clothing 2  -KD  2  -CS    Taking care of personal grooming (such as brushing teeth) 3  -KD  3  -CS    Eating meals 3  -KD  3  -CS    Score 14  -KD  14  -CS    Functional Assessment    Outcome Measure Options  AM-PAC 6 Clicks Daily Activity (OT)  -EM AM-PAC 6 Clicks Daily Activity (OT)  -CS      09/16/17 1040 09/15/17 1113 09/15/17 1014    How much help from another person do you currently need...    Turning from your back to your side while in flat bed without using bedrails? 3  -EM 2  -NICKOLAS     Moving from lying on back to sitting on the side of a flat bed without bedrails? 3  -EM 2  -NICKOLAS     Moving to and from a bed to a chair (including a wheelchair)? 3  -EM  2  -NICKOLAS     Standing up from a chair using your arms (e.g., wheelchair, bedside chair)? 3  -EM 2  -NICKOLAS     Climbing 3-5 steps with a railing? 2  -EM 1  -NICKOLAS     To walk in hospital room? 3  -EM 2  -NICKOLAS     AM-PAC 6 Clicks Score 17  -EM 11  -NICKOLAS     How much help from another is currently needed...    Putting on and taking off regular lower body clothing?   2  -BH (r) SP (t) BH (c)    Bathing (including washing, rinsing, and drying)   2  -BH (r) SP (t) BH (c)    Toileting (which includes using toilet bed pan or urinal)   2  -BH (r) SP (t) BH (c)    Putting on and taking off regular upper body clothing   2  -BH (r) SP (t) BH (c)    Taking care of personal grooming (such as brushing teeth)   3  -BH (r) SP (t) BH (c)    Eating meals   3  -BH (r) SP (t) BH (c)    Score   14  -BH (r) SP (t)    Functional Assessment    Outcome Measure Options AM-PAC 6 Clicks Basic Mobility (PT)  -EM AM-PAC 6 Clicks Basic Mobility (PT)  -NICKOLAS AM-PAC 6 Clicks Daily Activity (OT)  -BH (r) SP (t) BH (c)      User Key  (r) = Recorded By, (t) = Taken By, (c) = Cosigned By    Initials Name Provider Type    EM Vu Sevilla, PTA Physical Therapy Assistant     Ольга Bell, OTR/L Occupational Therapist    NICKOLAS Torres, PT Physical Therapist    SHERI Alonso WERNER/L Occupational Therapy Assistant    AGATA French WERNER/L Occupational Therapy Assistant    SABRINA Garcia, OT Student OT Student           Time Calculation:         Time Calculation- OT       09/18/17 0943          Time Calculation- OT    OT Start Time 0745  -KD      OT Stop Time 0841  -KD      OT Time Calculation (min) 56 min  -KD      Total Timed Code Minutes- OT 56 minute(s)  -KD      OT Received On 09/18/17  -KD        User Key  (r) = Recorded By, (t) = Taken By, (c) = Cosigned By    Initials Name Provider Type    TRAVIS HoustonA/L Occupational Therapy Assistant           Therapy Charges for Today     Code Description Service Date Service Provider Modifiers Qty     64418687587 HC OT THERAPEUTIC ACT EA 15 MIN 9/18/2017 DEBBI Nieto/L GO 1    91513547042 HC OT SELF CARE/MGMT/TRAIN EA 15 MIN 9/18/2017 DEBBI Nieto/L GO 3          OT G-codes  OT Professional Judgement Used?: Yes  OT Functional Scales Options: AM-PAC 6 Clicks Daily Activity (OT)  Score: 14  Functional Limitation: Self care  Self Care Current Status (): At least 40 percent but less than 60 percent impaired, limited or restricted  Self Care Goal Status (): At least 20 percent but less than 40 percent impaired, limited or restricted    DEAN Nieto  9/18/2017

## 2017-09-18 NOTE — PLAN OF CARE
Problem: Patient Care Overview (Adult)  Goal: Plan of Care Review  Outcome: Ongoing (interventions implemented as appropriate)    09/16/17 1129 09/18/17 0745   Coping/Psychosocial Response Interventions   Plan Of Care Reviewed With patient --    Patient Care Overview   Progress improving --    Outcome Evaluation   Outcome Summary/Follow up Plan --  Pt shine tx well with good participation. Pt educated in dept on CABG percautions and literature was given. Pt reported he was to D/C this date.       Goal: Discharge Needs Assessment  Outcome: Ongoing (interventions implemented as appropriate)    Problem: Inpatient Occupational Therapy  Goal: Bed Mobility Goal LTG- OT  Outcome: Ongoing (interventions implemented as appropriate)    09/15/17 1014 09/18/17 0745   Bed Mobility OT LTG   Bed Mobility OT LTG, Date Established 09/15/17 --    Bed Mobility OT LTG, Time to Achieve by discharge --    Bed Mobility OT LTG, Activity Type all bed mobility --    Bed Mobility OT LTG, [place] Level conditional independence --    Bed Mobility OT LTG, Date Goal Reviewed --  09/18/17   Bed Mobility OT LTG, Outcome --  goal not met       Goal: Transfer Training Goal 1 LTG- OT  Outcome: Ongoing (interventions implemented as appropriate)    09/15/17 1014 09/18/17 0745   Transfer Training OT LTG   Transfer Training OT LTG, Date Established 09/15/17 --    Transfer Training OT LTG, Time to Achieve by discharge --    Transfer Training OT LTG, Activity Type all transfers --    Transfer Training OT LTG, [place] Level supervision required;contact guard assist --    Transfer Training OT LTG, Date Goal Reviewed --  09/18/17   Transfer Training OT LTG, Outcome --  goal not met       Goal: Patient Education Goal LTG- OT  Outcome: Ongoing (interventions implemented as appropriate)    09/15/17 1014 09/18/17 0745   Patient Education OT LTG   Patient Education OT LTG, Date Established 09/15/17 --    Patient Education OT LTG, Time to Achieve by  discharge --    Patient Education OT LTG, Education Type HEP;positioning;posture/body mechanics;home safety;adaptive equipment mgmt;energy conservation;adaptive breathing --    Patient Education OT LTG, Education Understanding independent;demonstrates adequately;verbalizes understanding --    Patient Education OT LTG, Date Goal Reviewed --  09/18/17   Patient Education OT LTG Outcome --  goal not met       Goal: ADL Goal LTG- OT  Outcome: Ongoing (interventions implemented as appropriate)    09/15/17 1014 09/18/17 0745   ADL OT LTG   ADL OT LTG, Date Established 09/15/17 --    ADL OT LTG, Time to Achieve by discharge --    ADL OT LTG, Activity Type ADL skills --    ADL OT LTG, Additional Goal Set-up A with self-feeding, grooming; mod I with UB dressing and bathing and toileting; min/mod A for LB dressing and bathing --    ADL OT LTG, Date Goal Reviewed --  09/18/17   ADL OT LTG, Outcome --  goal not met

## 2017-09-18 NOTE — PLAN OF CARE
Problem: Patient Care Overview (Adult)  Goal: Plan of Care Review  Outcome: Ongoing (interventions implemented as appropriate)    09/18/17 0845   Coping/Psychosocial Response Interventions   Plan Of Care Reviewed With patient   Patient Care Overview   Progress improving   Outcome Evaluation   Outcome Summary/Follow up Plan sit-stand-sit sba of 1,to br with rw and cga of 1,amb 178',48',15' with rw and cga of 1,verbalized ind with cardiac/sternal precautions-no new goals met ;if d/c home today would benefit from hh pt/ot       Goal: Discharge Needs Assessment  Outcome: Ongoing (interventions implemented as appropriate)    09/15/17 0727 09/15/17 0916 09/15/17 1113   Discharge Needs Assessment   Concerns To Be Addressed --  adjustment to diagnosis/illness concerns --    Readmission Within The Last 30 Days no previous admission in last 30 days --  --    Discharge Facility/Level Of Care Needs --  home with home health --    Current Discharge Risk --  chronically ill --    Discharge Disposition --  still a patient --    Current Health   Anticipated Changes Related to Illness inability to care for self --  --    Self-Care   Equipment Currently Used at Home --  --  grab bar;raised toilet;shower chair  (has RW used by wife in the past)   Living Environment   Transportation Available --  --  family or friend will provide         Problem: Inpatient Physical Therapy  Goal: Transfer Training Goal 1 LTG- PT  Outcome: Ongoing (interventions implemented as appropriate)    09/15/17 1640 09/18/17 0845   Transfer Training PT LTG   Transfer Training PT LTG, Date Established 09/15/17 --    Transfer Training PT LTG, Time to Achieve by discharge --    Transfer Training PT LTG, Activity Type bed to chair /chair to bed;sit to stand/stand to sit --    Transfer Training PT LTG, [place] Level independent;conditional independence --    Transfer Training PT LTG, Date Goal Reviewed --  09/18/17   Transfer Training PT LTG, Outcome --  goal not  met       Goal: Gait Training Goal LTG- PT  Outcome: Ongoing (interventions implemented as appropriate)    09/15/17 1640 09/18/17 0845   Gait Training PT LTG   Gait Training Goal PT LTG, Date Established 09/15/17 --    Gait Training Goal PT LTG, Time to Achieve by discharge --    Gait Training Goal PT LTG, [place] Level independent;conditional independence --    Gait Training Goal PT LTG, Distance to Achieve 300ft --    Gait Training Goal PT LTG, Date Goal Reviewed --  09/18/17   Gait Training Goal PT LTG, Outcome --  goal not met       Goal: Stair Training Goal LTG- PT  Outcome: Ongoing (interventions implemented as appropriate)  Goal: Patient Education Goal LTG- PT  Outcome: Ongoing (interventions implemented as appropriate)    09/15/17 1640 09/18/17 0845   Patient Education PT LTG   Patient Education PT LTG, Date Established 09/15/17 --    Patient Education PT LTG, Time to Achieve by discharge --    Patient Education PT LTG, Education Type HEP;precaution per surgeon;gait;transfers;home safety;benefits of activity --    Patient Education PT LTG, Date Goal Reviewed --  09/18/17   Patient Education PT LTG Outcome --  goal not met

## 2017-09-18 NOTE — PLAN OF CARE
Problem: Patient Care Overview (Adult)  Goal: Discharge Needs Assessment  Outcome: Outcome(s) achieved Date Met:  09/18/17

## 2017-09-18 NOTE — DISCHARGE PLACEMENT REQUEST
"  Sorry, I left the order off with the clinicals faxed earlier.     Nick Robertson (66 y.o. Male)     Date of Birth Social Security Number Address Home Phone MRN    1950  [id number]  [facility] [phone] [id number]    Druze Marital Status          Muslim of [place]        Admission Date Admission Type Admitting Provider Attending Provider Department, Room/Bed    9/14/17 Elective Greg Morgan MD [place], Thomas Mark, MD [address], [address]/1    Discharge Date Discharge Disposition Discharge Destination         Home or Self Care             Attending Provider: Greg Morgan MD     Allergies:  Tetanus Toxoids    Isolation:  None   Infection:  None   Code Status:  FULL    Ht:  67.5\" (171.5 cm)   Wt:  275 lb 3.2 oz (125 kg)    Admission Cmt:  None   Principal Problem:  Coronary artery disease of native artery of native heart with stable angina pectoris [I25.118] More...                 Active Insurance as of 9/14/2017     Primary Coverage     Payor Plan Insurance Group Employer/Plan Group    MEDICARE MEDICARE A & B      Payor Plan Address Payor Plan Phone Number Effective From Effective To    [address] [phone] 4/1/2012     [place] [address]       Subscriber Name Subscriber Birth Date Member ID       NICK ROBERTSON 1950 [id number]           Secondary Coverage     Payor Plan Insurance Group Employer/Plan Group    [facility]     Payor Plan Address Payor Plan Phone Number Effective From Effective To    [address] [address]  10/2/2016     [place] [address]       Subscriber Name Subscriber Birth Date Member ID       NICK ROBERTSON 1950 [id number]                 Emergency Contacts      (Rel.) Home Phone Work Phone Mobile Phone    Veronika Robertson (Spouse) -- -- [phone]    Estrada Robertson (Son) [phone] -- --        Admission  Current   9/14/2017  [facility] " [address]  Default Flowsheet Data (all recorded)   Vital Signs         09/18/17  1204    09/18/17  1200    09/18/17  1118    09/18/17  0837    09/18/17  0835                Vital Signs   Temp      97.7 °F (36.5  °C)       Temp src      Oral       Pulse      62  76     Heart Rate Source      Monitor  Monitor     Resp      16       Resp Rate Source      Visual       BP      114/62       BP Location      Left arm       BP Method      Automatic       Patient Position      Sitting       Oxygen Therapy   SpO2  93 %  87 %  92 %         while at rest  while at rest         O2 Device  nasal cannula  room air  nasal cannula    nasal cannula   Flow (L/min)  1        1                   Home Oxygen Therapy [EQ60] (Order [id number])   General Supply    Date: 9/18/2017   Department: [address]   Ordering/Authorizing: RANDEE Brown           Order History  Outpatient       Date/Time Action Taken User Additional Information       09/18/17 1202 Sign RANDEE Brown            Order Details        Frequency Duration Priority Order Class       None None Routine External           Start Date/Time        Start Date       09/18/17           Order Questions        Question Answer Comment       Delivery Modality Nasal Cannula        Liters Per Minute: 1        Duration: Continuous        Equipment  Oxygen Concentrator &  &  Portable Gaseous Oxygen System & Portable Oxygen Contents Gaseous &  Conserving Regulator        Length of Need (99 Months = Lifetime) 6 Months        Note:  Custom values to enter length of need for DME           Source Order Set / Preference List        Order Set       [facility] GEN EXPRESS DISCHARGE [[id number]]           Clinical Indications           ICD-10-CM ICD-9-CM       Coronary artery disease involving native coronary artery of native heart with angina pectoris  - Primary     I25.119 414.01  413.9           Reprint Order Requisition         OXYGEN THERAPY (Order #[id number]) on 9/18/17         Encounter-Level Cardiology Documents:        There are no encounter-level cardiology documents.         Encounter        View Encounter         Order Provider Info            Office phone Pager E-mail       Ordering User RANDEE Brown [phone] -- --       Authorizing Provider RANDEE Brown [phone] -- --       Attending Provider Greg Morgan [phone] -- --           Linked Charges        No charges linked to this procedure         Order Transmittal Tracking        OXYGEN THERAPY (Order #[id number]) on 9/18/17         Authorized by:  RANDEE Brown  (NPI: [id number])               Emergency Contact Information     Name Relation Home Work Mobile    ArvindVeronika piña Spouse   [phone]    NormanEstrada Son [phone]

## 2017-09-18 NOTE — CONSULTS
Referring Provider: RANDEE Knott  Reason for Consultation: Cardiac Rehab    I met with patient and discussed Cardiac Rehab. Pt lives 6 minutes from [place], [place] and would prefer to go to Cardiac Rehab there, however pt agrees to start here for 2 weeks and then transfer. Pt is being discharged home with HH at this time. Appt made for October 6, 2017 at 0800 per pt request.  Cherri Guzman RN  09/18/17  2:18 PM    Time: 0021-2926

## 2017-09-18 NOTE — DISCHARGE PLACEMENT REQUEST
"  If any questions, please call Leila at [phone]. Patient is in room 301.      Nick Robertson (66 y.o. Male)     Date of Birth Social Security Number Address Home Phone MRN    1950  [id number] [facility] [phone] [id number]    Jainism Marital Status          [employer]        Admission Date Admission Type Admitting Provider Attending Provider Department, Room/Bed    9/14/17 Elective Greg Morgan MD [place], Thomas Mark, MD [address], 301/1    Discharge Date Discharge Disposition Discharge Destination         Home or Self Care             Attending Provider: Greg Morgan MD     Allergies:  Tetanus Toxoids    Isolation:  None   Infection:  None   Code Status:  FULL    Ht:  67.5\" (171.5 cm)   Wt:  275 lb 3.2 oz (125 kg)    Admission Cmt:  None   Principal Problem:  Coronary artery disease of native artery of native heart with stable angina pectoris [I25.118] More...                 Active Insurance as of 9/14/2017     Primary Coverage     Payor Plan Insurance Group Employer/Plan Group    MEDICARE MEDICARE A & B      Payor Plan Address Payor Plan Phone Number Effective From Effective To    [address] [phone] 4/1/2012     [place] [address]       Subscriber Name Subscriber Birth Date Member ID       NICK ROBERTSON 1950 [id number]           Secondary Coverage     Payor Plan Insurance Group Employer/Plan Group    [facility]     Payor Plan Address Payor Plan Phone Number Effective From Effective To    [address] [address]  10/2/2016     [place] [address]       Subscriber Name Subscriber Birth Date Member ID       NICK ROBERTSON 1950 [id number]                 Emergency Contacts      (Rel.) Home Phone Work Phone Mobile Phone    Veronika Robertson (Spouse) -- -- [phone]    Estrada Robertson (Son) [phone] -- --          Admission  Current "   9/14/2017  [facility] [address]  Default Flowsheet Data (all recorded)   Vital Signs         09/18/17  1204    09/18/17  1200    09/18/17  1118    09/18/17  0837    09/18/17  0835                Vital Signs   Temp      97.7 °F (36.5  °C)       Temp src      Oral       Pulse      62  76     Heart Rate Source      Monitor  Monitor     Resp      16       Resp Rate Source      Visual       BP      114/62       BP Location      Left arm       BP Method      Automatic       Patient Position      Sitting       Oxygen Therapy   SpO2  93 %  87 %  92 %         while at rest  while at rest         O2 Device  nasal cannula  room air  nasal cannula    nasal cannula   Flow (L/min)  1        1                   Insurance Information                MEDICARE/MEDICARE A & B Phone: [phone]    Subscriber: Nick Austin Subscriber#: [id number]    Group#:  Precert#:         [facility] SUP Phone:     Subscriber: Nick Austin Subscriber#: [id number]    Group#: [facility] Precert#:              History & Physical      RANDEE Brown at 9/7/2017 11:52 AM          CVTS CONSULTATION          Patient Care Team:  Nick Morgan MD as PCP - General (Family Medicine)  Nick Morgan MD as PCP - Claims Attributed  Requesting Provider: Perla Jay MD    Chief complaint: CAD      SUBJECTIVE       History of Present Illness:  Mr. Austin is a 66-year-old  male who is being seen by me for the first time.  He presents for evaluation of chest pressure and shortness of breath which has been going on for the past 3 months.  His dyspnea is NYHA class II and chest pressure was triggered by activity though it has occurred on bending forward.  His history is remarkable for document of coronary artery disease and back in 1989 underwent CABG ×1 vessel in [facility].  He is done well since then and has not followed up with cardiology in more than 20 years.  His risk factors  include hypertension, hyperlipidemia, hypothyroidism, obesity and premature coronary artery disease.  He is a nonsmoker. Mr. Austin underwent elective cardiac cath today demonstrating severe coronary disease LAD 90%, CIRCUMFLEX 100%, RCA 90%. Dr. Morgan was consulted for consideration of redo revascularization surgery.      The following portions of the patient's history were reviewed and updated as appropriate: allergies, current medications, past family history, past medical history, past social history, past surgical history and problem list.  Recent images independently reviewed.  Available laboratory values reviewed.    Review of Systems   Constitutional: Positive for fatigue. Negative for diaphoresis.   Respiratory: Positive for chest tightness and shortness of breath.    Cardiovascular: Negative for palpitations and leg swelling.   Gastrointestinal: Negative for abdominal pain.   Genitourinary: Negative for dysuria.   Musculoskeletal: Negative for back pain.   Skin: Negative.    Neurological: Negative for dizziness, speech difficulty and numbness.   Hematological: Does not bruise/bleed easily.   All other systems reviewed and are negative.       Past Medical History:   Diagnosis Date   • Acute bronchitis    • Backache    • Chronic pain    • Coronary arteriosclerosis    • Essential hypertension    • GERD (gastroesophageal reflux disease)    • Hyperglycemia    • Hyperlipidemia    • Hypothyroidism    • Type 2 diabetes mellitus      Past Surgical History:   Procedure Laterality Date   • CHOLECYSTECTOMY     • CORONARY ARTERY BYPASS GRAFT     • INJECTION OF MEDICATION  02/23/2015    dexamethasone   • LEG SURGERY      extensive lower leg   • SHOULDER SURGERY       Family History   Problem Relation Age of Onset   • No Known Problems Mother    • No Known Problems Father    • No Known Problems Sister    • No Known Problems Brother    • No Known Problems Daughter    • No Known Problems Son    • No Known Problems  Maternal Aunt    • No Known Problems Maternal Uncle    • No Known Problems Paternal Aunt    • No Known Problems Paternal Uncle    • No Known Problems Maternal Grandmother    • No Known Problems Maternal Grandfather    • No Known Problems Paternal Grandmother    • No Known Problems Paternal Grandfather      Social History   Substance Use Topics   • Smoking status: Never Smoker   • Smokeless tobacco: Never Used   • Alcohol use No     Prescriptions Prior to Admission   Medication Sig Dispense Refill Last Dose   • albuterol (PROVENTIL HFA;VENTOLIN HFA) 108 (90 BASE) MCG/ACT inhaler Inhale 2 puffs Every 4 (Four) Hours As Needed (asthma). Take 30 minutes before P.E or exercise. 18 g 3 9/6/2017 at 1800   • aspirin 325 MG tablet Take 325 mg by mouth Daily.   9/6/2017 at 1800   • atorvastatin (LIPITOR) 20 MG tablet Take 20 mg by mouth Every Night.   9/6/2017 at 1800   • Fluticasone Furoate-Vilanterol (BREO ELLIPTA) 100-25 MCG/INH aerosol powder  Inhale 1 dose Daily. 3 each 3 9/6/2017 at 1800   • isosorbide mononitrate (IMDUR) 30 MG 24 hr tablet Take 1 tablet by mouth Daily. 30 tablet 6 9/6/2017 at 1800   • lisinopril-hydrochlorothiazide (PRINZIDE,ZESTORETIC) 20-25 MG per tablet Take 1 tablet by mouth Daily.   9/6/2017 at 1800   • metoprolol succinate XL (TOPROL-XL) 25 MG 24 hr tablet q hs (Patient taking differently: 25 mg Daily. q hs) 30 tablet 3 9/6/2017 at 1800   • Omega-3 Fatty Acids (FISH OIL) 1000 MG capsule capsule Take 1,000 mg by mouth Daily With Breakfast.   9/6/2017 at 1800   • omeprazole (priLOSEC) 40 MG capsule Take 40 mg by mouth Daily.   9/6/2017 at 1800   • SYNTHROID 88 MCG tablet Take 1 tablet by mouth Daily. 90 tablet 2 9/6/2017 at 1800   • tamsulosin (FLOMAX) 0.4 MG capsule 24 hr capsule Take 1 capsule by mouth Every Night.   9/6/2017 at 1800        Allergies:  Tetanus toxoids    OBJECTIVE        Vital Signs  Temp:  [97.3 °F (36.3 °C)-97.4 °F (36.3 °C)] 97.3 °F (36.3 °C)  Heart Rate:  [60-64] 63  Resp:   "[16-18] 18  BP: ()/(51-76) 107/58    Flowsheet Rows         First Filed Value    Admission Height  67\" (170.2 cm) Documented at 09/07/2017 0635    Admission Weight  269 lb 13.5 oz (122 kg) Documented at 09/07/2017 0635        67\" (170.2 cm)    Physical Exam   Constitutional: He is oriented to person, place, and time. He appears well-developed.   HENT:   Head: Normocephalic.   Eyes: Pupils are equal, round, and reactive to light.   Neck: Neck supple. Carotid bruit is not present.   Cardiovascular: Normal rate and normal heart sounds.    Pulses:       Dorsalis pedis pulses are 2+ on the right side, and 2+ on the left side.        Posterior tibial pulses are 2+ on the right side, and 2+ on the left side.   Pulmonary/Chest: Effort normal and breath sounds normal. No respiratory distress.   Abdominal: Soft. Bowel sounds are normal.   Musculoskeletal: Normal range of motion. He exhibits no edema.   Neurological: He is alert and oriented to person, place, and time. No cranial nerve deficit.   Skin: Skin is warm and dry.   Psychiatric: He has a normal mood and affect. Thought content normal.   Vitals reviewed.      Results Review:   Lab Results   Component Value Date    WBC 8.37 03/28/2017    HGB 15.3 03/28/2017    HCT 46.4 03/28/2017    MCV 93.9 03/28/2017     03/28/2017     Lab Results   Component Value Date    GLUCOSE 118 (H) 09/07/2017    BUN 14 09/07/2017    CREATININE 0.99 09/07/2017    EGFRIFNONA 75 09/07/2017    BCR 14.1 09/07/2017    K 3.7 09/07/2017    CO2 26.0 09/07/2017    CALCIUM 8.9 09/07/2017    ALBUMIN 4.30 09/07/2017    LABIL2 1.3 09/07/2017    AST 36 09/07/2017    ALT 56 09/07/2017     Results for orders placed during the hospital encounter of 08/30/17   Adult Transthoracic Echo Complete    Narrative · Mild tricuspid valve regurgitation is present.  · Mild pulmonic valve regurgitation is present.  · Left ventricular wall thickness is consistent with borderline concentric   hypertrophy.  · Left " atrial cavity size is mildly dilated.  · Left ventricular systolic function is normal. Estimated EF = 55%.  · Left ventricular diastolic dysfunction (grade I) consistent with   impaired relaxation.                ASSESSMENT/PLAN       Principal Problem:    Coronary artery disease involving native coronary artery of native heart with angina pectoris  Active Problems:    Angina pectoris      Assessment:    Condition: In stable condition.   (Mr. Matt cardiac cath demonstrates severe CAD and currently chest pain free. Dr. Morgan independently reviewed cath films and discussed with  and Mr. Matt regarding options for medical management, PCI, CABG.  ).     Plan:   (Coronary Artery Bypass Grafting x 3 vessels is advisable with endoscopic vein harvesting. Mr. aMtt is aware of the risks involved, potential for complications, and hoped for benefits. He understands and wishes to proceed with surgical revascularization on 9/14/17. Currently we will complete pre-op work-up: Carotid Duplex Scan, BLE Vein Mapping, 5 Meter Walk Test, Type & Screen, CT Chest. Preoperative teaching has been completed and all questions have been answered.    ).       Thank you for the opportunity to participate in this patient's care.              This document has been electronically signed by RANDEE Brown on September 7, 2017 11:52 AM         Electronically signed by Greg Morgan MD at 9/11/2017  5:17 PM      Greg Morgan MD at 9/14/2017  7:15 AM            H&P reviewed. The patient was examined and there are no changes to the H&P.  Detailed discussion with Mr Austin regarding situation options and plans.  severe symptomatic CAD.  Coronary Artery Bypass Grafting is advisable.      Risks including but not limited to Mortality 2-3%, Stroke 1-2%, bleeding (return to surgery), transfusion, infection, pulmonary (prolonged mechanical ventilation, tracheostomy), renal dysfunction (dialysis).   Benefits:  relief of symptoms, reduction in cardiac events and hospitalization. improved survival.  Options:  medical therapy, multivessel PCI discussed. Understands and wishes to proceed.    Redo Coronary Artery Bypass Grafting, , SVG to RI, SVG to M2, SVG to PDA, ON, amicar, ancef, radial arterial line, pulmonary artery catheter.  GEN.  SDS.  9/14/2017         Electronically signed by Greg Morgan MD at 9/14/2017  7:16 AM    Source Note             CVTS CONSULTATION          Patient Care Team:  Nick Morgan MD as PCP - General (Family Medicine)  Nick Morgan MD as PCP - Claims Attributed  Requesting Provider: Perla Jay MD    Chief complaint: CAD      SUBJECTIVE       History of Present Illness:  Mr. Austin is a 66-year-old  male who is being seen by me for the first time.  He presents for evaluation of chest pressure and shortness of breath which has been going on for the past 3 months.  His dyspnea is NYHA class II and chest pressure was triggered by activity though it has occurred on bending forward.  His history is remarkable for document of coronary artery disease and back in 1989 underwent CABG ×1 vessel in [facility].  He is done well since then and has not followed up with cardiology in more than 20 years.  His risk factors include hypertension, hyperlipidemia, hypothyroidism, obesity and premature coronary artery disease.  He is a nonsmoker. Mr. Austin underwent elective cardiac cath today demonstrating severe coronary disease LAD 90%, CIRCUMFLEX 100%, RCA 90%. Dr. Morgan was consulted for consideration of redo revascularization surgery.      The following portions of the patient's history were reviewed and updated as appropriate: allergies, current medications, past family history, past medical history, past social history, past surgical history and problem list.  Recent images independently reviewed.  Available laboratory values  reviewed.    Review of Systems   Constitutional: Positive for fatigue. Negative for diaphoresis.   Respiratory: Positive for chest tightness and shortness of breath.    Cardiovascular: Negative for palpitations and leg swelling.   Gastrointestinal: Negative for abdominal pain.   Genitourinary: Negative for dysuria.   Musculoskeletal: Negative for back pain.   Skin: Negative.    Neurological: Negative for dizziness, speech difficulty and numbness.   Hematological: Does not bruise/bleed easily.   All other systems reviewed and are negative.       Past Medical History:   Diagnosis Date   • Acute bronchitis    • Backache    • Chronic pain    • Coronary arteriosclerosis    • Essential hypertension    • GERD (gastroesophageal reflux disease)    • Hyperglycemia    • Hyperlipidemia    • Hypothyroidism    • Type 2 diabetes mellitus      Past Surgical History:   Procedure Laterality Date   • CHOLECYSTECTOMY     • CORONARY ARTERY BYPASS GRAFT     • INJECTION OF MEDICATION  02/23/2015    dexamethasone   • LEG SURGERY      extensive lower leg   • SHOULDER SURGERY       Family History   Problem Relation Age of Onset   • No Known Problems Mother    • No Known Problems Father    • No Known Problems Sister    • No Known Problems Brother    • No Known Problems Daughter    • No Known Problems Son    • No Known Problems Maternal Aunt    • No Known Problems Maternal Uncle    • No Known Problems Paternal Aunt    • No Known Problems Paternal Uncle    • No Known Problems Maternal Grandmother    • No Known Problems Maternal Grandfather    • No Known Problems Paternal Grandmother    • No Known Problems Paternal Grandfather      Social History   Substance Use Topics   • Smoking status: Never Smoker   • Smokeless tobacco: Never Used   • Alcohol use No     Prescriptions Prior to Admission   Medication Sig Dispense Refill Last Dose   • albuterol (PROVENTIL HFA;VENTOLIN HFA) 108 (90 BASE) MCG/ACT inhaler Inhale 2 puffs Every 4 (Four) Hours As  "Needed (asthma). Take 30 minutes before P.E or exercise. 18 g 3 9/6/2017 at 1800   • aspirin 325 MG tablet Take 325 mg by mouth Daily.   9/6/2017 at 1800   • atorvastatin (LIPITOR) 20 MG tablet Take 20 mg by mouth Every Night.   9/6/2017 at 1800   • Fluticasone Furoate-Vilanterol (BREO ELLIPTA) 100-25 MCG/INH aerosol powder  Inhale 1 dose Daily. 3 each 3 9/6/2017 at 1800   • isosorbide mononitrate (IMDUR) 30 MG 24 hr tablet Take 1 tablet by mouth Daily. 30 tablet 6 9/6/2017 at 1800   • lisinopril-hydrochlorothiazide (PRINZIDE,ZESTORETIC) 20-25 MG per tablet Take 1 tablet by mouth Daily.   9/6/2017 at 1800   • metoprolol succinate XL (TOPROL-XL) 25 MG 24 hr tablet q hs (Patient taking differently: 25 mg Daily. q hs) 30 tablet 3 9/6/2017 at 1800   • Omega-3 Fatty Acids (FISH OIL) 1000 MG capsule capsule Take 1,000 mg by mouth Daily With Breakfast.   9/6/2017 at 1800   • omeprazole (priLOSEC) 40 MG capsule Take 40 mg by mouth Daily.   9/6/2017 at 1800   • SYNTHROID 88 MCG tablet Take 1 tablet by mouth Daily. 90 tablet 2 9/6/2017 at 1800   • tamsulosin (FLOMAX) 0.4 MG capsule 24 hr capsule Take 1 capsule by mouth Every Night.   9/6/2017 at 1800        Allergies:  Tetanus toxoids    OBJECTIVE        Vital Signs  Temp:  [97.3 °F (36.3 °C)-97.4 °F (36.3 °C)] 97.3 °F (36.3 °C)  Heart Rate:  [60-64] 63  Resp:  [16-18] 18  BP: ()/(51-76) 107/58    Flowsheet Rows         First Filed Value    Admission Height  67\" (170.2 cm) Documented at 09/07/2017 0635    Admission Weight  269 lb 13.5 oz (122 kg) Documented at 09/07/2017 0635        67\" (170.2 cm)    Physical Exam   Constitutional: He is oriented to person, place, and time. He appears well-developed.   HENT:   Head: Normocephalic.   Eyes: Pupils are equal, round, and reactive to light.   Neck: Neck supple. Carotid bruit is not present.   Cardiovascular: Normal rate and normal heart sounds.    Pulses:       Dorsalis pedis pulses are 2+ on the right side, and 2+ on the " left side.        Posterior tibial pulses are 2+ on the right side, and 2+ on the left side.   Pulmonary/Chest: Effort normal and breath sounds normal. No respiratory distress.   Abdominal: Soft. Bowel sounds are normal.   Musculoskeletal: Normal range of motion. He exhibits no edema.   Neurological: He is alert and oriented to person, place, and time. No cranial nerve deficit.   Skin: Skin is warm and dry.   Psychiatric: He has a normal mood and affect. Thought content normal.   Vitals reviewed.      Results Review:   Lab Results   Component Value Date    WBC 8.37 03/28/2017    HGB 15.3 03/28/2017    HCT 46.4 03/28/2017    MCV 93.9 03/28/2017     03/28/2017     Lab Results   Component Value Date    GLUCOSE 118 (H) 09/07/2017    BUN 14 09/07/2017    CREATININE 0.99 09/07/2017    EGFRIFNONA 75 09/07/2017    BCR 14.1 09/07/2017    K 3.7 09/07/2017    CO2 26.0 09/07/2017    CALCIUM 8.9 09/07/2017    ALBUMIN 4.30 09/07/2017    LABIL2 1.3 09/07/2017    AST 36 09/07/2017    ALT 56 09/07/2017     Results for orders placed during the hospital encounter of 08/30/17   Adult Transthoracic Echo Complete    Narrative · Mild tricuspid valve regurgitation is present.  · Mild pulmonic valve regurgitation is present.  · Left ventricular wall thickness is consistent with borderline concentric   hypertrophy.  · Left atrial cavity size is mildly dilated.  · Left ventricular systolic function is normal. Estimated EF = 55%.  · Left ventricular diastolic dysfunction (grade I) consistent with   impaired relaxation.                ASSESSMENT/PLAN       Principal Problem:    Coronary artery disease involving native coronary artery of native heart with angina pectoris  Active Problems:    Angina pectoris      Assessment:    Condition: In stable condition.   (Mr. Matt cardiac cath demonstrates severe CAD and currently chest pain free. Dr. Morgan independently reviewed cath films and discussed with  and Mr. Matt  regarding options for medical management, PCI, CABG.  ).     Plan:   (Coronary Artery Bypass Grafting x 3 vessels is advisable with endoscopic vein harvesting. Mr. Matt is aware of the risks involved, potential for complications, and hoped for benefits. He understands and wishes to proceed with surgical revascularization on 9/14/17. Currently we will complete pre-op work-up: Carotid Duplex Scan, BLE Vein Mapping, 5 Meter Walk Test, Type & Screen, CT Chest. Preoperative teaching has been completed and all questions have been answered.    ).       Thank you for the opportunity to participate in this patient's care.              This document has been electronically signed by RANDEE Brown on September 7, 2017 11:52 AM          Electronically signed by Greg Morgan MD at 9/11/2017  5:17 PM                 Discharge Summary      RANDEE Brown at 9/18/2017 12:02 PM          CVTS DISCHARGE SUMMARY  Date of Admission: 9/14/2017  5:12 AM  Date of Discharge:  9/18/2017    Admission Diagnosis:   Coronary artery disease of native artery of native heart with stable angina pectoris [I25.118]  Acute Blood Loss Anemia  Respiratory Failure with Hypoxia  Dyslipidemia    Discharge Diagnosis:   Post-Op Diagnosis Codes:     * Coronary artery disease of native artery of native heart with stable angina pectoris [I25.118]  Coronary artery disease of native artery of native heart with stable angina pectoris [I25.118]  Acute Blood Loss Anemia  Respiratory Failure with Hypoxia  Dyslipidemia    Consults:   Consults     Date and Time Order Name Status Description    9/14/2017 1506 Inpatient Consult to Cardiology      9/7/2017 0909 Inpatient Consult to Cardiothoracic Surgery Completed           Procedures Performed  Procedure(s):  REDO CORONARY ARTERY BYPASS GRAFTINGX X 3-4, ENDOSCOPIC VEIN HARVEST      (CELL SAVER)      REDO CORONARY ARTERY BYPASS GRAFTING  ENDOSCOPIC VEIN HARVEST   SAPHENOUS VEIN GRAFT FROM  ASCENDING AORTA TO FIRST DIAGONAL BRANCH  SAPHENOUS VEIN GRAFT FROM ASCENDING AORTA TO RAMUS INTERMEDIUS BRANCH  SAPHENOUS VEIN GRAFT FROM ASCENDING AORTA TO POSTERIOR LATERAL BRANCH   RIGHT common femoral arterial line  Vascular ultrasound guidance    Discharge Medications   Nick Austin   Home Medication Instructions EVANS:[id number]    Printed on:09/18/17 3003   Medication Information                      albuterol (PROVENTIL HFA;VENTOLIN HFA) 108 (90 BASE) MCG/ACT inhaler  Inhale 2 puffs Every 4 (Four) Hours As Needed (asthma). Take 30 minutes before P.E or exercise.             amiodarone (PACERONE) 400 MG tablet  Take 1 tablet by mouth Daily for 14 days. Then decrease dose to 200mg daily x 21 days             aspirin 81 MG EC tablet  Take 1 tablet by mouth Daily.             atorvastatin (LIPITOR) 20 MG tablet  Take 20 mg by mouth Every Night.             clopidogrel (PLAVIX) 75 MG tablet  Take 1 tablet by mouth Daily.             Fluticasone Furoate-Vilanterol (BREO ELLIPTA) 100-25 MCG/INH aerosol powder   Inhale 1 dose Daily.             furosemide (LASIX) 20 MG tablet  Take 1 tablet by mouth Daily.             magnesium oxide (MAGOX) 400 (241.3 Mg) MG tablet tablet  Take 1 tablet by mouth 2 (Two) Times a Day.             metoprolol succinate XL (TOPROL-XL) 25 MG 24 hr tablet  Take 25 mg by mouth Every Night.             Omega-3 Fatty Acids (FISH OIL) 1200 MG capsule delayed-release  Take  by mouth Every Night.             omeprazole (priLOSEC) 40 MG capsule  Take 40 mg by mouth Every Night.             Prenatal Vit-Fe Fumarate-FA (PRENATAL VITAMIN 27-0.8) 27-0.8 MG tablet tablet  Take 1 tablet by mouth Daily.             sennosides-docusate sodium (SENOKOT-S) 8.6-50 MG tablet  Take 1 tablet by mouth 2 (Two) Times a Day.             SYNTHROID 88 MCG tablet  Take 1 tablet by mouth Daily.             tamsulosin (FLOMAX) 0.4 MG capsule 24 hr capsule  Take 1 capsule by mouth Every Night.              traMADol (ULTRAM) 50 MG tablet  Take 1 tablet by mouth Every 4 (Four) Hours As Needed for Moderate Pain  (Surgical Pain).             vitamin C (VITAMIN C) 500 MG tablet  Take 1 tablet by mouth 2 (Two) Times a Day.               Medical Management: ASA,PLAVIX,STATIN, BETA. HOLD ACE. Reviewed risks, benefits, and habit forming potential and weaning from narcotic medication. Patient understands and wishes to receive prescription.  Prescription written for Ultram # 40 for post-surgical pain after Les placed on file.    Discharge Diet:   Diet Instructions     Diet: Cardiac, Consistent Carbohydrate       Discharge Diet:   Cardiac  Consistent Carbohydrate                    Discharge Disposition: Home in stable condition with follow up appointments with CVTS Nurse Practitioner 1 week, Dr. Morgan/Favian 2 weeks, Cardiology/PCP as scheduled.     History of Present Illness/Hospital Course:   Mr. Austin is a 66-year-old  male who is being seen by me for the first time.  He presents for evaluation of chest pressure and shortness of breath which has been going on for the past 3 months.  His dyspnea is NYHA class II and chest pressure was triggered by activity though it has occurred on bending forward.  His history is remarkable for document of coronary artery disease and back in 1989 underwent CABG ×1 vessel in [facility].  He is done well since then and has not followed up with cardiology in more than 20 years.  His risk factors include hypertension, hyperlipidemia, hypothyroidism, obesity and premature coronary artery disease.  He is a nonsmoker. Mr. Austin underwent elective cardiac cath today demonstrating severe coronary disease LAD 90%, CIRCUMFLEX 100%, RCA 90%. Dr. Morgan was consulted for consideration of redo revascularization surgery which was performed on 9/14/17.  8/30/17: Echo: EF 55% LVSD 31 LVEDD 45. Grade 1 DD.  9/2017: Carotid Duplex: 0-49%  Admitted through [facility], taken to the  operating suite placed under general anesthesia underwent said procedure without complications or difficulty.  Weaned easily from cardiopulmonary bypass with the assistance in drips of LINDA,NTG,DOBUTAMINE and transferred to CCU in stable condition.  They began weaning mechanical ventilation and was extubated in 6.5 hrs of intubation and placed on oxygen per Nasal cannula.  A total of 2 units in blood products, plus 2 units FFP, 6 pack platelets were given during their hospital stay.  POD1 they were out of bed to the chair and tolerated breakfast with stable glucose between the hours of 18-24 postoperatively.  Insulin drip was discontinued and they were continued on sliding scale insulin coverage as well as Lantus nightly.  Hemodynamics and neuro status remained stable for transfer to  telemetry after 22 hours stay in the CCU. They continued with PT/OT participation to satisfactory levels with activities of daily living. Chest tubes were without air leak and removed with satisfactory post removal CXR viewed on 916/17.  Hemodynamics remained stable, they remained afebrile postoperatively, and in regular sinus rhythm for satisfactory discharge home in stable condition on POD 4 with home oxygen.      Vital Signs  Temp:  [96.9 °F (36.1 °C)-99.8 °F (37.7 °C)] 97.7 °F (36.5 °C)  Heart Rate:  [52-83] 62  Resp:  [16-20] 16  BP: (114-166)/(59-84) 114/62  Last 3 weights    09/16/17  0615 09/17/17  0445 09/18/17  0500   Weight: 283 lb (128 kg) 280 lb 9.6 oz (127 kg) 275 lb 3.2 oz (125 kg)       Pertinent Test Results:  Lab Results   Component Value Date    WBC 16.78 (H) 09/17/2017    HGB 8.7 (L) 09/18/2017    HCT 25.6 (L) 09/18/2017    MCV 92.2 09/17/2017     09/17/2017     Lab Results   Component Value Date    GLUCOSE 105 (H) 09/17/2017    BUN 23 (H) 09/17/2017    CREATININE 0.94 09/17/2017    EGFRIFNONA 80 09/17/2017    BCR 24.5 09/17/2017    K 3.9 09/17/2017    CO2 30.0 09/17/2017    CALCIUM 7.9 (L) 09/17/2017     "ALBUMIN 3.20 (L) 09/17/2017    LABIL2 1.3 09/17/2017    AST 38 09/17/2017    ALT 37 09/17/2017       Physical Exam:  Physical Exam   General Appearance:  Comfortable.    Vital signs: (most recent): Blood pressure 114/62, pulse 62, temperature 97.7 °F (36.5 °C), temperature source Oral, resp. rate 16, height 67.5\" (171.5 cm), weight 275 lb 3.2 oz (125 kg), SpO2 92 %.  Vital signs are normal.  No fever.    Output: Producing urine and no stool output.    HEENT: Normal HEENT exam.    Lungs:  Normal respiratory rate and normal effort.  There are wheezes and decreased breath sounds (LLL).    Heart: Normal rate.  Regular rhythm.    Abdomen: Abdomen is soft and non-distended.  Bowel sounds are normal.     Extremities: Decreased range of motion.  There is dependent edema.  (D/t surgical pain)  Pulses: Distal pulses are intact.    Neurological: Patient is alert and oriented to person, place and time.    Skin:  Warm and dry.  (M/S INC: [place]. Intact  LLE EVH:  [facility])     Additional Instructions for the Follow-ups that You Need to Schedule     Discharge Follow-up with Specialty    As directed    Specialty:  Cardiothoracic Surgery   Follow Up:  1 Week   Follow Up Details:  Rayne JEFF 9/25/17 9:20       Referral to Home Health    As directed    Face to Face Visit Date:  9/18/2017   Follow-up Provider for Plan of Care?:  I will be treating the patient on an ongoing basis.  Please send me the Plan of Care for signature.   Follow-up Provider:  VIOLA SNYDER   Reason/Clinical Findings:  post op CABG   Describe mobility limitations that make leaving home difficult:  lifting/driving restrictions   Nursing/Therapeutic Services Requested:   Skilled Nursing  Physical Therapy  Occupational Therapy      Skilled nursing orders:   Post CABG care  Neurovascular assessments  Cardiopulmonary assessments      PT orders:   Strengthening  Home safety assessment  Therapeutic exercise      Occupational orders:   Activities of daily " living  Home safety assessment  Strengthening      Frequency:  1 Week 1                 Discharge Instructions: Discharge instructions include no heavy lifting anything greater than 10lbs for approximately 12 weeks.  No sex or driving for 3-6 weeks. Printed information given to the patient with advancement of activities weekly.  Risks and benefits of narcotic medications and weaning postoperatively have been discussed. Clean operative site with antibacterial soap/water, pat dry. Keep open to air unless draining, then may apply dry dressing.  No ointments or creams unless prescribed by provider. Signs and symptoms of infection including drainage from operative site, redness, swelling, with associated fever and/or chills notify [facility] V[facility] center immediately for wound check.  Patient verbalizes understanding of discharge instructions, all questions are answered, follow up appointments have been made, they are discharged home in stable condition.         Follow-up Appointments  Future Appointments  Date Time Provider Department Center   10/17/2017 9:00 AM Nick Morgan MD [facility] None       Test Results Pending at Discharge   Order Current Status    Hardware / Foreign Body Culture Preliminary result                 This document has been electronically signed by RANDEE Brown on September 18, 2017 12:03 PM      Time: Discharge 60 min       Electronically signed by RANDEE Brown at 9/18/2017 12:11 PM

## 2017-09-18 NOTE — CONSULTS
"Adult Nutrition  Assessment    Patient Name:  Nick Austin  YOB: 1950  MRN: [id number]  Admit Date:  9/14/2017    Assessment Date:  9/18/2017          Reason for Assessment       09/18/17 1500    Reason for Assessment    Reason For Assessment/Visit follow up protocol              Nutrition/Diet History       09/18/17 1500    Nutrition/Diet History    Typical Food/Fluid Intake Pt claims that his appetite is slowly improving.  WE discussed Eating healthy guidelines.                Labs/Tests/Procedures/Meds       09/18/17 1500    Labs/Tests/Procedures/Meds    Labs/Tests Review Reviewed    Medication Review Reviewed, pertinent                Nutrition Prescription Ordered       09/18/17 1501    Nutrition Prescription PO    Fluid Consistency Thin    Common Modifiers Consistent Carbohydrate            Evaluation of Received Nutrient/Fluid Intake       09/18/17 1501    PO Evaluation    Number of Days PO Intake Evaluated 2 days    Number of Meals 4    % PO Intake ~25--50%            Comments:  Pt reports that he is going home today. Appetite down but improving.  He states 'I am a Salt alcoholic\"  We discussed dietary options  Heart Healthy diet compliance encouraged.  Written dietary information already provided.  Questions answered.  Pt will follow-up at [facility].         Electronically signed by:  Rachel Vargas RD  09/18/17 3:04 PM  "

## 2017-09-18 NOTE — PLAN OF CARE
Problem: Patient Care Overview (Adult)  Goal: Plan of Care Review  Outcome: Outcome(s) achieved Date Met:  09/18/17

## 2017-09-19 LAB
ABO + RH BLD: NORMAL
ABO + RH BLD: NORMAL
BACTERIA SPEC AEROBE CULT: ABNORMAL
BH BB BLOOD EXPIRATION DATE: NORMAL
BH BB BLOOD EXPIRATION DATE: NORMAL
BH BB BLOOD TYPE BARCODE: 5100
BH BB BLOOD TYPE BARCODE: 9500
BH BB DISPENSE STATUS: NORMAL
BH BB DISPENSE STATUS: NORMAL
BH BB PRODUCT CODE: NORMAL
BH BB PRODUCT CODE: NORMAL
BH BB UNIT NUMBER: NORMAL
BH BB UNIT NUMBER: NORMAL
GRAM STN SPEC: ABNORMAL
UNIT  ABO: NORMAL
UNIT  ABO: NORMAL
UNIT  RH: NORMAL
UNIT  RH: NORMAL

## 2017-09-19 NOTE — PLAN OF CARE
Problem: Inpatient Physical Therapy  Goal: Transfer Training Goal 1 LTG- PT  Outcome: Unable to achieve outcome(s) by discharge Date Met:  09/19/17    09/15/17 1640 09/19/17 1006   Transfer Training PT LTG   Transfer Training PT LTG, Date Established 09/15/17 --    Transfer Training PT LTG, Time to Achieve by discharge --    Transfer Training PT LTG, Activity Type bed to chair /chair to bed;sit to stand/stand to sit --    Transfer Training PT LTG, [place] Level independent;conditional independence --    Transfer Training PT LTG, Date Goal Reviewed --  09/19/17   Transfer Training PT LTG, Outcome --  goal not met   Transfer Training PT LTG, Reason Goal Not Met --  discharged from facility       Goal: Gait Training Goal LTG- PT  Outcome: Unable to achieve outcome(s) by discharge Date Met:  09/19/17    09/15/17 1640 09/19/17 1006   Gait Training PT LTG   Gait Training Goal PT LTG, Date Established 09/15/17 --    Gait Training Goal PT LTG, Time to Achieve by discharge --    Gait Training Goal PT LTG, D[place] Level independent;conditional independence --    Gait Training Goal PT LTG, Distance to Achieve 300ft --    Gait Training Goal PT LTG, Date Goal Reviewed --  09/19/17   Gait Training Goal PT LTG, Outcome --  goal not met   Gait Training Goal PT LTG, Reason Goal Not Met --  discharged from facility       Goal: Stair Training Goal LTG- PT  Outcome: Outcome(s) achieved Date Met:  09/19/17    09/15/17 1640 09/19/17 1006   Stair Training PT LTG   Stair Training Goal PT LTG, Date Established 09/15/17 --    Stair Training Goal PT LTG, Time to Achieve by discharge --    Stair Training Goal PT LTG, Number of Steps 3 --    Stair Training Goal PT LTG, D[place] Level conditional independence --    Stair Training Goal PT LTG, Assist Device 1 handrail;2 handrails --    Stair Training Goal PT LTG, Date Goal Reviewed --  09/19/17   Stair Training Goal PT LTG, Outcome --  goal met   Stair Training Goal PT LTG, Reason  Goal Not Met --  discharged from facility       Goal: Patient Education Goal LTG- PT  Outcome: Unable to achieve outcome(s) by discharge Date Met:  09/19/17    09/15/17 1640 09/18/17 1310 09/19/17 1006   Patient Education PT LTG   Patient Education PT LTG, Date Established --  09/18/17 --    Patient Education PT LTG, Time to Achieve by discharge --  --    Patient Education PT LTG, Education Type HEP;precaution per surgeon;gait;transfers;home safety;benefits of activity --  --    Patient Education PT LTG, Date Goal Reviewed --  --  09/19/17   Patient Education PT LTG Outcome --  --  goal not met   Patient Education PT LTG, Reason Goal Not Met --  --  discharged from facility
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
## 2017-09-19 NOTE — PLAN OF CARE
Problem: Inpatient Occupational Therapy  Goal: ADL Goal LTG- OT    09/15/17 1014 09/19/17 1521   ADL OT LTG   ADL OT LTG, Date Established 09/15/17 --    ADL OT LTG, Time to Achieve by discharge --    ADL OT LTG, Activity Type ADL skills --    ADL OT LTG, Additional Goal Set-up A with self-feeding, grooming; mod I with UB dressing and bathing and toileting; min/mod A for LB dressing and bathing --    ADL OT LTG, Date Goal Reviewed --  09/19/17   ADL OT LTG, Outcome --  goal not met   ADL OT LTG, Reason Goal Not Met --  discharged from facility

## 2017-09-19 NOTE — THERAPY DISCHARGE NOTE
Acute Care - Physical Therapy Discharge Summary  [facility]       Patient Name: Ncik Austin  : 1950  MRN: [id number]    Today's Date: 2017  Onset of Illness/Injury or Date of Surgery Date: 17    Date of Referral to PT: 17  Referring Physician: RANDEE Knott      Admit Date: 2017      PT Recommendation and Plan    Visit Dx:    ICD-10-CM ICD-9-CM   1. Coronary artery disease involving native coronary artery of native heart with angina pectoris I25.119 414.01     413.9   2. Coronary artery disease of native artery of native heart with stable angina pectoris I25.118 414.01     413.9   3. Impaired mobility and ADLs Z74.09 799.89   4. Impaired physical mobility Z74.09 781.99   5. Impaired gait and mobility R26.89 781.2             Outcome Measures       17 1310 17 0845 17 0745    How much help from another person do you currently need...    Turning from your back to your side while in flat bed without using bedrails? 3  -LN 3  -LN     Moving from lying on back to sitting on the side of a flat bed without bedrails? 3  -LN 3  -LN     Moving to and from a bed to a chair (including a wheelchair)? 3  -LN 3  -LN     Standing up from a chair using your arms (e.g., wheelchair, bedside chair)? 3  -LN 3  -LN     Climbing 3-5 steps with a railing? 4  -LN 3  -LN     To walk in hospital room? 3  -LN 3  -LN     AM-PAC 6 Clicks Score 19  -LN 18  -LN     How much help from another is currently needed...    Putting on and taking off regular lower body clothing?   2  -KD    Bathing (including washing, rinsing, and drying)   2  -KD    Toileting (which includes using toilet bed pan or urinal)   2  -KD    Putting on and taking off regular upper body clothing   2  -KD    Taking care of personal grooming (such as brushing teeth)   3  -KD    Eating meals   3  -KD    Score   14  -KD    Functional Assessment    Outcome Measure Options AM-PAC 6 Clicks Basic Mobility (PT)  -LN  AM-PAC 6 Clicks Basic Mobility (PT)  -LN       09/17/17 1430 09/16/17 1100 09/16/17 1040    How much help from another person do you currently need...    Turning from your back to your side while in flat bed without using bedrails? 3  -EM  3  -EM    Moving from lying on back to sitting on the side of a flat bed without bedrails? 3  -EM  3  -EM    Moving to and from a bed to a chair (including a wheelchair)? 3  -EM  3  -EM    Standing up from a chair using your arms (e.g., wheelchair, bedside chair)? 3  -EM  3  -EM    Climbing 3-5 steps with a railing? 3  -EM  2  -EM    To walk in hospital room? 3  -EM  3  -EM    AM-PAC 6 Clicks Score 18  -EM  17  -EM    How much help from another is currently needed...    Putting on and taking off regular lower body clothing?  2  -CS     Bathing (including washing, rinsing, and drying)  2  -CS     Toileting (which includes using toilet bed pan or urinal)  2  -CS     Putting on and taking off regular upper body clothing  2  -CS     Taking care of personal grooming (such as brushing teeth)  3  -CS     Eating meals  3  -CS     Score  14  -CS     Functional Assessment    Outcome Measure Options AM-PAC 6 Clicks Daily Activity (OT)  -EM AM-PAC 6 Clicks Daily Activity (OT)  -CS AM-PAC 6 Clicks Basic Mobility (PT)  -EM      User Key  (r) = Recorded By, (t) = Taken By, (c) = Cosigned By    Initials Name Provider Type    EM Vu Sevilla PTA Physical Therapy Assistant    LN Aruna Nichols PTA Physical Therapy Assistant    DEBBI [place]/L Occupational Therapy Assistant    TRAVIS HerediaA/L Occupational Therapy Assistant                      IP PT Goals       09/19/17 1006 09/18/17 1310 09/18/17 0845    Transfer Training PT LTG    Transfer Training PT  LTG, Date Goal Reviewed 09/19/17  -MN 09/18/17  -LN 09/18/17  -LN    Transfer Training PT LTG, Outcome goal not met  -MN goal not met  -LN goal not met  -LN    Transfer Training PT LTG, Reason Goal Not Met discharged from  facility  -MN      Gait Training PT LTG    Gait Training Goal PT LTG, Date Goal Reviewed 09/19/17  -MN 09/18/17  -LN 09/18/17  -LN    Gait Training Goal PT LTG, Outcome goal not met  -MN goal not met  -LN goal not met  -LN    Gait Training Goal PT LTG, Reason Goal Not Met discharged from [facility]  -MN      Stair Training PT LTG    Stair Training Goal PT LTG, Date Goal Reviewed 09/19/17  -MN 09/18/17  -LN 09/18/17  -LN    Stair Training Goal PT LTG, Outcome goal met  -MN goal met  -LN goal not met  -LN    Stair Training Goal PT LTG, Reason Goal Not Met discharged from [facility]  -MN      Patient Education PT LTG    Patient Education PT LTG, Date Established  09/18/17  -LN     Patient Education PT LTG, Date Goal Reviewed 09/19/17  -MN 09/18/17  -LN 09/18/17  -LN    Patient Education PT LTG Outcome goal not met  -MN goal not met  -LN goal not met  -LN    Patient Education PT LTG, Reason Goal Not Met discharged from [facility]  -MN        09/15/17 1640          Transfer Training PT LTG    Transfer Training PT LTG, Date Established 09/15/17  -NICKOLAS      Transfer Training PT LTG, Time to Achieve by discharge  -NICKOLAS      Transfer Training PT LTG, Activity Type bed to chair /chair to bed;sit to stand/stand to sit  -NICKOLAS      Transfer Training PT LTG, [place] Level independent;conditional independence  -NICKOLAS      Transfer Training PT LTG, Outcome goal ongoing  -NICKOLAS      Gait Training PT LTG    Gait Training Goal PT LTG, Date Established 09/15/17  -NICKOLAS      Gait Training Goal PT LTG, Time to Achieve by discharge  -NICKOLAS      Gait Training Goal PT LTG, [place] Level independent;conditional independence  -NICKOLAS      Gait Training Goal PT LTG, Distance to Achieve 300ft  -NICKOLAS      Gait Training Goal PT LTG, Outcome goal ongoing  -NICKOLAS      Stair Training PT LTG    Stair Training Goal PT LTG, Date Established 09/15/17  -NICKOLAS      Stair Training Goal PT LTG, Time to Achieve by discharge  -NICKOLAS      Stair Training Goal PT LTG, Number of Steps 3  -NICKOLAS       Stair Training Goal PT LTG, [place] Level conditional independence  -      Stair Training Goal PT LTG, Assist Device 1 handrail;2 handrails  -      Patient Education PT LTG    Patient Education PT LTG, Date Established 09/15/17  -      Patient Education PT LTG, Time to Achieve by discharge  -      Patient Education PT LTG, Education Type HEP;precaution per surgeon;gait;transfers;home safety;benefits of activity  -      Patient Education PT LTG Outcome goal ongoing  -        User Key  (r) = Recorded By, (t) = Taken By, (c) = Cosigned By    Initials Name Provider Type    MN Marielos Blackwood, PT Physical Therapist    NICKOLAS Torres, PT Physical Therapist    LN Aruna Nichols, PTA Physical Therapy Assistant              PT Discharge Summary  Anticipated Discharge Disposition: home with assist  Reason for Discharge: Discharge from facility, Per MD order  Outcomes Achieved: Patient able to partially acheive established goals  Discharge Destination: Home      Marielos Blackwood, PT   9/19/2017

## 2017-09-20 ENCOUNTER — DOCUMENTATION (OUTPATIENT)
Dept: CARDIAC REHAB | Facility: HOSPITAL | Age: 67
End: 2017-09-20

## 2017-09-21 RX ORDER — FUROSEMIDE 20 MG/1
20 TABLET ORAL DAILY
Qty: 5 TABLET | Refills: 0 | Status: SHIPPED | OUTPATIENT
Start: 2017-09-21 | End: 2017-09-25 | Stop reason: SDUPTHER

## 2017-09-25 ENCOUNTER — OFFICE VISIT (OUTPATIENT)
Dept: CARDIAC SURGERY | Facility: CLINIC | Age: 67
End: 2017-09-25

## 2017-09-25 VITALS
BODY MASS INDEX: 41.18 KG/M2 | WEIGHT: 262.4 LBS | DIASTOLIC BLOOD PRESSURE: 64 MMHG | OXYGEN SATURATION: 94 % | SYSTOLIC BLOOD PRESSURE: 110 MMHG | HEIGHT: 67 IN | HEART RATE: 70 BPM | TEMPERATURE: 97.8 F

## 2017-09-25 DIAGNOSIS — R06.09 DYSPNEA ON EXERTION: ICD-10-CM

## 2017-09-25 DIAGNOSIS — Z09 FOLLOW-UP SURGERY CARE: Primary | ICD-10-CM

## 2017-09-25 DIAGNOSIS — I25.118 CORONARY ARTERY DISEASE OF NATIVE ARTERY OF NATIVE HEART WITH STABLE ANGINA PECTORIS (HCC): ICD-10-CM

## 2017-09-25 DIAGNOSIS — R71.8 OTHER ABNORMALITY OF RED BLOOD CELLS: ICD-10-CM

## 2017-09-25 PROCEDURE — 99024 POSTOP FOLLOW-UP VISIT: CPT | Performed by: NURSE PRACTITIONER

## 2017-09-25 RX ORDER — POTASSIUM CHLORIDE 20 MEQ/1
20 TABLET, EXTENDED RELEASE ORAL DAILY
Qty: 30 TABLET | Refills: 0 | Status: SHIPPED | OUTPATIENT
Start: 2017-09-25 | End: 2017-10-02

## 2017-09-25 RX ORDER — FUROSEMIDE 20 MG/1
40 TABLET ORAL DAILY
Qty: 30 TABLET | Refills: 0 | Status: SHIPPED | OUTPATIENT
Start: 2017-09-25 | End: 2017-10-17 | Stop reason: SDUPTHER

## 2017-09-25 NOTE — PATIENT INSTRUCTIONS
Lasix 40mg ( 2 tablets ) daily M-F this week. Then 20mg daily after that.    Signs and symptoms of infection including drainage from operative site, redness, swelling, with associated fever and/or chills notify Heart and Vascular center immediately for wound check.  Clean operative site with antibacterial soap/water, pat dry. Keep open to air unless draining, then may apply dry dressing.  No ointments or creams unless prescribed by provider.    Tylenol PM for sleep.    Return 1 week Genie Layne APRN - Labs, EKG, CXR
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
## 2017-09-26 RX ORDER — ALBUTEROL SULFATE 90 MCG
HFA AEROSOL WITH ADAPTER (GRAM) INHALATION
Qty: 13.4 G | Refills: 3 | Status: SHIPPED | OUTPATIENT
Start: 2017-09-26 | End: 2019-09-20

## 2017-09-29 ENCOUNTER — DOCUMENTATION (OUTPATIENT)
Dept: CARDIAC REHAB | Facility: HOSPITAL | Age: 67
End: 2017-09-29

## 2017-09-29 RX ORDER — PROMETHAZINE HYDROCHLORIDE 12.5 MG/1
12.5 TABLET ORAL EVERY 8 HOURS PRN
Qty: 20 TABLET | Refills: 0 | Status: SHIPPED | OUTPATIENT
Start: 2017-09-29 | End: 2017-10-17

## 2017-10-02 ENCOUNTER — LAB (OUTPATIENT)
Dept: LAB | Facility: HOSPITAL | Age: 67
End: 2017-10-02

## 2017-10-02 ENCOUNTER — OFFICE VISIT (OUTPATIENT)
Dept: CARDIAC SURGERY | Facility: CLINIC | Age: 67
End: 2017-10-02

## 2017-10-02 ENCOUNTER — HOSPITAL ENCOUNTER (OUTPATIENT)
Dept: GENERAL RADIOLOGY | Facility: HOSPITAL | Age: 67
Discharge: HOME | End: 2017-10-02
Admitting: NURSE PRACTITIONER

## 2017-10-02 VITALS
HEART RATE: 67 BPM | WEIGHT: 249.44 LBS | OXYGEN SATURATION: 94 % | DIASTOLIC BLOOD PRESSURE: 80 MMHG | TEMPERATURE: 97.7 F | HEIGHT: 67 IN | BODY MASS INDEX: 39.15 KG/M2 | SYSTOLIC BLOOD PRESSURE: 148 MMHG

## 2017-10-02 DIAGNOSIS — Z95.1 S/P CABG X 1: Primary | ICD-10-CM

## 2017-10-02 DIAGNOSIS — R71.8 OTHER ABNORMALITY OF RED BLOOD CELLS: ICD-10-CM

## 2017-10-02 DIAGNOSIS — Z09 FOLLOW-UP SURGERY CARE: ICD-10-CM

## 2017-10-02 DIAGNOSIS — I25.708 CORONARY ARTERY DISEASE OF BYPASS GRAFT OF NATIVE HEART WITH STABLE ANGINA PECTORIS (HCC): ICD-10-CM

## 2017-10-02 DIAGNOSIS — Z79.899 DRUG THERAPY: ICD-10-CM

## 2017-10-02 LAB
ALBUMIN SERPL-MCNC: 4.3 G/DL (ref 3.4–4.8)
ALBUMIN/GLOB SERPL: 1.2 G/DL (ref 1.1–1.8)
ALP SERPL-CCNC: 99 U/L (ref 38–126)
ALT SERPL W P-5'-P-CCNC: 74 U/L (ref 21–72)
ANION GAP SERPL CALCULATED.3IONS-SCNC: 13 MMOL/L (ref 5–15)
AST SERPL-CCNC: 60 U/L (ref 17–59)
BILIRUB SERPL-MCNC: 1 MG/DL (ref 0.2–1.3)
BUN BLD-MCNC: 18 MG/DL (ref 7–21)
BUN/CREAT SERPL: 13.1 (ref 7–25)
CALCIUM SPEC-SCNC: 9 MG/DL (ref 8.4–10.2)
CHLORIDE SERPL-SCNC: 101 MMOL/L (ref 95–110)
CO2 SERPL-SCNC: 28 MMOL/L (ref 22–31)
CREAT BLD-MCNC: 1.37 MG/DL (ref 0.7–1.3)
DEPRECATED RDW RBC AUTO: 51.7 FL (ref 35.1–43.9)
ERYTHROCYTE [DISTWIDTH] IN BLOOD BY AUTOMATED COUNT: 15.2 % (ref 11.5–14.5)
GFR SERPL CREATININE-BSD FRML MDRD: 52 ML/MIN/1.73 (ref 49–113)
GLOBULIN UR ELPH-MCNC: 3.5 GM/DL (ref 2.3–3.5)
GLUCOSE BLD-MCNC: 105 MG/DL (ref 60–100)
HCT VFR BLD AUTO: 36 % (ref 39–49)
HGB BLD-MCNC: 11.4 G/DL (ref 13.7–17.3)
HYPOCHROMIA BLD QL: NORMAL
MCH RBC QN AUTO: 29.9 PG (ref 26.5–34)
MCHC RBC AUTO-ENTMCNC: 31.7 G/DL (ref 31.5–36.3)
MCV RBC AUTO: 94.5 FL (ref 80–98)
PLATELET # BLD AUTO: 407 10*3/MM3 (ref 150–450)
PMV BLD AUTO: 9.4 FL (ref 8–12)
POTASSIUM BLD-SCNC: 4.8 MMOL/L (ref 3.5–5.1)
PROT SERPL-MCNC: 7.8 G/DL (ref 6.3–8.6)
RBC # BLD AUTO: 3.81 10*6/MM3 (ref 4.37–5.74)
SMALL PLATELETS BLD QL SMEAR: ADEQUATE
SODIUM BLD-SCNC: 142 MMOL/L (ref 137–145)
WBC MORPH BLD: NORMAL
WBC NRBC COR # BLD: 6.84 10*3/MM3 (ref 3.2–9.8)

## 2017-10-02 PROCEDURE — 71020 HC CHEST PA AND LATERAL: CPT

## 2017-10-02 PROCEDURE — 36415 COLL VENOUS BLD VENIPUNCTURE: CPT

## 2017-10-02 PROCEDURE — 80053 COMPREHEN METABOLIC PANEL: CPT

## 2017-10-02 PROCEDURE — 85007 BL SMEAR W/DIFF WBC COUNT: CPT

## 2017-10-02 PROCEDURE — 99024 POSTOP FOLLOW-UP VISIT: CPT | Performed by: NURSE PRACTITIONER

## 2017-10-02 PROCEDURE — 93005 ELECTROCARDIOGRAM TRACING: CPT | Performed by: NURSE PRACTITIONER

## 2017-10-02 PROCEDURE — 85027 COMPLETE CBC AUTOMATED: CPT

## 2017-10-02 PROCEDURE — 93010 ELECTROCARDIOGRAM REPORT: CPT | Performed by: INTERNAL MEDICINE

## 2017-10-02 NOTE — PATIENT INSTRUCTIONS
Chest xray:  Much improved   Keep doing incentive spirometry   Reduce Lasix to 20 mg every other day start Wednesday  MWF and 1 weekend day  Stop Prenatal or iron pill  Stop amiodarone   Not taking Ultram  No more potassium   May drive short distance  No lifting > 10 lbs    Recheck 2 wks with lab work

## 2017-10-04 DIAGNOSIS — E03.9 HYPOTHYROIDISM, UNSPECIFIED TYPE: ICD-10-CM

## 2017-10-04 DIAGNOSIS — E11.9 TYPE 2 DIABETES MELLITUS WITHOUT COMPLICATION, WITHOUT LONG-TERM CURRENT USE OF INSULIN (HCC): Primary | ICD-10-CM

## 2017-10-04 DIAGNOSIS — E78.5 HYPERLIPIDEMIA, UNSPECIFIED HYPERLIPIDEMIA TYPE: ICD-10-CM
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
## 2017-10-06 ENCOUNTER — HOSPITAL ENCOUNTER (OUTPATIENT)
Dept: CARDIAC REHAB | Facility: HOSPITAL | Age: 67
End: 2017-10-06

## 2017-10-17 ENCOUNTER — OFFICE VISIT (OUTPATIENT)
Dept: CARDIAC SURGERY | Facility: CLINIC | Age: 67
End: 2017-10-17

## 2017-10-17 ENCOUNTER — LAB (OUTPATIENT)
Dept: LAB | Facility: HOSPITAL | Age: 67
End: 2017-10-17

## 2017-10-17 VITALS
TEMPERATURE: 97.8 F | WEIGHT: 260 LBS | HEIGHT: 67 IN | SYSTOLIC BLOOD PRESSURE: 122 MMHG | BODY MASS INDEX: 40.81 KG/M2 | HEART RATE: 61 BPM | DIASTOLIC BLOOD PRESSURE: 78 MMHG | OXYGEN SATURATION: 98 %

## 2017-10-17 DIAGNOSIS — E55.9 VITAMIN D DEFICIENCY: ICD-10-CM

## 2017-10-17 DIAGNOSIS — Z09 FOLLOW-UP SURGERY CARE: ICD-10-CM

## 2017-10-17 DIAGNOSIS — I25.118 CORONARY ARTERY DISEASE OF NATIVE ARTERY OF NATIVE HEART WITH STABLE ANGINA PECTORIS (HCC): Primary | ICD-10-CM

## 2017-10-17 DIAGNOSIS — E11.9 TYPE 2 DIABETES MELLITUS WITHOUT COMPLICATION, WITHOUT LONG-TERM CURRENT USE OF INSULIN (HCC): ICD-10-CM

## 2017-10-17 DIAGNOSIS — E03.9 HYPOTHYROIDISM, UNSPECIFIED TYPE: ICD-10-CM

## 2017-10-17 LAB
25(OH)D3 SERPL-MCNC: 19.1 NG/ML (ref 30–100)
ALBUMIN SERPL-MCNC: 3.8 G/DL (ref 3.4–4.8)
ALBUMIN UR-MCNC: 5 MG/L
ALBUMIN/GLOB SERPL: 1.2 G/DL (ref 1.1–1.8)
ALP SERPL-CCNC: 86 U/L (ref 38–126)
ALT SERPL W P-5'-P-CCNC: 78 U/L (ref 21–72)
ANION GAP SERPL CALCULATED.3IONS-SCNC: 11 MMOL/L (ref 5–15)
ARTICHOKE IGE QN: 70 MG/DL (ref 1–129)
AST SERPL-CCNC: 44 U/L (ref 17–59)
BASOPHILS # BLD AUTO: 0.02 10*3/MM3 (ref 0–0.2)
BASOPHILS NFR BLD AUTO: 0.4 % (ref 0–2)
BILIRUB SERPL-MCNC: 0.8 MG/DL (ref 0.2–1.3)
BUN BLD-MCNC: 13 MG/DL (ref 7–21)
BUN/CREAT SERPL: 13.1 (ref 7–25)
CALCIUM SPEC-SCNC: 8.9 MG/DL (ref 8.4–10.2)
CHLORIDE SERPL-SCNC: 105 MMOL/L (ref 95–110)
CO2 SERPL-SCNC: 27 MMOL/L (ref 22–31)
CREAT BLD-MCNC: 0.99 MG/DL (ref 0.7–1.3)
CREAT UR-MCNC: 313.9 MG/DL
DEPRECATED RDW RBC AUTO: 55.2 FL (ref 35.1–43.9)
EOSINOPHIL # BLD AUTO: 0.5 10*3/MM3 (ref 0–0.7)
EOSINOPHIL NFR BLD AUTO: 9.7 % (ref 0–7)
ERYTHROCYTE [DISTWIDTH] IN BLOOD BY AUTOMATED COUNT: 16 % (ref 11.5–14.5)
GFR SERPL CREATININE-BSD FRML MDRD: 75 ML/MIN/1.73 (ref 60–113)
GLOBULIN UR ELPH-MCNC: 3.1 GM/DL (ref 2.3–3.5)
GLUCOSE BLD-MCNC: 103 MG/DL (ref 60–100)
HBA1C MFR BLD: 5.3 % (ref 4–5.6)
HCT VFR BLD AUTO: 35.4 % (ref 39–49)
HGB BLD-MCNC: 11.4 G/DL (ref 13.7–17.3)
IMM GRANULOCYTES # BLD: 0.01 10*3/MM3 (ref 0–0.02)
IMM GRANULOCYTES NFR BLD: 0.2 % (ref 0–0.5)
LYMPHOCYTES # BLD AUTO: 1.38 10*3/MM3 (ref 0.6–4.2)
LYMPHOCYTES NFR BLD AUTO: 26.7 % (ref 10–50)
MCH RBC QN AUTO: 30.2 PG (ref 26.5–34)
MCHC RBC AUTO-ENTMCNC: 32.2 G/DL (ref 31.5–36.3)
MCV RBC AUTO: 93.9 FL (ref 80–98)
MICROALBUMIN/CREAT UR: 15.9 MG/G (ref 0–30)
MONOCYTES # BLD AUTO: 0.42 10*3/MM3 (ref 0–0.9)
MONOCYTES NFR BLD AUTO: 8.1 % (ref 0–12)
NEUTROPHILS # BLD AUTO: 2.83 10*3/MM3 (ref 2–8.6)
NEUTROPHILS NFR BLD AUTO: 54.9 % (ref 37–80)
NRBC BLD MANUAL-RTO: 0 /100 WBC (ref 0–0)
PLATELET # BLD AUTO: 332 10*3/MM3 (ref 150–450)
PMV BLD AUTO: 9.7 FL (ref 8–12)
POTASSIUM BLD-SCNC: 4.5 MMOL/L (ref 3.5–5.1)
PROT SERPL-MCNC: 6.9 G/DL (ref 6.3–8.6)
RBC # BLD AUTO: 3.77 10*6/MM3 (ref 4.37–5.74)
SODIUM BLD-SCNC: 143 MMOL/L (ref 137–145)
T4 FREE SERPL-MCNC: 1.31 NG/DL (ref 0.78–2.19)
TSH SERPL DL<=0.05 MIU/L-ACNC: 3.21 MIU/ML (ref 0.46–4.68)
WBC NRBC COR # BLD: 5.16 10*3/MM3 (ref 3.2–9.8)

## 2017-10-17 PROCEDURE — 85025 COMPLETE CBC W/AUTO DIFF WBC: CPT

## 2017-10-17 PROCEDURE — 84443 ASSAY THYROID STIM HORMONE: CPT | Performed by: FAMILY MEDICINE

## 2017-10-17 PROCEDURE — 99024 POSTOP FOLLOW-UP VISIT: CPT | Performed by: NURSE PRACTITIONER

## 2017-10-17 PROCEDURE — 82570 ASSAY OF URINE CREATININE: CPT

## 2017-10-17 PROCEDURE — 82306 VITAMIN D 25 HYDROXY: CPT

## 2017-10-17 PROCEDURE — 84439 ASSAY OF FREE THYROXINE: CPT

## 2017-10-17 PROCEDURE — 82043 UR ALBUMIN QUANTITATIVE: CPT

## 2017-10-17 PROCEDURE — 83036 HEMOGLOBIN GLYCOSYLATED A1C: CPT | Performed by: FAMILY MEDICINE

## 2017-10-17 PROCEDURE — 80053 COMPREHEN METABOLIC PANEL: CPT | Performed by: FAMILY MEDICINE

## 2017-10-17 PROCEDURE — 36415 COLL VENOUS BLD VENIPUNCTURE: CPT

## 2017-10-17 PROCEDURE — 83721 ASSAY OF BLOOD LIPOPROTEIN: CPT | Performed by: FAMILY MEDICINE

## 2017-10-17 RX ORDER — FUROSEMIDE 20 MG/1
20 TABLET ORAL DAILY
Qty: 90 TABLET | Refills: 1 | Status: SHIPPED | OUTPATIENT
Start: 2017-10-17 | End: 2017-12-06 | Stop reason: SDUPTHER

## 2017-10-17 RX ORDER — CLOPIDOGREL BISULFATE 75 MG/1
75 TABLET ORAL DAILY
Qty: 90 TABLET | Refills: 4 | Status: SHIPPED | OUTPATIENT
Start: 2017-10-17 | End: 2018-08-15 | Stop reason: SDUPTHER

## 2017-10-24 ENCOUNTER — OFFICE VISIT (OUTPATIENT)
Dept: FAMILY MEDICINE CLINIC | Facility: CLINIC | Age: 67
End: 2017-10-24

## 2017-10-24 VITALS
HEART RATE: 59 BPM | SYSTOLIC BLOOD PRESSURE: 128 MMHG | OXYGEN SATURATION: 98 % | TEMPERATURE: 97.8 F | DIASTOLIC BLOOD PRESSURE: 82 MMHG | WEIGHT: 261.8 LBS | HEIGHT: 67 IN | BODY MASS INDEX: 41.09 KG/M2

## 2017-10-24 DIAGNOSIS — J45.30 MILD PERSISTENT ASTHMA WITHOUT COMPLICATION: ICD-10-CM

## 2017-10-24 DIAGNOSIS — E03.9 ACQUIRED HYPOTHYROIDISM: ICD-10-CM

## 2017-10-24 DIAGNOSIS — I25.118 CORONARY ARTERY DISEASE OF NATIVE ARTERY OF NATIVE HEART WITH STABLE ANGINA PECTORIS (HCC): ICD-10-CM

## 2017-10-24 DIAGNOSIS — M15.9 GENERALIZED OA: ICD-10-CM

## 2017-10-24 DIAGNOSIS — E78.01 FAMILIAL HYPERCHOLESTEROLEMIA: ICD-10-CM

## 2017-10-24 DIAGNOSIS — E11.9 TYPE 2 DIABETES MELLITUS WITHOUT COMPLICATION, WITHOUT LONG-TERM CURRENT USE OF INSULIN (HCC): ICD-10-CM

## 2017-10-24 DIAGNOSIS — I10 ESSENTIAL HYPERTENSION: Primary | ICD-10-CM

## 2017-10-24 PROCEDURE — 99214 OFFICE O/P EST MOD 30 MIN: CPT | Performed by: FAMILY MEDICINE

## 2017-10-24 RX ORDER — METHYLPREDNISOLONE 4 MG/1
TABLET ORAL
Qty: 21 TABLET | Refills: 0 | Status: SHIPPED | OUTPATIENT
Start: 2017-10-24 | End: 2017-11-06

## 2017-10-24 RX ORDER — AZITHROMYCIN 250 MG/1
TABLET, FILM COATED ORAL
Qty: 6 TABLET | Refills: 0 | Status: SHIPPED | OUTPATIENT
Start: 2017-10-24 | End: 2017-11-06

## 2017-10-24 NOTE — PROGRESS NOTES
Subjective   Nick Austin is a 66 y.o. male.   Chief Complaint   Patient presents with   • Hypertension     6 month f/u with labs   • Hyperlipidemia   • Diabetes   • Congestion     feels some congestion in chest but no other symptoms       History of Present Illness     The following portions of the patient's history were reviewed and updated as appropriate: allergies, current medications, past family history, past medical history, past social history, past surgical history and problem list.    Review of Systems    Objective   Physical Exam    Assessment/Plan   There are no diagnoses linked to this encounter.

## 2017-10-24 NOTE — PROGRESS NOTES
Subjective   Nick Austin is a 66 y.o. male who presents to the office for follow-up and review of labs.     Hypertension   This is a chronic problem. The current episode started more than 1 year ago. The problem is controlled. Associated symptoms include malaise/fatigue and shortness of breath. Pertinent negatives include no blurred vision, chest pain or PND. There are no associated agents to hypertension.   Hyperlipidemia   This is a chronic problem. The problem is controlled. Associated symptoms include shortness of breath. Pertinent negatives include no chest pain. There are no compliance problems.    Diabetes   He presents for his follow-up diabetic visit. He has type 2 diabetes mellitus. Associated symptoms include fatigue. Pertinent negatives for diabetes include no blurred vision, no chest pain, no polydipsia and no polyphagia. Symptoms are stable. His weight is stable. He is following a low fat/cholesterol and low salt diet. An ACE inhibitor/angiotensin II receptor blocker is being taken. Eye exam is current.        The following portions of the patient's history were reviewed and updated as appropriate: allergies, current medications, past family history, past medical history, past social history, past surgical history and problem list.    Review of Systems   Constitutional: Positive for fatigue and malaise/fatigue.   HENT: Positive for congestion.    Eyes: Negative.  Negative for blurred vision.   Respiratory: Positive for shortness of breath.    Cardiovascular: Negative.  Negative for chest pain and PND.   Gastrointestinal: Negative.    Endocrine: Negative.  Negative for polydipsia and polyphagia.   Genitourinary: Negative.    Musculoskeletal: Positive for arthralgias.   Skin: Negative.    Allergic/Immunologic: Negative.    Neurological: Negative.    Hematological: Negative.    Psychiatric/Behavioral: Negative.    All other systems reviewed and are negative.      Objective   Physical Exam    Constitutional: He is oriented to person, place, and time. He appears well-developed and well-nourished.   HENT:   Head: Normocephalic and atraumatic.   Right Ear: External ear normal.   Left Ear: External ear normal.   Nose: Nose normal.   Mouth/Throat: Oropharynx is clear and moist.   Eyes: Conjunctivae and EOM are normal. Pupils are equal, round, and reactive to light.   Neck: Normal range of motion. Neck supple.   Cardiovascular: Normal rate, regular rhythm, normal heart sounds and intact distal pulses.  Exam reveals no gallop and no friction rub.    No murmur heard.  Pulmonary/Chest: Effort normal and breath sounds normal. He has no wheezes. He has no rales.   Abdominal: Soft. Bowel sounds are normal. He exhibits no mass. There is no tenderness. There is no rebound and no guarding.   Musculoskeletal: Normal range of motion.   Neurological: He is alert and oriented to person, place, and time. He has normal reflexes. No cranial nerve deficit. He exhibits normal muscle tone.   Skin: Skin is warm and dry. No rash noted.   Psychiatric: He has a normal mood and affect. His behavior is normal. Judgment and thought content normal.   Nursing note and vitals reviewed.      Assessment/Plan   Nick was seen today for hypertension, hyperlipidemia, diabetes and congestion.    Diagnoses and all orders for this visit:    Essential hypertension    Familial hypercholesterolemia    Coronary artery disease of native artery of native heart with stable angina pectoris    Mild persistent asthma without complication    Acquired hypothyroidism  -     TSH; Future  -     T4, Free; Future    Type 2 diabetes mellitus without complication, without long-term current use of insulin  -     CBC & Differential; Future  -     Comprehensive Metabolic Panel; Future  -     Hemoglobin A1c; Future  -     LDL Cholesterol, Direct; Future    Generalized OA    Other orders  -     MethylPREDNISolone (MEDROL, RADHA,) 4 MG tablet; Take as directed on  package instructions.  -     azithromycin (ZITHROMAX Z-RADHA) 250 MG tablet; Take 2 tablets the first day, then 1 tablet daily for 4 days.         Labs are reviewed with patient.    PHQ-2/PHQ-9 Depression Screening 4/10/2017   Little interest or pleasure in doing things 2   Feeling down, depressed, or hopeless 0   Trouble falling or staying asleep, or sleeping too much 0   Feeling tired or having little energy 1   Poor appetite or overeating 2   Feeling bad about yourself - or that you are a failure or have let yourself or your family down 0   Trouble concentrating on things, such as reading the newspaper or watching television 0   Moving or speaking so slowly that other people could have noticed. Or the opposite - being so fidgety or restless that you have been moving around a lot more than usual 0   Thoughts that you would be better off dead, or of hurting yourself in some way 0   Total Score 5   If you checked off any problems, how difficult have these problems made it for you to do your work, take care of things at home, or get along with other people? Not difficult at all         Lab on 10/17/2017   Component Date Value Ref Range Status   • Free T4 10/17/2017 1.31  0.78 - 2.19 ng/dL Final   • Microalbumin/Creatinine Ratio 10/17/2017 15.9  0.0 - 30.0 mg/g Final   • Creatinine, Urine 10/17/2017 313.9  mg/dL Final   • Microalbumin, Urine 10/17/2017 5.0  mg/L Final   • WBC 10/17/2017 5.16  3.20 - 9.80 10*3/mm3 Final   • RBC 10/17/2017 3.77* 4.37 - 5.74 10*6/mm3 Final   • Hemoglobin 10/17/2017 11.4* 13.7 - 17.3 g/dL Final   • Hematocrit 10/17/2017 35.4* 39.0 - 49.0 % Final   • MCV 10/17/2017 93.9  80.0 - 98.0 fL Final   • MCH 10/17/2017 30.2  26.5 - 34.0 pg Final   • MCHC 10/17/2017 32.2  31.5 - 36.3 g/dL Final   • RDW 10/17/2017 16.0* 11.5 - 14.5 % Final   • RDW-SD 10/17/2017 55.2* 35.1 - 43.9 fl Final   • MPV 10/17/2017 9.7  8.0 - 12.0 fL Final   • Platelets 10/17/2017 332  150 - 450 10*3/mm3 Final   • Neutrophil %  10/17/2017 54.9  37.0 - 80.0 % Final   • Lymphocyte % 10/17/2017 26.7  10.0 - 50.0 % Final   • Monocyte % 10/17/2017 8.1  0.0 - 12.0 % Final   • Eosinophil % 10/17/2017 9.7* 0.0 - 7.0 % Final   • Basophil % 10/17/2017 0.4  0.0 - 2.0 % Final   • Immature Grans % 10/17/2017 0.2  0.0 - 0.5 % Final   • Neutrophils, Absolute 10/17/2017 2.83  2.00 - 8.60 10*3/mm3 Final   • Lymphocytes, Absolute 10/17/2017 1.38  0.60 - 4.20 10*3/mm3 Final   • Monocytes, Absolute 10/17/2017 0.42  0.00 - 0.90 10*3/mm3 Final   • Eosinophils, Absolute 10/17/2017 0.50  0.00 - 0.70 10*3/mm3 Final   • Basophils, Absolute 10/17/2017 0.02  0.00 - 0.20 10*3/mm3 Final   • Immature Grans, Absolute 10/17/2017 0.01  0.00 - 0.02 10*3/mm3 Final   • nRBC 10/17/2017 0.0  0.0 - 0.0 /100 WBC Final   • 25 Hydroxy, Vitamin D 10/17/2017 19.1* 30.0 - 100.0 ng/ml Final   Orders Only on 10/04/2017   Component Date Value Ref Range Status   • Glucose 10/17/2017 103* 60 - 100 mg/dL Final   • BUN 10/17/2017 13  7 - 21 mg/dL Final   • Creatinine 10/17/2017 0.99  0.70 - 1.30 mg/dL Final   • Sodium 10/17/2017 143  137 - 145 mmol/L Final   • Potassium 10/17/2017 4.5  3.5 - 5.1 mmol/L Final   • Chloride 10/17/2017 105  95 - 110 mmol/L Final   • CO2 10/17/2017 27.0  22.0 - 31.0 mmol/L Final   • Calcium 10/17/2017 8.9  8.4 - 10.2 mg/dL Final   • Total Protein 10/17/2017 6.9  6.3 - 8.6 g/dL Final   • Albumin 10/17/2017 3.80  3.40 - 4.80 g/dL Final   • ALT (SGPT) 10/17/2017 78* 21 - 72 U/L Final   • AST (SGOT) 10/17/2017 44  17 - 59 U/L Final   • Alkaline Phosphatase 10/17/2017 86  38 - 126 U/L Final   • Total Bilirubin 10/17/2017 0.8  0.2 - 1.3 mg/dL Final   • eGFR Non African Amer 10/17/2017 75  >60 mL/min/1.73 Final   • Globulin 10/17/2017 3.1  2.3 - 3.5 gm/dL Final   • A/G Ratio 10/17/2017 1.2  1.1 - 1.8 g/dL Final   • BUN/Creatinine Ratio 10/17/2017 13.1  7.0 - 25.0 Final   • Anion Gap 10/17/2017 11.0  5.0 - 15.0 mmol/L Final   • LDL Cholesterol  10/17/2017 70  1 - 129  mg/dL Final   • TSH 10/17/2017 3.210  0.460 - 4.680 mIU/mL Final   • Hemoglobin A1C 10/17/2017 5.3  4 - 5.6 % Final   Lab on 10/02/2017   Component Date Value Ref Range Status   • WBC 10/02/2017 6.84  3.20 - 9.80 10*3/mm3 Final   • RBC 10/02/2017 3.81* 4.37 - 5.74 10*6/mm3 Final   • Hemoglobin 10/02/2017 11.4* 13.7 - 17.3 g/dL Final   • Hematocrit 10/02/2017 36.0* 39.0 - 49.0 % Final   • MCV 10/02/2017 94.5  80.0 - 98.0 fL Final   • MCH 10/02/2017 29.9  26.5 - 34.0 pg Final   • MCHC 10/02/2017 31.7  31.5 - 36.3 g/dL Final   • RDW 10/02/2017 15.2* 11.5 - 14.5 % Final   • RDW-SD 10/02/2017 51.7* 35.1 - 43.9 fl Final   • MPV 10/02/2017 9.4  8.0 - 12.0 fL Final   • Platelets 10/02/2017 407  150 - 450 10*3/mm3 Final   • Glucose 10/02/2017 105* 60 - 100 mg/dL Final   • BUN 10/02/2017 18  7 - 21 mg/dL Final   • Creatinine 10/02/2017 1.37* 0.70 - 1.30 mg/dL Final   • Sodium 10/02/2017 142  137 - 145 mmol/L Final   • Potassium 10/02/2017 4.8  3.5 - 5.1 mmol/L Final   • Chloride 10/02/2017 101  95 - 110 mmol/L Final   • CO2 10/02/2017 28.0  22.0 - 31.0 mmol/L Final   • Calcium 10/02/2017 9.0  8.4 - 10.2 mg/dL Final   • Total Protein 10/02/2017 7.8  6.3 - 8.6 g/dL Final   • Albumin 10/02/2017 4.30  3.40 - 4.80 g/dL Final   • ALT (SGPT) 10/02/2017 74* 21 - 72 U/L Final   • AST (SGOT) 10/02/2017 60* 17 - 59 U/L Final   • Alkaline Phosphatase 10/02/2017 99  38 - 126 U/L Final   • Total Bilirubin 10/02/2017 1.0  0.2 - 1.3 mg/dL Final   • eGFR Non African Amer 10/02/2017 52  49 - 113 mL/min/1.73 Final   • Globulin 10/02/2017 3.5  2.3 - 3.5 gm/dL Final   • A/G Ratio 10/02/2017 1.2  1.1 - 1.8 g/dL Final   • BUN/Creatinine Ratio 10/02/2017 13.1  7.0 - 25.0 Final   • Anion Gap 10/02/2017 13.0  5.0 - 15.0 mmol/L Final   • Hypochromia 10/02/2017 Slight/1+  None Seen Final   • WBC Morphology 10/02/2017 Normal  Normal Final   • Platelet Estimate 10/02/2017 Adequate  Normal Final   Admission on 09/14/2017, Discharged on 09/18/2017   No  results displayed because visit has over 200 results.      Hospital Outpatient Visit on 09/08/2017   Component Date Value Ref Range Status   • DIST GSV THIGH DIST RIGHT 09/08/2017 0.37  cm Final   • MID GSV THIGH  RIGHT 09/08/2017 0.42  cm Final   • PROX GSV THIGH  RIGHT 09/08/2017 0.42  cm Final   • GSV KNEE DIST RIGHT 09/08/2017 0.34  cm Final   • PROX GSV CALF DIST RIGHT 09/08/2017 0.27  cm Final   • DIST GSV CALF DIST RIGHT 09/08/2017 0.31  cm Final   • GSV ANKLE DIST RIGHT 09/08/2017 0.33  cm Final   • PROX LSV CALF DIST RIGHT 09/08/2017 0.33  cm Final   • MID LSV CALF DIST RIGHT 09/08/2017 0.33  cm Final   • DIST LSV CALF DIST RIGHT 09/08/2017 0.34  cm Final   • DIST GSV THIGH DIST LEFT 09/08/2017 0.57  cm Final   • MID GSV THIGH  LEFT 09/08/2017 0.54  cm Final   • PROX GSV THIGH  LEFT 09/08/2017 0.68  cm Final   • GSV KNEE DIST LEFT 09/08/2017 0.45  cm Final   • PROX GSV CALF DIST LEFT 09/08/2017 0.38  cm Final   • DIST GSV CALF DIST LEFT 09/08/2017 0.41  cm Final   • GSV ANKLE DIST LEFT 09/08/2017 0.40  cm Final   • PROX LSV CALF DIST LEFT 09/08/2017 0.29  cm Final   • MID LSV CALF DIST LEFT 09/08/2017 0.29  cm Final   • DIST LSV CALF DIST LEFT 09/08/2017 0.27  cm Final   • MID GSV CALF DIST RIGHT 09/08/2017 0.30  cm Final   • MID GSV CALF DIST LEFT 09/08/2017 0.32  cm Final   Hospital Outpatient Visit on 09/08/2017   Component Date Value Ref Range Status   • Prox CCA PSV 09/08/2017 105  cm/sec Final   • Prox CCA EDV 09/08/2017 19  cm/sec Final   • Dist CCA PSV 09/08/2017 82  cm/sec Final   • Dist CCA EDV 09/08/2017 22  cm/sec Final   • Prox ECA PSV 09/08/2017 111  cm/sec Final   • Prox ECA EDV 09/08/2017 14  cm/sec Final   • Prox ICA PSV 09/08/2017 71  cm/sec Final   • Prox ICA EDV 09/08/2017 16  cm/sec Final   • Mid ICA PSV 09/08/2017 76  cm/sec Final   • Mid ICA EDV 09/08/2017 23  cm/sec Final   • Dist ICA PSV 09/08/2017 59  cm/sec Final   • Dist ICA EDV 09/08/2017 19  cm/sec Final   • Vertebral A PSV  09/08/2017 46  cm/sec Final   • Vertebral A EDV 09/08/2017 12  cm/sec Final   • ICA/CCA ratio 09/08/2017 0.7   Final   • Prox CCA PSV 09/08/2017 143  cm/sec Final   • Prox CCA EDV 09/08/2017 29  cm/sec Final   • Dist CCA PSV 09/08/2017 96  cm/sec Final   • Dist CCA EDV 09/08/2017 23  cm/sec Final   • Prox ECA PSV 09/08/2017 133  cm/sec Final   • Prox ECA EDV 09/08/2017 17  cm/sec Final   • Prox ICA PSV 09/08/2017 82  cm/sec Final   • Prox ICA EDV 09/08/2017 17  cm/sec Final   • Mid ICA PSV 09/08/2017 94  cm/sec Final   • Mid ICA EDV 09/08/2017 32  cm/sec Final   • Dist ICA PSV 09/08/2017 52  cm/sec Final   • Dist ICA EDV 09/08/2017 23  cm/sec Final   • Vertebral A PSV 09/08/2017 64  cm/sec Final   • Vertebral A EDV 09/08/2017 16  cm/sec Final   • ICA/CCA ratio 09/08/2017 0.7   Final   • Diastolic ICA/CCA Ratio 09/08/2017 1.2   Final   • ICA/CCA diastolic ratio 09/08/2017 1.1   Final   Admission on 09/07/2017, Discharged on 09/07/2017   Component Date Value Ref Range Status   • WBC 09/07/2017 6.75  3.20 - 9.80 10*3/mm3 Final   • RBC 09/07/2017 4.85  4.37 - 5.74 10*6/mm3 Final   • Hemoglobin 09/07/2017 15.2  13.7 - 17.3 g/dL Final   • Hematocrit 09/07/2017 44.9  39.0 - 49.0 % Final   • MCV 09/07/2017 92.6  80.0 - 98.0 fL Final   • MCH 09/07/2017 31.3  26.5 - 34.0 pg Final   • MCHC 09/07/2017 33.9  31.5 - 36.3 g/dL Final   • RDW 09/07/2017 13.7  11.5 - 14.5 % Final   • RDW-SD 09/07/2017 46.4* 35.1 - 43.9 fl Final   • MPV 09/07/2017 12.2* 8.0 - 12.0 fL Final   • Platelets 09/07/2017 255  150 - 450 10*3/mm3 Final   • Glucose 09/07/2017 118* 60 - 100 mg/dL Final   • BUN 09/07/2017 14  7 - 21 mg/dL Final   • Creatinine 09/07/2017 0.99  0.70 - 1.30 mg/dL Final   • Sodium 09/07/2017 140  137 - 145 mmol/L Final   • Potassium 09/07/2017 3.7  3.5 - 5.1 mmol/L Final   • Chloride 09/07/2017 103  95 - 110 mmol/L Final   • CO2 09/07/2017 26.0  22.0 - 31.0 mmol/L Final   • Calcium 09/07/2017 8.9  8.4 - 10.2 mg/dL Final   • Total  Protein 09/07/2017 7.5  6.3 - 8.6 g/dL Final   • Albumin 09/07/2017 4.30  3.40 - 4.80 g/dL Final   • ALT (SGPT) 09/07/2017 56  21 - 72 U/L Final   • AST (SGOT) 09/07/2017 36  17 - 59 U/L Final   • Alkaline Phosphatase 09/07/2017 71  38 - 126 U/L Final   • Total Bilirubin 09/07/2017 0.8  0.2 - 1.3 mg/dL Final   • eGFR Non African Amer 09/07/2017 75  49 - 113 mL/min/1.73 Final   • Globulin 09/07/2017 3.2  2.3 - 3.5 gm/dL Final   • A/G Ratio 09/07/2017 1.3  1.1 - 1.8 g/dL Final   • BUN/Creatinine Ratio 09/07/2017 14.1  7.0 - 25.0 Final   • Anion Gap 09/07/2017 11.0  5.0 - 15.0 mmol/L Final   • Protime 09/07/2017 12.9  11.1 - 15.3 Seconds Final   • INR 09/07/2017 0.98  0.80 - 1.20 Final   • TSH 09/07/2017 2.420  0.460 - 4.680 mIU/mL Final   • Hemoglobin A1C 09/07/2017 6.1* 4 - 5.6 % Final   • ABO Type 09/07/2017 O   Final   • RH type 09/07/2017 Positive   Final   • Antibody Screen 09/07/2017 Negative   Final   • MRSA, PCR 09/07/2017 Negative  Negative Final   • Previous History 09/07/2017 Patient needs ABO/RH on day of surgery.   Final   Hospital Outpatient Visit on 08/30/2017   Component Date Value Ref Range Status   • BSA 08/30/2017 2.3  m^2 Preliminary   • BH CV ECHO KAYKAY - RVDD 08/30/2017 3.5  cm Preliminary   • IVSd 08/30/2017 1.6  cm Preliminary   • LVIDd 08/30/2017 4.5  cm Preliminary   • LVIDs 08/30/2017 3.1  cm Preliminary   • LVPWd 08/30/2017 1.7  cm Preliminary   • IVS/LVPW 08/30/2017 0.94   Preliminary   • FS 08/30/2017 31.5  % Preliminary   • EDV(Teich) 08/30/2017 90.1  ml Preliminary   • ESV(Teich) 08/30/2017 36.4  ml Preliminary   • EF(Teich) 08/30/2017 59.5  % Preliminary   • EDV(cubed) 08/30/2017 88.1  ml Preliminary   • ESV(cubed) 08/30/2017 28.4  ml Preliminary   • EF(cubed) 08/30/2017 67.8  % Preliminary   • LV mass(C)d 08/30/2017 314.6  grams Preliminary   • LV mass(C)dI 08/30/2017 136.9  grams/m^2 Preliminary   • SV(Teich) 08/30/2017 53.6  ml Preliminary   • SI(Teich) 08/30/2017 23.3  ml/m^2  Preliminary   • SV(cubed) 08/30/2017 59.7  ml Preliminary   • SI(cubed) 08/30/2017 26.0  ml/m^2 Preliminary   • EPSS 08/30/2017 0.2  cm Preliminary   • Ao root diam 08/30/2017 3.9  cm Preliminary   • Ao root area 08/30/2017 11.9  cm^2 Preliminary   • ACS 08/30/2017 2.1  cm Preliminary   • LA dimension 08/30/2017 4.6  cm Preliminary   • LA/Ao 08/30/2017 1.2   Preliminary   • Ao root area (BSA corrected) 08/30/2017 1.7   Preliminary   • MV E max felice 08/30/2017 104.0  cm/sec Preliminary   • MV A max felice 08/30/2017 104.0  cm/sec Preliminary   • MV E/A 08/30/2017 1.0   Preliminary   • Ao pk felice 08/30/2017 154.0  cm/sec Preliminary   • Ao max PG 08/30/2017 9.5  mmHg Preliminary   • Ao max PG (full) 08/30/2017 5.1  mmHg Preliminary   • Ao V2 mean 08/30/2017 102.0  cm/sec Preliminary   • Ao mean PG 08/30/2017 5.0  mmHg Preliminary   • Ao mean PG (full) 08/30/2017 3.0  mmHg Preliminary   • Ao V2 VTI 08/30/2017 33.1  cm Preliminary   • LV V1 max PG 08/30/2017 4.4  mmHg Preliminary   • LV V1 mean PG 08/30/2017 2.0  mmHg Preliminary   • LV V1 max 08/30/2017 105.0  cm/sec Preliminary   • LV V1 mean 08/30/2017 69.9  cm/sec Preliminary   • LV V1 VTI 08/30/2017 24.5  cm Preliminary   • MR max felice 08/30/2017 349.0  cm/sec Preliminary   • MR max PG 08/30/2017 48.7  mmHg Preliminary   • SV(Ao) 08/30/2017 395.4  ml Preliminary   • SI(Ao) 08/30/2017 172.1  ml/m^2 Preliminary   • PA V2 max 08/30/2017 86.9  cm/sec Preliminary   • PA max PG 08/30/2017 3.0  mmHg Preliminary   • PA V2 mean 08/30/2017 62.5  cm/sec Preliminary   • PA mean PG 08/30/2017 2.0  mmHg Preliminary   • PA V2 VTI 08/30/2017 20.4  cm Preliminary   • PI end-d felice 08/30/2017 100.0  cm/sec Preliminary   • TR max felice 08/30/2017 237.0  cm/sec Preliminary   • RVSP(TR) 08/30/2017 32.5  mmHg Preliminary   • RAP systole 08/30/2017 10.0  mmHg Preliminary   • BH CV ECHO KAYKAY - BZI_BMI 08/30/2017 42.3  kilograms/m^2 Preliminary   • BH CV ECHO [facility] - BSA([place] 08/30/2017 2.5  m^2  Preliminary   • BH CV ECHO KAYKAY - BZI_METRIC_WEIGHT 08/30/2017 122.5  kg Preliminary   •  CV ECHO KAYKAY - BZI_METRIC_HEIGHT 08/30/2017 170.2  cm Preliminary   • Echo EF Estimated 08/30/2017 55  % Final   ]
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
## 2017-11-06 ENCOUNTER — OFFICE VISIT (OUTPATIENT)
Dept: CARDIOLOGY | Facility: CLINIC | Age: 67
End: 2017-11-06

## 2017-11-06 VITALS
SYSTOLIC BLOOD PRESSURE: 122 MMHG | HEIGHT: 67 IN | HEART RATE: 56 BPM | WEIGHT: 255 LBS | BODY MASS INDEX: 40.02 KG/M2 | DIASTOLIC BLOOD PRESSURE: 76 MMHG

## 2017-11-06 DIAGNOSIS — Z95.1 S/P CABG (CORONARY ARTERY BYPASS GRAFT): ICD-10-CM

## 2017-11-06 DIAGNOSIS — I10 ESSENTIAL HYPERTENSION: ICD-10-CM

## 2017-11-06 DIAGNOSIS — I25.708 CORONARY ARTERY DISEASE OF BYPASS GRAFT OF NATIVE HEART WITH STABLE ANGINA PECTORIS (HCC): Primary | ICD-10-CM

## 2017-11-06 DIAGNOSIS — E78.01 FAMILIAL HYPERCHOLESTEROLEMIA: ICD-10-CM

## 2017-11-06 PROCEDURE — 99024 POSTOP FOLLOW-UP VISIT: CPT | Performed by: INTERNAL MEDICINE
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
## 2017-11-20 ENCOUNTER — OFFICE VISIT (OUTPATIENT)
Dept: FAMILY MEDICINE CLINIC | Facility: CLINIC | Age: 67
End: 2017-11-20

## 2017-11-20 VITALS
DIASTOLIC BLOOD PRESSURE: 90 MMHG | TEMPERATURE: 98 F | HEIGHT: 67 IN | OXYGEN SATURATION: 93 % | BODY MASS INDEX: 39.87 KG/M2 | SYSTOLIC BLOOD PRESSURE: 142 MMHG | HEART RATE: 62 BPM | WEIGHT: 254 LBS

## 2017-11-20 DIAGNOSIS — R07.89 PAIN OF STERNUM: Primary | ICD-10-CM

## 2017-11-20 DIAGNOSIS — R07.1 CHEST PAIN ON BREATHING: ICD-10-CM

## 2017-11-20 PROCEDURE — 99214 OFFICE O/P EST MOD 30 MIN: CPT | Performed by: NURSE PRACTITIONER

## 2017-11-20 RX ORDER — PREDNISONE 20 MG/1
20 TABLET ORAL 2 TIMES DAILY
Qty: 10 TABLET | Refills: 0 | Status: SHIPPED | OUTPATIENT
Start: 2017-11-20 | End: 2017-12-06

## 2017-11-20 NOTE — PROGRESS NOTES
"Chief Complaint   Patient presents with   • Breathing Problem     pain when breathing in deeply, x 2 weeks        Subjective   Nick Austin is a 66 y.o. male presents to the office for ***evaluation of ***.    PMH  ***  HPI   ***  HPI  Family History   Problem Relation Age of Onset   • No Known Problems Mother    • No Known Problems Father    • No Known Problems Sister    • No Known Problems Brother    • No Known Problems Daughter    • No Known Problems Son    • No Known Problems Maternal Aunt    • No Known Problems Maternal Uncle    • No Known Problems Paternal Aunt    • No Known Problems Paternal Uncle    • No Known Problems Maternal Grandmother    • No Known Problems Maternal Grandfather    • No Known Problems Paternal Grandmother    • No Known Problems Paternal Grandfather      Social History     Social History   • Marital status:      Spouse name: Jocelyne   • Number of children: 2   • Years of education: N/A     Occupational History   • Not on file.     Social History Main Topics   • Smoking status: Never Smoker   • Smokeless tobacco: Never Used   • Alcohol use No   • Drug use: No   • Sexual activity: Defer     Other Topics Concern   • Not on file     Social History Narrative       The following portions of the patient's history were reviewed and updated as appropriate: allergies, current medications, past family history, past medical history, past social history, past surgical history and problem list.    Review of Systems  14 Point ROS completed with pertinent positives discussed. All other systems reviewed and are negative.       Objective   Encounter Vitals  /90  Pulse 62  Temp 98 °F (36.7 °C) (Tympanic)   Ht 67\" (170.2 cm)  Wt 254 lb (115 kg)  SpO2 93%  BMI 39.78 kg/m2    Physical Exam    Pertinent Labs  Lab on 10/17/2017   Component Date Value Ref Range Status   • Free T4 10/17/2017 1.31  0.78 - 2.19 ng/dL Final   • Microalbumin/Creatinine Ratio 10/17/2017 15.9  0.0 - 30.0 " mg/g Final   • Creatinine, Urine 10/17/2017 313.9  mg/dL Final   • Microalbumin, Urine 10/17/2017 5.0  mg/L Final   • WBC 10/17/2017 5.16  3.20 - 9.80 10*3/mm3 Final   • RBC 10/17/2017 3.77* 4.37 - 5.74 10*6/mm3 Final   • Hemoglobin 10/17/2017 11.4* 13.7 - 17.3 g/dL Final   • Hematocrit 10/17/2017 35.4* 39.0 - 49.0 % Final   • MCV 10/17/2017 93.9  80.0 - 98.0 fL Final   • MCH 10/17/2017 30.2  26.5 - 34.0 pg Final   • MCHC 10/17/2017 32.2  31.5 - 36.3 g/dL Final   • RDW 10/17/2017 16.0* 11.5 - 14.5 % Final   • RDW-SD 10/17/2017 55.2* 35.1 - 43.9 fl Final   • MPV 10/17/2017 9.7  8.0 - 12.0 fL Final   • Platelets 10/17/2017 332  150 - 450 10*3/mm3 Final   • Neutrophil % 10/17/2017 54.9  37.0 - 80.0 % Final   • Lymphocyte % 10/17/2017 26.7  10.0 - 50.0 % Final   • Monocyte % 10/17/2017 8.1  0.0 - 12.0 % Final   • Eosinophil % 10/17/2017 9.7* 0.0 - 7.0 % Final   • Basophil % 10/17/2017 0.4  0.0 - 2.0 % Final   • Immature Grans % 10/17/2017 0.2  0.0 - 0.5 % Final   • Neutrophils, Absolute 10/17/2017 2.83  2.00 - 8.60 10*3/mm3 Final   • Lymphocytes, Absolute 10/17/2017 1.38  0.60 - 4.20 10*3/mm3 Final   • Monocytes, Absolute 10/17/2017 0.42  0.00 - 0.90 10*3/mm3 Final   • Eosinophils, Absolute 10/17/2017 0.50  0.00 - 0.70 10*3/mm3 Final   • Basophils, Absolute 10/17/2017 0.02  0.00 - 0.20 10*3/mm3 Final   • Immature Grans, Absolute 10/17/2017 0.01  0.00 - 0.02 10*3/mm3 Final   • nRBC 10/17/2017 0.0  0.0 - 0.0 /100 WBC Final   • 25 Hydroxy, Vitamin D 10/17/2017 19.1* 30.0 - 100.0 ng/ml Final   Orders Only on 10/04/2017   Component Date Value Ref Range Status   • Glucose 10/17/2017 103* 60 - 100 mg/dL Final   • BUN 10/17/2017 13  7 - 21 mg/dL Final   • Creatinine 10/17/2017 0.99  0.70 - 1.30 mg/dL Final   • Sodium 10/17/2017 143  137 - 145 mmol/L Final   • Potassium 10/17/2017 4.5  3.5 - 5.1 mmol/L Final   • Chloride 10/17/2017 105  95 - 110 mmol/L Final   • CO2 10/17/2017 27.0  22.0 - 31.0 mmol/L Final   • Calcium  10/17/2017 8.9  8.4 - 10.2 mg/dL Final   • Total Protein 10/17/2017 6.9  6.3 - 8.6 g/dL Final   • Albumin 10/17/2017 3.80  3.40 - 4.80 g/dL Final   • ALT (SGPT) 10/17/2017 78* 21 - 72 U/L Final   • AST (SGOT) 10/17/2017 44  17 - 59 U/L Final   • Alkaline Phosphatase 10/17/2017 86  38 - 126 U/L Final   • Total Bilirubin 10/17/2017 0.8  0.2 - 1.3 mg/dL Final   • eGFR Non African Amer 10/17/2017 75  >60 mL/min/1.73 Final   • Globulin 10/17/2017 3.1  2.3 - 3.5 gm/dL Final   • A/G Ratio 10/17/2017 1.2  1.1 - 1.8 g/dL Final   • BUN/Creatinine Ratio 10/17/2017 13.1  7.0 - 25.0 Final   • Anion Gap 10/17/2017 11.0  5.0 - 15.0 mmol/L Final   • LDL Cholesterol  10/17/2017 70  1 - 129 mg/dL Final   • TSH 10/17/2017 3.210  0.460 - 4.680 mIU/mL Final   • Hemoglobin A1C 10/17/2017 5.3  4 - 5.6 % Final   Lab on 10/02/2017   Component Date Value Ref Range Status   • WBC 10/02/2017 6.84  3.20 - 9.80 10*3/mm3 Final   • RBC 10/02/2017 3.81* 4.37 - 5.74 10*6/mm3 Final   • Hemoglobin 10/02/2017 11.4* 13.7 - 17.3 g/dL Final   • Hematocrit 10/02/2017 36.0* 39.0 - 49.0 % Final   • MCV 10/02/2017 94.5  80.0 - 98.0 fL Final   • MCH 10/02/2017 29.9  26.5 - 34.0 pg Final   • MCHC 10/02/2017 31.7  31.5 - 36.3 g/dL Final   • RDW 10/02/2017 15.2* 11.5 - 14.5 % Final   • RDW-SD 10/02/2017 51.7* 35.1 - 43.9 fl Final   • MPV 10/02/2017 9.4  8.0 - 12.0 fL Final   • Platelets 10/02/2017 407  150 - 450 10*3/mm3 Final   • Glucose 10/02/2017 105* 60 - 100 mg/dL Final   • BUN 10/02/2017 18  7 - 21 mg/dL Final   • Creatinine 10/02/2017 1.37* 0.70 - 1.30 mg/dL Final   • Sodium 10/02/2017 142  137 - 145 mmol/L Final   • Potassium 10/02/2017 4.8  3.5 - 5.1 mmol/L Final   • Chloride 10/02/2017 101  95 - 110 mmol/L Final   • CO2 10/02/2017 28.0  22.0 - 31.0 mmol/L Final   • Calcium 10/02/2017 9.0  8.4 - 10.2 mg/dL Final   • Total Protein 10/02/2017 7.8  6.3 - 8.6 g/dL Final   • Albumin 10/02/2017 4.30  3.40 - 4.80 g/dL Final   • ALT (SGPT) 10/02/2017 74* 21 - 72  U/L Final   • AST (SGOT) 10/02/2017 60* 17 - 59 U/L Final   • Alkaline Phosphatase 10/02/2017 99  38 - 126 U/L Final   • Total Bilirubin 10/02/2017 1.0  0.2 - 1.3 mg/dL Final   • eGFR Non African Amer 10/02/2017 52  49 - 113 mL/min/1.73 Final   • Globulin 10/02/2017 3.5  2.3 - 3.5 gm/dL Final   • A/G Ratio 10/02/2017 1.2  1.1 - 1.8 g/dL Final   • BUN/Creatinine Ratio 10/02/2017 13.1  7.0 - 25.0 Final   • Anion Gap 10/02/2017 13.0  5.0 - 15.0 mmol/L Final   • Hypochromia 10/02/2017 Slight/1+  None Seen Final   • WBC Morphology 10/02/2017 Normal  Normal Final   • Platelet Estimate 10/02/2017 Adequate  Normal Final   Admission on 09/14/2017, Discharged on 09/18/2017   No results displayed because visit has over 200 results.      Hospital Outpatient Visit on 09/08/2017   Component Date Value Ref Range Status   • DIST GSV THIGH DIST RIGHT 09/08/2017 0.37  cm Final   • MID GSV THIGH  RIGHT 09/08/2017 0.42  cm Final   • PROX GSV THIGH  RIGHT 09/08/2017 0.42  cm Final   • GSV KNEE DIST RIGHT 09/08/2017 0.34  cm Final   • PROX GSV CALF DIST RIGHT 09/08/2017 0.27  cm Final   • DIST GSV CALF DIST RIGHT 09/08/2017 0.31  cm Final   • GSV ANKLE DIST RIGHT 09/08/2017 0.33  cm Final   • PROX LSV CALF DIST RIGHT 09/08/2017 0.33  cm Final   • MID LSV CALF DIST RIGHT 09/08/2017 0.33  cm Final   • DIST LSV CALF DIST RIGHT 09/08/2017 0.34  cm Final   • DIST GSV THIGH DIST LEFT 09/08/2017 0.57  cm Final   • MID GSV THIGH  LEFT 09/08/2017 0.54  cm Final   • PROX GSV THIGH  LEFT 09/08/2017 0.68  cm Final   • GSV KNEE DIST LEFT 09/08/2017 0.45  cm Final   • PROX GSV CALF DIST LEFT 09/08/2017 0.38  cm Final   • DIST GSV CALF DIST LEFT 09/08/2017 0.41  cm Final   • GSV ANKLE DIST LEFT 09/08/2017 0.40  cm Final   • PROX LSV CALF DIST LEFT 09/08/2017 0.29  cm Final   • MID LSV CALF DIST LEFT 09/08/2017 0.29  cm Final   • DIST LSV CALF DIST LEFT 09/08/2017 0.27  cm Final   • MID GSV CALF DIST RIGHT 09/08/2017 0.30  cm Final   • MID GSV CALF DIST  LEFT 09/08/2017 0.32  cm Final   Hospital Outpatient Visit on 09/08/2017   Component Date Value Ref Range Status   • Prox CCA PSV 09/08/2017 105  cm/sec Final   • Prox CCA EDV 09/08/2017 19  cm/sec Final   • Dist CCA PSV 09/08/2017 82  cm/sec Final   • Dist CCA EDV 09/08/2017 22  cm/sec Final   • Prox ECA PSV 09/08/2017 111  cm/sec Final   • Prox ECA EDV 09/08/2017 14  cm/sec Final   • Prox ICA PSV 09/08/2017 71  cm/sec Final   • Prox ICA EDV 09/08/2017 16  cm/sec Final   • Mid ICA PSV 09/08/2017 76  cm/sec Final   • Mid ICA EDV 09/08/2017 23  cm/sec Final   • Dist ICA PSV 09/08/2017 59  cm/sec Final   • Dist ICA EDV 09/08/2017 19  cm/sec Final   • Vertebral A PSV 09/08/2017 46  cm/sec Final   • Vertebral A EDV 09/08/2017 12  cm/sec Final   • ICA/CCA ratio 09/08/2017 0.7   Final   • Prox CCA PSV 09/08/2017 143  cm/sec Final   • Prox CCA EDV 09/08/2017 29  cm/sec Final   • Dist CCA PSV 09/08/2017 96  cm/sec Final   • Dist CCA EDV 09/08/2017 23  cm/sec Final   • Prox ECA PSV 09/08/2017 133  cm/sec Final   • Prox ECA EDV 09/08/2017 17  cm/sec Final   • Prox ICA PSV 09/08/2017 82  cm/sec Final   • Prox ICA EDV 09/08/2017 17  cm/sec Final   • Mid ICA PSV 09/08/2017 94  cm/sec Final   • Mid ICA EDV 09/08/2017 32  cm/sec Final   • Dist ICA PSV 09/08/2017 52  cm/sec Final   • Dist ICA EDV 09/08/2017 23  cm/sec Final   • Vertebral A PSV 09/08/2017 64  cm/sec Final   • Vertebral A EDV 09/08/2017 16  cm/sec Final   • ICA/CCA ratio 09/08/2017 0.7   Final   • Diastolic ICA/CCA Ratio 09/08/2017 1.2   Final   • ICA/CCA diastolic ratio 09/08/2017 1.1   Final   Admission on 09/07/2017, Discharged on 09/07/2017   Component Date Value Ref Range Status   • WBC 09/07/2017 6.75  3.20 - 9.80 10*3/mm3 Final   • RBC 09/07/2017 4.85  4.37 - 5.74 10*6/mm3 Final   • Hemoglobin 09/07/2017 15.2  13.7 - 17.3 g/dL Final   • Hematocrit 09/07/2017 44.9  39.0 - 49.0 % Final   • MCV 09/07/2017 92.6  80.0 - 98.0 fL Final   • MCH 09/07/2017 31.3  26.5 -  34.0 pg Final   • MCHC 09/07/2017 33.9  31.5 - 36.3 g/dL Final   • RDW 09/07/2017 13.7  11.5 - 14.5 % Final   • RDW-SD 09/07/2017 46.4* 35.1 - 43.9 fl Final   • MPV 09/07/2017 12.2* 8.0 - 12.0 fL Final   • Platelets 09/07/2017 255  150 - 450 10*3/mm3 Final   • Glucose 09/07/2017 118* 60 - 100 mg/dL Final   • BUN 09/07/2017 14  7 - 21 mg/dL Final   • Creatinine 09/07/2017 0.99  0.70 - 1.30 mg/dL Final   • Sodium 09/07/2017 140  137 - 145 mmol/L Final   • Potassium 09/07/2017 3.7  3.5 - 5.1 mmol/L Final   • Chloride 09/07/2017 103  95 - 110 mmol/L Final   • CO2 09/07/2017 26.0  22.0 - 31.0 mmol/L Final   • Calcium 09/07/2017 8.9  8.4 - 10.2 mg/dL Final   • Total Protein 09/07/2017 7.5  6.3 - 8.6 g/dL Final   • Albumin 09/07/2017 4.30  3.40 - 4.80 g/dL Final   • ALT (SGPT) 09/07/2017 56  21 - 72 U/L Final   • AST (SGOT) 09/07/2017 36  17 - 59 U/L Final   • Alkaline Phosphatase 09/07/2017 71  38 - 126 U/L Final   • Total Bilirubin 09/07/2017 0.8  0.2 - 1.3 mg/dL Final   • eGFR Non African Amer 09/07/2017 75  49 - 113 mL/min/1.73 Final   • Globulin 09/07/2017 3.2  2.3 - 3.5 gm/dL Final   • A/G Ratio 09/07/2017 1.3  1.1 - 1.8 g/dL Final   • BUN/Creatinine Ratio 09/07/2017 14.1  7.0 - 25.0 Final   • Anion Gap 09/07/2017 11.0  5.0 - 15.0 mmol/L Final   • Protime 09/07/2017 12.9  11.1 - 15.3 Seconds Final   • INR 09/07/2017 0.98  0.80 - 1.20 Final   • TSH 09/07/2017 2.420  0.460 - 4.680 mIU/mL Final   • Hemoglobin A1C 09/07/2017 6.1* 4 - 5.6 % Final   • ABO Type 09/07/2017 O   Final   • RH type 09/07/2017 Positive   Final   • Antibody Screen 09/07/2017 Negative   Final   • MRSA, PCR 09/07/2017 Negative  Negative Final   • Previous History 09/07/2017 Patient needs ABO/RH on day of surgery.   Final   Hospital Outpatient Visit on 08/30/2017   Component Date Value Ref Range Status   • BSA 08/30/2017 2.3  m^2 Preliminary   •  CV ECHO KAYKAY - RVDD 08/30/2017 3.5  cm Preliminary   • IVSd 08/30/2017 1.6  cm Preliminary   • LVIDd  08/30/2017 4.5  cm Preliminary   • LVIDs 08/30/2017 3.1  cm Preliminary   • LVPWd 08/30/2017 1.7  cm Preliminary   • IVS/LVPW 08/30/2017 0.94   Preliminary   • FS 08/30/2017 31.5  % Preliminary   • EDV(Teich) 08/30/2017 90.1  ml Preliminary   • ESV(Teich) 08/30/2017 36.4  ml Preliminary   • EF(Teich) 08/30/2017 59.5  % Preliminary   • EDV(cubed) 08/30/2017 88.1  ml Preliminary   • ESV(cubed) 08/30/2017 28.4  ml Preliminary   • EF(cubed) 08/30/2017 67.8  % Preliminary   • LV mass(C)d 08/30/2017 314.6  grams Preliminary   • LV mass(C)dI 08/30/2017 136.9  grams/m^2 Preliminary   • SV(Teich) 08/30/2017 53.6  ml Preliminary   • SI(Teich) 08/30/2017 23.3  ml/m^2 Preliminary   • SV(cubed) 08/30/2017 59.7  ml Preliminary   • SI(cubed) 08/30/2017 26.0  ml/m^2 Preliminary   • EPSS 08/30/2017 0.2  cm Preliminary   • Ao root diam 08/30/2017 3.9  cm Preliminary   • Ao root area 08/30/2017 11.9  cm^2 Preliminary   • ACS 08/30/2017 2.1  cm Preliminary   • LA dimension 08/30/2017 4.6  cm Preliminary   • LA/Ao 08/30/2017 1.2   Preliminary   • Ao root area (BSA corrected) 08/30/2017 1.7   Preliminary   • MV E max felice 08/30/2017 104.0  cm/sec Preliminary   • MV A max felice 08/30/2017 104.0  cm/sec Preliminary   • MV E/A 08/30/2017 1.0   Preliminary   • Ao pk felice 08/30/2017 154.0  cm/sec Preliminary   • Ao max PG 08/30/2017 9.5  mmHg Preliminary   • Ao max PG (full) 08/30/2017 5.1  mmHg Preliminary   • Ao V2 mean 08/30/2017 102.0  cm/sec Preliminary   • Ao mean PG 08/30/2017 5.0  mmHg Preliminary   • Ao mean PG (full) 08/30/2017 3.0  mmHg Preliminary   • Ao V2 VTI 08/30/2017 33.1  cm Preliminary   • LV V1 max PG 08/30/2017 4.4  mmHg Preliminary   • LV V1 mean PG 08/30/2017 2.0  mmHg Preliminary   • LV V1 max 08/30/2017 105.0  cm/sec Preliminary   • LV V1 mean 08/30/2017 69.9  cm/sec Preliminary   • LV V1 VTI 08/30/2017 24.5  cm Preliminary   • MR max felice 08/30/2017 349.0  cm/sec Preliminary   • MR max PG 08/30/2017 48.7  mmHg Preliminary    • SV(Ao) 08/30/2017 395.4  ml Preliminary   • SI(Ao) 08/30/2017 172.1  ml/m^2 Preliminary   • PA V2 max 08/30/2017 86.9  cm/sec Preliminary   • PA max PG 08/30/2017 3.0  mmHg Preliminary   • PA V2 mean 08/30/2017 62.5  cm/sec Preliminary   • PA mean PG 08/30/2017 2.0  mmHg Preliminary   • PA V2 VTI 08/30/2017 20.4  cm Preliminary   • PI end-d felice 08/30/2017 100.0  cm/sec Preliminary   • TR max felice 08/30/2017 237.0  cm/sec Preliminary   • RVSP(TR) 08/30/2017 32.5  mmHg Preliminary   • RAP systole 08/30/2017 10.0  mmHg Preliminary   • BH CV ECHO KAYKAY - BZI_BMI 08/30/2017 42.3  kilograms/m^2 Preliminary   • BH CV ECHO KAYKAY - BSA(HAYCOCK) 08/30/2017 2.5  m^2 Preliminary   • BH CV ECHO KAYKAY - BZI_METRIC_WEIGHT 08/30/2017 122.5  kg Preliminary   • BH CV ECHO KAYKAY - BZI_METRIC_HEIGHT 08/30/2017 170.2  cm Preliminary   • Echo EF Estimated 08/30/2017 55  % Final     Labs have been independently reviewed    Key Imaging/Tracings/POC Testing  ***  Assessment and Medications  Problems Addressed this Visit     None        Side effects of ordered medications discussed with patient.     Plan/Additional Notes/Instructions  Plan   1. ***    Follow-Up  No Follow-up on file.    Patient/caregiver verbalizes understanding of all orders and instructions in this plan of care.       This document has been electronically signed by RANDEE Banks on November 20, 2017 3:41 PM

## 2017-11-20 NOTE — PROGRESS NOTES
"Chief Complaint   Patient presents with   • Breathing Problem     pain when breathing in deeply, x 2 weeks         Subjective   Nick Austin is a 66 y.o. male presents to the office for evaluation of sternal chest pain near closed CABG incision site X 2 weeks.    PMH  HTN- well controlled with metoprolol    Hyperlipidemia- manages with atorvastatin    Hypothyroidism- managed with synthroid    CABG- plavix, ASA; cardiologist- Dr. Morgan, now followed by Dr. Jay    BPH- managed with flomax    Asthma- managed with breo, albuterol    GERD- managed with prilosec    HPI   Patient presents with sternal pain that is elicited by deep breathing. He describes the pain as \"feeling like I cant catch my breath\" and \"like I lose my air\". He rates his discomfort while sitting as 0/10 and 3/10 with deep inhalation.  He denies fever, body aches, palpitations, left chest pain, radiating pain, dizziness, diaphoresis, tingling, numbness, confusion, and feelings of impending doom.       HPI  Family History   Problem Relation Age of Onset   • No Known Problems Mother    • No Known Problems Father    • No Known Problems Sister    • No Known Problems Brother    • No Known Problems Daughter    • No Known Problems Son    • No Known Problems Maternal Aunt    • No Known Problems Maternal Uncle    • No Known Problems Paternal Aunt    • No Known Problems Paternal Uncle    • No Known Problems Maternal Grandmother    • No Known Problems Maternal Grandfather    • No Known Problems Paternal Grandmother    • No Known Problems Paternal Grandfather      Social History     Social History   • Marital status:      Spouse name: Jocelyne   • Number of children: 2   • Years of education: N/A     Occupational History   • Not on file.     Social History Main Topics   • Smoking status: Never Smoker   • Smokeless tobacco: Never Used   • Alcohol use No   • Drug use: No   • Sexual activity: Defer     Other Topics Concern   • Not on file " "    Social History Narrative       The following portions of the patient's history were reviewed and updated as appropriate: allergies, current medications, past family history, past medical history, past social history, past surgical history and problem list.    Review of Systems   Constitutional: Negative.  Negative for activity change, appetite change, chills, fatigue, fever and unexpected weight change.   HENT: Negative.  Negative for congestion, drooling, facial swelling, postnasal drip, rhinorrhea, sinus pressure, sore throat, trouble swallowing and voice change.    Eyes: Negative.  Negative for photophobia, pain, discharge and visual disturbance.   Respiratory: Positive for shortness of breath. Negative for apnea, cough, choking and wheezing.    Cardiovascular: Negative.  Negative for chest pain, palpitations and leg swelling.   Gastrointestinal: Negative.  Negative for abdominal distention, abdominal pain, constipation, diarrhea, nausea and vomiting.   Endocrine: Negative.  Negative for polyphagia and polyuria.   Genitourinary: Negative.  Negative for decreased urine volume, difficulty urinating and dysuria.   Musculoskeletal: Negative.  Negative for arthralgias, gait problem and myalgias.   Skin: Negative.  Negative for rash and wound.   Allergic/Immunologic: Negative.    Neurological: Negative.  Negative for dizziness, syncope, weakness, light-headedness, numbness and headaches.   Hematological: Negative.    Psychiatric/Behavioral: Negative.  Negative for confusion, decreased concentration and sleep disturbance. The patient is not nervous/anxious.      14 Point ROS completed with pertinent positives discussed. All other systems reviewed and are negative.       Objective   Encounter Vitals  /90  Pulse 62  Temp 98 °F (36.7 °C) (Tympanic)   Ht 67\" (170.2 cm)  Wt 254 lb (115 kg)  SpO2 93%  BMI 39.78 kg/m2    Physical Exam   Constitutional: He is oriented to person, place, and time. He appears " well-developed and well-nourished.   HENT:   Head: Normocephalic and atraumatic.   Right Ear: External ear normal.   Left Ear: External ear normal.   Nose: Nose normal.   Mouth/Throat: Oropharynx is clear and moist.   Eyes: Conjunctivae and EOM are normal. Pupils are equal, round, and reactive to light.   Neck: Normal range of motion. Neck supple.   Cardiovascular: Normal rate, regular rhythm, normal heart sounds and intact distal pulses.  Exam reveals no gallop and no friction rub.    No murmur heard.  CABG incision- closed without s/sx of infection   Pulmonary/Chest: Effort normal and breath sounds normal. He has no wheezes. He has no rales.       CABG incision- closed without s/sx of infection  Purple box denotes painful area   Abdominal: Soft. Bowel sounds are normal. He exhibits no mass. There is no tenderness. There is no rebound and no guarding.   Musculoskeletal: Normal range of motion.   Neurological: He is alert and oriented to person, place, and time. He has normal reflexes. No cranial nerve deficit. He exhibits normal muscle tone.   Skin: Skin is warm and dry. No rash noted.   Psychiatric: He has a normal mood and affect. His behavior is normal. Judgment and thought content normal.   Nursing note and vitals reviewed.      Pertinent Labs  Lab on 10/17/2017   Component Date Value Ref Range Status   • Free T4 10/17/2017 1.31  0.78 - 2.19 ng/dL Final   • Microalbumin/Creatinine Ratio 10/17/2017 15.9  0.0 - 30.0 mg/g Final   • Creatinine, Urine 10/17/2017 313.9  mg/dL Final   • Microalbumin, Urine 10/17/2017 5.0  mg/L Final   • WBC 10/17/2017 5.16  3.20 - 9.80 10*3/mm3 Final   • RBC 10/17/2017 3.77* 4.37 - 5.74 10*6/mm3 Final   • Hemoglobin 10/17/2017 11.4* 13.7 - 17.3 g/dL Final   • Hematocrit 10/17/2017 35.4* 39.0 - 49.0 % Final   • MCV 10/17/2017 93.9  80.0 - 98.0 fL Final   • MCH 10/17/2017 30.2  26.5 - 34.0 pg Final   • MCHC 10/17/2017 32.2  31.5 - 36.3 g/dL Final   • RDW 10/17/2017 16.0* 11.5 - 14.5 %  Final   • RDW-SD 10/17/2017 55.2* 35.1 - 43.9 fl Final   • MPV 10/17/2017 9.7  8.0 - 12.0 fL Final   • Platelets 10/17/2017 332  150 - 450 10*3/mm3 Final   • Neutrophil % 10/17/2017 54.9  37.0 - 80.0 % Final   • Lymphocyte % 10/17/2017 26.7  10.0 - 50.0 % Final   • Monocyte % 10/17/2017 8.1  0.0 - 12.0 % Final   • Eosinophil % 10/17/2017 9.7* 0.0 - 7.0 % Final   • Basophil % 10/17/2017 0.4  0.0 - 2.0 % Final   • Immature Grans % 10/17/2017 0.2  0.0 - 0.5 % Final   • Neutrophils, Absolute 10/17/2017 2.83  2.00 - 8.60 10*3/mm3 Final   • Lymphocytes, Absolute 10/17/2017 1.38  0.60 - 4.20 10*3/mm3 Final   • Monocytes, Absolute 10/17/2017 0.42  0.00 - 0.90 10*3/mm3 Final   • Eosinophils, Absolute 10/17/2017 0.50  0.00 - 0.70 10*3/mm3 Final   • Basophils, Absolute 10/17/2017 0.02  0.00 - 0.20 10*3/mm3 Final   • Immature Grans, Absolute 10/17/2017 0.01  0.00 - 0.02 10*3/mm3 Final   • nRBC 10/17/2017 0.0  0.0 - 0.0 /100 WBC Final   • 25 Hydroxy, Vitamin D 10/17/2017 19.1* 30.0 - 100.0 ng/ml Final   Orders Only on 10/04/2017   Component Date Value Ref Range Status   • Glucose 10/17/2017 103* 60 - 100 mg/dL Final   • BUN 10/17/2017 13  7 - 21 mg/dL Final   • Creatinine 10/17/2017 0.99  0.70 - 1.30 mg/dL Final   • Sodium 10/17/2017 143  137 - 145 mmol/L Final   • Potassium 10/17/2017 4.5  3.5 - 5.1 mmol/L Final   • Chloride 10/17/2017 105  95 - 110 mmol/L Final   • CO2 10/17/2017 27.0  22.0 - 31.0 mmol/L Final   • Calcium 10/17/2017 8.9  8.4 - 10.2 mg/dL Final   • Total Protein 10/17/2017 6.9  6.3 - 8.6 g/dL Final   • Albumin 10/17/2017 3.80  3.40 - 4.80 g/dL Final   • ALT (SGPT) 10/17/2017 78* 21 - 72 U/L Final   • AST (SGOT) 10/17/2017 44  17 - 59 U/L Final   • Alkaline Phosphatase 10/17/2017 86  38 - 126 U/L Final   • Total Bilirubin 10/17/2017 0.8  0.2 - 1.3 mg/dL Final   • eGFR Non African Amer 10/17/2017 75  >60 mL/min/1.73 Final   • Globulin 10/17/2017 3.1  2.3 - 3.5 gm/dL Final   • A/G Ratio 10/17/2017 1.2  1.1 - 1.8  g/dL Final   • BUN/Creatinine Ratio 10/17/2017 13.1  7.0 - 25.0 Final   • Anion Gap 10/17/2017 11.0  5.0 - 15.0 mmol/L Final   • LDL Cholesterol  10/17/2017 70  1 - 129 mg/dL Final   • TSH 10/17/2017 3.210  0.460 - 4.680 mIU/mL Final   • Hemoglobin A1C 10/17/2017 5.3  4 - 5.6 % Final   Lab on 10/02/2017   Component Date Value Ref Range Status   • WBC 10/02/2017 6.84  3.20 - 9.80 10*3/mm3 Final   • RBC 10/02/2017 3.81* 4.37 - 5.74 10*6/mm3 Final   • Hemoglobin 10/02/2017 11.4* 13.7 - 17.3 g/dL Final   • Hematocrit 10/02/2017 36.0* 39.0 - 49.0 % Final   • MCV 10/02/2017 94.5  80.0 - 98.0 fL Final   • MCH 10/02/2017 29.9  26.5 - 34.0 pg Final   • MCHC 10/02/2017 31.7  31.5 - 36.3 g/dL Final   • RDW 10/02/2017 15.2* 11.5 - 14.5 % Final   • RDW-SD 10/02/2017 51.7* 35.1 - 43.9 fl Final   • MPV 10/02/2017 9.4  8.0 - 12.0 fL Final   • Platelets 10/02/2017 407  150 - 450 10*3/mm3 Final   • Glucose 10/02/2017 105* 60 - 100 mg/dL Final   • BUN 10/02/2017 18  7 - 21 mg/dL Final   • Creatinine 10/02/2017 1.37* 0.70 - 1.30 mg/dL Final   • Sodium 10/02/2017 142  137 - 145 mmol/L Final   • Potassium 10/02/2017 4.8  3.5 - 5.1 mmol/L Final   • Chloride 10/02/2017 101  95 - 110 mmol/L Final   • CO2 10/02/2017 28.0  22.0 - 31.0 mmol/L Final   • Calcium 10/02/2017 9.0  8.4 - 10.2 mg/dL Final   • Total Protein 10/02/2017 7.8  6.3 - 8.6 g/dL Final   • Albumin 10/02/2017 4.30  3.40 - 4.80 g/dL Final   • ALT (SGPT) 10/02/2017 74* 21 - 72 U/L Final   • AST (SGOT) 10/02/2017 60* 17 - 59 U/L Final   • Alkaline Phosphatase 10/02/2017 99  38 - 126 U/L Final   • Total Bilirubin 10/02/2017 1.0  0.2 - 1.3 mg/dL Final   • eGFR Non African Amer 10/02/2017 52  49 - 113 mL/min/1.73 Final   • Globulin 10/02/2017 3.5  2.3 - 3.5 gm/dL Final   • A/G Ratio 10/02/2017 1.2  1.1 - 1.8 g/dL Final   • BUN/Creatinine Ratio 10/02/2017 13.1  7.0 - 25.0 Final   • Anion Gap 10/02/2017 13.0  5.0 - 15.0 mmol/L Final   • Hypochromia 10/02/2017 Slight/1+  None Seen Final    • WBC Morphology 10/02/2017 Normal  Normal Final   • Platelet Estimate 10/02/2017 Adequate  Normal Final   Admission on 09/14/2017, Discharged on 09/18/2017   No results displayed because visit has over 200 results.      Hospital Outpatient Visit on 09/08/2017   Component Date Value Ref Range Status   • DIST GSV THIGH DIST RIGHT 09/08/2017 0.37  cm Final   • MID GSV THIGH  RIGHT 09/08/2017 0.42  cm Final   • PROX GSV THIGH  RIGHT 09/08/2017 0.42  cm Final   • GSV KNEE DIST RIGHT 09/08/2017 0.34  cm Final   • PROX GSV CALF DIST RIGHT 09/08/2017 0.27  cm Final   • DIST GSV CALF DIST RIGHT 09/08/2017 0.31  cm Final   • GSV ANKLE DIST RIGHT 09/08/2017 0.33  cm Final   • PROX LSV CALF DIST RIGHT 09/08/2017 0.33  cm Final   • MID LSV CALF DIST RIGHT 09/08/2017 0.33  cm Final   • DIST LSV CALF DIST RIGHT 09/08/2017 0.34  cm Final   • DIST GSV THIGH DIST LEFT 09/08/2017 0.57  cm Final   • MID GSV THIGH  LEFT 09/08/2017 0.54  cm Final   • PROX GSV THIGH  LEFT 09/08/2017 0.68  cm Final   • GSV KNEE DIST LEFT 09/08/2017 0.45  cm Final   • PROX GSV CALF DIST LEFT 09/08/2017 0.38  cm Final   • DIST GSV CALF DIST LEFT 09/08/2017 0.41  cm Final   • GSV ANKLE DIST LEFT 09/08/2017 0.40  cm Final   • PROX LSV CALF DIST LEFT 09/08/2017 0.29  cm Final   • MID LSV CALF DIST LEFT 09/08/2017 0.29  cm Final   • DIST LSV CALF DIST LEFT 09/08/2017 0.27  cm Final   • MID GSV CALF DIST RIGHT 09/08/2017 0.30  cm Final   • MID GSV CALF DIST LEFT 09/08/2017 0.32  cm Final   Hospital Outpatient Visit on 09/08/2017   Component Date Value Ref Range Status   • Prox CCA PSV 09/08/2017 105  cm/sec Final   • Prox CCA EDV 09/08/2017 19  cm/sec Final   • Dist CCA PSV 09/08/2017 82  cm/sec Final   • Dist CCA EDV 09/08/2017 22  cm/sec Final   • Prox ECA PSV 09/08/2017 111  cm/sec Final   • Prox ECA EDV 09/08/2017 14  cm/sec Final   • Prox ICA PSV 09/08/2017 71  cm/sec Final   • Prox ICA EDV 09/08/2017 16  cm/sec Final   • Mid ICA PSV 09/08/2017 76  cm/sec  Final   • Mid ICA EDV 09/08/2017 23  cm/sec Final   • Dist ICA PSV 09/08/2017 59  cm/sec Final   • Dist ICA EDV 09/08/2017 19  cm/sec Final   • Vertebral A PSV 09/08/2017 46  cm/sec Final   • Vertebral A EDV 09/08/2017 12  cm/sec Final   • ICA/CCA ratio 09/08/2017 0.7   Final   • Prox CCA PSV 09/08/2017 143  cm/sec Final   • Prox CCA EDV 09/08/2017 29  cm/sec Final   • Dist CCA PSV 09/08/2017 96  cm/sec Final   • Dist CCA EDV 09/08/2017 23  cm/sec Final   • Prox ECA PSV 09/08/2017 133  cm/sec Final   • Prox ECA EDV 09/08/2017 17  cm/sec Final   • Prox ICA PSV 09/08/2017 82  cm/sec Final   • Prox ICA EDV 09/08/2017 17  cm/sec Final   • Mid ICA PSV 09/08/2017 94  cm/sec Final   • Mid ICA EDV 09/08/2017 32  cm/sec Final   • Dist ICA PSV 09/08/2017 52  cm/sec Final   • Dist ICA EDV 09/08/2017 23  cm/sec Final   • Vertebral A PSV 09/08/2017 64  cm/sec Final   • Vertebral A EDV 09/08/2017 16  cm/sec Final   • ICA/CCA ratio 09/08/2017 0.7   Final   • Diastolic ICA/CCA Ratio 09/08/2017 1.2   Final   • ICA/CCA diastolic ratio 09/08/2017 1.1   Final   Admission on 09/07/2017, Discharged on 09/07/2017   Component Date Value Ref Range Status   • WBC 09/07/2017 6.75  3.20 - 9.80 10*3/mm3 Final   • RBC 09/07/2017 4.85  4.37 - 5.74 10*6/mm3 Final   • Hemoglobin 09/07/2017 15.2  13.7 - 17.3 g/dL Final   • Hematocrit 09/07/2017 44.9  39.0 - 49.0 % Final   • MCV 09/07/2017 92.6  80.0 - 98.0 fL Final   • MCH 09/07/2017 31.3  26.5 - 34.0 pg Final   • MCHC 09/07/2017 33.9  31.5 - 36.3 g/dL Final   • RDW 09/07/2017 13.7  11.5 - 14.5 % Final   • RDW-SD 09/07/2017 46.4* 35.1 - 43.9 fl Final   • MPV 09/07/2017 12.2* 8.0 - 12.0 fL Final   • Platelets 09/07/2017 255  150 - 450 10*3/mm3 Final   • Glucose 09/07/2017 118* 60 - 100 mg/dL Final   • BUN 09/07/2017 14  7 - 21 mg/dL Final   • Creatinine 09/07/2017 0.99  0.70 - 1.30 mg/dL Final   • Sodium 09/07/2017 140  137 - 145 mmol/L Final   • Potassium 09/07/2017 3.7  3.5 - 5.1 mmol/L Final   •  Chloride 09/07/2017 103  95 - 110 mmol/L Final   • CO2 09/07/2017 26.0  22.0 - 31.0 mmol/L Final   • Calcium 09/07/2017 8.9  8.4 - 10.2 mg/dL Final   • Total Protein 09/07/2017 7.5  6.3 - 8.6 g/dL Final   • Albumin 09/07/2017 4.30  3.40 - 4.80 g/dL Final   • ALT (SGPT) 09/07/2017 56  21 - 72 U/L Final   • AST (SGOT) 09/07/2017 36  17 - 59 U/L Final   • Alkaline Phosphatase 09/07/2017 71  38 - 126 U/L Final   • Total Bilirubin 09/07/2017 0.8  0.2 - 1.3 mg/dL Final   • eGFR Non African Amer 09/07/2017 75  49 - 113 mL/min/1.73 Final   • Globulin 09/07/2017 3.2  2.3 - 3.5 gm/dL Final   • A/G Ratio 09/07/2017 1.3  1.1 - 1.8 g/dL Final   • BUN/Creatinine Ratio 09/07/2017 14.1  7.0 - 25.0 Final   • Anion Gap 09/07/2017 11.0  5.0 - 15.0 mmol/L Final   • Protime 09/07/2017 12.9  11.1 - 15.3 Seconds Final   • INR 09/07/2017 0.98  0.80 - 1.20 Final   • TSH 09/07/2017 2.420  0.460 - 4.680 mIU/mL Final   • Hemoglobin A1C 09/07/2017 6.1* 4 - 5.6 % Final   • ABO Type 09/07/2017 O   Final   • RH type 09/07/2017 Positive   Final   • Antibody Screen 09/07/2017 Negative   Final   • MRSA, PCR 09/07/2017 Negative  Negative Final   • Previous History 09/07/2017 Patient needs ABO/RH on day of surgery.   Final   Hospital Outpatient Visit on 08/30/2017   Component Date Value Ref Range Status   • BSA 08/30/2017 2.3  m^2 Preliminary   • BH CV ECHO KAYKAY - RVDD 08/30/2017 3.5  cm Preliminary   • IVSd 08/30/2017 1.6  cm Preliminary   • LVIDd 08/30/2017 4.5  cm Preliminary   • LVIDs 08/30/2017 3.1  cm Preliminary   • LVPWd 08/30/2017 1.7  cm Preliminary   • IVS/LVPW 08/30/2017 0.94   Preliminary   • FS 08/30/2017 31.5  % Preliminary   • EDV(Teich) 08/30/2017 90.1  ml Preliminary   • ESV(Teich) 08/30/2017 36.4  ml Preliminary   • EF(Teich) 08/30/2017 59.5  % Preliminary   • EDV(cubed) 08/30/2017 88.1  ml Preliminary   • ESV(cubed) 08/30/2017 28.4  ml Preliminary   • EF(cubed) 08/30/2017 67.8  % Preliminary   • LV mass(C)d 08/30/2017 314.6  grams  Preliminary   • LV mass(C)dI 08/30/2017 136.9  grams/m^2 Preliminary   • SV(Teich) 08/30/2017 53.6  ml Preliminary   • SI(Teich) 08/30/2017 23.3  ml/m^2 Preliminary   • SV(cubed) 08/30/2017 59.7  ml Preliminary   • SI(cubed) 08/30/2017 26.0  ml/m^2 Preliminary   • EPSS 08/30/2017 0.2  cm Preliminary   • Ao root diam 08/30/2017 3.9  cm Preliminary   • Ao root area 08/30/2017 11.9  cm^2 Preliminary   • ACS 08/30/2017 2.1  cm Preliminary   • LA dimension 08/30/2017 4.6  cm Preliminary   • LA/Ao 08/30/2017 1.2   Preliminary   • Ao root area (BSA corrected) 08/30/2017 1.7   Preliminary   • MV E max felice 08/30/2017 104.0  cm/sec Preliminary   • MV A max felice 08/30/2017 104.0  cm/sec Preliminary   • MV E/A 08/30/2017 1.0   Preliminary   • Ao pk felice 08/30/2017 154.0  cm/sec Preliminary   • Ao max PG 08/30/2017 9.5  mmHg Preliminary   • Ao max PG (full) 08/30/2017 5.1  mmHg Preliminary   • Ao V2 mean 08/30/2017 102.0  cm/sec Preliminary   • Ao mean PG 08/30/2017 5.0  mmHg Preliminary   • Ao mean PG (full) 08/30/2017 3.0  mmHg Preliminary   • Ao V2 VTI 08/30/2017 33.1  cm Preliminary   • LV V1 max PG 08/30/2017 4.4  mmHg Preliminary   • LV V1 mean PG 08/30/2017 2.0  mmHg Preliminary   • LV V1 max 08/30/2017 105.0  cm/sec Preliminary   • LV V1 mean 08/30/2017 69.9  cm/sec Preliminary   • LV V1 VTI 08/30/2017 24.5  cm Preliminary   • MR max felice 08/30/2017 349.0  cm/sec Preliminary   • MR max PG 08/30/2017 48.7  mmHg Preliminary   • SV(Ao) 08/30/2017 395.4  ml Preliminary   • SI(Ao) 08/30/2017 172.1  ml/m^2 Preliminary   • PA V2 max 08/30/2017 86.9  cm/sec Preliminary   • PA max PG 08/30/2017 3.0  mmHg Preliminary   • PA V2 mean 08/30/2017 62.5  cm/sec Preliminary   • PA mean PG 08/30/2017 2.0  mmHg Preliminary   • PA V2 VTI 08/30/2017 20.4  cm Preliminary   • PI end-d felice 08/30/2017 100.0  cm/sec Preliminary   • TR max felice 08/30/2017 237.0  cm/sec Preliminary   • RVSP(TR) 08/30/2017 32.5  mmHg Preliminary   • RAP systole  08/30/2017 10.0  mmHg Preliminary   • BH CV ECHO KAYKAY - BZI_BMI 08/30/2017 42.3  kilograms/m^2 Preliminary   • BH CV ECHO KAYKAY - BSA(HAYCOCK) 08/30/2017 2.5  m^2 Preliminary   • BH CV ECHO KAYKAY - BZI_METRIC_WEIGHT 08/30/2017 122.5  kg Preliminary   • BH CV ECHO KAYKAY - BZI_METRIC_HEIGHT 08/30/2017 170.2  cm Preliminary   • Echo EF Estimated 08/30/2017 55  % Final     Labs have been independently reviewed    Key Imaging/Tracings/POC Testing  none  Assessment and Medications  Problems Addressed this Visit     None      Visit Diagnoses     Pain of sternum    -  Primary    Relevant Orders    CT Chest Without Contrast    Chest pain on breathing        Relevant Orders    CT Chest Without Contrast        Side effects of ordered medications discussed with patient.     Plan/Additional Notes/Instructions  Plan   1. Discussed patient s/sx with PCP  2. CT of chest tbc before Wed.  3. Prednisone BID X 5 days  4. If you experience respiratory distress, chest pain, or feelings of impending doom, call 911 or have someone drive you to the nearest ER.    Follow-Up  Return for After ordered studies are completed.    Patient/caregiver verbalizes understanding of all orders and instructions in this plan of care.       This document has been electronically signed by RANDEE Banks on November 21, 2017 8:22 AM

## 2017-11-21 ENCOUNTER — TELEPHONE (OUTPATIENT)
Dept: FAMILY MEDICINE CLINIC | Facility: CLINIC | Age: 67
End: 2017-11-21

## 2017-11-21 NOTE — TELEPHONE ENCOUNTER
Patient was called this morning to remind him of his appt today at 10am for his CT scan. He said he is going to to appt.

## 2017-11-21 NOTE — TELEPHONE ENCOUNTER
----- Message from RANDEE Banks sent at 11/21/2017  8:21 AM CST -----  Please call to make sure he gets into CT today or tomorrow! Need to have this read before we leave for Thanksgiving. May need to change the order to STAT

## 2017-11-27 NOTE — PROGRESS NOTES
Spoke with RANDEE Knott. Advised of CT imaging results.     Patient notified that new symptoms are expected after increasing lifting weight amount- his weight amount was increased to 20 lbs on 10/17/83123.     No new orders at this time. Patient to continue with cardiovascular f/u  appt on 12/6/2017

## 2017-12-06 ENCOUNTER — OFFICE VISIT (OUTPATIENT)
Dept: CARDIAC SURGERY | Facility: CLINIC | Age: 67
End: 2017-12-06

## 2017-12-06 VITALS
DIASTOLIC BLOOD PRESSURE: 68 MMHG | HEIGHT: 67 IN | BODY MASS INDEX: 40.18 KG/M2 | OXYGEN SATURATION: 98 % | WEIGHT: 256 LBS | HEART RATE: 62 BPM | TEMPERATURE: 98.1 F | SYSTOLIC BLOOD PRESSURE: 110 MMHG

## 2017-12-06 DIAGNOSIS — Z79.899 DRUG THERAPY: ICD-10-CM

## 2017-12-06 DIAGNOSIS — Z09 FOLLOW-UP SURGERY CARE: ICD-10-CM

## 2017-12-06 DIAGNOSIS — Z95.1 S/P CABG (CORONARY ARTERY BYPASS GRAFT): Primary | ICD-10-CM

## 2017-12-06 PROCEDURE — 99024 POSTOP FOLLOW-UP VISIT: CPT | Performed by: NURSE PRACTITIONER

## 2017-12-06 RX ORDER — FUROSEMIDE 20 MG/1
20 TABLET ORAL EVERY OTHER DAY
Qty: 90 TABLET | Refills: 1
Start: 2017-12-06 | End: 2018-01-19
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
## 2017-12-06 NOTE — PATIENT INSTRUCTIONS
Sternum is stable   May exercise   May take Aleve 1 tablet MWF  3 days a week  Change Lasix Furosemide to 3 days a week  MWF  Vit D take 50,000 units weekly  May work up to 20 lb lifting by 2 lbs per week

## 2017-12-29 RX ORDER — TAMSULOSIN HYDROCHLORIDE 0.4 MG/1
CAPSULE ORAL
Qty: 90 CAPSULE | Refills: 2 | Status: SHIPPED | OUTPATIENT
Start: 2017-12-29 | End: 2018-04-24 | Stop reason: SDUPTHER

## 2018-01-19 ENCOUNTER — APPOINTMENT (OUTPATIENT)
Dept: LAB | Facility: HOSPITAL | Age: 68
End: 2018-01-19

## 2018-01-19 ENCOUNTER — OFFICE VISIT (OUTPATIENT)
Dept: CARDIAC SURGERY | Facility: CLINIC | Age: 68
End: 2018-01-19

## 2018-01-19 VITALS
HEART RATE: 63 BPM | HEIGHT: 67 IN | WEIGHT: 267.8 LBS | OXYGEN SATURATION: 98 % | TEMPERATURE: 97.6 F | BODY MASS INDEX: 42.03 KG/M2 | SYSTOLIC BLOOD PRESSURE: 132 MMHG | DIASTOLIC BLOOD PRESSURE: 88 MMHG

## 2018-01-19 DIAGNOSIS — E55.9 VITAMIN D DEFICIENCY: Primary | ICD-10-CM

## 2018-01-19 DIAGNOSIS — I25.118 CORONARY ARTERY DISEASE OF NATIVE ARTERY OF NATIVE HEART WITH STABLE ANGINA PECTORIS (HCC): ICD-10-CM

## 2018-01-19 DIAGNOSIS — Z09 FOLLOW-UP SURGERY CARE: ICD-10-CM

## 2018-01-19 LAB
ALBUMIN SERPL-MCNC: 4 G/DL (ref 3.4–4.8)
ALBUMIN/GLOB SERPL: 1.3 G/DL (ref 1.1–1.8)
ALP SERPL-CCNC: 81 U/L (ref 38–126)
ALT SERPL W P-5'-P-CCNC: 39 U/L (ref 21–72)
ANION GAP SERPL CALCULATED.3IONS-SCNC: 10 MMOL/L (ref 5–15)
AST SERPL-CCNC: 27 U/L (ref 17–59)
BILIRUB SERPL-MCNC: 0.3 MG/DL (ref 0.2–1.3)
BUN BLD-MCNC: 11 MG/DL (ref 7–21)
BUN/CREAT SERPL: 11.6 (ref 7–25)
CALCIUM SPEC-SCNC: 9 MG/DL (ref 8.4–10.2)
CHLORIDE SERPL-SCNC: 104 MMOL/L (ref 95–110)
CO2 SERPL-SCNC: 28 MMOL/L (ref 22–31)
CREAT BLD-MCNC: 0.95 MG/DL (ref 0.7–1.3)
GFR SERPL CREATININE-BSD FRML MDRD: 79 ML/MIN/1.73 (ref 49–113)
GLOBULIN UR ELPH-MCNC: 3.1 GM/DL (ref 2.3–3.5)
GLUCOSE BLD-MCNC: 108 MG/DL (ref 60–100)
POTASSIUM BLD-SCNC: 4.2 MMOL/L (ref 3.5–5.1)
PROT SERPL-MCNC: 7.1 G/DL (ref 6.3–8.6)
SODIUM BLD-SCNC: 142 MMOL/L (ref 137–145)

## 2018-01-19 PROCEDURE — 36415 COLL VENOUS BLD VENIPUNCTURE: CPT | Performed by: NURSE PRACTITIONER

## 2018-01-19 PROCEDURE — 80053 COMPREHEN METABOLIC PANEL: CPT | Performed by: NURSE PRACTITIONER

## 2018-01-19 PROCEDURE — 99024 POSTOP FOLLOW-UP VISIT: CPT | Performed by: NURSE PRACTITIONER

## 2018-01-19 NOTE — PATIENT INSTRUCTIONS
Stop Lasix  Potassium   Melatonin is a sleep aid  Avoid tylenol pm   If symptoms not improved, get lab in 10 days  Lab normal today
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
## 2018-01-29 ENCOUNTER — LAB (OUTPATIENT)
Dept: LAB | Facility: OTHER | Age: 68
End: 2018-01-29

## 2018-01-29 DIAGNOSIS — E55.9 VITAMIN D DEFICIENCY: ICD-10-CM

## 2018-01-29 LAB — 25(OH)D3 SERPL-MCNC: 52 NG/ML (ref 30–100)

## 2018-01-29 PROCEDURE — 36415 COLL VENOUS BLD VENIPUNCTURE: CPT | Performed by: NURSE PRACTITIONER

## 2018-01-29 PROCEDURE — 82306 VITAMIN D 25 HYDROXY: CPT | Performed by: NURSE PRACTITIONER

## 2018-02-05 ENCOUNTER — LAB (OUTPATIENT)
Dept: LAB | Facility: OTHER | Age: 68
End: 2018-02-05

## 2018-02-05 DIAGNOSIS — E03.9 ACQUIRED HYPOTHYROIDISM: ICD-10-CM

## 2018-02-05 DIAGNOSIS — E11.9 TYPE 2 DIABETES MELLITUS WITHOUT COMPLICATION, WITHOUT LONG-TERM CURRENT USE OF INSULIN (HCC): ICD-10-CM

## 2018-02-05 LAB
ALBUMIN SERPL-MCNC: 4 G/DL (ref 3.2–5.5)
ALBUMIN/GLOB SERPL: 1.2 G/DL (ref 1–3)
ALP SERPL-CCNC: 73 U/L (ref 15–121)
ALT SERPL W P-5'-P-CCNC: 18 U/L (ref 10–60)
ANION GAP SERPL CALCULATED.3IONS-SCNC: 7 MMOL/L (ref 5–15)
ARTICHOKE IGE QN: 92 MG/DL (ref 0–129)
AST SERPL-CCNC: 20 U/L (ref 10–60)
BASOPHILS # BLD AUTO: 0.04 10*3/MM3 (ref 0–0.2)
BASOPHILS NFR BLD AUTO: 0.6 % (ref 0–2)
BILIRUB SERPL-MCNC: 0.6 MG/DL (ref 0.2–1)
BUN BLD-MCNC: 15 MG/DL (ref 8–25)
BUN/CREAT SERPL: 15 (ref 7–25)
CALCIUM SPEC-SCNC: 8.9 MG/DL (ref 8.4–10.8)
CHLORIDE SERPL-SCNC: 106 MMOL/L (ref 100–112)
CO2 SERPL-SCNC: 28 MMOL/L (ref 20–32)
CREAT BLD-MCNC: 1 MG/DL (ref 0.4–1.3)
DEPRECATED RDW RBC AUTO: 50.4 FL (ref 35.1–43.9)
EOSINOPHIL # BLD AUTO: 0.36 10*3/MM3 (ref 0–0.7)
EOSINOPHIL NFR BLD AUTO: 5 % (ref 0–7)
ERYTHROCYTE [DISTWIDTH] IN BLOOD BY AUTOMATED COUNT: 15.8 % (ref 11.5–14.5)
GFR SERPL CREATININE-BSD FRML MDRD: 74 ML/MIN/1.73 (ref 49–113)
GLOBULIN UR ELPH-MCNC: 3.3 GM/DL (ref 2.5–4.6)
GLUCOSE BLD-MCNC: 107 MG/DL (ref 70–100)
HBA1C MFR BLD: 6.2 % (ref 4–5.6)
HCT VFR BLD AUTO: 45.3 % (ref 39–49)
HGB BLD-MCNC: 14.7 G/DL (ref 13.7–17.3)
LYMPHOCYTES # BLD AUTO: 2.22 10*3/MM3 (ref 0.6–4.2)
LYMPHOCYTES NFR BLD AUTO: 30.7 % (ref 10–50)
MCH RBC QN AUTO: 29.1 PG (ref 26.5–34)
MCHC RBC AUTO-ENTMCNC: 32.5 G/DL (ref 31.5–36.3)
MCV RBC AUTO: 89.5 FL (ref 80–98)
MONOCYTES # BLD AUTO: 0.6 10*3/MM3 (ref 0–0.9)
MONOCYTES NFR BLD AUTO: 8.3 % (ref 0–12)
NEUTROPHILS # BLD AUTO: 4.01 10*3/MM3 (ref 2–8.6)
NEUTROPHILS NFR BLD AUTO: 55.4 % (ref 37–80)
PLATELET # BLD AUTO: 265 10*3/MM3 (ref 150–450)
PMV BLD AUTO: 11 FL (ref 8–12)
POTASSIUM BLD-SCNC: 3.9 MMOL/L (ref 3.4–5.4)
PROT SERPL-MCNC: 7.3 G/DL (ref 6.7–8.2)
RBC # BLD AUTO: 5.06 10*6/MM3 (ref 4.37–5.74)
SODIUM BLD-SCNC: 141 MMOL/L (ref 134–146)
T4 FREE SERPL-MCNC: 1.01 NG/DL (ref 0.78–2.19)
TSH SERPL DL<=0.05 MIU/L-ACNC: 3.16 MIU/ML (ref 0.46–4.68)
WBC NRBC COR # BLD: 7.23 10*3/MM3 (ref 3.2–9.8)

## 2018-02-05 PROCEDURE — 36415 COLL VENOUS BLD VENIPUNCTURE: CPT | Performed by: FAMILY MEDICINE

## 2018-02-05 PROCEDURE — 84443 ASSAY THYROID STIM HORMONE: CPT | Performed by: FAMILY MEDICINE

## 2018-02-05 PROCEDURE — 85025 COMPLETE CBC W/AUTO DIFF WBC: CPT | Performed by: FAMILY MEDICINE

## 2018-02-05 PROCEDURE — 80053 COMPREHEN METABOLIC PANEL: CPT | Performed by: FAMILY MEDICINE

## 2018-02-05 PROCEDURE — 84439 ASSAY OF FREE THYROXINE: CPT | Performed by: FAMILY MEDICINE

## 2018-02-05 PROCEDURE — 83721 ASSAY OF BLOOD LIPOPROTEIN: CPT | Performed by: FAMILY MEDICINE

## 2018-02-05 PROCEDURE — 83036 HEMOGLOBIN GLYCOSYLATED A1C: CPT | Performed by: FAMILY MEDICINE

## 2018-02-20 ENCOUNTER — OFFICE VISIT (OUTPATIENT)
Dept: CARDIAC SURGERY | Facility: CLINIC | Age: 68
End: 2018-02-20

## 2018-02-20 VITALS
BODY MASS INDEX: 43.39 KG/M2 | OXYGEN SATURATION: 98 % | HEART RATE: 69 BPM | TEMPERATURE: 98.2 F | WEIGHT: 270 LBS | HEIGHT: 66 IN | DIASTOLIC BLOOD PRESSURE: 80 MMHG | SYSTOLIC BLOOD PRESSURE: 130 MMHG

## 2018-02-20 DIAGNOSIS — I25.118 CORONARY ARTERY DISEASE OF NATIVE ARTERY OF NATIVE HEART WITH STABLE ANGINA PECTORIS (HCC): Primary | ICD-10-CM

## 2018-02-20 DIAGNOSIS — Z09 FOLLOW-UP SURGERY CARE: ICD-10-CM

## 2018-02-20 PROCEDURE — 99214 OFFICE O/P EST MOD 30 MIN: CPT | Performed by: NURSE PRACTITIONER

## 2018-02-20 RX ORDER — LOSARTAN POTASSIUM 50 MG/1
50 TABLET ORAL DAILY
Qty: 30 TABLET | Refills: 1 | Status: SHIPPED | OUTPATIENT
Start: 2018-02-20 | End: 2018-03-22 | Stop reason: DRUGHIGH

## 2018-02-20 NOTE — PATIENT INSTRUCTIONS
Very rare side effect to Metoprolol is shadow people  Add cozaar for BP   1mth FU for med changes
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
## 2018-03-22 ENCOUNTER — OFFICE VISIT (OUTPATIENT)
Dept: CARDIAC SURGERY | Facility: CLINIC | Age: 68
End: 2018-03-22

## 2018-03-22 VITALS
BODY MASS INDEX: 40.43 KG/M2 | WEIGHT: 273 LBS | DIASTOLIC BLOOD PRESSURE: 85 MMHG | TEMPERATURE: 97.6 F | HEART RATE: 73 BPM | HEIGHT: 69 IN | OXYGEN SATURATION: 98 % | SYSTOLIC BLOOD PRESSURE: 140 MMHG

## 2018-03-22 DIAGNOSIS — I25.10 CORONARY ARTERY DISEASE INVOLVING NATIVE CORONARY ARTERY OF NATIVE HEART WITHOUT ANGINA PECTORIS: Primary | ICD-10-CM

## 2018-03-22 PROCEDURE — 99213 OFFICE O/P EST LOW 20 MIN: CPT | Performed by: NURSE PRACTITIONER

## 2018-03-22 RX ORDER — LOSARTAN POTASSIUM 100 MG/1
100 TABLET ORAL DAILY
Qty: 90 TABLET | Refills: 3 | Status: SHIPPED | OUTPATIENT
Start: 2018-03-22 | End: 2019-01-28 | Stop reason: SDUPTHER

## 2018-03-22 NOTE — PATIENT INSTRUCTIONS
Increase losartan to 100 mg daily   Shadow people improved with dc of beta blocker.   Follow up with Dr Jay  Will be happy to see as needed.

## 2018-03-23 RX ORDER — ATORVASTATIN CALCIUM 20 MG/1
TABLET, FILM COATED ORAL
Qty: 90 TABLET | Refills: 2 | Status: SHIPPED | OUTPATIENT
Start: 2018-03-23 | End: 2018-10-26 | Stop reason: SDUPTHER

## 2018-03-23 RX ORDER — OMEPRAZOLE 40 MG/1
CAPSULE, DELAYED RELEASE ORAL
Qty: 90 CAPSULE | Refills: 2 | Status: SHIPPED | OUTPATIENT
Start: 2018-03-23 | End: 2018-11-14 | Stop reason: SDUPTHER

## 2018-03-23 RX ORDER — LEVOTHYROXINE SODIUM 88 MCG
TABLET ORAL
Qty: 90 TABLET | Refills: 2 | Status: SHIPPED | OUTPATIENT
Start: 2018-03-23 | End: 2018-11-14 | Stop reason: SDUPTHER
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
## 2018-04-17 ENCOUNTER — LAB (OUTPATIENT)
Dept: LAB | Facility: OTHER | Age: 68
End: 2018-04-17

## 2018-04-17 DIAGNOSIS — E11.9 TYPE 2 DIABETES MELLITUS WITHOUT COMPLICATION, WITHOUT LONG-TERM CURRENT USE OF INSULIN (HCC): ICD-10-CM

## 2018-04-17 DIAGNOSIS — E78.01 FAMILIAL HYPERCHOLESTEROLEMIA: ICD-10-CM

## 2018-04-17 DIAGNOSIS — I10 ESSENTIAL HYPERTENSION: ICD-10-CM

## 2018-04-17 DIAGNOSIS — I10 ESSENTIAL HYPERTENSION: Primary | ICD-10-CM

## 2018-04-17 LAB
ALBUMIN SERPL-MCNC: 4 G/DL (ref 3.2–5.5)
ALBUMIN/GLOB SERPL: 1.3 G/DL (ref 1–3)
ALP SERPL-CCNC: 69 U/L (ref 15–121)
ALT SERPL W P-5'-P-CCNC: 28 U/L (ref 10–60)
ANION GAP SERPL CALCULATED.3IONS-SCNC: 9 MMOL/L (ref 5–15)
AST SERPL-CCNC: 29 U/L (ref 10–60)
BASOPHILS # BLD AUTO: 0.05 10*3/MM3 (ref 0–0.2)
BASOPHILS NFR BLD AUTO: 0.7 % (ref 0–2)
BILIRUB SERPL-MCNC: 0.6 MG/DL (ref 0.2–1)
BUN BLD-MCNC: 10 MG/DL (ref 8–25)
BUN/CREAT SERPL: 9.1 (ref 7–25)
CALCIUM SPEC-SCNC: 8.9 MG/DL (ref 8.4–10.8)
CHLORIDE SERPL-SCNC: 105 MMOL/L (ref 100–112)
CHOLEST SERPL-MCNC: 150 MG/DL (ref 150–200)
CO2 SERPL-SCNC: 27 MMOL/L (ref 20–32)
CREAT BLD-MCNC: 1.1 MG/DL (ref 0.4–1.3)
DEPRECATED RDW RBC AUTO: 49.2 FL (ref 35.1–43.9)
EOSINOPHIL # BLD AUTO: 0.53 10*3/MM3 (ref 0–0.7)
EOSINOPHIL NFR BLD AUTO: 7.7 % (ref 0–7)
ERYTHROCYTE [DISTWIDTH] IN BLOOD BY AUTOMATED COUNT: 15.1 % (ref 11.5–14.5)
GFR SERPL CREATININE-BSD FRML MDRD: 66 ML/MIN/1.73 (ref 49–113)
GLOBULIN UR ELPH-MCNC: 3.2 GM/DL (ref 2.5–4.6)
GLUCOSE BLD-MCNC: 113 MG/DL (ref 70–100)
HBA1C MFR BLD: 5.8 % (ref 4–5.6)
HCT VFR BLD AUTO: 45 % (ref 39–49)
HDLC SERPL-MCNC: 25 MG/DL (ref 35–100)
HGB BLD-MCNC: 15.2 G/DL (ref 13.7–17.3)
LDLC SERPL CALC-MCNC: 83 MG/DL
LDLC/HDLC SERPL: 3.32 {RATIO}
LYMPHOCYTES # BLD AUTO: 2.09 10*3/MM3 (ref 0.6–4.2)
LYMPHOCYTES NFR BLD AUTO: 30.4 % (ref 10–50)
MCH RBC QN AUTO: 30.9 PG (ref 26.5–34)
MCHC RBC AUTO-ENTMCNC: 33.8 G/DL (ref 31.5–36.3)
MCV RBC AUTO: 91.5 FL (ref 80–98)
MONOCYTES # BLD AUTO: 0.44 10*3/MM3 (ref 0–0.9)
MONOCYTES NFR BLD AUTO: 6.4 % (ref 0–12)
NEUTROPHILS # BLD AUTO: 3.76 10*3/MM3 (ref 2–8.6)
NEUTROPHILS NFR BLD AUTO: 54.8 % (ref 37–80)
PLATELET # BLD AUTO: 254 10*3/MM3 (ref 150–450)
PMV BLD AUTO: 10.6 FL (ref 8–12)
POTASSIUM BLD-SCNC: 3.8 MMOL/L (ref 3.4–5.4)
PROT SERPL-MCNC: 7.2 G/DL (ref 6.7–8.2)
RBC # BLD AUTO: 4.92 10*6/MM3 (ref 4.37–5.74)
SODIUM BLD-SCNC: 141 MMOL/L (ref 134–146)
T4 FREE SERPL-MCNC: 1.29 NG/DL (ref 0.78–2.19)
TRIGL SERPL-MCNC: 210 MG/DL (ref 35–160)
TSH SERPL DL<=0.05 MIU/L-ACNC: 2.37 MIU/ML (ref 0.46–4.68)
VLDLC SERPL-MCNC: 42 MG/DL
WBC NRBC COR # BLD: 6.87 10*3/MM3 (ref 3.2–9.8)

## 2018-04-17 PROCEDURE — 80061 LIPID PANEL: CPT | Performed by: FAMILY MEDICINE

## 2018-04-17 PROCEDURE — 80053 COMPREHEN METABOLIC PANEL: CPT | Performed by: FAMILY MEDICINE

## 2018-04-17 PROCEDURE — 84443 ASSAY THYROID STIM HORMONE: CPT | Performed by: FAMILY MEDICINE

## 2018-04-17 PROCEDURE — 84439 ASSAY OF FREE THYROXINE: CPT | Performed by: FAMILY MEDICINE

## 2018-04-17 PROCEDURE — 36415 COLL VENOUS BLD VENIPUNCTURE: CPT | Performed by: FAMILY MEDICINE

## 2018-04-17 PROCEDURE — 85025 COMPLETE CBC W/AUTO DIFF WBC: CPT | Performed by: FAMILY MEDICINE

## 2018-04-17 PROCEDURE — 83036 HEMOGLOBIN GLYCOSYLATED A1C: CPT | Performed by: FAMILY MEDICINE

## 2018-04-24 ENCOUNTER — OFFICE VISIT (OUTPATIENT)
Dept: FAMILY MEDICINE CLINIC | Facility: CLINIC | Age: 68
End: 2018-04-24

## 2018-04-24 VITALS
HEART RATE: 67 BPM | DIASTOLIC BLOOD PRESSURE: 82 MMHG | RESPIRATION RATE: 14 BRPM | WEIGHT: 273 LBS | TEMPERATURE: 97.6 F | HEIGHT: 66 IN | SYSTOLIC BLOOD PRESSURE: 138 MMHG | BODY MASS INDEX: 43.87 KG/M2 | OXYGEN SATURATION: 97 %

## 2018-04-24 DIAGNOSIS — E11.9 TYPE 2 DIABETES MELLITUS WITHOUT COMPLICATION, WITHOUT LONG-TERM CURRENT USE OF INSULIN (HCC): ICD-10-CM

## 2018-04-24 DIAGNOSIS — I25.118 CORONARY ARTERY DISEASE OF NATIVE ARTERY OF NATIVE HEART WITH STABLE ANGINA PECTORIS (HCC): ICD-10-CM

## 2018-04-24 DIAGNOSIS — E78.01 FAMILIAL HYPERCHOLESTEROLEMIA: ICD-10-CM

## 2018-04-24 DIAGNOSIS — J45.30 MILD PERSISTENT ASTHMA WITHOUT COMPLICATION: ICD-10-CM

## 2018-04-24 DIAGNOSIS — E03.9 ACQUIRED HYPOTHYROIDISM: ICD-10-CM

## 2018-04-24 DIAGNOSIS — M15.9 GENERALIZED OA: ICD-10-CM

## 2018-04-24 DIAGNOSIS — I10 ESSENTIAL HYPERTENSION: Primary | ICD-10-CM

## 2018-04-24 PROCEDURE — 99214 OFFICE O/P EST MOD 30 MIN: CPT | Performed by: FAMILY MEDICINE

## 2018-04-24 RX ORDER — TAMSULOSIN HYDROCHLORIDE 0.4 MG/1
1 CAPSULE ORAL DAILY
Qty: 30 CAPSULE | Refills: 0 | Status: SHIPPED | OUTPATIENT
Start: 2018-04-24 | End: 2018-08-15 | Stop reason: SDUPTHER

## 2018-04-24 RX ORDER — METHYLPREDNISOLONE 4 MG/1
TABLET ORAL
Qty: 21 TABLET | Refills: 0 | Status: SHIPPED | OUTPATIENT
Start: 2018-04-24 | End: 2018-05-07

## 2018-04-24 RX ORDER — AZITHROMYCIN 250 MG/1
TABLET, FILM COATED ORAL
Qty: 6 TABLET | Refills: 0 | Status: SHIPPED | OUTPATIENT
Start: 2018-04-24 | End: 2018-05-07

## 2018-04-24 NOTE — PROGRESS NOTES
Subjective   Nick Austin is a 67 y.o. male.   Chief Complaint   Patient presents with   • Diabetes   • Hyperlipidemia   • Hypertension       History of Present Illness   Patient with multiple medical problems including hypertension, hyperlipidemia, and diabetes is in today for reevaluation and review labs.  Patient is doing fairly well at this time.  He voices complaint of cough and nasal congestion along with some wheezing over the past week to 2 weeks.  He denies chest pain, PND, orthopnea.  No neurological symptoms.    The following portions of the patient's history were reviewed and updated as appropriate: allergies, current medications, past family history, past medical history, past social history, past surgical history and problem list.    Review of Systems   Constitutional: Positive for fatigue.   HENT: Negative.    Eyes: Negative.    Respiratory: Positive for shortness of breath.    Cardiovascular: Negative.  Negative for chest pain.   Gastrointestinal: Negative.    Endocrine: Negative.  Negative for polydipsia and polyphagia.   Genitourinary: Negative.    Musculoskeletal: Positive for arthralgias.   Skin: Negative.    Allergic/Immunologic: Negative.    Neurological: Negative.    Hematological: Negative.    Psychiatric/Behavioral: Negative.    All other systems reviewed and are negative.      Objective   Physical Exam   Constitutional: He is oriented to person, place, and time. He appears well-developed and well-nourished.   HENT:   Head: Normocephalic and atraumatic.   Right Ear: External ear normal.   Left Ear: External ear normal.   Nose: Nose normal.   Mouth/Throat: Oropharynx is clear and moist.   Eyes: Conjunctivae and EOM are normal. Pupils are equal, round, and reactive to light.   Neck: Normal range of motion. Neck supple.   Cardiovascular: Normal rate, regular rhythm, normal heart sounds and intact distal pulses.  Exam reveals no gallop and no friction rub.    No murmur  heard.  Pulmonary/Chest: Effort normal. He has wheezes. He has no rales.   Abdominal: Soft. Bowel sounds are normal. He exhibits no mass. There is no tenderness. There is no rebound and no guarding.   obese   Musculoskeletal:        Right knee: He exhibits decreased range of motion. Tenderness found.        Left knee: He exhibits decreased range of motion. Tenderness found.   Neurological: He is alert and oriented to person, place, and time. He has normal reflexes. No cranial nerve deficit. He exhibits normal muscle tone.   Skin: Skin is warm and dry. No rash noted.   Psychiatric: He has a normal mood and affect. His behavior is normal. Judgment and thought content normal.   Nursing note and vitals reviewed.      Assessment/Plan   Nick was seen today for diabetes, hyperlipidemia and hypertension.    Diagnoses and all orders for this visit:    Essential hypertension    Familial hypercholesterolemia    Coronary artery disease of native artery of native heart with stable angina pectoris    Acquired hypothyroidism  -     TSH; Future    Type 2 diabetes mellitus without complication, without long-term current use of insulin  -     Comprehensive Metabolic Panel; Future  -     Hemoglobin A1c; Future  -     LDL Cholesterol, Direct; Future    Generalized OA    Mild persistent asthma without complication    Other orders  -     tamsulosin (FLOMAX) 0.4 MG capsule 24 hr capsule; Take 1 capsule by mouth Daily.  -     MethylPREDNISolone (MEDROL, RADHA,) 4 MG tablet; Take as directed on package instructions.  -     azithromycin (ZITHROMAX Z-RADHA) 250 MG tablet; Take 2 tablets the first day, then 1 tablet daily for 4 days.

## 2018-05-07 ENCOUNTER — OFFICE VISIT (OUTPATIENT)
Dept: CARDIOLOGY | Facility: CLINIC | Age: 68
End: 2018-05-07

## 2018-05-07 VITALS
SYSTOLIC BLOOD PRESSURE: 120 MMHG | WEIGHT: 268 LBS | HEIGHT: 66 IN | BODY MASS INDEX: 43.07 KG/M2 | DIASTOLIC BLOOD PRESSURE: 78 MMHG | HEART RATE: 79 BPM

## 2018-05-07 DIAGNOSIS — Z95.1 S/P CABG (CORONARY ARTERY BYPASS GRAFT): ICD-10-CM

## 2018-05-07 DIAGNOSIS — I10 ESSENTIAL HYPERTENSION: ICD-10-CM

## 2018-05-07 DIAGNOSIS — E78.2 MIXED HYPERLIPIDEMIA: ICD-10-CM

## 2018-05-07 DIAGNOSIS — I25.10 CORONARY ARTERY DISEASE INVOLVING NATIVE CORONARY ARTERY OF NATIVE HEART WITHOUT ANGINA PECTORIS: Primary | ICD-10-CM

## 2018-05-07 PROCEDURE — 99214 OFFICE O/P EST MOD 30 MIN: CPT | Performed by: INTERNAL MEDICINE
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
## 2018-08-16 RX ORDER — TAMSULOSIN HYDROCHLORIDE 0.4 MG/1
CAPSULE ORAL
Qty: 90 CAPSULE | Refills: 0 | Status: SHIPPED | OUTPATIENT
Start: 2018-08-16 | End: 2018-11-14 | Stop reason: SDUPTHER

## 2018-08-16 RX ORDER — CLOPIDOGREL BISULFATE 75 MG/1
TABLET ORAL
Qty: 90 TABLET | Refills: 3 | Status: SHIPPED | OUTPATIENT
Start: 2018-08-16 | End: 2019-06-19 | Stop reason: SDUPTHER

## 2018-10-15 ENCOUNTER — LAB (OUTPATIENT)
Dept: LAB | Facility: OTHER | Age: 68
End: 2018-10-15

## 2018-10-15 DIAGNOSIS — E11.9 TYPE 2 DIABETES MELLITUS WITHOUT COMPLICATION, WITHOUT LONG-TERM CURRENT USE OF INSULIN (HCC): ICD-10-CM

## 2018-10-15 DIAGNOSIS — E03.9 ACQUIRED HYPOTHYROIDISM: ICD-10-CM

## 2018-10-15 LAB
ALBUMIN SERPL-MCNC: 4.1 G/DL (ref 3.5–5)
ALBUMIN/GLOB SERPL: 1.2 G/DL (ref 1.1–1.8)
ALP SERPL-CCNC: 112 U/L (ref 38–126)
ALT SERPL W P-5'-P-CCNC: 35 U/L
ANION GAP SERPL CALCULATED.3IONS-SCNC: 9 MMOL/L (ref 5–15)
ARTICHOKE IGE QN: 97 MG/DL (ref 1–129)
AST SERPL-CCNC: 32 U/L (ref 17–59)
BILIRUB SERPL-MCNC: 0.4 MG/DL (ref 0.2–1.3)
BUN BLD-MCNC: 17 MG/DL (ref 9–20)
BUN/CREAT SERPL: 13.2 (ref 7–25)
CALCIUM SPEC-SCNC: 8.9 MG/DL (ref 8.4–10.2)
CHLORIDE SERPL-SCNC: 108 MMOL/L (ref 98–107)
CO2 SERPL-SCNC: 27 MMOL/L (ref 22–30)
CREAT BLD-MCNC: 1.29 MG/DL (ref 0.66–1.25)
GFR SERPL CREATININE-BSD FRML MDRD: 55 ML/MIN/1.73 (ref 49–113)
GLOBULIN UR ELPH-MCNC: 3.4 GM/DL (ref 2.3–3.5)
GLUCOSE BLD-MCNC: 121 MG/DL (ref 74–99)
HBA1C MFR BLD: 6 % (ref 4–5.6)
POTASSIUM BLD-SCNC: 4.2 MMOL/L (ref 3.4–5)
PROT SERPL-MCNC: 7.5 G/DL (ref 6.3–8.2)
SODIUM BLD-SCNC: 144 MMOL/L (ref 137–145)
TSH SERPL DL<=0.05 MIU/L-ACNC: 2.99 MIU/ML (ref 0.46–4.68)

## 2018-10-15 PROCEDURE — 36415 COLL VENOUS BLD VENIPUNCTURE: CPT | Performed by: FAMILY MEDICINE

## 2018-10-15 PROCEDURE — 83721 ASSAY OF BLOOD LIPOPROTEIN: CPT | Performed by: FAMILY MEDICINE

## 2018-10-15 PROCEDURE — 84443 ASSAY THYROID STIM HORMONE: CPT | Performed by: FAMILY MEDICINE

## 2018-10-15 PROCEDURE — 80053 COMPREHEN METABOLIC PANEL: CPT | Performed by: FAMILY MEDICINE

## 2018-10-15 PROCEDURE — 83036 HEMOGLOBIN GLYCOSYLATED A1C: CPT | Performed by: FAMILY MEDICINE

## 2018-10-26 ENCOUNTER — OFFICE VISIT (OUTPATIENT)
Dept: FAMILY MEDICINE CLINIC | Facility: CLINIC | Age: 68
End: 2018-10-26

## 2018-10-26 VITALS
HEIGHT: 66 IN | SYSTOLIC BLOOD PRESSURE: 138 MMHG | TEMPERATURE: 98.7 F | BODY MASS INDEX: 43.39 KG/M2 | WEIGHT: 270 LBS | DIASTOLIC BLOOD PRESSURE: 80 MMHG | HEART RATE: 64 BPM

## 2018-10-26 DIAGNOSIS — E03.9 ACQUIRED HYPOTHYROIDISM: Chronic | ICD-10-CM

## 2018-10-26 DIAGNOSIS — I10 ESSENTIAL HYPERTENSION: Chronic | ICD-10-CM

## 2018-10-26 DIAGNOSIS — I25.119 CORONARY ARTERY DISEASE INVOLVING NATIVE CORONARY ARTERY OF NATIVE HEART WITH ANGINA PECTORIS (HCC): Chronic | ICD-10-CM

## 2018-10-26 DIAGNOSIS — E11.9 TYPE 2 DIABETES MELLITUS WITHOUT COMPLICATION, WITHOUT LONG-TERM CURRENT USE OF INSULIN (HCC): Primary | ICD-10-CM

## 2018-10-26 DIAGNOSIS — N40.0 BENIGN NON-NODULAR PROSTATIC HYPERPLASIA: Chronic | ICD-10-CM

## 2018-10-26 DIAGNOSIS — Z12.5 SPECIAL SCREENING FOR MALIGNANT NEOPLASM OF PROSTATE: ICD-10-CM

## 2018-10-26 DIAGNOSIS — L82.0 INFLAMED SEBORRHEIC KERATOSIS: ICD-10-CM

## 2018-10-26 DIAGNOSIS — E78.2 MIXED HYPERLIPIDEMIA: Chronic | ICD-10-CM

## 2018-10-26 PROCEDURE — 99214 OFFICE O/P EST MOD 30 MIN: CPT | Performed by: INTERNAL MEDICINE

## 2018-10-26 RX ORDER — ATORVASTATIN CALCIUM 40 MG/1
40 TABLET, FILM COATED ORAL DAILY
Qty: 30 TABLET | Refills: 11 | Status: SHIPPED | OUTPATIENT
Start: 2018-10-26 | End: 2019-09-22 | Stop reason: SDUPTHER

## 2018-10-26 NOTE — PATIENT INSTRUCTIONS
Exercising to Lose Weight  Exercising can help you to lose weight. In order to lose weight through exercise, you need to do vigorous-intensity exercise. You can tell that you are exercising with vigorous intensity if you are breathing very hard and fast and cannot hold a conversation while exercising.  Moderate-intensity exercise helps to maintain your current weight. You can tell that you are exercising at a moderate level if you have a higher heart rate and faster breathing, but you are still able to hold a conversation.  How often should I exercise?  Choose an activity that you enjoy and set realistic goals. Your health care provider can help you to make an activity plan that works for you. Exercise regularly as directed by your health care provider. This may include:  · Doing resistance training twice each week, such as:  ? Push-ups.  ? Sit-ups.  ? Lifting weights.  ? Using resistance bands.  · Doing a given intensity of exercise for a given amount of time. Choose from these options:  ? 150 minutes of moderate-intensity exercise every week.  ? 75 minutes of vigorous-intensity exercise every week.  ? A mix of moderate-intensity and vigorous-intensity exercise every week.    Children, pregnant women, people who are out of shape, people who are overweight, and older adults may need to consult a health care provider for individual recommendations. If you have any sort of medical condition, be sure to consult your health care provider before starting a new exercise program.  What are some activities that can help me to lose weight?  · Walking at a rate of at least 4.5 miles an hour.  · Jogging or running at a rate of 5 miles per hour.  · Biking at a rate of at least 10 miles per hour.  · Lap swimming.  · Roller-skating or in-line skating.  · Cross-country skiing.  · Vigorous competitive sports, such as football, basketball, and soccer.  · Jumping rope.  · Aerobic dancing.  How can I be more active in my day-to-day  activities?  · Use the stairs instead of the elevator.  · Take a walk during your lunch break.  · If you drive, park your car farther away from work or school.  · If you take public transportation, get off one stop early and walk the rest of the way.  · Make all of your phone calls while standing up and walking around.  · Get up, stretch, and walk around every 30 minutes throughout the day.  What guidelines should I follow while exercising?  · Do not exercise so much that you hurt yourself, feel dizzy, or get very short of breath.  · Consult your health care provider prior to starting a new exercise program.  · Wear comfortable clothes and shoes with good support.  · Drink plenty of water while you exercise to prevent dehydration or heat stroke. Body water is lost during exercise and must be replaced.  · Work out until you breathe faster and your heart beats faster.  This information is not intended to replace advice given to you by your health care provider. Make sure you discuss any questions you have with your health care provider.  Document Released: 01/20/2012 Document Revised: 05/25/2017 Document Reviewed: 05/21/2015  [employer] Interactive Patient Education © 2018 [employer]      Calorie Counting for Weight Loss  Calories are units of energy. Your body needs a certain amount of calories from food to keep you going throughout the day. When you eat more calories than your body needs, your body stores the extra calories as fat. When you eat fewer calories than your body needs, your body burns fat to get the energy it needs.  Calorie counting means keeping track of how many calories you eat and drink each day. Calorie counting can be helpful if you need to lose weight. If you make sure to eat fewer calories than your body needs, you should lose weight. Ask your health care provider what a healthy weight is for you.  For calorie counting to work, you will need to eat the right number of calories in a day in order  to lose a healthy amount of weight per week. A dietitian can help you determine how many calories you need in a day and will give you suggestions on how to reach your calorie goal.  · A healthy amount of weight to lose per week is usually 1-2 lb (0.5-0.9 kg). This usually means that your daily calorie intake should be reduced by 500-750 calories.  · Eating 1,200 - 1,500 calories per day can help most women lose weight.  · Eating 1,500 - 1,800 calories per day can help most men lose weight.    What do I need to know about calorie counting?  In order to meet your daily calorie goal, you will need to:  · Find out how many calories are in each food you would like to eat. Try to do this before you eat.  · Decide how much of the food you plan to eat.  · Write down what you ate and how many calories it had. Doing this is called keeping a food log.    To successfully lose weight, it is important to balance calorie counting with a healthy lifestyle that includes regular activity. Aim for 150 minutes of moderate exercise (such as walking) or 75 minutes of vigorous exercise (such as running) each week.  Where do I find calorie information?    The number of calories in a food can be found on a Nutrition Facts label. If a food does not have a Nutrition Facts label, try to look up the calories online or ask your dietitian for help.  Remember that calories are listed per serving. If you choose to have more than one serving of a food, you will have to multiply the calories per serving by the amount of servings you plan to eat. For example, the label on a package of bread might say that a serving size is 1 slice and that there are 90 calories in a serving. If you eat 1 slice, you will have eaten 90 calories. If you eat 2 slices, you will have eaten 180 calories.  How do I keep a food log?  Immediately after each meal, record the following information in your food log:  · What you ate. Don't forget to include toppings, sauces, and  "other extras on the food.  · How much you ate. This can be measured in cups, ounces, or number of items.  · How many calories each food and drink had.  · The total number of calories in the meal.    Keep your food log near you, such as in a small notebook in your pocket, or use a mobile juarez or website. Some programs will calculate calories for you and show you how many calories you have left for the day to meet your goal.  What are some calorie counting tips?  · Use your calories on foods and drinks that will fill you up and not leave you hungry:  ? Some examples of foods that fill you up are nuts and nut butters, vegetables, lean proteins, and high-fiber foods like whole grains. High-fiber foods are foods with more than 5 g fiber per serving.  ? Drinks such as sodas, specialty coffee drinks, alcohol, and juices have a lot of calories, yet do not fill you up.  · Eat nutritious foods and avoid empty calories. Empty calories are calories you get from foods or beverages that do not have many vitamins or protein, such as candy, sweets, and soda. It is better to have a nutritious high-calorie food (such as an avocado) than a food with few nutrients (such as a bag of chips).  · Know how many calories are in the foods you eat most often. This will help you calculate calorie counts faster.  · Pay attention to calories in drinks. Low-calorie drinks include water and unsweetened drinks.  · Pay attention to nutrition labels for \"low fat\" or \"fat free\" foods. These foods sometimes have the same amount of calories or more calories than the full fat versions. They also often have added sugar, starch, or salt, to make up for flavor that was removed with the fat.  · Find a way of tracking calories that works for you. Get creative. Try different apps or programs if writing down calories does not work for you.  What are some portion control tips?  · Know how many calories are in a serving. This will help you know how many servings of " a certain food you can have.  · Use a measuring cup to measure serving sizes. You could also try weighing out portions on a kitchen scale. With time, you will be able to estimate serving sizes for some foods.  · Take some time to put servings of different foods on your favorite plates, bowls, and cups so you know what a serving looks like.  · Try not to eat straight from a bag or box. Doing this can lead to overeating. Put the amount you would like to eat in a cup or on a plate to make sure you are eating the right portion.  · Use smaller plates, glasses, and bowls to prevent overeating.  · Try not to multitask (for example, watch TV or use your computer) while eating. If it is time to eat, sit down at a table and enjoy your food. This will help you to know when you are full. It will also help you to be aware of what you are eating and how much you are eating.  What are tips for following this plan?  Reading food labels  · Check the calorie count compared to the serving size. The serving size may be smaller than what you are used to eating.  · Check the source of the calories. Make sure the food you are eating is high in vitamins and protein and low in saturated and trans fats.  Shopping  · Read nutrition labels while you shop. This will help you make healthy decisions before you decide to purchase your food.  · Make a grocery list and stick to it.  Cooking  · Try to cook your favorite foods in a healthier way. For example, try baking instead of frying.  · Use low-fat dairy products.  Meal planning  · Use more fruits and vegetables. Half of your plate should be fruits and vegetables.  · Include lean proteins like poultry and fish.  How do I count calories when eating out?  · Ask for smaller portion sizes.  · Consider sharing an entree and sides instead of getting your own entree.  · If you get your own entree, eat only half. Ask for a box at the beginning of your meal and put the rest of your entree in it so you are  not tempted to eat it.  · If calories are listed on the menu, choose the lower calorie options.  · Choose dishes that include vegetables, fruits, whole grains, low-fat dairy products, and lean protein.  · Choose items that are boiled, broiled, grilled, or steamed. Stay away from items that are buttered, battered, fried, or served with cream sauce. Items labeled “crispy” are usually fried, unless stated otherwise.  · Choose water, low-fat milk, unsweetened iced tea, or other drinks without added sugar. If you want an alcoholic beverage, choose a lower calorie option such as a glass of wine or light beer.  · Ask for dressings, sauces, and syrups on the side. These are usually high in calories, so you should limit the amount you eat.  · If you want a salad, choose a garden salad and ask for grilled meats. Avoid extra toppings like wahl, cheese, or fried items. Ask for the dressing on the side, or ask for olive oil and vinegar or lemon to use as dressing.  · Estimate how many servings of a food you are given. For example, a serving of cooked rice is ½ cup or about the size of half a baseball. Knowing serving sizes will help you be aware of how much food you are eating at restaurants. The list below tells you how big or small some common portion sizes are based on everyday objects:  ? 1 oz--4 stacked dice.  ? 3 oz--1 deck of cards.  ? 1 tsp--1 die.  ? 1 Tbsp--½ a ping-pong ball.  ? 2 Tbsp--1 ping-pong ball.  ? ½ cup--½ baseball.  ? 1 cup--1 baseball.  Summary  · Calorie counting means keeping track of how many calories you eat and drink each day. If you eat fewer calories than your body needs, you should lose weight.  · A healthy amount of weight to lose per week is usually 1-2 lb (0.5-0.9 kg). This usually means reducing your daily calorie intake by 500-750 calories.  · The number of calories in a food can be found on a Nutrition Facts label. If a food does not have a Nutrition Facts label, try to look up the calories  online or ask your dietitian for help.  · Use your calories on foods and drinks that will fill you up, and not on foods and drinks that will leave you hungry.  · Use smaller plates, glasses, and bowls to prevent overeating.  This information is not intended to replace advice given to you by your health care provider. Make sure you discuss any questions you have with your health care provider.  Document Released: 12/18/2006 Document Revised: 11/17/2017 Document Reviewed: 11/17/2017  [employer] Interactive Patient Education © 2018 [employer]

## 2018-10-26 NOTE — PROGRESS NOTES
Subjective        History of Present Illness     Nick Austin (Naveed)  is a 67 y.o. male who presents to [facility] care.  He has been a prior patient of Dr. Elizabeth Morgan.  I am a neighbor of Estrada Austin, Naveed's son.  He is retired.   Chronic medical issues include essential hypertension, hyperlipidemia, CAD, hypothyroidism, and Type 2 diabetes mellitus among other issues.  He has had CABG x 2 with the most recent dated 09/2017 by Dr. Greg Mario.  He continues to have some residual pain in the chest wall, but denies angina.  He continues on medical management with Plavix, aspirin, and Lipitor.   Dr. Jay is his cardiologist.  He struggles with intermittent flares of chronic lumbar back pain, for which he takes Aleve.   Denies radicular symptoms.     Labs reveal very early Type 2 diabetes mellitus.  We reviewed principles of Type 2 diabetes and diabetic diet.  He is not compliant with diabetic diet.  We discussed the need for weight loss. He is morbidly obese with weight is 270 with BMI of 43.6.  I recommended he make efforts at an initial goal of 25-pound weight loss in the next six months, which could be achieved with improved diet and increased physical activity.  He is rather sedentary as he is retired.       Labs reveal trending down of renal function with creatinine 1.3.  I recommended he avoid chronic NSAID use.  He can safely use Tylenol products.      He has multiple SKs on his back, some of which have been traumatized with scratching. I recommended scheduling an appointment to have cryotherapy destruction.   He is in agreement.      He reports GERD symptoms are mostly adequately with Prilosec.  However, he has occasional breakthrough nighttime symptoms every few months, for which he takes Zantac.  He also premedicates with Zantac when he knows he is going to be having a spicy, high fat meal.   He was asking about stopping the Prilosec.  We discussed how he may experienced rebound  "GERD symptoms.  He decides to continue with the Prilosec.  He can add Zantac nightly for flares and add OTC antacids.       He continues on Flomax for BPH symptoms.  He reports he has difficulty going back to sleep when he gets up at night to urinate.   I asked him to check and make sure he is taking the Flomax at night.  If not, he can start taking it at nighttime to decrease nocturia and thereby allowing him to sleep better at night.            Blood pressure is reasonably controlled with losartan.      He has historically declined immunizations.  He continues to decline influenza or pneumonia vaccines.         The patient's relevant past medical, surgical, and social history was reviewed in Epic.   Lab results are reviewed with the patient today.  Fasting glucose 121.  A1c has increased from 5.8 to 6.0.  LDL 97 on Lipitor.  PSA was normal in March 2016.  Liver function normal.  Renal function declined with creatinine 1.29 increased from 1.1.    Review of Systems   Constitutional: Negative for chills, fatigue and fever.   HENT: Negative for congestion, ear pain, postnasal drip, sinus pressure and sore throat.    Respiratory: Negative for cough, shortness of breath and wheezing.    Cardiovascular: Negative for chest pain, palpitations and leg swelling.   Gastrointestinal: Negative for abdominal pain, blood in stool, constipation, diarrhea, nausea and vomiting.   Endocrine: Negative for cold intolerance, heat intolerance, polydipsia and polyuria.   Genitourinary: Negative for dysuria, frequency, hematuria and urgency.   Skin: Negative for rash.   Neurological: Negative for syncope and weakness.      Objective     Vitals:    10/26/18 1123   BP: 138/80   Pulse: 64   Temp: 98.7 °F (37.1 °C)   TempSrc: Oral   Weight: 122 kg (270 lb)   Height: 167.6 cm (66\")     Physical Exam   Constitutional: He is oriented to person, place, and time. He appears well-developed and well-nourished. No distress.   Morbidly obese male.  "   HENT:   Head: Normocephalic and atraumatic.   Nose: Right sinus exhibits no maxillary sinus tenderness and no frontal sinus tenderness. Left sinus exhibits no maxillary sinus tenderness and no frontal sinus tenderness.   Mouth/Throat: Uvula is midline, oropharynx is clear and moist and mucous membranes are normal. No oral lesions. No tonsillar exudate.   Eyes: Pupils are equal, round, and reactive to light. Conjunctivae and EOM are normal.   Neck: Trachea normal. Neck supple. No JVD present. Carotid bruit is not present. No tracheal deviation present. No thyroid mass and no thyromegaly present.   Cardiovascular: Normal rate, regular rhythm, normal heart sounds and intact distal pulses.   No extrasystoles are present. PMI is not displaced.    No murmur heard.  CABG scars noted.    Pulmonary/Chest: Effort normal and breath sounds normal. No accessory muscle usage. No respiratory distress. He has no decreased breath sounds. He has no wheezes. He has no rhonchi. He has no rales.   Abdominal: Soft. Bowel sounds are normal. He exhibits no distension. There is no hepatosplenomegaly. There is no tenderness.   Obese abdomen limits exam.      Vascular Status -  His right foot exhibits abnormal foot vasculature  (Diminished pulses and trace edema bilateral lower extremities ) and abnormal foot edema. His left foot exhibits abnormal foot vasculature  and abnormal foot edema.  Lymphadenopathy:     He has no cervical adenopathy.   Neurological: He is alert and oriented to person, place, and time. No cranial nerve deficit. Coordination normal.   Skin: Skin is warm, dry and intact. No rash noted. No cyanosis. Nails show no clubbing.   Multiple SKs on his back, some of which have been traumatized with scratching   Psychiatric: He has a normal mood and affect. His speech is normal and behavior is normal. Judgment and thought content normal.   Vitals reviewed.    Future Appointments  Date Time Provider Department Center   5/3/2019  8:00 AM Patrick Magdaleno MD [facility] None   5/8/2019 11:30 AM Perla Jay MD [facility] None       Assessment/Plan      Increase Lipitor to 40 mg daily due to his strong history of CAD with a goal of keeping his LDL cholesterol under 70.  LDL is currently 97 on Lipitor 20 mg daily.  Improved dietary efforts and weight loss will also be beneficial.  Continue follow up visits with cardiology, Dr. Jay.  Continue medical management with lavix, aspirin, and Lipitor     I gave patient a note to check and make sure he is taking his tamsulosin (Flomax) 0.4 mg at night to decrease nighttime nocturia symptoms.  We will recheck a PSA with labs in six months. PSA was normal limits in 20    I recommended he avoid chronic use of NSAIDs or other nephrotoxic agents.  He can safely take Tylenol products.  We will continue to monitor renal function.    Continue Synthroid 88 mcg one tablet daily.         Recommended diet, exercise, and weight loss to help delay progression of Type 2 diabetes.  I recommended increasing physical activity.  A recumbent bicycle or daily walking would be helpful in achieving weight loss goals.  Written literature regarding diet and exercise included in patient's AVS today.      He continues to decline influenza or pneumonia vaccines.   He will let us know if he changes his mind.      Return in six months for routine follow up with fasting labs one week prior.        Scribed for Dr. Magdaleno by Harriet Gardner [facility]     Diagnoses and all orders for this visit:    Type 2 diabetes mellitus without complication, without long-term current use of insulin (CMS/HCC)  -     CBC Auto Differential; Future  -     Comprehensive Metabolic Panel; Future  -     Hemoglobin A1c; Future  -     Microalbumin / Creatinine Urine Ratio - Urine, Clean Catch; Future    Coronary artery disease involving native coronary artery of native heart with angina pectoris (CMS/HCC)    Essential hypertension    Mixed  hyperlipidemia  -     Lipid Panel; Future    Benign non-nodular prostatic hyperplasia    Inflamed seborrheic keratosis    Acquired hypothyroidism  -     TSH; Future  -     T4, free; Future    Special screening for malignant neoplasm of prostate  -     PSA Screen; Future    Other orders  -     atorvastatin (LIPITOR) 40 MG tablet; Take 1 tablet by mouth Daily.        Lab on 10/15/2018   Component Date Value Ref Range Status   • Glucose 10/15/2018 121* 74 - 99 mg/dL Final   • BUN 10/15/2018 17  9 - 20 mg/dL Final   • Creatinine 10/15/2018 1.29* 0.66 - 1.25 mg/dL Final   • Sodium 10/15/2018 144  137 - 145 mmol/L Final   • Potassium 10/15/2018 4.2  3.4 - 5.0 mmol/L Final   • Chloride 10/15/2018 108* 98 - 107 mmol/L Final   • CO2 10/15/2018 27.0  22.0 - 30.0 mmol/L Final   • Calcium 10/15/2018 8.9  8.4 - 10.2 mg/dL Final   • Total Protein 10/15/2018 7.5  6.3 - 8.2 g/dL Final   • Albumin 10/15/2018 4.10  3.50 - 5.00 g/dL Final   • ALT (SGPT) 10/15/2018 35  <=50 U/L Final   • AST (SGOT) 10/15/2018 32  17 - 59 U/L Final   • Alkaline Phosphatase 10/15/2018 112  38 - 126 U/L Final   • Total Bilirubin 10/15/2018 0.4  0.2 - 1.3 mg/dL Final   • eGFR Non African Amer 10/15/2018 55  49 - 113 mL/min/1.73 Final   • Globulin 10/15/2018 3.4  2.3 - 3.5 gm/dL Final   • A/G Ratio 10/15/2018 1.2  1.1 - 1.8 g/dL Final   • BUN/Creatinine Ratio 10/15/2018 13.2  7.0 - 25.0 Final   • Anion Gap 10/15/2018 9.0  5.0 - 15.0 mmol/L Final   • Hemoglobin A1C 10/15/2018 6.0* 4 - 5.6 % Final   • LDL Cholesterol  10/15/2018 97  1 - 129 mg/dL Final   • TSH 10/15/2018 2.990  0.460 - 4.680 mIU/mL Final   ]

## 2018-11-14 RX ORDER — LEVOTHYROXINE SODIUM 88 MCG
88 TABLET ORAL DAILY
Qty: 90 TABLET | Refills: 3 | Status: SHIPPED | OUTPATIENT
Start: 2018-11-14 | End: 2019-09-22 | Stop reason: SDUPTHER

## 2018-11-14 RX ORDER — OMEPRAZOLE 40 MG/1
40 CAPSULE, DELAYED RELEASE ORAL DAILY
Qty: 90 CAPSULE | Refills: 3 | Status: SHIPPED | OUTPATIENT
Start: 2018-11-14 | End: 2019-09-26 | Stop reason: SDUPTHER

## 2018-11-14 RX ORDER — TAMSULOSIN HYDROCHLORIDE 0.4 MG/1
1 CAPSULE ORAL DAILY
Qty: 90 CAPSULE | Refills: 3 | Status: SHIPPED | OUTPATIENT
Start: 2018-11-14 | End: 2019-09-22 | Stop reason: SDUPTHER

## 2019-01-28 RX ORDER — LOSARTAN POTASSIUM 100 MG/1
100 TABLET ORAL DAILY
Qty: 90 TABLET | Refills: 3 | Status: SHIPPED | OUTPATIENT
Start: 2019-01-28 | End: 2019-11-05 | Stop reason: ALTCHOICE

## 2019-04-24 ENCOUNTER — LAB (OUTPATIENT)
Dept: LAB | Facility: OTHER | Age: 69
End: 2019-04-24

## 2019-04-24 DIAGNOSIS — E11.9 TYPE 2 DIABETES MELLITUS WITHOUT COMPLICATION, WITHOUT LONG-TERM CURRENT USE OF INSULIN (HCC): ICD-10-CM

## 2019-04-24 DIAGNOSIS — E03.9 ACQUIRED HYPOTHYROIDISM: Chronic | ICD-10-CM

## 2019-04-24 DIAGNOSIS — E78.2 MIXED HYPERLIPIDEMIA: Chronic | ICD-10-CM

## 2019-04-24 DIAGNOSIS — Z12.5 SPECIAL SCREENING FOR MALIGNANT NEOPLASM OF PROSTATE: ICD-10-CM

## 2019-04-24 LAB
ALBUMIN SERPL-MCNC: 4.1 G/DL (ref 3.5–5)
ALBUMIN UR-MCNC: <1.2 MG/L
ALBUMIN/GLOB SERPL: 1.3 G/DL (ref 1.1–1.8)
ALP SERPL-CCNC: 111 U/L (ref 38–126)
ALT SERPL W P-5'-P-CCNC: 45 U/L
ANION GAP SERPL CALCULATED.3IONS-SCNC: 9 MMOL/L (ref 5–15)
AST SERPL-CCNC: 32 U/L (ref 17–59)
BASOPHILS # BLD AUTO: 0.03 10*3/MM3 (ref 0–0.2)
BASOPHILS NFR BLD AUTO: 0.5 % (ref 0–1.5)
BILIRUB SERPL-MCNC: 0.5 MG/DL (ref 0.2–1.3)
BUN BLD-MCNC: 11 MG/DL (ref 7–23)
BUN/CREAT SERPL: 10 (ref 7–25)
CALCIUM SPEC-SCNC: 9.1 MG/DL (ref 8.4–10.2)
CHLORIDE SERPL-SCNC: 103 MMOL/L (ref 101–112)
CHOLEST SERPL-MCNC: 142 MG/DL (ref 150–200)
CO2 SERPL-SCNC: 28 MMOL/L (ref 22–30)
CREAT BLD-MCNC: 1.1 MG/DL (ref 0.7–1.3)
CREAT UR-MCNC: 184.9 MG/DL
DEPRECATED RDW RBC AUTO: 48.8 FL (ref 37–54)
EOSINOPHIL # BLD AUTO: 0.31 10*3/MM3 (ref 0–0.4)
EOSINOPHIL NFR BLD AUTO: 4.8 % (ref 0.3–6.2)
ERYTHROCYTE [DISTWIDTH] IN BLOOD BY AUTOMATED COUNT: 14.6 % (ref 12.3–15.4)
GFR SERPL CREATININE-BSD FRML MDRD: 66 ML/MIN/1.73 (ref 42–98)
GLOBULIN UR ELPH-MCNC: 3.2 GM/DL (ref 2.3–3.5)
GLUCOSE BLD-MCNC: 114 MG/DL (ref 70–99)
HBA1C MFR BLD: 5.95 % (ref 4.8–5.6)
HCT VFR BLD AUTO: 46.2 % (ref 37.5–51)
HDLC SERPL-MCNC: 25 MG/DL (ref 40–59)
HGB BLD-MCNC: 15.2 G/DL (ref 13–17.7)
LDLC SERPL CALC-MCNC: 47 MG/DL
LDLC/HDLC SERPL: 1.9 {RATIO} (ref 0–3.55)
LYMPHOCYTES # BLD AUTO: 2.09 10*3/MM3 (ref 0.7–3.1)
LYMPHOCYTES NFR BLD AUTO: 32.5 % (ref 19.6–45.3)
MCH RBC QN AUTO: 31 PG (ref 26.6–33)
MCHC RBC AUTO-ENTMCNC: 32.9 G/DL (ref 31.5–35.7)
MCV RBC AUTO: 94.3 FL (ref 79–97)
MICROALBUMIN/CREAT UR: NORMAL MG/G
MONOCYTES # BLD AUTO: 0.51 10*3/MM3 (ref 0.1–0.9)
MONOCYTES NFR BLD AUTO: 7.9 % (ref 5–12)
NEUTROPHILS # BLD AUTO: 3.5 10*3/MM3 (ref 1.7–7)
NEUTROPHILS NFR BLD AUTO: 54.3 % (ref 42.7–76)
PLATELET # BLD AUTO: 270 10*3/MM3 (ref 140–450)
PMV BLD AUTO: 10.4 FL (ref 6–12)
POTASSIUM BLD-SCNC: 3.9 MMOL/L (ref 3.4–5)
PROT SERPL-MCNC: 7.3 G/DL (ref 6.3–8.6)
PSA SERPL-MCNC: 2.04 NG/ML (ref 0–4)
RBC # BLD AUTO: 4.9 10*6/MM3 (ref 4.14–5.8)
SODIUM BLD-SCNC: 140 MMOL/L (ref 137–145)
T4 FREE SERPL-MCNC: 1.19 NG/DL (ref 0.93–1.7)
TRIGL SERPL-MCNC: 348 MG/DL
TSH SERPL DL<=0.05 MIU/L-ACNC: 3.31 MIU/ML (ref 0.27–4.2)
VLDLC SERPL-MCNC: 69.6 MG/DL
WBC NRBC COR # BLD: 6.44 10*3/MM3 (ref 3.4–10.8)

## 2019-04-24 PROCEDURE — 82570 ASSAY OF URINE CREATININE: CPT | Performed by: INTERNAL MEDICINE

## 2019-04-24 PROCEDURE — 85025 COMPLETE CBC W/AUTO DIFF WBC: CPT | Performed by: INTERNAL MEDICINE

## 2019-04-24 PROCEDURE — 36415 COLL VENOUS BLD VENIPUNCTURE: CPT | Performed by: INTERNAL MEDICINE

## 2019-04-24 PROCEDURE — 80053 COMPREHEN METABOLIC PANEL: CPT | Performed by: INTERNAL MEDICINE

## 2019-04-24 PROCEDURE — 84439 ASSAY OF FREE THYROXINE: CPT | Performed by: INTERNAL MEDICINE

## 2019-04-24 PROCEDURE — G0103 PSA SCREENING: HCPCS | Performed by: INTERNAL MEDICINE

## 2019-04-24 PROCEDURE — 80061 LIPID PANEL: CPT | Performed by: INTERNAL MEDICINE

## 2019-04-24 PROCEDURE — 83036 HEMOGLOBIN GLYCOSYLATED A1C: CPT | Performed by: INTERNAL MEDICINE

## 2019-04-24 PROCEDURE — 82043 UR ALBUMIN QUANTITATIVE: CPT | Performed by: INTERNAL MEDICINE

## 2019-04-24 PROCEDURE — 84443 ASSAY THYROID STIM HORMONE: CPT | Performed by: INTERNAL MEDICINE

## 2019-05-01 ENCOUNTER — OFFICE VISIT (OUTPATIENT)
Dept: FAMILY MEDICINE CLINIC | Facility: CLINIC | Age: 69
End: 2019-05-01

## 2019-05-01 VITALS
DIASTOLIC BLOOD PRESSURE: 88 MMHG | HEIGHT: 66 IN | HEART RATE: 60 BPM | SYSTOLIC BLOOD PRESSURE: 150 MMHG | WEIGHT: 264 LBS | TEMPERATURE: 98.6 F | BODY MASS INDEX: 42.43 KG/M2

## 2019-05-01 DIAGNOSIS — E11.9 TYPE 2 DIABETES MELLITUS WITHOUT COMPLICATION, WITHOUT LONG-TERM CURRENT USE OF INSULIN (HCC): Primary | Chronic | ICD-10-CM

## 2019-05-01 DIAGNOSIS — I10 ESSENTIAL HYPERTENSION: Chronic | ICD-10-CM

## 2019-05-01 DIAGNOSIS — E78.2 MIXED HYPERLIPIDEMIA: Chronic | ICD-10-CM

## 2019-05-01 DIAGNOSIS — E78.1 HYPERTRIGLYCERIDEMIA: Chronic | ICD-10-CM

## 2019-05-01 DIAGNOSIS — E03.9 ACQUIRED HYPOTHYROIDISM: Chronic | ICD-10-CM

## 2019-05-01 DIAGNOSIS — N40.0 BENIGN NON-NODULAR PROSTATIC HYPERPLASIA: Chronic | ICD-10-CM

## 2019-05-01 DIAGNOSIS — I25.119 CORONARY ARTERY DISEASE INVOLVING NATIVE CORONARY ARTERY OF NATIVE HEART WITH ANGINA PECTORIS (HCC): Chronic | ICD-10-CM

## 2019-05-01 DIAGNOSIS — H53.9 VISION CHANGES: ICD-10-CM

## 2019-05-01 DIAGNOSIS — Z12.11 COLON CANCER SCREENING: ICD-10-CM

## 2019-05-01 PROCEDURE — 99214 OFFICE O/P EST MOD 30 MIN: CPT | Performed by: INTERNAL MEDICINE

## 2019-05-01 RX ORDER — FENOFIBRATE 48 MG/1
48 TABLET, COATED ORAL DAILY
Qty: 90 TABLET | Refills: 3 | Status: SHIPPED | OUTPATIENT
Start: 2019-05-01 | End: 2020-03-19

## 2019-05-01 NOTE — PATIENT INSTRUCTIONS
Exercising to Lose Weight  Exercising can help you to lose weight. In order to lose weight through exercise, you need to do vigorous-intensity exercise. You can tell that you are exercising with vigorous intensity if you are breathing very hard and fast and cannot hold a conversation while exercising.  Moderate-intensity exercise helps to maintain your current weight. You can tell that you are exercising at a moderate level if you have a higher heart rate and faster breathing, but you are still able to hold a conversation.  How often should I exercise?  Choose an activity that you enjoy and set realistic goals. Your health care provider can help you to make an activity plan that works for you. Exercise regularly as directed by your health care provider. This may include:  · Doing resistance training twice each week, such as:  ? Push-ups.  ? Sit-ups.  ? Lifting weights.  ? Using resistance bands.  · Doing a given intensity of exercise for a given amount of time. Choose from these options:  ? 150 minutes of moderate-intensity exercise every week.  ? 75 minutes of vigorous-intensity exercise every week.  ? A mix of moderate-intensity and vigorous-intensity exercise every week.    Children, pregnant women, people who are out of shape, people who are overweight, and older adults may need to consult a health care provider for individual recommendations. If you have any sort of medical condition, be sure to consult your health care provider before starting a new exercise program.  What are some activities that can help me to lose weight?  · Walking at a rate of at least 4.5 miles an hour.  · Jogging or running at a rate of 5 miles per hour.  · Biking at a rate of at least 10 miles per hour.  · Lap swimming.  · Roller-skating or in-line skating.  · Cross-country skiing.  · Vigorous competitive sports, such as football, basketball, and soccer.  · Jumping rope.  · Aerobic dancing.  How can I be more active in my day-to-day  activities?  · Use the stairs instead of the elevator.  · Take a walk during your lunch break.  · If you drive, park your car farther away from work or school.  · If you take public transportation, get off one stop early and walk the rest of the way.  · Make all of your phone calls while standing up and walking around.  · Get up, stretch, and walk around every 30 minutes throughout the day.  What guidelines should I follow while exercising?  · Do not exercise so much that you hurt yourself, feel dizzy, or get very short of breath.  · Consult your health care provider prior to starting a new exercise program.  · Wear comfortable clothes and shoes with good support.  · Drink plenty of water while you exercise to prevent dehydration or heat stroke. Body water is lost during exercise and must be replaced.  · Work out until you breathe faster and your heart beats faster.  This information is not intended to replace advice given to you by your health care provider. Make sure you discuss any questions you have with your health care provider.  Document Released: 01/20/2012 Document Revised: 05/25/2017 Document Reviewed: 05/21/2015  [employer] Interactive Patient Education © 2019 [employer]      Calorie Counting for Weight Loss  Calories are units of energy. Your body needs a certain amount of calories from food to keep you going throughout the day. When you eat more calories than your body needs, your body stores the extra calories as fat. When you eat fewer calories than your body needs, your body burns fat to get the energy it needs.  Calorie counting means keeping track of how many calories you eat and drink each day. Calorie counting can be helpful if you need to lose weight. If you make sure to eat fewer calories than your body needs, you should lose weight. Ask your health care provider what a healthy weight is for you.  For calorie counting to work, you will need to eat the right number of calories in a day in order  to lose a healthy amount of weight per week. A dietitian can help you determine how many calories you need in a day and will give you suggestions on how to reach your calorie goal.  · A healthy amount of weight to lose per week is usually 1-2 lb (0.5-0.9 kg). This usually means that your daily calorie intake should be reduced by 500-750 calories.  · Eating 1,200 - 1,500 calories per day can help most women lose weight.  · Eating 1,500 - 1,800 calories per day can help most men lose weight.    What is my plan?  My goal is to have __________ calories per day.  If I have this many calories per day, I should lose around __________ pounds per week.  What do I need to know about calorie counting?  In order to meet your daily calorie goal, you will need to:  · Find out how many calories are in each food you would like to eat. Try to do this before you eat.  · Decide how much of the food you plan to eat.  · Write down what you ate and how many calories it had. Doing this is called keeping a food log.    To successfully lose weight, it is important to balance calorie counting with a healthy lifestyle that includes regular activity. Aim for 150 minutes of moderate exercise (such as walking) or 75 minutes of vigorous exercise (such as running) each week.  Where do I find calorie information?    The number of calories in a food can be found on a Nutrition Facts label. If a food does not have a Nutrition Facts label, try to look up the calories online or ask your dietitian for help.  Remember that calories are listed per serving. If you choose to have more than one serving of a food, you will have to multiply the calories per serving by the amount of servings you plan to eat. For example, the label on a package of bread might say that a serving size is 1 slice and that there are 90 calories in a serving. If you eat 1 slice, you will have eaten 90 calories. If you eat 2 slices, you will have eaten 180 calories.  How do I keep a  "food log?  Immediately after each meal, record the following information in your food log:  · What you ate. Don't forget to include toppings, sauces, and other extras on the food.  · How much you ate. This can be measured in cups, ounces, or number of items.  · How many calories each food and drink had.  · The total number of calories in the meal.    Keep your food log near you, such as in a small notebook in your pocket, or use a mobile juarez or website. Some programs will calculate calories for you and show you how many calories you have left for the day to meet your goal.  What are some calorie counting tips?  · Use your calories on foods and drinks that will fill you up and not leave you hungry:  ? Some examples of foods that fill you up are nuts and nut butters, vegetables, lean proteins, and high-fiber foods like whole grains. High-fiber foods are foods with more than 5 g fiber per serving.  ? Drinks such as sodas, specialty coffee drinks, alcohol, and juices have a lot of calories, yet do not fill you up.  · Eat nutritious foods and avoid empty calories. Empty calories are calories you get from foods or beverages that do not have many vitamins or protein, such as candy, sweets, and soda. It is better to have a nutritious high-calorie food (such as an avocado) than a food with few nutrients (such as a bag of chips).  · Know how many calories are in the foods you eat most often. This will help you calculate calorie counts faster.  · Pay attention to calories in drinks. Low-calorie drinks include water and unsweetened drinks.  · Pay attention to nutrition labels for \"low fat\" or \"fat free\" foods. These foods sometimes have the same amount of calories or more calories than the full fat versions. They also often have added sugar, starch, or salt, to make up for flavor that was removed with the fat.  · Find a way of tracking calories that works for you. Get creative. Try different apps or programs if writing down " calories does not work for you.  What are some portion control tips?  · Know how many calories are in a serving. This will help you know how many servings of a certain food you can have.  · Use a measuring cup to measure serving sizes. You could also try weighing out portions on a kitchen scale. With time, you will be able to estimate serving sizes for some foods.  · Take some time to put servings of different foods on your favorite plates, bowls, and cups so you know what a serving looks like.  · Try not to eat straight from a bag or box. Doing this can lead to overeating. Put the amount you would like to eat in a cup or on a plate to make sure you are eating the right portion.  · Use smaller plates, glasses, and bowls to prevent overeating.  · Try not to multitask (for example, watch TV or use your computer) while eating. If it is time to eat, sit down at a table and enjoy your food. This will help you to know when you are full. It will also help you to be aware of what you are eating and how much you are eating.  What are tips for following this plan?  Reading food labels  · Check the calorie count compared to the serving size. The serving size may be smaller than what you are used to eating.  · Check the source of the calories. Make sure the food you are eating is high in vitamins and protein and low in saturated and trans fats.  Shopping  · Read nutrition labels while you shop. This will help you make healthy decisions before you decide to purchase your food.  · Make a grocery list and stick to it.  Cooking  · Try to cook your favorite foods in a healthier way. For example, try baking instead of frying.  · Use low-fat dairy products.  Meal planning  · Use more fruits and vegetables. Half of your plate should be fruits and vegetables.  · Include lean proteins like poultry and fish.  How do I count calories when eating out?  · Ask for smaller portion sizes.  · Consider sharing an entree and sides instead of  getting your own entree.  · If you get your own entree, eat only half. Ask for a box at the beginning of your meal and put the rest of your entree in it so you are not tempted to eat it.  · If calories are listed on the menu, choose the lower calorie options.  · Choose dishes that include vegetables, fruits, whole grains, low-fat dairy products, and lean protein.  · Choose items that are boiled, broiled, grilled, or steamed. Stay away from items that are buttered, battered, fried, or served with cream sauce. Items labeled “crispy” are usually fried, unless stated otherwise.  · Choose water, low-fat milk, unsweetened iced tea, or other drinks without added sugar. If you want an alcoholic beverage, choose a lower calorie option such as a glass of wine or light beer.  · Ask for dressings, sauces, and syrups on the side. These are usually high in calories, so you should limit the amount you eat.  · If you want a salad, choose a garden salad and ask for grilled meats. Avoid extra toppings like wahl, cheese, or fried items. Ask for the dressing on the side, or ask for olive oil and vinegar or lemon to use as dressing.  · Estimate how many servings of a food you are given. For example, a serving of cooked rice is ½ cup or about the size of half a baseball. Knowing serving sizes will help you be aware of how much food you are eating at restaurants. The list below tells you how big or small some common portion sizes are based on everyday objects:  ? 1 oz--4 stacked dice.  ? 3 oz--1 deck of cards.  ? 1 tsp--1 die.  ? 1 Tbsp--½ a ping-pong ball.  ? 2 Tbsp--1 ping-pong ball.  ? ½ cup--½ baseball.  ? 1 cup--1 baseball.  Summary  · Calorie counting means keeping track of how many calories you eat and drink each day. If you eat fewer calories than your body needs, you should lose weight.  · A healthy amount of weight to lose per week is usually 1-2 lb (0.5-0.9 kg). This usually means reducing your daily calorie intake by 500-750  calories.  · The number of calories in a food can be found on a Nutrition Facts label. If a food does not have a Nutrition Facts label, try to look up the calories online or ask your dietitian for help.  · Use your calories on foods and drinks that will fill you up, and not on foods and drinks that will leave you hungry.  · Use smaller plates, glasses, and bowls to prevent overeating.  This information is not intended to replace advice given to you by your health care provider. Make sure you discuss any questions you have with your health care provider.  Document Released: 12/18/2006 Document Revised: 11/17/2017 Document Reviewed: 11/17/2017  [employer] Interactive Patient Education © 2019 [employer]

## 2019-05-01 NOTE — PROGRESS NOTES
Subjective        History of Present Illness     Nick Austin (Naveed)  is a 68 y.o. male who established care six months ago.  He comes in for a 6-month follow up on chronic medical issues including essential hypertension, hyperlipidemia, CAD, hypothyroidism, and Type 2 diabetes mellitus among other issues.  Dr. Jay is his cardiologist.  He has history of CABG x 2 with the most recent dated 09/2017 by Dr. Greg Morgan and continues on medical management with Plavix, aspirin, and Lipitor. Six months ago, I iIncreased Lipitor to 40 mg daily due to his strong history of CAD with a goal of keeping his LDL cholesterol under 70.  LDL is improved at 47 decreased from 97.  He continues to have some residual pain in the chest wall, but denies angina.  He reports nocturia averaging once nightly, but denies any other significant BPH symptoms.  He reports his cardiologist recommended he stop the fish oil.  His triglycerides are vastly increased without the fish oil.  We discussed trying fenofibrate or resuming the fish oil.  He would like to try the prescription fenofibrate.  He has only occasional breakthrough GERD symptoms with the Prilosec managed with OTC antacids    He reports several vision issues, especially in the right eye after he had cataract sugery 5-6 months ago by Dr. Marqeuz.  He has not addressed this with Dr. Marquez.  He did get new eyeglasses by Dr. Cline, his optometrist 3-4 weeks ago, but has resumed wearing his old prescription glasses, as he did not see well with the new glasses. I urged patient to address this with Dr. Cline.     He continues to have excellent diabetes control with diet.  We reviewed diabetic diet principles.  He continues to drink Gatorade and sugar sweetened RC's.  I recommended he eliminate all sugar sweetened drinks.      Renal function had been trending down with last labs, but creatinine is back to baseline at 1.1.       He is due pneumonia vaccines.  He  "declines pneumonia or influenza vaccines now and in the future.      He is due repeat colonoscopy.  His last colonoscopy was 04/2009.     Weight is down 6 pounds in the past six months.  Blood pressure is above goal at 150/88.  He reports BP is normally elevated in the doctor's office, although, BP is normal .       The patient's relevant past medical, surgical, and social history was reviewed in Epic.   Lab results are reviewed with the patient today.  CBC unremarkable.  Fasting glucose 114. A1c is 5.95.  Normal liver and renal function.  Total cholesterol 142 on Lipitor.  HDL 25.  LDL 47.  Triglycerides 348 above goal.  LDL/HDL ratio 1.9.     Review of Systems   Constitutional: Negative for chills, fatigue and fever.   HENT: Negative for congestion, ear pain, postnasal drip, sinus pressure and sore throat.    Respiratory: Negative for cough, shortness of breath and wheezing.    Cardiovascular: Negative for chest pain, palpitations and leg swelling.   Gastrointestinal: Negative for abdominal pain, blood in stool, constipation, diarrhea, nausea and vomiting.   Endocrine: Negative for cold intolerance, heat intolerance, polydipsia and polyuria.   Genitourinary: Negative for dysuria, frequency, hematuria and urgency.   Skin: Negative for rash.   Neurological: Negative for syncope and weakness.        Objective     Vitals:    05/01/19 0803   BP: 150/88   Pulse: 60   Temp: 98.6 °F (37 °C)   TempSrc: Oral   Weight: 120 kg (264 lb)   Height: 167.6 cm (66\")     Physical Exam   Constitutional: He is oriented to person, place, and time. He appears well-developed and well-nourished. No distress.   Morbidly obese male.     HENT:   Head: Normocephalic and atraumatic.   Nose: Right sinus exhibits no maxillary sinus tenderness and no frontal sinus tenderness. Left sinus exhibits no maxillary sinus tenderness and no frontal sinus tenderness.   Mouth/Throat: Uvula is midline, oropharynx is clear and moist and mucous membranes are " normal. No oral lesions. No tonsillar exudate.   Crowded posterior oropharynx.    Eyes: Conjunctivae and EOM are normal. Pupils are equal, round, and reactive to light.   Neck: Trachea normal. Neck supple. No JVD present. Carotid bruit is not present. No tracheal deviation present. No thyroid mass and no thyromegaly present.   Cardiovascular: Normal rate, regular rhythm, normal heart sounds and intact distal pulses.  No extrasystoles are present. PMI is not displaced.   No murmur heard.  CABG scars noted.     Pulmonary/Chest: Effort normal and breath sounds normal. No accessory muscle usage. No respiratory distress. He has no decreased breath sounds. He has no wheezes. He has no rhonchi. He has no rales.   Abdominal: Soft. Bowel sounds are normal. He exhibits no distension. There is no hepatosplenomegaly. There is no tenderness.   Obese abdomen limits exam.      Vascular Status -  His right foot exhibits abnormal foot edema (Trace edema bilateral ankles. ). His right foot exhibits normal foot vasculature . His left foot exhibits abnormal foot edema. His left foot exhibits normal foot vasculature .  Lymphadenopathy:     He has no cervical adenopathy.   Neurological: He is alert and oriented to person, place, and time. No cranial nerve deficit. Coordination normal.   Skin: Skin is warm, dry and intact. No rash noted. No cyanosis. Nails show no clubbing.   Psychiatric: He has a normal mood and affect. His speech is normal and behavior is normal. Judgment and thought content normal.   Vitals reviewed.        Assessment/Plan      For the new problem of hypertriglyceridemia, a prescription is sent for fenofibrate 48 mg to take one q.d.  Pursue dietary efforts.  Continue medical management with Plavix, aspirin, and Lipitor 40 mg daily due to his strong history of CAD with a goal of keeping his LDL cholesterol under 70. Continue follow up visits with cardiology, Dr. Jay.      Refer to Dr. Harsh Ruiz for repeat  colonoscopy.      Continue the Prilosec for GERD and OTC antacids for breakthrough symptoms     Continue Cozaar and monitor BP.  Notify me if BP is consistently above goal at home.     Continue Synthroid 88 mcg one tablet daily.    Vision change is a new problem.I recommended he schedule with Dr. Cline to see about changing his prescription, as patient reports he did not see well with his new eyeglasses prescribed approximately a month ago.      He continues to decline influenza or pneumonia vaccines.   He will let us know if he changes his mind.       Return in six months for routine follow up with fasting labs one week prior.        Continue diet control for diabetes.  Pursue diabetic diet, exercise and weight loss efforts especially decreasing carbohydrates and sugar.    Eliminate the sugar sweetened drinks.  I gave him the name of Powerade Zero with zero carbohydrates or sugar.      Scribed for Dr. Magdaleno by Harriet Gardner [facility].     Diagnoses and all orders for this visit:    Type 2 diabetes mellitus without complication, without long-term current use of insulin (CMS/[facility])  -     CBC Auto Differential; Future  -     Comprehensive Metabolic Panel; Future  -     Hemoglobin A1c; Future  -     TSH; Future  -     T4, free; Future  -     LDL Cholesterol, Direct; Future    Mixed hyperlipidemia  -     LDL Cholesterol, Direct; Future  -     Triglycerides; Future    Essential hypertension  -     Comprehensive Metabolic Panel; Future    Coronary artery disease involving native coronary artery of native heart with angina pectoris (CMS/[facility]) - followed by Dr. Jay    Benign non-nodular prostatic hyperplasia    Acquired hypothyroidism  -     TSH; Future  -     T4, free; Future    Colon cancer screening  -     Ambulatory Referral to General Surgery    Hypertriglyceridemia    Other orders  -     fenofibrate (TRICOR) 48 MG tablet; Take 1 tablet by mouth Daily.        Lab on 04/24/2019   Component Date Value Ref Range  Status   • WBC 04/24/2019 6.44  3.40 - 10.80 10*3/mm3 Final   • RBC 04/24/2019 4.90  4.14 - 5.80 10*6/mm3 Final   • Hemoglobin 04/24/2019 15.2  13.0 - 17.7 g/dL Final   • Hematocrit 04/24/2019 46.2  37.5 - 51.0 % Final   • MCV 04/24/2019 94.3  79.0 - 97.0 fL Final   • MCH 04/24/2019 31.0  26.6 - 33.0 pg Final   • MCHC 04/24/2019 32.9  31.5 - 35.7 g/dL Final   • RDW 04/24/2019 14.6  12.3 - 15.4 % Final   • RDW-SD 04/24/2019 48.8  37.0 - 54.0 fl Final   • MPV 04/24/2019 10.4  6.0 - 12.0 fL Final   • Platelets 04/24/2019 270  140 - 450 10*3/mm3 Final   • Neutrophil % 04/24/2019 54.3  42.7 - 76.0 % Final   • Lymphocyte % 04/24/2019 32.5  19.6 - 45.3 % Final   • Monocyte % 04/24/2019 7.9  5.0 - 12.0 % Final   • Eosinophil % 04/24/2019 4.8  0.3 - 6.2 % Final   • Basophil % 04/24/2019 0.5  0.0 - 1.5 % Final   • Neutrophils, Absolute 04/24/2019 3.50  1.70 - 7.00 10*3/mm3 Final   • Lymphocytes, Absolute 04/24/2019 2.09  0.70 - 3.10 10*3/mm3 Final   • Monocytes, Absolute 04/24/2019 0.51  0.10 - 0.90 10*3/mm3 Final   • Eosinophils, Absolute 04/24/2019 0.31  0.00 - 0.40 10*3/mm3 Final   • Basophils, Absolute 04/24/2019 0.03  0.00 - 0.20 10*3/mm3 Final   • Glucose 04/24/2019 114* 70 - 99 mg/dL Final   • BUN 04/24/2019 11  7 - 23 mg/dL Final   • Creatinine 04/24/2019 1.10  0.70 - 1.30 mg/dL Final   • Sodium 04/24/2019 140  137 - 145 mmol/L Final   • Potassium 04/24/2019 3.9  3.4 - 5.0 mmol/L Final   • Chloride 04/24/2019 103  101 - 112 mmol/L Final   • CO2 04/24/2019 28.0  22.0 - 30.0 mmol/L Final   • Calcium 04/24/2019 9.1  8.4 - 10.2 mg/dL Final   • Total Protein 04/24/2019 7.3  6.3 - 8.6 g/dL Final   • Albumin 04/24/2019 4.10  3.50 - 5.00 g/dL Final   • ALT (SGPT) 04/24/2019 45  <=50 U/L Final   • AST (SGOT) 04/24/2019 32  17 - 59 U/L Final   • Alkaline Phosphatase 04/24/2019 111  38 - 126 U/L Final   • Total Bilirubin 04/24/2019 0.5  0.2 - 1.3 mg/dL Final   • eGFR Non  Amer 04/24/2019 66  42 - 98 mL/min/1.73 Final   •  Globulin 04/24/2019 3.2  2.3 - 3.5 gm/dL Final   • A/G Ratio 04/24/2019 1.3  1.1 - 1.8 g/dL Final   • BUN/Creatinine Ratio 04/24/2019 10.0  7.0 - 25.0 Final   • Anion Gap 04/24/2019 9.0  5.0 - 15.0 mmol/L Final   • Hemoglobin A1C 04/24/2019 5.95* 4.80 - 5.60 % Final   • Total Cholesterol 04/24/2019 142* 150 - 200 mg/dL Final   • Triglycerides 04/24/2019 348* <=150 mg/dL Final   • HDL Cholesterol 04/24/2019 25* 40 - 59 mg/dL Final   • LDL Cholesterol  04/24/2019 47  <=100 mg/dL Final   • VLDL Cholesterol 04/24/2019 69.6  mg/dL Final   • LDL/HDL Ratio 04/24/2019 1.90  0.00 - 3.55 Final   • Microalbumin/Creatinine Ratio 04/24/2019   mg/g Final    Unable to calculate   • Creatinine, Urine 04/24/2019 184.9  mg/dL Final   • Microalbumin, Urine 04/24/2019 <1.2  mg/L Final   • TSH 04/24/2019 3.310  0.270 - 4.200 mIU/mL Final   • Free T4 04/24/2019 1.19  0.93 - 1.70 ng/dL Final   • PSA 04/24/2019 2.040  0.000 - 4.000 ng/mL Final   ]

## 2019-05-08 ENCOUNTER — CONSULT (OUTPATIENT)
Dept: SURGERY | Facility: CLINIC | Age: 69
End: 2019-05-08

## 2019-05-08 ENCOUNTER — OFFICE VISIT (OUTPATIENT)
Dept: CARDIOLOGY | Facility: CLINIC | Age: 69
End: 2019-05-08

## 2019-05-08 VITALS
BODY MASS INDEX: 41.78 KG/M2 | HEIGHT: 66 IN | OXYGEN SATURATION: 98 % | WEIGHT: 260 LBS | SYSTOLIC BLOOD PRESSURE: 118 MMHG | HEART RATE: 72 BPM | DIASTOLIC BLOOD PRESSURE: 70 MMHG

## 2019-05-08 VITALS
WEIGHT: 260 LBS | HEIGHT: 66 IN | SYSTOLIC BLOOD PRESSURE: 120 MMHG | BODY MASS INDEX: 41.78 KG/M2 | HEART RATE: 90 BPM | TEMPERATURE: 98.5 F | DIASTOLIC BLOOD PRESSURE: 74 MMHG

## 2019-05-08 DIAGNOSIS — Z12.11 SCREEN FOR COLON CANCER: Primary | ICD-10-CM

## 2019-05-08 DIAGNOSIS — I10 ESSENTIAL HYPERTENSION: Primary | Chronic | ICD-10-CM

## 2019-05-08 DIAGNOSIS — Z95.1 S/P CABG (CORONARY ARTERY BYPASS GRAFT): ICD-10-CM

## 2019-05-08 DIAGNOSIS — I25.118 CORONARY ARTERY DISEASE OF NATIVE ARTERY OF NATIVE HEART WITH STABLE ANGINA PECTORIS (HCC): ICD-10-CM

## 2019-05-08 DIAGNOSIS — E78.2 MIXED HYPERLIPIDEMIA: Chronic | ICD-10-CM

## 2019-05-08 PROCEDURE — 99203 OFFICE O/P NEW LOW 30 MIN: CPT | Performed by: SURGERY

## 2019-05-08 PROCEDURE — 99214 OFFICE O/P EST MOD 30 MIN: CPT | Performed by: INTERNAL MEDICINE

## 2019-05-08 RX ORDER — DEXTROSE AND SODIUM CHLORIDE 5; .45 G/100ML; G/100ML
100 INJECTION, SOLUTION INTRAVENOUS CONTINUOUS
Status: CANCELLED | OUTPATIENT
Start: 2019-05-17

## 2019-05-08 NOTE — PLAN OF CARE
Problem: Patient Care Overview (Adult)  Goal: Plan of Care Review  Outcome: Ongoing (interventions implemented as appropriate)    09/18/17 1310   Coping/Psychosocial Response Interventions   Plan Of Care Reviewed With patient   Patient Care Overview   Progress improving   Outcome Evaluation   Outcome Summary/Follow up Plan sit-stand-sit sba of 1,sit-sup min of 1,correct use hh with t/f's,amb 256' with rw and cga of 1,up/down 5 steps with hr ind-copies given of hep,home safety and cardiac /sternal precautions,benefits of increased acitivity;2/4 goals met-d/c home with hh pt/ot followed by cardiac rehab       Goal: Discharge Needs Assessment  Outcome: Ongoing (interventions implemented as appropriate)    09/15/17 0727 09/15/17 0916 09/15/17 1113   Discharge Needs Assessment   Concerns To Be Addressed --  adjustment to diagnosis/illness concerns --    Readmission Within The Last 30 Days no previous admission in last 30 days --  --    Discharge Facility/Level Of Care Needs --  home with home health --    Current Discharge Risk --  chronically ill --    Discharge Disposition --  still a patient --    Current Health   Anticipated Changes Related to Illness inability to care for self --  --    Self-Care   Equipment Currently Used at Home --  --  grab bar;raised toilet;shower chair  (has RW used by wife in the past)   Living Environment   Transportation Available --  --  family or friend will provide         Problem: Inpatient Physical Therapy  Goal: Transfer Training Goal 1 LTG- PT  Outcome: Ongoing (interventions implemented as appropriate)    09/15/17 1640 09/18/17 1310   Transfer Training PT LTG   Transfer Training PT LTG, Date Established 09/15/17 --    Transfer Training PT LTG, Time to Achieve by discharge --    Transfer Training PT LTG, Activity Type bed to chair /chair to bed;sit to stand/stand to sit --    Transfer Training PT LTG, [place] Level independent;conditional independence --    Transfer Training PT  LTG, Date Goal Reviewed --  09/18/17   Transfer Training PT LTG, Outcome --  goal not met       Goal: Gait Training Goal LTG- PT  Outcome: Ongoing (interventions implemented as appropriate)    09/15/17 1640 09/18/17 1310   Gait Training PT LTG   Gait Training Goal PT LTG, Date Established 09/15/17 --    Gait Training Goal PT LTG, Time to Achieve by discharge --    Gait Training Goal PT LTG, [place] Level independent;conditional independence --    Gait Training Goal PT LTG, Distance to Achieve 300ft --    Gait Training Goal PT LTG, Date Goal Reviewed --  09/18/17   Gait Training Goal PT LTG, Outcome --  goal not met       Goal: Stair Training Goal LTG- PT  Outcome: Outcome(s) achieved Date Met:  09/18/17    09/15/17 1640 09/18/17 1310   Stair Training PT LTG   Stair Training Goal PT LTG, Date Established 09/15/17 --    Stair Training Goal PT LTG, Time to Achieve by discharge --    Stair Training Goal PT LTG, Number of Steps 3 --    Stair Training Goal PT LTG, [place] Level conditional independence --    Stair Training Goal PT LTG, Assist Device 1 handrail;2 handrails --    Stair Training Goal PT LTG, Date Goal Reviewed --  09/18/17   Stair Training Goal PT LTG, Outcome --  goal met       Goal: Patient Education Goal LTG- PT  Outcome: Ongoing (interventions implemented as appropriate)    09/15/17 1640 09/18/17 1310   Patient Education PT LTG   Patient Education PT LTG, Date Established --  09/18/17   Patient Education PT LTG, Time to Achieve by discharge --    Patient Education PT LTG, Education Type HEP;precaution per surgeon;gait;transfers;home safety;benefits of activity --    Patient Education PT LTG, Date Goal Reviewed --  09/18/17   Patient Education PT LTG Outcome --  goal not met            Patient/Caregiver provided printed discharge information.

## 2019-05-08 NOTE — PATIENT INSTRUCTIONS
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
## 2019-05-17 ENCOUNTER — ANESTHESIA (OUTPATIENT)
Dept: GASTROENTEROLOGY | Facility: HOSPITAL | Age: 69
End: 2019-05-17

## 2019-05-17 ENCOUNTER — ANESTHESIA EVENT (OUTPATIENT)
Dept: GASTROENTEROLOGY | Facility: HOSPITAL | Age: 69
End: 2019-05-17

## 2019-05-17 ENCOUNTER — HOSPITAL ENCOUNTER (OUTPATIENT)
Facility: HOSPITAL | Age: 69
Setting detail: HOSPITAL OUTPATIENT SURGERY
Discharge: HOME | End: 2019-05-17
Attending: SURGERY | Admitting: SURGERY

## 2019-05-17 VITALS
BODY MASS INDEX: 40.02 KG/M2 | RESPIRATION RATE: 19 BRPM | HEIGHT: 67 IN | TEMPERATURE: 96.8 F | WEIGHT: 255 LBS | DIASTOLIC BLOOD PRESSURE: 60 MMHG | HEART RATE: 70 BPM | SYSTOLIC BLOOD PRESSURE: 118 MMHG | OXYGEN SATURATION: 96 %

## 2019-05-17 DIAGNOSIS — Z12.11 SCREEN FOR COLON CANCER: ICD-10-CM

## 2019-05-17 PROCEDURE — 45378 DIAGNOSTIC COLONOSCOPY: CPT | Performed by: SURGERY

## 2019-05-17 PROCEDURE — 25010000002 PROPOFOL 10 MG/ML EMULSION: Performed by: NURSE ANESTHETIST, CERTIFIED REGISTERED

## 2019-05-17 RX ORDER — DEXTROSE AND SODIUM CHLORIDE 5; .45 G/100ML; G/100ML
100 INJECTION, SOLUTION INTRAVENOUS CONTINUOUS
Status: DISCONTINUED | OUTPATIENT
Start: 2019-05-17 | End: 2019-05-17 | Stop reason: HOSPADM

## 2019-05-17 RX ORDER — LIDOCAINE HYDROCHLORIDE 20 MG/ML
INJECTION, SOLUTION INFILTRATION; PERINEURAL AS NEEDED
Status: DISCONTINUED | OUTPATIENT
Start: 2019-05-17 | End: 2019-05-17 | Stop reason: SURG

## 2019-05-17 RX ORDER — ONDANSETRON 2 MG/ML
4 INJECTION INTRAMUSCULAR; INTRAVENOUS ONCE AS NEEDED
Status: DISCONTINUED | OUTPATIENT
Start: 2019-05-17 | End: 2019-05-17 | Stop reason: HOSPADM

## 2019-05-17 RX ORDER — PROPOFOL 10 MG/ML
VIAL (ML) INTRAVENOUS AS NEEDED
Status: DISCONTINUED | OUTPATIENT
Start: 2019-05-17 | End: 2019-05-17 | Stop reason: SURG

## 2019-05-17 RX ADMIN — PROPOFOL 20 MG: 10 INJECTION, EMULSION INTRAVENOUS at 09:10

## 2019-05-17 RX ADMIN — DEXTROSE AND SODIUM CHLORIDE 100 ML/HR: 5; 450 INJECTION, SOLUTION INTRAVENOUS at 08:47

## 2019-05-17 RX ADMIN — PROPOFOL 80 MG: 10 INJECTION, EMULSION INTRAVENOUS at 09:02

## 2019-05-17 RX ADMIN — PROPOFOL 10 MG: 10 INJECTION, EMULSION INTRAVENOUS at 09:04

## 2019-05-17 RX ADMIN — LIDOCAINE HYDROCHLORIDE 80 MG: 20 INJECTION, SOLUTION INFILTRATION; PERINEURAL at 09:02

## 2019-05-17 RX ADMIN — PROPOFOL 20 MG: 10 INJECTION, EMULSION INTRAVENOUS at 09:08

## 2019-05-17 RX ADMIN — PROPOFOL 10 MG: 10 INJECTION, EMULSION INTRAVENOUS at 09:03

## 2019-05-17 RX ADMIN — PROPOFOL 20 MG: 10 INJECTION, EMULSION INTRAVENOUS at 09:06

## 2019-05-17 NOTE — ANESTHESIA POSTPROCEDURE EVALUATION
Patient: Nick Austin    Procedure Summary     Date:  05/17/19 Room / Location:  [facility] ENDOSCOPY 2 / [facility] ENDOSCOPY    Anesthesia Start:  0854 Anesthesia Stop:  0919    Procedure:  COLONOSCOPY (N/A ) Diagnosis:       Screen for colon cancer      (Screen for colon cancer [Z12.11])    Surgeon:  Harsh Ruiz MD Provider:  Freddie Casiano CRNA    Anesthesia Type:  MAC ASA Status:  3          Anesthesia Type: MAC  Last vitals  BP   154/92 (05/17/19 0836)   Temp   98.1 °F (36.7 °C) (05/17/19 0836)   Pulse   75 (05/17/19 0836)   Resp   20 (05/17/19 0836)     SpO2   98 % (05/17/19 0836)     Post Anesthesia Care and Evaluation    Patient location during evaluation: PHASE II  Level of consciousness: sleepy but conscious  Pain score: 0  Pain management: adequate  Airway patency: patent  Anesthetic complications: No anesthetic complications  PONV Status: none  Cardiovascular status: acceptable and hemodynamically stable  Respiratory status: acceptable and spontaneous ventilation  Hydration status: acceptable

## 2019-05-17 NOTE — ANESTHESIA PREPROCEDURE EVALUATION
Anesthesia Evaluation     Patient summary reviewed and Nursing notes reviewed   NPO Solid Status: > 8 hours  NPO Liquid Status: > 2 hours           Airway   Mallampati: II  TM distance: >3 FB  Dental      Pulmonary - normal exam   (+) asthma, shortness of breath (on exertion),   Cardiovascular - normal exam  Exercise tolerance: poor (<4 METS)    (+) hypertension poorly controlled less than 2 medications, CAD, CABG, angina, hyperlipidemia,       Neuro/Psych  GI/Hepatic/Renal/Endo    (+) obesity, morbid obesity, GERD,  diabetes mellitus type 2, hypothyroidism,     Musculoskeletal     Abdominal   (+) obese,    Substance History      OB/GYN          Other   (+) arthritis                     Anesthesia Plan    ASA 3     MAC     intravenous induction   Anesthetic plan, all risks, benefits, and alternatives have been provided, discussed and informed consent has been obtained with: patient.

## 2019-06-19 RX ORDER — CLOPIDOGREL BISULFATE 75 MG/1
75 TABLET ORAL DAILY
Qty: 90 TABLET | Refills: 3 | Status: SHIPPED | OUTPATIENT
Start: 2019-06-19 | End: 2020-05-29

## 2019-08-21 ENCOUNTER — OFFICE VISIT (OUTPATIENT)
Dept: FAMILY MEDICINE CLINIC | Facility: CLINIC | Age: 69
End: 2019-08-21

## 2019-08-21 VITALS
HEART RATE: 64 BPM | DIASTOLIC BLOOD PRESSURE: 70 MMHG | WEIGHT: 256 LBS | HEIGHT: 67 IN | SYSTOLIC BLOOD PRESSURE: 138 MMHG | BODY MASS INDEX: 40.18 KG/M2 | TEMPERATURE: 98.1 F

## 2019-08-21 DIAGNOSIS — R13.12 OROPHARYNGEAL DYSPHAGIA: Primary | ICD-10-CM

## 2019-08-21 DIAGNOSIS — E11.9 TYPE 2 DIABETES MELLITUS WITHOUT COMPLICATION, WITHOUT LONG-TERM CURRENT USE OF INSULIN (HCC): Chronic | ICD-10-CM

## 2019-08-21 DIAGNOSIS — N20.0 NEPHROLITHIASIS: Chronic | ICD-10-CM

## 2019-08-21 DIAGNOSIS — I10 ESSENTIAL HYPERTENSION: Chronic | ICD-10-CM

## 2019-08-21 PROCEDURE — 99214 OFFICE O/P EST MOD 30 MIN: CPT | Performed by: INTERNAL MEDICINE

## 2019-08-21 NOTE — PROGRESS NOTES
Subjective          History of Present Illness     Nick Austin is a 68 y.o. male who established care last year.  His chronic medical issues include essential hypertension, hyperlipidemia, CAD, hypothyroidism, obesity, and Type 2 diabetes mellitus among other issues.  He comes in today reporting approximately 3-month history of upper oropharyngeal dysphagia.  He reports three choking episodes, once with meal and twice with medication.  Denies unexplained weight loss.  Denies tobacco use.  GERD symptoms are adequately managed with daily use of Prilosec adding OTC antacids as needed.  He has had upper endoscopy in the past, but it has been quite some time.  He had colonoscopy by Dr. Ruiz earlier this year (05/2019).  I recommended GI referral to evaluate the upper oropharyngeal dysphagia.  He is in agreement.      Diabetes was well controlled with labs in the spring.  He does not monitor glucose routinely at home.      He has a history of nephrolithiasis and feels he was recently passing a kidney stone.  He is taking Flomax.  He brings in a prescription for Zithromax, which patient reports he was told in the past by his previous physician that it would help with the pain associated with kidney stones and is requesting a refill.  I encouraged him to remain on the Flomax, but told him the Zithromax is not indicated for renal colic.      Review of Systems   Constitutional: Negative for chills, fatigue and fever.   HENT: Negative for congestion, ear pain, postnasal drip, sinus pressure and sore throat.    Respiratory: Negative for cough, shortness of breath and wheezing.    Cardiovascular: Negative for chest pain, palpitations and leg swelling.   Gastrointestinal: Negative for abdominal pain, blood in stool, constipation, diarrhea, nausea and vomiting.        Dysphagia.    Endocrine: Negative for cold intolerance, heat intolerance, polydipsia and polyuria.   Genitourinary: Negative for dysuria, frequency,  "hematuria and urgency.   Skin: Negative for rash.   Neurological: Negative for syncope and weakness.        Objective     Vitals:    08/21/19 1107   BP: 138/70   Pulse: 64   Temp: 98.1 °F (36.7 °C)   TempSrc: Oral   Weight: 116 kg (256 lb)   Height: 170.2 cm (67\")     Physical Exam   Constitutional: He is oriented to person, place, and time. He appears well-developed and well-nourished. No distress.   Morbidly obese male.    HENT:   Head: Normocephalic and atraumatic.   Nose: Nose normal.   Mouth/Throat: Oropharynx is clear and moist. No oropharyngeal exudate.   Eyes: EOM are normal. Pupils are equal, round, and reactive to light.   Neck: Neck supple. No JVD present. No thyromegaly present.   Cardiovascular: Normal rate, regular rhythm and normal heart sounds.   Pulmonary/Chest: Effort normal and breath sounds normal. No accessory muscle usage. No respiratory distress. He has no wheezes. He has no rales.   Abdominal: Soft. Bowel sounds are normal. He exhibits no distension. There is no tenderness.   Obese abdomen.    Musculoskeletal: He exhibits no edema.   Lymphadenopathy:     He has no cervical adenopathy.   Neurological: He is alert and oriented to person, place, and time. No cranial nerve deficit.   Psychiatric: He has a normal mood and affect. His speech is normal and behavior is normal. Judgment and thought content normal.       Future Appointments   Date Time Provider Department Center   11/5/2019  8:00 AM Patrick Magdaleno MD [facility] None   5/13/2020 10:45 AM Perla Jay MD [facility] None       Assessment/Plan      I ecommended he take small bites of food with eating meals and drinking plenty of liquid when eating solid food.  Refer to Dr. Arredondo, gastroenterologist, to evaluate the upper oropharyngeal dysphagia.  He is in agreement.      Continue the Flomax.   I educated that there is no reason to take zithromax when passing a kidney stone.    Continue the current diabetes management.  He " is currently diet controlled.    Return in November for routine follow up with fasting labs one week prior or sooner if needed.     Scribed for Dr. Magdaleno by Harriet Gardner [facility].     Diagnoses and all orders for this visit:    Oropharyngeal dysphagia  -     Ambulatory Referral to Gastroenterology    Essential hypertension    Type 2 diabetes mellitus without complication, without long-term current use of insulin (CMS/HCC)    Nephrolithiasis        No visits with results within 3 Week(s) from this visit.   Latest known visit with results is:   Lab on 04/24/2019   Component Date Value Ref Range Status   • WBC 04/24/2019 6.44  3.40 - 10.80 10*3/mm3 Final   • RBC 04/24/2019 4.90  4.14 - 5.80 10*6/mm3 Final   • Hemoglobin 04/24/2019 15.2  13.0 - 17.7 g/dL Final   • Hematocrit 04/24/2019 46.2  37.5 - 51.0 % Final   • MCV 04/24/2019 94.3  79.0 - 97.0 fL Final   • MCH 04/24/2019 31.0  26.6 - 33.0 pg Final   • MCHC 04/24/2019 32.9  31.5 - 35.7 g/dL Final   • RDW 04/24/2019 14.6  12.3 - 15.4 % Final   • RDW-SD 04/24/2019 48.8  37.0 - 54.0 fl Final   • MPV 04/24/2019 10.4  6.0 - 12.0 fL Final   • Platelets 04/24/2019 270  140 - 450 10*3/mm3 Final   • Neutrophil % 04/24/2019 54.3  42.7 - 76.0 % Final   • Lymphocyte % 04/24/2019 32.5  19.6 - 45.3 % Final   • Monocyte % 04/24/2019 7.9  5.0 - 12.0 % Final   • Eosinophil % 04/24/2019 4.8  0.3 - 6.2 % Final   • Basophil % 04/24/2019 0.5  0.0 - 1.5 % Final   • Neutrophils, Absolute 04/24/2019 3.50  1.70 - 7.00 10*3/mm3 Final   • Lymphocytes, Absolute 04/24/2019 2.09  0.70 - 3.10 10*3/mm3 Final   • Monocytes, Absolute 04/24/2019 0.51  0.10 - 0.90 10*3/mm3 Final   • Eosinophils, Absolute 04/24/2019 0.31  0.00 - 0.40 10*3/mm3 Final   • Basophils, Absolute 04/24/2019 0.03  0.00 - 0.20 10*3/mm3 Final   • Glucose 04/24/2019 114* 70 - 99 mg/dL Final   • BUN 04/24/2019 11  7 - 23 mg/dL Final   • Creatinine 04/24/2019 1.10  0.70 - 1.30 mg/dL Final   • Sodium 04/24/2019 140  137 - 145  mmol/L Final   • Potassium 04/24/2019 3.9  3.4 - 5.0 mmol/L Final   • Chloride 04/24/2019 103  101 - 112 mmol/L Final   • CO2 04/24/2019 28.0  22.0 - 30.0 mmol/L Final   • Calcium 04/24/2019 9.1  8.4 - 10.2 mg/dL Final   • Total Protein 04/24/2019 7.3  6.3 - 8.6 g/dL Final   • Albumin 04/24/2019 4.10  3.50 - 5.00 g/dL Final   • ALT (SGPT) 04/24/2019 45  <=50 U/L Final   • AST (SGOT) 04/24/2019 32  17 - 59 U/L Final   • Alkaline Phosphatase 04/24/2019 111  38 - 126 U/L Final   • Total Bilirubin 04/24/2019 0.5  0.2 - 1.3 mg/dL Final   • eGFR Non  Amer 04/24/2019 66  42 - 98 mL/min/1.73 Final   • Globulin 04/24/2019 3.2  2.3 - 3.5 gm/dL Final   • A/G Ratio 04/24/2019 1.3  1.1 - 1.8 g/dL Final   • BUN/Creatinine Ratio 04/24/2019 10.0  7.0 - 25.0 Final   • Anion Gap 04/24/2019 9.0  5.0 - 15.0 mmol/L Final   • Hemoglobin A1C 04/24/2019 5.95* 4.80 - 5.60 % Final   • Total Cholesterol 04/24/2019 142* 150 - 200 mg/dL Final   • Triglycerides 04/24/2019 348* <=150 mg/dL Final   • HDL Cholesterol 04/24/2019 25* 40 - 59 mg/dL Final   • LDL Cholesterol  04/24/2019 47  <=100 mg/dL Final   • VLDL Cholesterol 04/24/2019 69.6  mg/dL Final   • LDL/HDL Ratio 04/24/2019 1.90  0.00 - 3.55 Final   • Microalbumin/Creatinine Ratio 04/24/2019   mg/g Final    Unable to calculate   • Creatinine, Urine 04/24/2019 184.9  mg/dL Final   • Microalbumin, Urine 04/24/2019 <1.2  mg/L Final   • TSH 04/24/2019 3.310  0.270 - 4.200 mIU/mL Final   • Free T4 04/24/2019 1.19  0.93 - 1.70 ng/dL Final   • PSA 04/24/2019 2.040  0.000 - 4.000 ng/mL Final   ]

## 2019-09-09 ENCOUNTER — OFFICE VISIT (OUTPATIENT)
Dept: GASTROENTEROLOGY | Facility: CLINIC | Age: 69
End: 2019-09-09

## 2019-09-09 VITALS — OXYGEN SATURATION: 96 % | HEIGHT: 66 IN | WEIGHT: 256.6 LBS | HEART RATE: 79 BPM | BODY MASS INDEX: 41.24 KG/M2

## 2019-09-09 DIAGNOSIS — R13.10 DYSPHAGIA, UNSPECIFIED TYPE: Primary | ICD-10-CM

## 2019-09-09 PROCEDURE — 99203 OFFICE O/P NEW LOW 30 MIN: CPT | Performed by: INTERNAL MEDICINE

## 2019-09-09 RX ORDER — DEXTROSE AND SODIUM CHLORIDE 5; .45 G/100ML; G/100ML
30 INJECTION, SOLUTION INTRAVENOUS CONTINUOUS PRN
Status: CANCELLED | OUTPATIENT
Start: 2019-09-25

## 2019-09-09 NOTE — PATIENT INSTRUCTIONS
Dysphagia    Dysphagia is trouble swallowing. This condition occurs when solids and liquids stick in a person's throat on the way down to the stomach, or when food takes longer to get to the stomach. You may have problems swallowing food, liquids, or both. You may also have pain while trying to swallow. It may take you more time and effort to swallow something.  What are the causes?  This condition is caused by:  · Problems with the muscles. They may make it difficult for you to move food and liquids through the tube that connects your mouth to your stomach (esophagus). You may have ulcers, scar tissue, or inflammation that blocks the normal passage of food and liquids. Causes of these problems include:  ? Acid reflux from your stomach into your esophagus (gastroesophageal reflux).  ? Infections.  ? Radiation treatment for cancer.  ? Medicines taken without enough fluids to wash them down into your stomach.  · Nerve problems. These prevent signals from being sent to the muscles of your esophagus to squeeze (contract) and move what you swallow down to your stomach.  · Globus pharyngeus. This is a common problem that involves feeling like something is stuck in the throat or a sense of trouble with swallowing even though nothing is wrong with the swallowing passages.  · Stroke. This can affect the nerves and make it difficult to swallow.  · Certain conditions, such as cerebral palsy or Parkinson disease.  What are the signs or symptoms?  Common symptoms of this condition include:  · A feeling that solids or liquids are stuck in your throat on the way down to the stomach.  · Food taking too long to get to the stomach.  Other symptoms include:  · Food moving back from your stomach to your mouth (regurgitation).  · Noises coming from your throat.  · Chest discomfort with swallowing.  · A feeling of fullness when swallowing.  · Drooling, especially when the throat is blocked.  · Pain while  swallowing.  · Heartburn.  · Coughing or gagging while trying to swallow.  How is this diagnosed?  This condition is diagnosed by:  · Barium X-ray. In this test, you swallow a white substance (contrast medium)that sticks to the inside of your esophagus. X-ray images are then taken.  · Endoscopy. In this test, a flexible telescope is inserted down your throat to look at your esophagus and your stomach.  · CT scans and MRI.  How is this treated?  Treatment for dysphagia depends on the cause of the condition:  · If the dysphagia is caused by acid reflux or infection, medicines may be used. They may include antibiotics and heartburn medicines.  · If the dysphagia is caused by problems with your muscles, swallowing therapy may be used to help you strengthen your swallowing muscles. You may have to do specific exercises to strengthen the muscles or stretch them.  · If the dysphagia is caused by a blockage or mass, procedures to remove the blockage may be done. You may need surgery and a feeding tube.  You may need to make diet changes. Ask your health care provider for specific instructions.  Follow these instructions at home:  Eating and drinking  · Try to eat soft food that is easier to swallow.  · Follow any diet changes as told by your health care provider.  · Cut your food into small pieces and eat slowly.  · Eat and drink only when you are sitting upright.  · Do not drink alcohol or caffeine. If you need help quitting, ask your health care provider.  General instructions  · Check your weight every day to make sure you are not losing weight.  · Take over-the-counter and prescription medicines only as told by your health care provider.  · If you were prescribed an antibiotic medicine, take it as told by your health care provider. Do not stop taking the antibiotic even if you start to feel better.  · Do not use any products that contain nicotine or tobacco, such as cigarettes and e-cigarettes. If you need help  quitting, ask your health care provider.  · Keep all follow-up visits as told by your health care provider. This is important.  Contact a health care provider if:  · You lose weight because you cannot swallow.  · You cough when you drink liquids (aspiration).  · You cough up partially digested food.  Get help right away if:  · You cannot swallow your saliva.  · You have shortness of breath or a fever, or both.  · You have a hoarse voice and also have trouble swallowing.  Summary  · Dysphagia is trouble swallowing. This condition occurs when solids and liquids stick in a person's throat on the way down to the stomach, or when food takes longer to get to the stomach.  · Dysphagia has many possible causes and symptoms.  · Treatment for dysphagia depends on the cause of the condition.  This information is not intended to replace advice given to you by your health care provider. Make sure you discuss any questions you have with your health care provider.  Document Released: 12/15/2001 Document Revised: 12/07/2017 Document Reviewed: 12/07/2017  [employer] Interactive Patient Education © 2019 [employer]  BMI for Adults    Body mass index (BMI) is a number that is calculated from a person's weight and height. BMI may help to estimate how much of a person's weight is composed of fat. BMI can help identify those who may be at higher risk for certain medical problems.  How is BMI used with adults?  BMI is used as a screening tool to identify possible weight problems. It is used to check whether a person is obese, overweight, healthy weight, or underweight.  How is BMI calculated?  BMI measures your weight and compares it to your height. This can be done either in English ([place] or metric measurements. Note that charts are available to help you find your BMI quickly and easily without having to do these calculations yourself.  To calculate your BMI in English ([place] measurements, your health care provider will:  1. Measure  "your weight in pounds (lb).  2. Multiply the number of pounds by 703.  ? For example, for a person who weighs 180 lb, multiply that number by 703, which equals 126,540.  3. Measure your height in inches (in). Then multiply that number by itself to get a measurement called \"inches squared.\"  ? For example, for a person who is 70 in tall, the \"inches squared\" measurement is 70 in x 70 in, which equals 4900 inches squared.  4. Divide the total from Step 2 (number of lb x 703) by the total from Step 3 (inches squared): 126,540 ÷ 4900 = 25.8. This is your BMI.  To calculate your BMI in metric measurements, your health care provider will:  1. Measure your weight in kilograms (kg).  2. Measure your height in meters (m). Then multiply that number by itself to get a measurement called \"meters squared.\"  ? For example, for a person who is 1.75 m tall, the \"meters squared\" measurement is 1.75 m x 1.75 m, which is equal to 3.1 meters squared.  3. Divide the number of kilograms (your weight) by the meters squared number. In this example: 70 ÷ 3.1 = 22.6. This is your BMI.  How is BMI interpreted?  To interpret your results, your health care provider will use BMI charts to identify whether you are underweight, normal weight, overweight, or obese. The following guidelines will be used:  · Underweight: BMI less than 18.5.  · Normal weight: BMI between 18.5 and 24.9.  · Overweight: BMI between 25 and 29.9.  · Obese: BMI of 30 and above.  Please note:  · Weight includes both fat and muscle, so someone with a muscular build, such as an athlete, may have a BMI that is higher than 24.9. In cases like these, BMI is not an accurate measure of body fat.  · To determine if excess body fat is the cause of a BMI of 25 or higher, further assessments may need to be done by a health care provider.  · BMI is usually interpreted in the same way for men and women.  Why is BMI a useful tool?  BMI is useful in two ways:  · Identifying a weight " problem that may be related to a medical condition, or that may increase the risk for medical problems.  · Promoting lifestyle and diet changes in order to reach a healthy weight.  Summary  · Body mass index (BMI) is a number that is calculated from a person's weight and height.  · BMI may help to estimate how much of a person's weight is composed of fat. BMI can help identify those who may be at higher risk for certain medical problems.  · BMI can be measured using English measurements or metric measurements.  · To interpret your results, your health care provider will use BMI charts to identify whether you are underweight, normal weight, overweight, or obese.  This information is not intended to replace advice given to you by your health care provider. Make sure you discuss any questions you have with your health care provider.  Document Released: 08/29/2005 Document Revised: 10/31/2018 Document Reviewed: 10/31/2018  [employer] Interactive Patient Education © 2019 [employer]
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
## 2019-09-11 PROBLEM — R13.10 DYSPHAGIA: Status: ACTIVE | Noted: 2019-09-11

## 2019-09-20 RX ORDER — ALBUTEROL SULFATE 90 UG/1
2 AEROSOL, METERED RESPIRATORY (INHALATION) EVERY 4 HOURS PRN
COMMUNITY
End: 2020-08-12 | Stop reason: SDUPTHER

## 2019-09-23 RX ORDER — ATORVASTATIN CALCIUM 40 MG/1
TABLET, FILM COATED ORAL
Qty: 90 TABLET | Refills: 3 | Status: SHIPPED | OUTPATIENT
Start: 2019-09-23 | End: 2020-08-17

## 2019-09-23 RX ORDER — LEVOTHYROXINE SODIUM 88 MCG
TABLET ORAL
Qty: 90 TABLET | Refills: 3 | Status: SHIPPED | OUTPATIENT
Start: 2019-09-23 | End: 2020-05-21 | Stop reason: SDUPTHER

## 2019-09-23 RX ORDER — TAMSULOSIN HYDROCHLORIDE 0.4 MG/1
CAPSULE ORAL
Qty: 90 CAPSULE | Refills: 3 | Status: SHIPPED | OUTPATIENT
Start: 2019-09-23 | End: 2020-08-17

## 2019-09-25 ENCOUNTER — ANESTHESIA (OUTPATIENT)
Dept: GASTROENTEROLOGY | Facility: HOSPITAL | Age: 69
End: 2019-09-25

## 2019-09-25 ENCOUNTER — ANESTHESIA EVENT (OUTPATIENT)
Dept: GASTROENTEROLOGY | Facility: HOSPITAL | Age: 69
End: 2019-09-25

## 2019-09-25 ENCOUNTER — HOSPITAL ENCOUNTER (OUTPATIENT)
Facility: HOSPITAL | Age: 69
Setting detail: HOSPITAL OUTPATIENT SURGERY
Discharge: HOME | End: 2019-09-25
Attending: INTERNAL MEDICINE | Admitting: INTERNAL MEDICINE

## 2019-09-25 VITALS
WEIGHT: 256 LBS | DIASTOLIC BLOOD PRESSURE: 67 MMHG | OXYGEN SATURATION: 95 % | BODY MASS INDEX: 41.14 KG/M2 | SYSTOLIC BLOOD PRESSURE: 125 MMHG | TEMPERATURE: 97 F | HEIGHT: 66 IN | HEART RATE: 70 BPM | RESPIRATION RATE: 18 BRPM

## 2019-09-25 DIAGNOSIS — R13.10 DYSPHAGIA, UNSPECIFIED TYPE: ICD-10-CM

## 2019-09-25 PROCEDURE — 88305 TISSUE EXAM BY PATHOLOGIST: CPT | Performed by: PATHOLOGY

## 2019-09-25 PROCEDURE — C1726 CATH, BAL DIL, NON-VASCULAR: HCPCS | Performed by: INTERNAL MEDICINE

## 2019-09-25 PROCEDURE — 25010000002 PROPOFOL 10 MG/ML EMULSION: Performed by: NURSE ANESTHETIST, CERTIFIED REGISTERED

## 2019-09-25 PROCEDURE — 43249 ESOPH EGD DILATION <30 MM: CPT | Performed by: INTERNAL MEDICINE

## 2019-09-25 PROCEDURE — 88305 TISSUE EXAM BY PATHOLOGIST: CPT | Performed by: INTERNAL MEDICINE

## 2019-09-25 RX ORDER — DEXTROSE AND SODIUM CHLORIDE 5; .45 G/100ML; G/100ML
30 INJECTION, SOLUTION INTRAVENOUS CONTINUOUS PRN
Status: DISCONTINUED | OUTPATIENT
Start: 2019-09-25 | End: 2019-09-25 | Stop reason: HOSPADM

## 2019-09-25 RX ORDER — LIDOCAINE HYDROCHLORIDE 20 MG/ML
INJECTION, SOLUTION EPIDURAL; INFILTRATION; INTRACAUDAL; PERINEURAL AS NEEDED
Status: DISCONTINUED | OUTPATIENT
Start: 2019-09-25 | End: 2019-09-25 | Stop reason: SURG

## 2019-09-25 RX ORDER — PROPOFOL 10 MG/ML
VIAL (ML) INTRAVENOUS AS NEEDED
Status: DISCONTINUED | OUTPATIENT
Start: 2019-09-25 | End: 2019-09-25 | Stop reason: SURG

## 2019-09-25 RX ADMIN — LIDOCAINE HYDROCHLORIDE 100 MG: 20 INJECTION, SOLUTION EPIDURAL; INFILTRATION; INTRACAUDAL at 10:55

## 2019-09-25 RX ADMIN — PROPOFOL 120 MG: 10 INJECTION, EMULSION INTRAVENOUS at 10:55

## 2019-09-25 RX ADMIN — DEXTROSE AND SODIUM CHLORIDE 30 ML/HR: 5; 450 INJECTION, SOLUTION INTRAVENOUS at 09:59

## 2019-09-25 NOTE — ANESTHESIA POSTPROCEDURE EVALUATION
Patient: Nick Austin    Procedure Summary     Date:  09/25/19 Room / Location:  [facility] ENDOSCOPY 2 / [facility] ENDOSCOPY    Anesthesia Start:  1050 Anesthesia Stop:  1108    Procedure:  ESOPHAGOGASTRODUODENOSCOPY (N/A ) Diagnosis:       Dysphagia, unspecified type      (Dysphagia, unspecified type [R13.10])    Surgeon:  Carlos Wood MD Provider:  Yelena Maya CRNA    Anesthesia Type:  MAC ASA Status:  3          Anesthesia Type: MAC  Last vitals  BP   145/84 (09/25/19 0951)   Temp   97.1 °F (36.2 °C) (09/25/19 0951)   Pulse   72 (09/25/19 0951)   Resp   18 (09/25/19 0951)     SpO2   98 % (09/25/19 0951)     Post Anesthesia Care and Evaluation    Patient location during evaluation: bedside  Patient participation: waiting for patient participation  Level of consciousness: sleepy but conscious  Pain score: 0  Pain management: adequate  Airway patency: patent  Anesthetic complications: No anesthetic complications  PONV Status: none  Cardiovascular status: acceptable  Respiratory status: acceptable  Hydration status: acceptable

## 2019-09-25 NOTE — ANESTHESIA PREPROCEDURE EVALUATION
Anesthesia Evaluation     Patient summary reviewed and Nursing notes reviewed   NPO Solid Status: > 8 hours  NPO Liquid Status: > 4 hours           Airway   Mallampati: II  TM distance: >3 FB  Neck ROM: full  No difficulty expected  Dental - normal exam     Pulmonary - normal exam   (+) asthma, shortness of breath,   Cardiovascular - normal exam    (+) hypertension, CAD, CABG >6 Months, angina, hyperlipidemia,       Neuro/Psych- negative ROS  GI/Hepatic/Renal/Endo    (+)  GERD,  renal disease, diabetes mellitus, hypothyroidism,     Musculoskeletal     Abdominal  - normal exam   Substance History - negative use     OB/GYN negative ob/gyn ROS         Other   (+) arthritis                   Anesthesia Plan    ASA 3     MAC     intravenous induction   Anesthetic plan, all risks, benefits, and alternatives have been provided, discussed and informed consent has been obtained with: patient.

## 2019-09-26 LAB
LAB AP CASE REPORT: NORMAL
PATH REPORT.FINAL DX SPEC: NORMAL
PATH REPORT.GROSS SPEC: NORMAL

## 2019-09-26 RX ORDER — OMEPRAZOLE 40 MG/1
40 CAPSULE, DELAYED RELEASE ORAL DAILY
Qty: 90 CAPSULE | Refills: 3 | Status: SHIPPED | OUTPATIENT
Start: 2019-09-26 | End: 2021-01-19

## 2019-10-02 ENCOUNTER — OFFICE VISIT (OUTPATIENT)
Dept: FAMILY MEDICINE CLINIC | Facility: CLINIC | Age: 69
End: 2019-10-02

## 2019-10-02 VITALS
HEART RATE: 78 BPM | BODY MASS INDEX: 39.7 KG/M2 | TEMPERATURE: 98.6 F | WEIGHT: 247 LBS | HEIGHT: 66 IN | OXYGEN SATURATION: 99 % | SYSTOLIC BLOOD PRESSURE: 128 MMHG | DIASTOLIC BLOOD PRESSURE: 80 MMHG

## 2019-10-02 DIAGNOSIS — I87.2 CHRONIC VENOUS INSUFFICIENCY: Chronic | ICD-10-CM

## 2019-10-02 DIAGNOSIS — I10 ESSENTIAL HYPERTENSION: Primary | Chronic | ICD-10-CM

## 2019-10-02 DIAGNOSIS — E66.01 CLASS 2 SEVERE OBESITY DUE TO EXCESS CALORIES WITH SERIOUS COMORBIDITY AND BODY MASS INDEX (BMI) OF 39.0 TO 39.9 IN ADULT (HCC): Chronic | ICD-10-CM

## 2019-10-02 PROCEDURE — 99214 OFFICE O/P EST MOD 30 MIN: CPT | Performed by: INTERNAL MEDICINE

## 2019-10-02 NOTE — PROGRESS NOTES
Subjective        History of Present Illness     Nick Austin (Naveed) is a 68 y.o. with chronic medical issues including essential hypertension, hyperlipidemia, CAD, hypothyroidism, and Type 2 diabetes mellitus among other issues.  Dr. Jay is his cardiologist.  He has history of CABG x 2 with the most recent dated 09/2017 by Dr. Greg Morgan and continues on medical management with Plavix, aspirin, and Lipitor.     [facility].  As part of the workup, he had an ECHO two days ago in [place].  During the physical workup yesterday, he reports the physician mentioned lower extremity edema, although, this is not something he has noticed prior.  Denies orthopnea, orthopnea or claudication.   He has been losing some weight recently with weight down 9 pounds in the past six weeks.  Exam today reveals mild bilateral lower extremity edema and venous stasis dermatitis consistent with chronic venous insufficiency.  We reviewed a management plan for the chronic venous insufficiency.    It seems that he was really wanting diuretics, but not indicated at this point.    We recently referred to Dr. Flores for oropharyngeal dysphagia.  Patient reports symptoms improved with esophageal dilation.      Review of Systems   Constitutional: Negative for chills, fatigue and fever.   HENT: Negative for congestion, ear pain, postnasal drip, sinus pressure and sore throat.    Respiratory: Negative for cough, shortness of breath and wheezing.    Cardiovascular: Positive for leg swelling. Negative for chest pain and palpitations.   Gastrointestinal: Negative for abdominal pain, blood in stool, constipation, diarrhea, nausea and vomiting.   Endocrine: Negative for cold intolerance, heat intolerance, polydipsia and polyuria.   Genitourinary: Negative for dysuria, frequency, hematuria and urgency.   Skin: Negative for rash.   Neurological: Negative for syncope  "and weakness.        Objective     BMI Readings from Last 1 Encounters:   10/02/19 39.87 kg/m²       Vitals:    10/02/19 0822   BP: 128/80   Pulse: 78   Temp: 98.6 °F (37 °C)   TempSrc: Oral   SpO2: 99%   Weight: 112 kg (247 lb)   Height: 167.6 cm (66\")     Physical Exam   Constitutional: He is oriented to person, place, and time. He appears well-developed and well-nourished. No distress.   Obese male.    HENT:   Head: Normocephalic and atraumatic.   Nose: Nose normal.   Mouth/Throat: Oropharynx is clear and moist. No oropharyngeal exudate.   Eyes: EOM are normal. Pupils are equal, round, and reactive to light.   Neck: Neck supple. No JVD present. No thyromegaly present.   Cardiovascular: Normal rate and regular rhythm.   Murmur heard.  2/6 systolic murmur at left sternal border.    Pulmonary/Chest: Effort normal and breath sounds normal. No accessory muscle usage. No respiratory distress. He has no wheezes. He has no rales.   Abdominal: Soft. Bowel sounds are normal. He exhibits no distension. There is no tenderness.   Obese abdomen.    Musculoskeletal: He exhibits no edema.     Vascular Status -  His right foot exhibits abnormal foot vasculature  (Pulses intact bilateral ankles. ) and abnormal foot edema (Trace edema bilateral lower extremities with venous stasis dermatitis. ). His left foot exhibits abnormal foot vasculature  and abnormal foot edema.  Lymphadenopathy:     He has no cervical adenopathy.   Neurological: He is alert and oriented to person, place, and time. No cranial nerve deficit.   Psychiatric: He has a normal mood and affect. His speech is normal and behavior is normal. Judgment and thought content normal.       Future Appointments   Date Time Provider Department Center   10/8/2019  9:30 AM Carlos Wood MD [facility] None   11/5/2019  8:00 AM Patrick Magdaleno MD [facility] None   5/13/2020 10:45 AM Perla Jay MD [facility] None       Assessment/Plan      The diagnosis of " chronic venous insufficiency is a new problem.  We reviewed management of chronic venous insufficiency including sodium restriction, much more aggressive weight loss, elevating the lower extremities when not ambulating and wearing compression stockings.  Continue the losartan 100 mg daily.  We will continue to follow.    I asked him to request a copy of [facility] so this can be reviewed by me and scanned into his electronic record.      Return in November for routine follow up with fasting labs one week prior or sooner if needed.     Scribed for Dr. Magdaleno by Harriet Gardner [facility].     Diagnoses and all orders for this visit:    Essential hypertension    Class 2 severe obesity due to excess calories with serious comorbidity and body mass index (BMI) of 39.0 to 39.9 in adult (CMS/[facility])    Chronic venous insufficiency        Admission on 09/25/2019, Discharged on 09/25/2019   Component Date Value Ref Range Status   • Case Report 09/25/2019    Final                    Value:Surgical Pathology Report                         Case: [id number]                                  Authorizing Provider:  Carlos Wood MD      Collected:           09/25/2019 11:11 AM          Ordering Location:     [facility]             Received:            09/25/2019 01:00 PM                                 [place] [facility]                                                     Pathologist:           Peter Oviedo MD                                                         Specimen:    Esophagus                                                                                 • Final Diagnosis 09/25/2019    Final                    Value:This result contains rich text formatting which cannot be displayed here.   • Gross Description 09/25/2019    Final                    Value:This result contains rich text formatting which cannot be displayed here.   ]

## 2019-10-08 ENCOUNTER — OFFICE VISIT (OUTPATIENT)
Dept: GASTROENTEROLOGY | Facility: CLINIC | Age: 69
End: 2019-10-08

## 2019-10-08 VITALS
OXYGEN SATURATION: 98 % | WEIGHT: 256.6 LBS | SYSTOLIC BLOOD PRESSURE: 144 MMHG | HEART RATE: 69 BPM | BODY MASS INDEX: 41.24 KG/M2 | HEIGHT: 66 IN | DIASTOLIC BLOOD PRESSURE: 72 MMHG

## 2019-10-08 DIAGNOSIS — R13.19 ESOPHAGEAL DYSPHAGIA: Primary | ICD-10-CM

## 2019-10-08 PROBLEM — K21.9 GERD (GASTROESOPHAGEAL REFLUX DISEASE): Status: ACTIVE | Noted: 2019-10-08

## 2019-10-08 PROCEDURE — 99213 OFFICE O/P EST LOW 20 MIN: CPT | Performed by: INTERNAL MEDICINE

## 2019-10-08 NOTE — PATIENT INSTRUCTIONS
Dysphagia    Dysphagia is trouble swallowing. This condition occurs when solids and liquids stick in a person's throat on the way down to the stomach, or when food takes longer to get to the stomach. You may have problems swallowing food, liquids, or both. You may also have pain while trying to swallow. It may take you more time and effort to swallow something.  What are the causes?  This condition is caused by:  · Problems with the muscles. They may make it difficult for you to move food and liquids through the tube that connects your mouth to your stomach (esophagus). You may have ulcers, scar tissue, or inflammation that blocks the normal passage of food and liquids. Causes of these problems include:  ? Acid reflux from your stomach into your esophagus (gastroesophageal reflux).  ? Infections.  ? Radiation treatment for cancer.  ? Medicines taken without enough fluids to wash them down into your stomach.  · Nerve problems. These prevent signals from being sent to the muscles of your esophagus to squeeze (contract) and move what you swallow down to your stomach.  · Globus pharyngeus. This is a common problem that involves feeling like something is stuck in the throat or a sense of trouble with swallowing even though nothing is wrong with the swallowing passages.  · Stroke. This can affect the nerves and make it difficult to swallow.  · Certain conditions, such as cerebral palsy or Parkinson disease.  What are the signs or symptoms?  Common symptoms of this condition include:  · A feeling that solids or liquids are stuck in your throat on the way down to the stomach.  · Food taking too long to get to the stomach.  Other symptoms include:  · Food moving back from your stomach to your mouth (regurgitation).  · Noises coming from your throat.  · Chest discomfort with swallowing.  · A feeling of fullness when swallowing.  · Drooling, especially when the throat is blocked.  · Pain while  swallowing.  · Heartburn.  · Coughing or gagging while trying to swallow.  How is this diagnosed?  This condition is diagnosed by:  · Barium X-ray. In this test, you swallow a white substance (contrast medium)that sticks to the inside of your esophagus. X-ray images are then taken.  · Endoscopy. In this test, a flexible telescope is inserted down your throat to look at your esophagus and your stomach.  · CT scans and MRI.  How is this treated?  Treatment for dysphagia depends on the cause of the condition:  · If the dysphagia is caused by acid reflux or infection, medicines may be used. They may include antibiotics and heartburn medicines.  · If the dysphagia is caused by problems with your muscles, swallowing therapy may be used to help you strengthen your swallowing muscles. You may have to do specific exercises to strengthen the muscles or stretch them.  · If the dysphagia is caused by a blockage or mass, procedures to remove the blockage may be done. You may need surgery and a feeding tube.  You may need to make diet changes. Ask your health care provider for specific instructions.  Follow these instructions at home:  Eating and drinking  · Try to eat soft food that is easier to swallow.  · Follow any diet changes as told by your health care provider.  · Cut your food into small pieces and eat slowly.  · Eat and drink only when you are sitting upright.  · Do not drink alcohol or caffeine. If you need help quitting, ask your health care provider.  General instructions  · Check your weight every day to make sure you are not losing weight.  · Take over-the-counter and prescription medicines only as told by your health care provider.  · If you were prescribed an antibiotic medicine, take it as told by your health care provider. Do not stop taking the antibiotic even if you start to feel better.  · Do not use any products that contain nicotine or tobacco, such as cigarettes and e-cigarettes. If you need help  quitting, ask your health care provider.  · Keep all follow-up visits as told by your health care provider. This is important.  Contact a health care provider if:  · You lose weight because you cannot swallow.  · You cough when you drink liquids (aspiration).  · You cough up partially digested food.  Get help right away if:  · You cannot swallow your saliva.  · You have shortness of breath or a fever, or both.  · You have a hoarse voice and also have trouble swallowing.  Summary  · Dysphagia is trouble swallowing. This condition occurs when solids and liquids stick in a person's throat on the way down to the stomach, or when food takes longer to get to the stomach.  · Dysphagia has many possible causes and symptoms.  · Treatment for dysphagia depends on the cause of the condition.  This information is not intended to replace advice given to you by your health care provider. Make sure you discuss any questions you have with your health care provider.  Document Released: 12/15/2001 Document Revised: 12/07/2017 Document Reviewed: 12/07/2017  [employer] Interactive Patient Education © 2019 [employer]  Gastroesophageal Reflux Disease, Adult    Normally, food travels down the esophagus and stays in the stomach to be digested. If a person has gastroesophageal reflux disease (GERD), food and stomach acid move back up into the esophagus. When this happens, the esophagus becomes sore and swollen (inflamed). Over time, GERD can make small holes (ulcers) in the lining of the esophagus.  Follow these instructions at home:  Diet  · Follow a diet as told by your doctor. You may need to avoid foods and drinks such as:  ? Coffee and tea (with or without caffeine).  ? Drinks that contain alcohol.  ? Energy drinks and sports drinks.  ? Carbonated drinks or sodas.  ? Chocolate and cocoa.  ? Peppermint and mint flavorings.  ? Garlic and onions.  ? Horseradish.  ? Spicy and acidic foods, such as peppers, chili powder, mark powder,  vinegar, hot sauces, and BBQ sauce.  ? Citrus fruit juices and citrus fruits, such as oranges, cheko, and limes.  ? Tomato-based foods, such as red sauce, chili, salsa, and pizza with red sauce.  ? Fried and fatty foods, such as donuts, french fries, potato chips, and high-fat dressings.  ? High-fat meats, such as hot dogs, rib eye steak, sausage, ham, and wahl.  ? High-fat dairy items, such as whole milk, butter, and cream cheese.  · Eat small meals often. Avoid eating large meals.  · Avoid drinking large amounts of liquid with your meals.  · Avoid eating meals during the 2-3 hours before bedtime.  · Avoid lying down right after you eat.  · Do not exercise right after you eat.  General instructions  · Pay attention to any changes in your symptoms.  · Take over-the-counter and prescription medicines only as told by your doctor. Do not take aspirin, ibuprofen, or other NSAIDs unless your doctor says it is okay.  · Do not use any tobacco products, including cigarettes, chewing tobacco, and e-cigarettes. If you need help quitting, ask your doctor.  · Wear loose clothes. Do not wear anything tight around your waist.  · Raise (elevate) the head of your bed about 6 inches (15 cm).  · Try to lower your stress. If you need help doing this, ask your doctor.  · If you are overweight, lose an amount of weight that is healthy for you. Ask your doctor about a safe weight loss goal.  · Keep all follow-up visits as told by your doctor. This is important.  Contact a doctor if:  · You have new symptoms.  · You lose weight and you do not know why it is happening.  · You have trouble swallowing, or it hurts to swallow.  · You have wheezing or a cough that keeps happening.  · Your symptoms do not get better with treatment.  · You have a hoarse voice.  Get help right away if:  · You have pain in your arms, neck, jaw, teeth, or back.  · You feel sweaty, dizzy, or light-headed.  · You have chest pain or shortness of breath.  · You throw  up (vomit) and your throw up looks like blood or coffee grounds.  · You pass out (faint).  · Your poop (stool) is bloody or black.  · You cannot swallow, drink, or eat.  This information is not intended to replace advice given to you by your health care provider. Make sure you discuss any questions you have with your health care provider.  Document Released: 06/05/2009 Document Revised: 10/30/2018 Document Reviewed: 04/13/2016  [employer] Interactive Patient Education © 2019 [employer]
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
## 2019-11-01 ENCOUNTER — LAB (OUTPATIENT)
Dept: LAB | Facility: OTHER | Age: 69
End: 2019-11-01

## 2019-11-01 DIAGNOSIS — I10 ESSENTIAL HYPERTENSION: Chronic | ICD-10-CM

## 2019-11-01 DIAGNOSIS — E11.9 TYPE 2 DIABETES MELLITUS WITHOUT COMPLICATION, WITHOUT LONG-TERM CURRENT USE OF INSULIN (HCC): Chronic | ICD-10-CM

## 2019-11-01 DIAGNOSIS — E03.9 ACQUIRED HYPOTHYROIDISM: Chronic | ICD-10-CM

## 2019-11-01 DIAGNOSIS — E78.2 MIXED HYPERLIPIDEMIA: Chronic | ICD-10-CM

## 2019-11-01 LAB
ALBUMIN SERPL-MCNC: 4.2 G/DL (ref 3.5–5)
ALBUMIN/GLOB SERPL: 1.3 G/DL (ref 1.1–1.8)
ALP SERPL-CCNC: 75 U/L (ref 38–126)
ALT SERPL W P-5'-P-CCNC: 48 U/L
ANION GAP SERPL CALCULATED.3IONS-SCNC: 10 MMOL/L (ref 5–15)
ARTICHOKE IGE QN: 94 MG/DL (ref 0–100)
AST SERPL-CCNC: 33 U/L (ref 17–59)
BASOPHILS # BLD AUTO: 0.03 10*3/MM3 (ref 0–0.2)
BASOPHILS NFR BLD AUTO: 0.5 % (ref 0–1.5)
BILIRUB SERPL-MCNC: 0.4 MG/DL (ref 0.2–1.3)
BUN BLD-MCNC: 14 MG/DL (ref 7–23)
BUN/CREAT SERPL: 12.2 (ref 7–25)
CALCIUM SPEC-SCNC: 8.9 MG/DL (ref 8.4–10.2)
CHLORIDE SERPL-SCNC: 109 MMOL/L (ref 101–112)
CO2 SERPL-SCNC: 26 MMOL/L (ref 22–30)
CREAT BLD-MCNC: 1.15 MG/DL (ref 0.7–1.3)
DEPRECATED RDW RBC AUTO: 48.5 FL (ref 37–54)
EOSINOPHIL # BLD AUTO: 0.32 10*3/MM3 (ref 0–0.4)
EOSINOPHIL NFR BLD AUTO: 4.9 % (ref 0.3–6.2)
ERYTHROCYTE [DISTWIDTH] IN BLOOD BY AUTOMATED COUNT: 14.2 % (ref 12.3–15.4)
GFR SERPL CREATININE-BSD FRML MDRD: 63 ML/MIN/1.73 (ref 42–98)
GLOBULIN UR ELPH-MCNC: 3.2 GM/DL (ref 2.3–3.5)
GLUCOSE BLD-MCNC: 113 MG/DL (ref 70–99)
HBA1C MFR BLD: 5.9 % (ref 4.8–5.6)
HCT VFR BLD AUTO: 46.4 % (ref 37.5–51)
HGB BLD-MCNC: 15.3 G/DL (ref 13–17.7)
LYMPHOCYTES # BLD AUTO: 1.86 10*3/MM3 (ref 0.7–3.1)
LYMPHOCYTES NFR BLD AUTO: 28.5 % (ref 19.6–45.3)
MCH RBC QN AUTO: 31.4 PG (ref 26.6–33)
MCHC RBC AUTO-ENTMCNC: 33 G/DL (ref 31.5–35.7)
MCV RBC AUTO: 95.1 FL (ref 79–97)
MONOCYTES # BLD AUTO: 0.45 10*3/MM3 (ref 0.1–0.9)
MONOCYTES NFR BLD AUTO: 6.9 % (ref 5–12)
NEUTROPHILS # BLD AUTO: 3.86 10*3/MM3 (ref 1.7–7)
NEUTROPHILS NFR BLD AUTO: 59.2 % (ref 42.7–76)
PLATELET # BLD AUTO: 267 10*3/MM3 (ref 140–450)
PMV BLD AUTO: 10.8 FL (ref 6–12)
POTASSIUM BLD-SCNC: 4.1 MMOL/L (ref 3.4–5)
PROT SERPL-MCNC: 7.4 G/DL (ref 6.3–8.6)
RBC # BLD AUTO: 4.88 10*6/MM3 (ref 4.14–5.8)
SODIUM BLD-SCNC: 145 MMOL/L (ref 137–145)
T4 FREE SERPL-MCNC: 1.17 NG/DL (ref 0.93–1.7)
TRIGL SERPL-MCNC: 182 MG/DL
TSH SERPL DL<=0.05 MIU/L-ACNC: 3.19 UIU/ML (ref 0.27–4.2)
WBC NRBC COR # BLD: 6.52 10*3/MM3 (ref 3.4–10.8)

## 2019-11-01 PROCEDURE — 84439 ASSAY OF FREE THYROXINE: CPT | Performed by: INTERNAL MEDICINE

## 2019-11-01 PROCEDURE — 84443 ASSAY THYROID STIM HORMONE: CPT | Performed by: INTERNAL MEDICINE

## 2019-11-01 PROCEDURE — 36415 COLL VENOUS BLD VENIPUNCTURE: CPT | Performed by: INTERNAL MEDICINE

## 2019-11-01 PROCEDURE — 84478 ASSAY OF TRIGLYCERIDES: CPT | Performed by: INTERNAL MEDICINE

## 2019-11-01 PROCEDURE — 83036 HEMOGLOBIN GLYCOSYLATED A1C: CPT | Performed by: INTERNAL MEDICINE

## 2019-11-01 PROCEDURE — 85025 COMPLETE CBC W/AUTO DIFF WBC: CPT | Performed by: INTERNAL MEDICINE

## 2019-11-01 PROCEDURE — 83721 ASSAY OF BLOOD LIPOPROTEIN: CPT | Performed by: INTERNAL MEDICINE

## 2019-11-01 PROCEDURE — 80053 COMPREHEN METABOLIC PANEL: CPT | Performed by: INTERNAL MEDICINE

## 2019-11-05 ENCOUNTER — OFFICE VISIT (OUTPATIENT)
Dept: FAMILY MEDICINE CLINIC | Facility: CLINIC | Age: 69
End: 2019-11-05

## 2019-11-05 VITALS
DIASTOLIC BLOOD PRESSURE: 80 MMHG | HEART RATE: 64 BPM | HEIGHT: 66 IN | TEMPERATURE: 98.7 F | BODY MASS INDEX: 42.27 KG/M2 | SYSTOLIC BLOOD PRESSURE: 160 MMHG | WEIGHT: 263 LBS

## 2019-11-05 DIAGNOSIS — I25.119 CORONARY ARTERY DISEASE INVOLVING NATIVE CORONARY ARTERY OF NATIVE HEART WITH ANGINA PECTORIS (HCC): Chronic | ICD-10-CM

## 2019-11-05 DIAGNOSIS — E03.9 ACQUIRED HYPOTHYROIDISM: Chronic | ICD-10-CM

## 2019-11-05 DIAGNOSIS — E78.1 HYPERTRIGLYCERIDEMIA: Chronic | ICD-10-CM

## 2019-11-05 DIAGNOSIS — K21.9 GASTROESOPHAGEAL REFLUX DISEASE, ESOPHAGITIS PRESENCE NOT SPECIFIED: ICD-10-CM

## 2019-11-05 DIAGNOSIS — Z12.5 SPECIAL SCREENING FOR MALIGNANT NEOPLASM OF PROSTATE: ICD-10-CM

## 2019-11-05 DIAGNOSIS — R73.01 IFG (IMPAIRED FASTING GLUCOSE): Chronic | ICD-10-CM

## 2019-11-05 DIAGNOSIS — I10 ESSENTIAL HYPERTENSION: Primary | Chronic | ICD-10-CM

## 2019-11-05 DIAGNOSIS — N20.0 NEPHROLITHIASIS: Chronic | ICD-10-CM

## 2019-11-05 DIAGNOSIS — N40.0 BENIGN NON-NODULAR PROSTATIC HYPERPLASIA: Chronic | ICD-10-CM

## 2019-11-05 DIAGNOSIS — E78.2 MIXED HYPERLIPIDEMIA: Chronic | ICD-10-CM

## 2019-11-05 PROCEDURE — 99214 OFFICE O/P EST MOD 30 MIN: CPT | Performed by: INTERNAL MEDICINE

## 2019-11-05 RX ORDER — LOSARTAN POTASSIUM AND HYDROCHLOROTHIAZIDE 12.5; 1 MG/1; MG/1
1 TABLET ORAL EVERY MORNING
Qty: 90 TABLET | Refills: 3 | Status: SHIPPED | OUTPATIENT
Start: 2019-11-05 | End: 2020-03-20 | Stop reason: RX

## 2019-11-05 NOTE — PATIENT INSTRUCTIONS
Calorie Counting for Weight Loss  Calories are units of energy. Your body needs a certain amount of calories from food to keep you going throughout the day. When you eat more calories than your body needs, your body stores the extra calories as fat. When you eat fewer calories than your body needs, your body burns fat to get the energy it needs.  Calorie counting means keeping track of how many calories you eat and drink each day. Calorie counting can be helpful if you need to lose weight. If you make sure to eat fewer calories than your body needs, you should lose weight. Ask your health care provider what a healthy weight is for you.  For calorie counting to work, you will need to eat the right number of calories in a day in order to lose a healthy amount of weight per week. A dietitian can help you determine how many calories you need in a day and will give you suggestions on how to reach your calorie goal.  · A healthy amount of weight to lose per week is usually 1-2 lb (0.5-0.9 kg). This usually means that your daily calorie intake should be reduced by 500-750 calories.  · Eating 1,200 - 1,500 calories per day can help most women lose weight.  · Eating 1,500 - 1,800 calories per day can help most men lose weight.  What is my plan?  My goal is to have __________ calories per day.  If I have this many calories per day, I should lose around __________ pounds per week.  What do I need to know about calorie counting?  In order to meet your daily calorie goal, you will need to:  · Find out how many calories are in each food you would like to eat. Try to do this before you eat.  · Decide how much of the food you plan to eat.  · Write down what you ate and how many calories it had. Doing this is called keeping a food log.  To successfully lose weight, it is important to balance calorie counting with a healthy lifestyle that includes regular activity. Aim for 150 minutes of moderate exercise (such as walking) or 75  minutes of vigorous exercise (such as running) each week.  Where do I find calorie information?    The number of calories in a food can be found on a Nutrition Facts label. If a food does not have a Nutrition Facts label, try to look up the calories online or ask your dietitian for help.  Remember that calories are listed per serving. If you choose to have more than one serving of a food, you will have to multiply the calories per serving by the amount of servings you plan to eat. For example, the label on a package of bread might say that a serving size is 1 slice and that there are 90 calories in a serving. If you eat 1 slice, you will have eaten 90 calories. If you eat 2 slices, you will have eaten 180 calories.  How do I keep a food log?  Immediately after each meal, record the following information in your food log:  · What you ate. Don't forget to include toppings, sauces, and other extras on the food.  · How much you ate. This can be measured in cups, ounces, or number of items.  · How many calories each food and drink had.  · The total number of calories in the meal.  Keep your food log near you, such as in a small notebook in your pocket, or use a mobile juarez or website. Some programs will calculate calories for you and show you how many calories you have left for the day to meet your goal.  What are some calorie counting tips?    · Use your calories on foods and drinks that will fill you up and not leave you hungry:  ? Some examples of foods that fill you up are nuts and nut butters, vegetables, lean proteins, and high-fiber foods like whole grains. High-fiber foods are foods with more than 5 g fiber per serving.  ? Drinks such as sodas, specialty coffee drinks, alcohol, and juices have a lot of calories, yet do not fill you up.  · Eat nutritious foods and avoid empty calories. Empty calories are calories you get from foods or beverages that do not have many vitamins or protein, such as candy, sweets, and  "soda. It is better to have a nutritious high-calorie food (such as an avocado) than a food with few nutrients (such as a bag of chips).  · Know how many calories are in the foods you eat most often. This will help you calculate calorie counts faster.  · Pay attention to calories in drinks. Low-calorie drinks include water and unsweetened drinks.  · Pay attention to nutrition labels for \"low fat\" or \"fat free\" foods. These foods sometimes have the same amount of calories or more calories than the full fat versions. They also often have added sugar, starch, or salt, to make up for flavor that was removed with the fat.  · Find a way of tracking calories that works for you. Get creative. Try different apps or programs if writing down calories does not work for you.  What are some portion control tips?  · Know how many calories are in a serving. This will help you know how many servings of a certain food you can have.  · Use a measuring cup to measure serving sizes. You could also try weighing out portions on a kitchen scale. With time, you will be able to estimate serving sizes for some foods.  · Take some time to put servings of different foods on your favorite plates, bowls, and cups so you know what a serving looks like.  · Try not to eat straight from a bag or box. Doing this can lead to overeating. Put the amount you would like to eat in a cup or on a plate to make sure you are eating the right portion.  · Use smaller plates, glasses, and bowls to prevent overeating.  · Try not to multitask (for example, watch TV or use your computer) while eating. If it is time to eat, sit down at a table and enjoy your food. This will help you to know when you are full. It will also help you to be aware of what you are eating and how much you are eating.  What are tips for following this plan?  Reading food labels  · Check the calorie count compared to the serving size. The serving size may be smaller than what you are used to " "eating.  · Check the source of the calories. Make sure the food you are eating is high in vitamins and protein and low in saturated and trans fats.  Shopping  · Read nutrition labels while you shop. This will help you make healthy decisions before you decide to purchase your food.  · Make a grocery list and stick to it.  Cooking  · Try to cook your favorite foods in a healthier way. For example, try baking instead of frying.  · Use low-fat dairy products.  Meal planning  · Use more fruits and vegetables. Half of your plate should be fruits and vegetables.  · Include lean proteins like poultry and fish.  How do I count calories when eating out?  · Ask for smaller portion sizes.  · Consider sharing an entree and sides instead of getting your own entree.  · If you get your own entree, eat only half. Ask for a box at the beginning of your meal and put the rest of your entree in it so you are not tempted to eat it.  · If calories are listed on the menu, choose the lower calorie options.  · Choose dishes that include vegetables, fruits, whole grains, low-fat dairy products, and lean protein.  · Choose items that are boiled, broiled, grilled, or steamed. Stay away from items that are buttered, battered, fried, or served with cream sauce. Items labeled \"crispy\" are usually fried, unless stated otherwise.  · Choose water, low-fat milk, unsweetened iced tea, or other drinks without added sugar. If you want an alcoholic beverage, choose a lower calorie option such as a glass of wine or light beer.  · Ask for dressings, sauces, and syrups on the side. These are usually high in calories, so you should limit the amount you eat.  · If you want a salad, choose a garden salad and ask for grilled meats. Avoid extra toppings like wahl, cheese, or fried items. Ask for the dressing on the side, or ask for olive oil and vinegar or lemon to use as dressing.  · Estimate how many servings of a food you are given. For example, a serving of " cooked rice is ½ cup or about the size of half a baseball. Knowing serving sizes will help you be aware of how much food you are eating at restaurants. The list below tells you how big or small some common portion sizes are based on everyday objects:  ? 1 oz--4 stacked dice.  ? 3 oz--1 deck of cards.  ? 1 tsp--1 die.  ? 1 Tbsp--½ a ping-pong ball.  ? 2 Tbsp--1 ping-pong ball.  ? ½ cup--½ baseball.  ? 1 cup--1 baseball.  Summary  · Calorie counting means keeping track of how many calories you eat and drink each day. If you eat fewer calories than your body needs, you should lose weight.  · A healthy amount of weight to lose per week is usually 1-2 lb (0.5-0.9 kg). This usually means reducing your daily calorie intake by 500-750 calories.  · The number of calories in a food can be found on a Nutrition Facts label. If a food does not have a Nutrition Facts label, try to look up the calories online or ask your dietitian for help.  · Use your calories on foods and drinks that will fill you up, and not on foods and drinks that will leave you hungry.  · Use smaller plates, glasses, and bowls to prevent overeating.  This information is not intended to replace advice given to you by your health care provider. Make sure you discuss any questions you have with your health care provider.  Document Released: 12/18/2006 Document Revised: 09/06/2019 Document Reviewed: 11/17/2017  [employer] Interactive Patient Education © 2019 [employer]      Exercising to Lose Weight  Exercise is structured, repetitive physical activity to improve fitness and health. Getting regular exercise is important for everyone. It is especially important if you are overweight. Being overweight increases your risk of heart disease, stroke, diabetes, high blood pressure, and several types of cancer. Reducing your calorie intake and exercising can help you lose weight.  Exercise is usually categorized as moderate or vigorous intensity. To lose weight, most  people need to do a certain amount of moderate-intensity or vigorous-intensity exercise each week.  Moderate-intensity exercise    Moderate-intensity exercise is any activity that gets you moving enough to burn at least three times more energy (calories) than if you were sitting.  Examples of moderate exercise include:  · Walking a mile in 15 minutes.  · Doing light yard work.  · Biking at an easy pace.  Most people should get at least 150 minutes (2 hours and 30 minutes) a week of moderate-intensity exercise to maintain their body weight.  Vigorous-intensity exercise  Vigorous-intensity exercise is any activity that gets you moving enough to burn at least six times more calories than if you were sitting. When you exercise at this intensity, you should be working hard enough that you are not able to carry on a conversation.  Examples of vigorous exercise include:  · Running.  · Playing a team sport, such as football, basketball, and soccer.  · Jumping rope.  Most people should get at least 75 minutes (1 hour and 15 minutes) a week of vigorous-intensity exercise to maintain their body weight.  How can exercise affect me?  When you exercise enough to burn more calories than you eat, you lose weight. Exercise also reduces body fat and builds muscle. The more muscle you have, the more calories you burn. Exercise also:  · Improves mood.  · Reduces stress and tension.  · Improves your overall fitness, flexibility, and endurance.  · Increases bone strength.  The amount of exercise you need to lose weight depends on:  · Your age.  · The type of exercise.  · Any health conditions you have.  · Your overall physical ability.  Talk to your health care provider about how much exercise you need and what types of activities are safe for you.  What actions can I take to lose weight?  Nutrition    · Make changes to your diet as told by your health care provider or diet and nutrition specialist (dietitian). This may  include:  ? Eating fewer calories.  ? Eating more protein.  ? Eating less unhealthy fats.  ? Eating a diet that includes fresh fruits and vegetables, whole grains, low-fat dairy products, and lean protein.  ? Avoiding foods with added fat, salt, and sugar.  · Drink plenty of water while you exercise to prevent dehydration or heat stroke.  Activity  · Choose an activity that you enjoy and set realistic goals. Your health care provider can help you make an exercise plan that works for you.  · Exercise at a moderate or vigorous intensity most days of the week.  ? The intensity of exercise may vary from person to person. You can tell how intense a workout is for you by paying attention to your breathing and heartbeat. Most people will notice their breathing and heartbeat get faster with more intense exercise.  · Do resistance training twice each week, such as:  ? Push-ups.  ? Sit-ups.  ? Lifting weights.  ? Using resistance bands.  · Getting short amounts of exercise can be just as helpful as long structured periods of exercise. If you have trouble finding time to exercise, try to include exercise in your daily routine.  ? Get up, stretch, and walk around every 30 minutes throughout the day.  ? Go for a walk during your lunch break.  ? Park your car farther away from your destination.  ? If you take public transportation, get off one stop early and walk the rest of the way.  ? Make phone calls while standing up and walking around.  ? Take the stairs instead of elevators or escalators.  · Wear comfortable clothes and shoes with good support.  · Do not exercise so much that you hurt yourself, feel dizzy, or get very short of breath.  Where to find more information  · [place] Department of Health and Human Services: www.hhs.gov  · Centers for Disease Control and Prevention (CDC): www.cdc.gov  Contact a health care provider:  · Before starting a new exercise program.  · If you have questions or concerns about your  weight.  · If you have a medical problem that keeps you from exercising.  Get help right away if you have any of the following while exercising:  · Injury.  · Dizziness.  · Difficulty breathing or shortness of breath that does not go away when you stop exercising.  · Chest pain.  · Rapid heartbeat.  Summary  · Being overweight increases your risk of heart disease, stroke, diabetes, high blood pressure, and several types of cancer.  · Losing weight happens when you burn more calories than you eat.  · Reducing the amount of calories you eat in addition to getting regular moderate or vigorous exercise each week helps you lose weight.  This information is not intended to replace advice given to you by your health care provider. Make sure you discuss any questions you have with your health care provider.  Document Released: 01/20/2012 Document Revised: 12/31/2018 Document Reviewed: 12/31/2018  [employer] Interactive Patient Education © 2019 [employer]

## 2019-11-05 NOTE — PROGRESS NOTES
Subjective     History of Present Illness     Nick Austin (Naveed) is a 68 y.o. with chronic medical issues including essential hypertension, hyperlipidemia, CAD, hypothyroidism, and IFG among other issues.  Dr. Jay is his cardiologist.  He has history of CABG x 2 with the most recent dated 09/2017 by Dr. Greg Morgan and continues on medical management with Plavix, aspirin, and Lipitor.  He denies any recent chest pain.      Patient was evaluated by Dr. Wood to work up oropharyngeal dysphagia, which improved post dilatation.  EGD was consistent with hiatal hernia and Schatzki's ring.  [facility] is managing GERD symptoms well.  He had colonoscopy 05/2019 by Dr. Ruiz revealing diverticulosis in sigmoid and descending colon.  Repeat was recommended in ten years.     Six months ago, I started fenofibrate 48 mg to take one q.d. for hypertriglyceridemia, for which he has good results with triglycerides improving from 348 to 182.  .      He was in last month reporting lower extremity swelling consistent with chronic venous insufficiency . He continues to have mild swelling.  Blood pressure is above goal this morning at 160/80, but he has not had his losartan 100 mg this morning.   He only monitors BP at home occasionally.  I recommended we stop the plain losartan and start losartan hydrochlorothiazide 100/12.5 mg, which should help lower blood pressure as well as help with weight loss.      Labs reveal impaired fasting glucose has persisted. Weight is down 1 pound in the past six months, although, weight is up 16 pounds in the past 2 1/2 weeks as he had lost over 20 pounds and regained the weight.  He admits to drinking sugar sweetened RC erica and eating lots of other dessert cakes and candy.        He continues to decline influenza or pneumonia vaccines.   He will let us know if he changes his mind.       The patient's relevant past medical, surgical, and social history was reviewed in Epic.    "Lab results are reviewed with the patient today.  CBC unremarkable.  Fasting glucose 113.  A1c is 5.9.  Normal renal and liver function.  Thyroid function at goal.  LDL 94. Triglycerides improved at 182.     Review of Systems   Constitutional: Negative for chills, fatigue and fever.   HENT: Negative for congestion, ear pain, postnasal drip, sinus pressure and sore throat.    Respiratory: Negative for cough, shortness of breath and wheezing.    Cardiovascular: Negative for chest pain, palpitations and leg swelling.   Gastrointestinal: Negative for abdominal pain, blood in stool, constipation, diarrhea, nausea and vomiting.   Endocrine: Negative for cold intolerance, heat intolerance, polydipsia and polyuria.   Genitourinary: Negative for dysuria, frequency, hematuria and urgency.   Skin: Negative for rash.   Neurological: Negative for syncope and weakness.        Objective     Visit Vitals  /80 Comment: hasnt had meds yet   Pulse 64   Temp 98.7 °F (37.1 °C) (Oral)   Ht 167.6 cm (66\")   Wt 119 kg (263 lb)   BMI 42.45 kg/m²     Physical Exam   Constitutional: He is oriented to person, place, and time. He appears well-developed and well-nourished. No distress.   Morbidly obese male.     HENT:   Head: Normocephalic and atraumatic.   Nose: Right sinus exhibits no maxillary sinus tenderness and no frontal sinus tenderness. Left sinus exhibits no maxillary sinus tenderness and no frontal sinus tenderness.   Mouth/Throat: Uvula is midline, oropharynx is clear and moist and mucous membranes are normal. No oral lesions. No tonsillar exudate.   Crowded posterior oropharynx.    Eyes: Conjunctivae and EOM are normal. Pupils are equal, round, and reactive to light.   Neck: Trachea normal. Neck supple. No JVD present. Carotid bruit is not present. No tracheal deviation present. No thyroid mass and no thyromegaly present.   Cardiovascular: Normal rate, regular rhythm, normal heart sounds and intact distal pulses.  No " extrasystoles are present. PMI is not displaced.   No murmur heard.  CABG scars noted.    Pulmonary/Chest: Effort normal and breath sounds normal. No accessory muscle usage. No respiratory distress. He has no decreased breath sounds. He has no wheezes. He has no rhonchi. He has no rales.   Abdominal: Soft. Bowel sounds are normal. He exhibits no distension. There is no hepatosplenomegaly. There is no tenderness.   Obese male.       Vascular Status -  His right foot exhibits abnormal foot edema (Trace edema bilateral ankles with evidence of chronic venous insufficiiency). His right foot exhibits normal foot vasculature . His left foot exhibits abnormal foot edema. His left foot exhibits normal foot vasculature .  Lymphadenopathy:     He has no cervical adenopathy.   Neurological: He is alert and oriented to person, place, and time. No cranial nerve deficit. Coordination normal.   Skin: Skin is warm, dry and intact. No rash noted. No cyanosis. Nails show no clubbing.   Psychiatric: He has a normal mood and affect. His speech is normal and behavior is normal. Judgment and thought content normal.   Vitals reviewed.        Assessment/Plan      Stop the plain losartan and start losartan hydrochlorothiazide 100/12.5 mg to take ne q.a.m.  I recommended he take his medication as soon as he gets home and monitor at home blood pressure notifying me if systolic BP is consistently above 140.       Continue the Prilosec for GERD and OTC antacids for breakthrough symptoms     Continue Synthroid 88 mcg one tablet daily.    Continue the fenofibrate for hypertriglyceridemia.     He continues to decline influenza or pneumonia vaccines.   He will let us know if he changes his mind.      We reviewed management of chronic venous insufficiency including sodium restriction, much more aggressive weight loss, elevating the lower extremities when not ambulating and wearing compression stockings.      Continue diet control for diabetes for  now.  Pursue diabetic diet, exercise and weight loss efforts especially decreasing carbohydrates and sugar.    Eliminate the sugar sweetened drinks and other foods high in sugar.  We will need to add metformin in the near future.     Return in six months for routine follow up with fasting labs one week prior.          Scribed for Dr. Magdaleno by Harriet [facility].     Diagnoses and all orders for this visit:    Essential hypertension  -     CBC Auto Differential; Future  -     Comprehensive Metabolic Panel; Future    Mixed hyperlipidemia  -     Lipid Panel; Future    Hypertriglyceridemia  -     Lipid Panel; Future    Coronary artery disease involving native coronary artery of native heart with angina pectoris (CMS/HCC) - followed by Dr. Jay    Gastroesophageal reflux disease, esophagitis presence not specified    Nephrolithiasis    Benign non-nodular prostatic hyperplasia    IFG (impaired fasting glucose)  -     Hemoglobin A1c; Future    Acquired hypothyroidism  -     TSH; Future  -     T4, free; Future    Special screening for malignant neoplasm of prostate  -     PSA Screen; Future    Other orders  -     losartan-hydrochlorothiazide (HYZAAR) 100-12.5 MG per tablet; Take 1 tablet by mouth Every Morning.        Lab on 11/01/2019   Component Date Value Ref Range Status   • WBC 11/01/2019 6.52  3.40 - 10.80 10*3/mm3 Final   • RBC 11/01/2019 4.88  4.14 - 5.80 10*6/mm3 Final   • Hemoglobin 11/01/2019 15.3  13.0 - 17.7 g/dL Final   • Hematocrit 11/01/2019 46.4  37.5 - 51.0 % Final   • MCV 11/01/2019 95.1  79.0 - 97.0 fL Final   • MCH 11/01/2019 31.4  26.6 - 33.0 pg Final   • MCHC 11/01/2019 33.0  31.5 - 35.7 g/dL Final   • RDW 11/01/2019 14.2  12.3 - 15.4 % Final   • RDW-SD 11/01/2019 48.5  37.0 - 54.0 fl Final   • MPV 11/01/2019 10.8  6.0 - 12.0 fL Final   • Platelets 11/01/2019 267  140 - 450 10*3/mm3 Final   • Neutrophil % 11/01/2019 59.2  42.7 - 76.0 % Final   • Lymphocyte % 11/01/2019 28.5  19.6 - 45.3 %  Final   • Monocyte % 11/01/2019 6.9  5.0 - 12.0 % Final   • Eosinophil % 11/01/2019 4.9  0.3 - 6.2 % Final   • Basophil % 11/01/2019 0.5  0.0 - 1.5 % Final   • Neutrophils, Absolute 11/01/2019 3.86  1.70 - 7.00 10*3/mm3 Final   • Lymphocytes, Absolute 11/01/2019 1.86  0.70 - 3.10 10*3/mm3 Final   • Monocytes, Absolute 11/01/2019 0.45  0.10 - 0.90 10*3/mm3 Final   • Eosinophils, Absolute 11/01/2019 0.32  0.00 - 0.40 10*3/mm3 Final   • Basophils, Absolute 11/01/2019 0.03  0.00 - 0.20 10*3/mm3 Final   • Glucose 11/01/2019 113* 70 - 99 mg/dL Final   • BUN 11/01/2019 14  7 - 23 mg/dL Final   • Creatinine 11/01/2019 1.15  0.70 - 1.30 mg/dL Final   • Sodium 11/01/2019 145  137 - 145 mmol/L Final   • Potassium 11/01/2019 4.1  3.4 - 5.0 mmol/L Final   • Chloride 11/01/2019 109  101 - 112 mmol/L Final   • CO2 11/01/2019 26.0  22.0 - 30.0 mmol/L Final   • Calcium 11/01/2019 8.9  8.4 - 10.2 mg/dL Final   • Total Protein 11/01/2019 7.4  6.3 - 8.6 g/dL Final   • Albumin 11/01/2019 4.20  3.50 - 5.00 g/dL Final   • ALT (SGPT) 11/01/2019 48  <=50 U/L Final   • AST (SGOT) 11/01/2019 33  17 - 59 U/L Final   • Alkaline Phosphatase 11/01/2019 75  38 - 126 U/L Final   • Total Bilirubin 11/01/2019 0.4  0.2 - 1.3 mg/dL Final   • eGFR Non African Amer 11/01/2019 63  42 - 98 mL/min/1.73 Final   • Globulin 11/01/2019 3.2  2.3 - 3.5 gm/dL Final   • A/G Ratio 11/01/2019 1.3  1.1 - 1.8 g/dL Final   • BUN/Creatinine Ratio 11/01/2019 12.2  7.0 - 25.0 Final   • Anion Gap 11/01/2019 10.0  5.0 - 15.0 mmol/L Final   • Hemoglobin A1C 11/01/2019 5.90* 4.80 - 5.60 % Final   • TSH 11/01/2019 3.190  0.270 - 4.200 uIU/mL Final   • Free T4 11/01/2019 1.17  0.93 - 1.70 ng/dL Final   • LDL Cholesterol  11/01/2019 94  0 - 100 mg/dL Final   • Triglycerides 11/01/2019 182* <=150 mg/dL Final   ]

## 2020-03-19 RX ORDER — FENOFIBRATE 48 MG/1
TABLET, COATED ORAL
Qty: 90 TABLET | Refills: 3 | Status: SHIPPED | OUTPATIENT
Start: 2020-03-19 | End: 2021-01-13

## 2020-03-20 RX ORDER — OLMESARTAN MEDOXOMIL AND HYDROCHLOROTHIAZIDE 40/12.5 40; 12.5 MG/1; MG/1
1 TABLET ORAL DAILY
Qty: 90 TABLET | Refills: 3 | Status: SHIPPED | OUTPATIENT
Start: 2020-03-20 | End: 2021-04-06

## 2020-05-21 RX ORDER — LEVOTHYROXINE SODIUM 88 MCG
88 TABLET ORAL DAILY
Qty: 90 TABLET | Refills: 1 | Status: SHIPPED | OUTPATIENT
Start: 2020-05-21 | End: 2020-10-28

## 2020-05-29 RX ORDER — CLOPIDOGREL BISULFATE 75 MG/1
TABLET ORAL
Qty: 30 TABLET | Refills: 0 | Status: SHIPPED | OUTPATIENT
Start: 2020-05-29 | End: 2020-08-24 | Stop reason: SDUPTHER

## 2020-08-04 ENCOUNTER — LAB (OUTPATIENT)
Dept: LAB | Facility: OTHER | Age: 70
End: 2020-08-04

## 2020-08-04 DIAGNOSIS — E03.9 ACQUIRED HYPOTHYROIDISM: Chronic | ICD-10-CM

## 2020-08-04 DIAGNOSIS — R73.01 IFG (IMPAIRED FASTING GLUCOSE): Chronic | ICD-10-CM

## 2020-08-04 DIAGNOSIS — Z12.5 SPECIAL SCREENING FOR MALIGNANT NEOPLASM OF PROSTATE: ICD-10-CM

## 2020-08-04 DIAGNOSIS — E78.1 HYPERTRIGLYCERIDEMIA: Chronic | ICD-10-CM

## 2020-08-04 DIAGNOSIS — E78.2 MIXED HYPERLIPIDEMIA: Chronic | ICD-10-CM

## 2020-08-04 DIAGNOSIS — I10 ESSENTIAL HYPERTENSION: Chronic | ICD-10-CM

## 2020-08-04 LAB
ALBUMIN SERPL-MCNC: 4.4 G/DL (ref 3.5–5)
ALBUMIN/GLOB SERPL: 1.4 G/DL (ref 1.1–1.8)
ALP SERPL-CCNC: 63 U/L (ref 38–126)
ALT SERPL W P-5'-P-CCNC: 43 U/L
ANION GAP SERPL CALCULATED.3IONS-SCNC: 9 MMOL/L (ref 5–15)
AST SERPL-CCNC: 35 U/L (ref 17–59)
BASOPHILS # BLD AUTO: 0.03 10*3/MM3 (ref 0–0.2)
BASOPHILS NFR BLD AUTO: 0.5 % (ref 0–1.5)
BILIRUB SERPL-MCNC: 0.4 MG/DL (ref 0.2–1.3)
BUN SERPL-MCNC: 18 MG/DL (ref 7–23)
BUN/CREAT SERPL: 15.8 (ref 7–25)
CALCIUM SPEC-SCNC: 9.5 MG/DL (ref 8.4–10.2)
CHLORIDE SERPL-SCNC: 102 MMOL/L (ref 101–112)
CHOLEST SERPL-MCNC: 141 MG/DL (ref 150–200)
CO2 SERPL-SCNC: 28 MMOL/L (ref 22–30)
CREAT SERPL-MCNC: 1.14 MG/DL (ref 0.7–1.3)
DEPRECATED RDW RBC AUTO: 49.3 FL (ref 37–54)
EOSINOPHIL # BLD AUTO: 0.34 10*3/MM3 (ref 0–0.4)
EOSINOPHIL NFR BLD AUTO: 5.3 % (ref 0.3–6.2)
ERYTHROCYTE [DISTWIDTH] IN BLOOD BY AUTOMATED COUNT: 14.6 % (ref 12.3–15.4)
GFR SERPL CREATININE-BSD FRML MDRD: 63 ML/MIN/1.73 (ref 42–98)
GLOBULIN UR ELPH-MCNC: 3.2 GM/DL (ref 2.3–3.5)
GLUCOSE SERPL-MCNC: 119 MG/DL (ref 70–99)
HBA1C MFR BLD: 6.17 % (ref 4.8–5.6)
HCT VFR BLD AUTO: 45.3 % (ref 37.5–51)
HDLC SERPL-MCNC: 29 MG/DL (ref 40–59)
HGB BLD-MCNC: 15.1 G/DL (ref 13–17.7)
LDLC SERPL CALC-MCNC: 77 MG/DL
LDLC/HDLC SERPL: 2.65 {RATIO} (ref 0–3.55)
LYMPHOCYTES # BLD AUTO: 1.84 10*3/MM3 (ref 0.7–3.1)
LYMPHOCYTES NFR BLD AUTO: 28.7 % (ref 19.6–45.3)
MCH RBC QN AUTO: 31.5 PG (ref 26.6–33)
MCHC RBC AUTO-ENTMCNC: 33.3 G/DL (ref 31.5–35.7)
MCV RBC AUTO: 94.6 FL (ref 79–97)
MONOCYTES # BLD AUTO: 0.47 10*3/MM3 (ref 0.1–0.9)
MONOCYTES NFR BLD AUTO: 7.3 % (ref 5–12)
NEUTROPHILS NFR BLD AUTO: 3.73 10*3/MM3 (ref 1.7–7)
NEUTROPHILS NFR BLD AUTO: 58.2 % (ref 42.7–76)
PLATELET # BLD AUTO: 255 10*3/MM3 (ref 140–450)
PMV BLD AUTO: 10.9 FL (ref 6–12)
POTASSIUM SERPL-SCNC: 3.8 MMOL/L (ref 3.4–5)
PROT SERPL-MCNC: 7.6 G/DL (ref 6.3–8.6)
PSA SERPL-MCNC: 0.92 NG/ML (ref 0–4)
RBC # BLD AUTO: 4.79 10*6/MM3 (ref 4.14–5.8)
SODIUM SERPL-SCNC: 139 MMOL/L (ref 137–145)
T4 FREE SERPL-MCNC: 1.41 NG/DL (ref 0.93–1.7)
TRIGL SERPL-MCNC: 176 MG/DL
TSH SERPL DL<=0.05 MIU/L-ACNC: 2.72 UIU/ML (ref 0.27–4.2)
VLDLC SERPL-MCNC: 35.2 MG/DL
WBC # BLD AUTO: 6.41 10*3/MM3 (ref 3.4–10.8)

## 2020-08-04 PROCEDURE — 84443 ASSAY THYROID STIM HORMONE: CPT | Performed by: INTERNAL MEDICINE

## 2020-08-04 PROCEDURE — 83036 HEMOGLOBIN GLYCOSYLATED A1C: CPT | Performed by: INTERNAL MEDICINE

## 2020-08-04 PROCEDURE — 80053 COMPREHEN METABOLIC PANEL: CPT | Performed by: INTERNAL MEDICINE

## 2020-08-04 PROCEDURE — 84439 ASSAY OF FREE THYROXINE: CPT | Performed by: INTERNAL MEDICINE

## 2020-08-04 PROCEDURE — G0103 PSA SCREENING: HCPCS | Performed by: INTERNAL MEDICINE

## 2020-08-04 PROCEDURE — 85025 COMPLETE CBC W/AUTO DIFF WBC: CPT | Performed by: INTERNAL MEDICINE

## 2020-08-04 PROCEDURE — 36415 COLL VENOUS BLD VENIPUNCTURE: CPT | Performed by: INTERNAL MEDICINE

## 2020-08-04 PROCEDURE — 80061 LIPID PANEL: CPT | Performed by: INTERNAL MEDICINE

## 2020-08-12 ENCOUNTER — OFFICE VISIT (OUTPATIENT)
Dept: FAMILY MEDICINE CLINIC | Facility: CLINIC | Age: 70
End: 2020-08-12

## 2020-08-12 VITALS — WEIGHT: 260 LBS | BODY MASS INDEX: 41.78 KG/M2 | HEIGHT: 66 IN

## 2020-08-12 DIAGNOSIS — E78.2 MIXED HYPERLIPIDEMIA: Primary | Chronic | ICD-10-CM

## 2020-08-12 DIAGNOSIS — K21.9 GASTROESOPHAGEAL REFLUX DISEASE, ESOPHAGITIS PRESENCE NOT SPECIFIED: ICD-10-CM

## 2020-08-12 DIAGNOSIS — I25.119 CORONARY ARTERY DISEASE INVOLVING NATIVE CORONARY ARTERY OF NATIVE HEART WITH ANGINA PECTORIS (HCC): Chronic | ICD-10-CM

## 2020-08-12 DIAGNOSIS — E78.1 HYPERTRIGLYCERIDEMIA: Chronic | ICD-10-CM

## 2020-08-12 DIAGNOSIS — E66.01 CLASS 3 SEVERE OBESITY DUE TO EXCESS CALORIES WITH SERIOUS COMORBIDITY AND BODY MASS INDEX (BMI) OF 40.0 TO 44.9 IN ADULT (HCC): Chronic | ICD-10-CM

## 2020-08-12 DIAGNOSIS — I10 ESSENTIAL HYPERTENSION: Chronic | ICD-10-CM

## 2020-08-12 DIAGNOSIS — E03.9 ACQUIRED HYPOTHYROIDISM: Chronic | ICD-10-CM

## 2020-08-12 DIAGNOSIS — E11.9 TYPE 2 DIABETES MELLITUS WITHOUT COMPLICATION, WITHOUT LONG-TERM CURRENT USE OF INSULIN (HCC): Chronic | ICD-10-CM

## 2020-08-12 PROCEDURE — G2025 DIS SITE TELE SVCS RHC/FQHC: HCPCS | Performed by: INTERNAL MEDICINE

## 2020-08-12 RX ORDER — ALBUTEROL SULFATE 90 UG/1
2 AEROSOL, METERED RESPIRATORY (INHALATION) EVERY 4 HOURS PRN
Qty: 3 INHALER | Refills: 3 | Status: SHIPPED | OUTPATIENT
Start: 2020-08-12 | End: 2021-06-28

## 2020-08-12 NOTE — PROGRESS NOTES
Subjective      You have chosen to receive care through a telephone visit. Do you consent to use a telephone visit for your medical care today? Yes    Total visit time: 21 minutes.        History of Present Illness     Nick Austin (Naveed) is a 69 y.o. male who receives care today via telephone visit for overdue 6-month follow up on chronic medical issues including essential hypertension, hyperlipidemia, CAD, hypothyroidism, and impaired fasting glucose, GERD, which has progressed to Type 2 diabetes mellitus.  Dr. Jay is his cardiologist who continues to follow patient annually.  Denies chest pain.   GERD symptoms adequately controlled with Prilosec and avoiding late night eating.        He had colonoscopy 05/2019 by Dr. Ruiz revealing diverticulosis in sigmoid and descending colon.  Repeat was recommended in ten years.    Labs reveal impaired fasting glucose has progressed to type 2 diabetes mellitus.  He reports a 3-pound weight loss since last fall.  He reports he had lost 20-25 pounds prior to the pandemic, but has fallen back into some bad eating habits.  I offered starting metformin, although, patient feels he can make dietary improvements including eliminating sugar sweetened tea and sugar sweetened soft drinks to help bring his glucose back down.        Six months ago, I stopped plain losartan and started losartan hydrochlorothiazide 100/12.5 mg daily after BP was above goal.  He hs not been monitoring BP at home, but does have a BP cuff.  I asked him to monitor episodically and notify me if not consistently at goal.     The patient's relevant past medical, surgical, and social history was reviewed in Epic.   Lab results are reviewed with the patient today.  CBC unremarkable.  Fasting glucose 119.  A1c 6.17.  Total cholesterol 141.  HDL low at 29.  LDL 77.  Triglycerides 176.  LDL/HDL ratio 2.65. PSA reveals no evidence of prostate cancer. Normal renal and liver function.      Review of  "Systems   Constitutional: Negative for chills, fatigue and fever.   HENT: Negative for congestion, ear pain, postnasal drip, sinus pressure and sore throat.    Respiratory: Negative for cough, shortness of breath and wheezing.    Cardiovascular: Negative for chest pain, palpitations and leg swelling.   Gastrointestinal: Negative for abdominal pain, blood in stool, constipation, diarrhea, nausea and vomiting.   Endocrine: Negative for cold intolerance, heat intolerance, polydipsia and polyuria.   Genitourinary: Negative for dysuria, frequency, hematuria and urgency.   Skin: Negative for rash.   Neurological: Negative for syncope and weakness.        Objective     Visit Vitals  Ht 167.6 cm (66\")   Wt 118 kg (260 lb)   BMI 41.97 kg/m²       Physical Exam        Assessment/Plan      Impaired fasting glucose has progressed to Type 2 diabetes.  Continue diet control for diabetes for now.  I recommended starting metformin, although, he would like to try dietary efforts prior to starting the medication.  I asked him to eliminate the sugar sweetened drinks and sweet tea as well as other foods high in sugar and carbohydrates.  Notify me if he is not having successful weight loss efforts prior to his next appointment and we will plan to add metformin.     Continue the Benicar HCT.  Monitor BP and heart rate episodically while at rest and notify me if not consistently at goal.      Continue Synthroid 88 mcg one tablet daily.    Continue the Prilosec for GERD.       Continue the fenofibrate for hypertriglyceridemia.     Return in six months for routine follow up with fasting labs one week prior.          Scribed for Dr. Magdaleno by Harriet Gardner, [facility].       Diagnoses and all orders for this visit:    Mixed hyperlipidemia  -     CBC Auto Differential; Future  -     Comprehensive Metabolic Panel; Future  -     LDL Cholesterol, Direct; Future    Essential hypertension    Hypertriglyceridemia  -     Triglycerides; Future    Type " 2 diabetes mellitus without complication, without long-term current use of insulin (CMS/HCC)  -     Comprehensive Metabolic Panel; Future  -     Hemoglobin A1c; Future  -     Microalbumin / Creatinine Urine Ratio - Urine, Clean Catch; Future    Acquired hypothyroidism  -     TSH; Future  -     T4, free; Future    Coronary artery disease involving native coronary artery of native heart with angina pectoris (CMS/HCC) - followed by Dr. Jay    Gastroesophageal reflux disease, esophagitis presence not specified    Class 3 severe obesity due to excess calories with serious comorbidity and body mass index (BMI) of 40.0 to 44.9 in adult (CMS/HCC)    Other orders  -     albuterol sulfate HFA (Ventolin HFA) 108 (90 Base) MCG/ACT inhaler; Inhale 2 puffs Every 4 (Four) Hours As Needed for Wheezing.        Lab on 08/04/2020   Component Date Value Ref Range Status   • WBC 08/04/2020 6.41  3.40 - 10.80 10*3/mm3 Final   • RBC 08/04/2020 4.79  4.14 - 5.80 10*6/mm3 Final   • Hemoglobin 08/04/2020 15.1  13.0 - 17.7 g/dL Final   • Hematocrit 08/04/2020 45.3  37.5 - 51.0 % Final   • MCV 08/04/2020 94.6  79.0 - 97.0 fL Final   • MCH 08/04/2020 31.5  26.6 - 33.0 pg Final   • MCHC 08/04/2020 33.3  31.5 - 35.7 g/dL Final   • RDW 08/04/2020 14.6  12.3 - 15.4 % Final   • RDW-SD 08/04/2020 49.3  37.0 - 54.0 fl Final   • MPV 08/04/2020 10.9  6.0 - 12.0 fL Final   • Platelets 08/04/2020 255  140 - 450 10*3/mm3 Final   • Neutrophil % 08/04/2020 58.2  42.7 - 76.0 % Final   • Lymphocyte % 08/04/2020 28.7  19.6 - 45.3 % Final   • Monocyte % 08/04/2020 7.3  5.0 - 12.0 % Final   • Eosinophil % 08/04/2020 5.3  0.3 - 6.2 % Final   • Basophil % 08/04/2020 0.5  0.0 - 1.5 % Final   • Neutrophils, Absolute 08/04/2020 3.73  1.70 - 7.00 10*3/mm3 Final   • Lymphocytes, Absolute 08/04/2020 1.84  0.70 - 3.10 10*3/mm3 Final   • Monocytes, Absolute 08/04/2020 0.47  0.10 - 0.90 10*3/mm3 Final   • Eosinophils, Absolute 08/04/2020 0.34  0.00 - 0.40 10*3/mm3  Final   • Basophils, Absolute 08/04/2020 0.03  0.00 - 0.20 10*3/mm3 Final   • Glucose 08/04/2020 119* 70 - 99 mg/dL Final   • BUN 08/04/2020 18  7 - 23 mg/dL Final   • Creatinine 08/04/2020 1.14  0.70 - 1.30 mg/dL Final   • Sodium 08/04/2020 139  137 - 145 mmol/L Final   • Potassium 08/04/2020 3.8  3.4 - 5.0 mmol/L Final   • Chloride 08/04/2020 102  101 - 112 mmol/L Final   • CO2 08/04/2020 28.0  22.0 - 30.0 mmol/L Final   • Calcium 08/04/2020 9.5  8.4 - 10.2 mg/dL Final   • Total Protein 08/04/2020 7.6  6.3 - 8.6 g/dL Final   • Albumin 08/04/2020 4.40  3.50 - 5.00 g/dL Final   • ALT (SGPT) 08/04/2020 43  <=50 U/L Final   • AST (SGOT) 08/04/2020 35  17 - 59 U/L Final   • Alkaline Phosphatase 08/04/2020 63  38 - 126 U/L Final   • Total Bilirubin 08/04/2020 0.4  0.2 - 1.3 mg/dL Final   • eGFR Non African Amer 08/04/2020 63  42 - 98 mL/min/1.73 Final   • Globulin 08/04/2020 3.2  2.3 - 3.5 gm/dL Final   • A/G Ratio 08/04/2020 1.4  1.1 - 1.8 g/dL Final   • BUN/Creatinine Ratio 08/04/2020 15.8  7.0 - 25.0 Final   • Anion Gap 08/04/2020 9.0  5.0 - 15.0 mmol/L Final   • Hemoglobin A1C 08/04/2020 6.17* 4.80 - 5.60 % Final   • Total Cholesterol 08/04/2020 141* 150 - 200 mg/dL Final   • Triglycerides 08/04/2020 176* <=150 mg/dL Final   • HDL Cholesterol 08/04/2020 29* 40 - 59 mg/dL Final   • LDL Cholesterol  08/04/2020 77  <=100 mg/dL Final   • VLDL Cholesterol 08/04/2020 35.2  mg/dL Final   • LDL/HDL Ratio 08/04/2020 2.65  0.00 - 3.55 Final   • TSH 08/04/2020 2.720  0.270 - 4.200 uIU/mL Final   • Free T4 08/04/2020 1.41  0.93 - 1.70 ng/dL Final   • PSA 08/04/2020 0.922  0.000 - 4.000 ng/mL Final   ]

## 2020-08-12 NOTE — PATIENT INSTRUCTIONS
Exercising to Lose Weight  Exercise is structured, repetitive physical activity to improve fitness and health. Getting regular exercise is important for everyone. It is especially important if you are overweight. Being overweight increases your risk of heart disease, stroke, diabetes, high blood pressure, and several types of cancer. Reducing your calorie intake and exercising can help you lose weight.  Exercise is usually categorized as moderate or vigorous intensity. To lose weight, most people need to do a certain amount of moderate-intensity or vigorous-intensity exercise each week.  Moderate-intensity exercise    Moderate-intensity exercise is any activity that gets you moving enough to burn at least three times more energy (calories) than if you were sitting.  Examples of moderate exercise include:  · Walking a mile in 15 minutes.  · Doing light yard work.  · Biking at an easy pace.  Most people should get at least 150 minutes (2 hours and 30 minutes) a week of moderate-intensity exercise to maintain their body weight.  Vigorous-intensity exercise  Vigorous-intensity exercise is any activity that gets you moving enough to burn at least six times more calories than if you were sitting. When you exercise at this intensity, you should be working hard enough that you are not able to carry on a conversation.  Examples of vigorous exercise include:  · Running.  · Playing a team sport, such as football, basketball, and soccer.  · Jumping rope.  Most people should get at least 75 minutes (1 hour and 15 minutes) a week of vigorous-intensity exercise to maintain their body weight.  How can exercise affect me?  When you exercise enough to burn more calories than you eat, you lose weight. Exercise also reduces body fat and builds muscle. The more muscle you have, the more calories you burn. Exercise also:  · Improves mood.  · Reduces stress and tension.  · Improves your overall fitness, flexibility, and  endurance.  · Increases bone strength.  The amount of exercise you need to lose weight depends on:  · Your age.  · The type of exercise.  · Any health conditions you have.  · Your overall physical ability.  Talk to your health care provider about how much exercise you need and what types of activities are safe for you.  What actions can I take to lose weight?  Nutrition    · Make changes to your diet as told by your health care provider or diet and nutrition specialist (dietitian). This may include:  ? Eating fewer calories.  ? Eating more protein.  ? Eating less unhealthy fats.  ? Eating a diet that includes fresh fruits and vegetables, whole grains, low-fat dairy products, and lean protein.  ? Avoiding foods with added fat, salt, and sugar.  · Drink plenty of water while you exercise to prevent dehydration or heat stroke.  Activity  · Choose an activity that you enjoy and set realistic goals. Your health care provider can help you make an exercise plan that works for you.  · Exercise at a moderate or vigorous intensity most days of the week.  ? The intensity of exercise may vary from person to person. You can tell how intense a workout is for you by paying attention to your breathing and heartbeat. Most people will notice their breathing and heartbeat get faster with more intense exercise.  · Do resistance training twice each week, such as:  ? Push-ups.  ? Sit-ups.  ? Lifting weights.  ? Using resistance bands.  · Getting short amounts of exercise can be just as helpful as long structured periods of exercise. If you have trouble finding time to exercise, try to include exercise in your daily routine.  ? Get up, stretch, and walk around every 30 minutes throughout the day.  ? Go for a walk during your lunch break.  ? Park your car farther away from your destination.  ? If you take public transportation, get off one stop early and walk the rest of the way.  ? Make phone calls while standing up and walking  around.  ? Take the stairs instead of elevators or escalators.  · Wear comfortable clothes and shoes with good support.  · Do not exercise so much that you hurt yourself, feel dizzy, or get very short of breath.  Where to find more information  · [place] Department of Health and Human Services: www.hhs.gov  · Centers for Disease Control and Prevention (CDC): www.cdc.gov  Contact a health care provider:  · Before starting a new exercise program.  · If you have questions or concerns about your weight.  · If you have a medical problem that keeps you from exercising.  Get help right away if you have any of the following while exercising:  · Injury.  · Dizziness.  · Difficulty breathing or shortness of breath that does not go away when you stop exercising.  · Chest pain.  · Rapid heartbeat.  Summary  · Being overweight increases your risk of heart disease, stroke, diabetes, high blood pressure, and several types of cancer.  · Losing weight happens when you burn more calories than you eat.  · Reducing the amount of calories you eat in addition to getting regular moderate or vigorous exercise each week helps you lose weight.  This information is not intended to replace advice given to you by your health care provider. Make sure you discuss any questions you have with your health care provider.  Document Released: 01/20/2012 Document Revised: 12/31/2018 Document Reviewed: 12/31/2018  [employer] Patient Education © 2020 [employer]      Calorie Counting for Weight Loss  Calories are units of energy. Your body needs a certain amount of calories from food to keep you going throughout the day. When you eat more calories than your body needs, your body stores the extra calories as fat. When you eat fewer calories than your body needs, your body burns fat to get the energy it needs.  Calorie counting means keeping track of how many calories you eat and drink each day. Calorie counting can be helpful if you need to lose weight. If  you make sure to eat fewer calories than your body needs, you should lose weight. Ask your health care provider what a healthy weight is for you.  For calorie counting to work, you will need to eat the right number of calories in a day in order to lose a healthy amount of weight per week. A dietitian can help you determine how many calories you need in a day and will give you suggestions on how to reach your calorie goal.  · A healthy amount of weight to lose per week is usually 1-2 lb (0.5-0.9 kg). This usually means that your daily calorie intake should be reduced by 500-750 calories.  · Eating 1,200 - 1,500 calories per day can help most women lose weight.  · Eating 1,500 - 1,800 calories per day can help most men lose weight.  What is my plan?  My goal is to have __________ calories per day.  If I have this many calories per day, I should lose around __________ pounds per week.  What do I need to know about calorie counting?  In order to meet your daily calorie goal, you will need to:  · Find out how many calories are in each food you would like to eat. Try to do this before you eat.  · Decide how much of the food you plan to eat.  · Write down what you ate and how many calories it had. Doing this is called keeping a food log.  To successfully lose weight, it is important to balance calorie counting with a healthy lifestyle that includes regular activity. Aim for 150 minutes of moderate exercise (such as walking) or 75 minutes of vigorous exercise (such as running) each week.  Where do I find calorie information?    The number of calories in a food can be found on a Nutrition Facts label. If a food does not have a Nutrition Facts label, try to look up the calories online or ask your dietitian for help.  Remember that calories are listed per serving. If you choose to have more than one serving of a food, you will have to multiply the calories per serving by the amount of servings you plan to eat. For example, the  "label on a package of bread might say that a serving size is 1 slice and that there are 90 calories in a serving. If you eat 1 slice, you will have eaten 90 calories. If you eat 2 slices, you will have eaten 180 calories.  How do I keep a food log?  Immediately after each meal, record the following information in your food log:  · What you ate. Don't forget to include toppings, sauces, and other extras on the food.  · How much you ate. This can be measured in cups, ounces, or number of items.  · How many calories each food and drink had.  · The total number of calories in the meal.  Keep your food log near you, such as in a small notebook in your pocket, or use a mobile juarez or website. Some programs will calculate calories for you and show you how many calories you have left for the day to meet your goal.  What are some calorie counting tips?    · Use your calories on foods and drinks that will fill you up and not leave you hungry:  ? Some examples of foods that fill you up are nuts and nut butters, vegetables, lean proteins, and high-fiber foods like whole grains. High-fiber foods are foods with more than 5 g fiber per serving.  ? Drinks such as sodas, specialty coffee drinks, alcohol, and juices have a lot of calories, yet do not fill you up.  · Eat nutritious foods and avoid empty calories. Empty calories are calories you get from foods or beverages that do not have many vitamins or protein, such as candy, sweets, and soda. It is better to have a nutritious high-calorie food (such as an avocado) than a food with few nutrients (such as a bag of chips).  · Know how many calories are in the foods you eat most often. This will help you calculate calorie counts faster.  · Pay attention to calories in drinks. Low-calorie drinks include water and unsweetened drinks.  · Pay attention to nutrition labels for \"low fat\" or \"fat free\" foods. These foods sometimes have the same amount of calories or more calories than the " full fat versions. They also often have added sugar, starch, or salt, to make up for flavor that was removed with the fat.  · Find a way of tracking calories that works for you. Get creative. Try different apps or programs if writing down calories does not work for you.  What are some portion control tips?  · Know how many calories are in a serving. This will help you know how many servings of a certain food you can have.  · Use a measuring cup to measure serving sizes. You could also try weighing out portions on a kitchen scale. With time, you will be able to estimate serving sizes for some foods.  · Take some time to put servings of different foods on your favorite plates, bowls, and cups so you know what a serving looks like.  · Try not to eat straight from a bag or box. Doing this can lead to overeating. Put the amount you would like to eat in a cup or on a plate to make sure you are eating the right portion.  · Use smaller plates, glasses, and bowls to prevent overeating.  · Try not to multitask (for example, watch TV or use your computer) while eating. If it is time to eat, sit down at a table and enjoy your food. This will help you to know when you are full. It will also help you to be aware of what you are eating and how much you are eating.  What are tips for following this plan?  Reading food labels  · Check the calorie count compared to the serving size. The serving size may be smaller than what you are used to eating.  · Check the source of the calories. Make sure the food you are eating is high in vitamins and protein and low in saturated and trans fats.  Shopping  · Read nutrition labels while you shop. This will help you make healthy decisions before you decide to purchase your food.  · Make a grocery list and stick to it.  Cooking  · Try to cook your favorite foods in a healthier way. For example, try baking instead of frying.  · Use low-fat dairy products.  Meal planning  · Use more fruits and  "vegetables. Half of your plate should be fruits and vegetables.  · Include lean proteins like poultry and fish.  How do I count calories when eating out?  · Ask for smaller portion sizes.  · Consider sharing an entree and sides instead of getting your own entree.  · If you get your own entree, eat only half. Ask for a box at the beginning of your meal and put the rest of your entree in it so you are not tempted to eat it.  · If calories are listed on the menu, choose the lower calorie options.  · Choose dishes that include vegetables, fruits, whole grains, low-fat dairy products, and lean protein.  · Choose items that are boiled, broiled, grilled, or steamed. Stay away from items that are buttered, battered, fried, or served with cream sauce. Items labeled \"crispy\" are usually fried, unless stated otherwise.  · Choose water, low-fat milk, unsweetened iced tea, or other drinks without added sugar. If you want an alcoholic beverage, choose a lower calorie option such as a glass of wine or light beer.  · Ask for dressings, sauces, and syrups on the side. These are usually high in calories, so you should limit the amount you eat.  · If you want a salad, choose a garden salad and ask for grilled meats. Avoid extra toppings like wahl, cheese, or fried items. Ask for the dressing on the side, or ask for olive oil and vinegar or lemon to use as dressing.  · Estimate how many servings of a food you are given. For example, a serving of cooked rice is ½ cup or about the size of half a baseball. Knowing serving sizes will help you be aware of how much food you are eating at restaurants. The list below tells you how big or small some common portion sizes are based on everyday objects:  ? 1 oz--4 stacked dice.  ? 3 oz--1 deck of cards.  ? 1 tsp--1 die.  ? 1 Tbsp--½ a ping-pong ball.  ? 2 Tbsp--1 ping-pong ball.  ? ½ cup--½ baseball.  ? 1 cup--1 baseball.  Summary  · Calorie counting means keeping track of how many calories you " eat and drink each day. If you eat fewer calories than your body needs, you should lose weight.  · A healthy amount of weight to lose per week is usually 1-2 lb (0.5-0.9 kg). This usually means reducing your daily calorie intake by 500-750 calories.  · The number of calories in a food can be found on a Nutrition Facts label. If a food does not have a Nutrition Facts label, try to look up the calories online or ask your dietitian for help.  · Use your calories on foods and drinks that will fill you up, and not on foods and drinks that will leave you hungry.  · Use smaller plates, glasses, and bowls to prevent overeating.  This information is not intended to replace advice given to you by your health care provider. Make sure you discuss any questions you have with your health care provider.  Document Released: 12/18/2006 Document Revised: 09/06/2019 Document Reviewed: 11/17/2017  [employer] Patient Education © 2020 [employer]

## 2020-08-17 RX ORDER — TAMSULOSIN HYDROCHLORIDE 0.4 MG/1
CAPSULE ORAL
Qty: 90 CAPSULE | Refills: 3 | Status: SHIPPED | OUTPATIENT
Start: 2020-08-17 | End: 2021-06-29 | Stop reason: SDUPTHER

## 2020-08-17 RX ORDER — ATORVASTATIN CALCIUM 40 MG/1
TABLET, FILM COATED ORAL
Qty: 90 TABLET | Refills: 3 | Status: SHIPPED | OUTPATIENT
Start: 2020-08-17 | End: 2021-06-28

## 2020-08-24 RX ORDER — CLOPIDOGREL BISULFATE 75 MG/1
75 TABLET ORAL DAILY
Qty: 90 TABLET | Refills: 3 | Status: SHIPPED | OUTPATIENT
Start: 2020-08-24 | End: 2021-06-28

## 2020-09-14 ENCOUNTER — OFFICE VISIT (OUTPATIENT)
Dept: FAMILY MEDICINE CLINIC | Facility: CLINIC | Age: 70
End: 2020-09-14

## 2020-09-14 VITALS — HEIGHT: 66 IN | BODY MASS INDEX: 41.78 KG/M2 | WEIGHT: 260 LBS

## 2020-09-14 DIAGNOSIS — Z28.21 23-POLYVALENT PNEUMOCOCCAL POLYSACCHARIDE VACCINE DECLINED: ICD-10-CM

## 2020-09-14 DIAGNOSIS — Z28.21 INFLUENZA VACCINATION DECLINED BY PATIENT: ICD-10-CM

## 2020-09-14 DIAGNOSIS — Z00.00 MEDICARE ANNUAL WELLNESS VISIT, SUBSEQUENT: Primary | ICD-10-CM

## 2020-09-14 PROCEDURE — G0439 PPPS, SUBSEQ VISIT: HCPCS | Performed by: INTERNAL MEDICINE

## 2020-09-14 NOTE — PROGRESS NOTES
The ABCs of the Annual Wellness Visit  Subsequent Medicare Wellness Visit    Chief Complaint   Patient presents with   • Medicare Wellness-subsequent       Subjective   History of Present Illness:  Nick Austin is a 69 y.o. male who presents for a Subsequent Medicare Wellness Visit. You have chosen to receive care through a telephone visit. Do you consent to use a telephone visit for your medical care today? Yes     HEALTH RISK ASSESSMENT    Recent Hospitalizations:  No hospitalization(s) within the last year.    Current Medical Providers:  Patient Care Team:  Patrick Magdaleno MD as PCP - General (Internal Medicine)  Patrick Magdaleno MD as PCP - Internal Medicine (Internal Medicine)  Carlos Wood MD as Consulting Physician (Gastroenterology)    Smoking Status:  Social History     Tobacco Use   Smoking Status Never Smoker   Smokeless Tobacco Never Used       Alcohol Consumption:  Social History     Substance and Sexual Activity   Alcohol Use No       Depression Screen:   PHQ-2/PHQ-9 Depression Screening 9/14/2020   Little interest or pleasure in doing things 0   Feeling down, depressed, or hopeless 0   Trouble falling or staying asleep, or sleeping too much -   Feeling tired or having little energy -   Poor appetite or overeating -   Feeling bad about yourself - or that you are a failure or have let yourself or your family down -   Trouble concentrating on things, such as reading the newspaper or watching television -   Moving or speaking so slowly that other people could have noticed. Or the opposite - being so fidgety or restless that you have been moving around a lot more than usual -   Thoughts that you would be better off dead, or of hurting yourself in some way -   Total Score 0   If you checked off any problems, how difficult have these problems made it for you to do your work, take care of things at home, or get along with other people? -       Fall Risk Screen:  SELENE Fall Risk Assessment was  completed, and patient is at LOW risk for falls.Assessment completed on:9/14/2020    Health Habits and Functional and Cognitive Screening:  Functional & Cognitive Status 9/14/2020   Do you have difficulty preparing food and eating? No   Do you have difficulty bathing yourself, getting dressed or grooming yourself? No   Do you have difficulty using the toilet? No   Do you have difficulty moving around from place to place? No   Do you have trouble with steps or getting out of a bed or a chair? No   Current Diet Limited Junk Food   Dental Exam Up to date   Eye Exam Up to date   Exercise (times per week) 4 times per week   Current Exercise Activities Include Yard Work   Do you need help using the phone?  No   Are you deaf or do you have serious difficulty hearing?  Yes   Do you need help with transportation? No   Do you need help shopping? No   Do you need help preparing meals?  No   Do you need help with housework?  No   Do you need help with laundry? No   Do you need help taking your medications? No   Do you need help managing money? No   Do you ever drive or ride in a car without wearing a seat belt? No   Have you felt unusual stress, anger or loneliness in the last month? No   Who do you live with? Spouse   If you need help, do you have trouble finding someone available to you? No   Have you been bothered in the last four weeks by sexual problems? No   Do you have difficulty concentrating, remembering or making decisions? Yes         Does the patient have evidence of cognitive impairment? No    Asprin use counseling:Taking ASA appropriately as indicated    Age-appropriate Screening Schedule:  Refer to the list below for future screening recommendations based on patient's age, sex and/or medical conditions. Orders for these recommended tests are listed in the plan section. The patient has been provided with a written plan.    Health Maintenance   Topic Date Due   • ZOSTER VACCINE (2 of 2) 06/05/2017   • DIABETIC FOOT  EXAM  04/10/2018   • DIABETIC EYE EXAM  04/01/2020   • URINE MICROALBUMIN  04/24/2020   • HEMOGLOBIN A1C  02/04/2021   • LIPID PANEL  08/04/2021   • TDAP/TD VACCINES (2 - Td) 04/10/2027   • COLONOSCOPY  05/17/2029   • INFLUENZA VACCINE  Discontinued          The following portions of the patient's history were reviewed and updated as appropriate:   He  has a past medical history of Acquired hypothyroidism (10/12/2016), Acute bronchitis, Arthritis, Backache, Chronic pain, Chronic venous insufficiency (10/2/2019), Class 2 severe obesity due to excess calories with serious comorbidity and body mass index (BMI) of 39.0 to 39.9 in adult (CMS/[facility]) (10/2/2019), Coronary arteriosclerosis, Essential hypertension, GERD (gastroesophageal reflux disease), History of transfusion, Hyperglycemia, Hyperlipidemia, Hypertriglyceridemia (5/1/2019), and Hypothyroidism.  He does not have any pertinent problems on file.  He  has a past surgical history that includes Coronary artery bypass graft; Cholecystectomy; Injection of Medication (02/23/2015); Shoulder surgery (Right); Cardiac catheterization (N/A, 9/7/2017); Leg Surgery (Left); Knee Arthroscopy (Left); Other surgical history (Left); Other surgical history (Right); Coronary artery bypass graft (N/A, 9/14/2017); Cardiac surgery; Colonoscopy (N/A, 5/17/2019); Esophagogastroduodenoscopy (N/A, 9/25/2019); and Upper gastrointestinal endoscopy (09/25/2019).  His family history includes Heart disease in an other family member; Hypertension in an other family member; No Known Problems in his brother, daughter, father, maternal aunt, maternal grandfather, maternal grandmother, maternal uncle, mother, paternal aunt, paternal grandfather, paternal grandmother, paternal uncle, sister, and son.  He  reports that he has never smoked. He has never used smokeless tobacco. He reports that he does not drink alcohol or use drugs.  Current Outpatient Medications   Medication Sig Dispense Refill   •  albuterol sulfate HFA (Ventolin HFA) 108 (90 Base) MCG/ACT inhaler Inhale 2 puffs Every 4 (Four) Hours As Needed for Wheezing. 3 inhaler 3   • aspirin 81 MG EC tablet Take 1 tablet by mouth Daily.     • atorvastatin (LIPITOR) 40 MG tablet TAKE 1 TABLET DAILY 90 tablet 3   • clopidogrel (PLAVIX) 75 MG tablet Take 1 tablet by mouth Daily. 90 tablet 3   • docusate sodium (COLACE) 50 MG capsule Take  by mouth 2 (Two) Times a Day.     • fenofibrate (TRICOR) 48 MG tablet TAKE 1 TABLET DAILY 90 tablet 3   • Fluticasone Furoate-Vilanterol (BREO ELLIPTA) 100-25 MCG/INH aerosol powder  Inhale 1 puff Daily. (Patient taking differently: Inhale 1 puff Daily As Needed.) 84 puff 2   • olmesartan-hydrochlorothiazide (BENICAR HCT) 40-12.5 MG per tablet Take 1 tablet by mouth Daily. 90 tablet 3   • omeprazole (priLOSEC) 40 MG capsule Take 1 capsule by mouth Daily. 90 capsule 3   • SYNTHROID 88 MCG tablet Take 1 tablet by mouth Daily. 90 tablet 1   • tamsulosin (FLOMAX) 0.4 MG capsule 24 hr capsule TAKE 1 CAPSULE DAILY 90 capsule 3     No current facility-administered medications for this visit.      Current Outpatient Medications on File Prior to Visit   Medication Sig   • albuterol sulfate HFA (Ventolin HFA) 108 (90 Base) MCG/ACT inhaler Inhale 2 puffs Every 4 (Four) Hours As Needed for Wheezing.   • aspirin 81 MG EC tablet Take 1 tablet by mouth Daily.   • atorvastatin (LIPITOR) 40 MG tablet TAKE 1 TABLET DAILY   • clopidogrel (PLAVIX) 75 MG tablet Take 1 tablet by mouth Daily.   • docusate sodium (COLACE) 50 MG capsule Take  by mouth 2 (Two) Times a Day.   • fenofibrate (TRICOR) 48 MG tablet TAKE 1 TABLET DAILY   • Fluticasone Furoate-Vilanterol (BREO ELLIPTA) 100-25 MCG/INH aerosol powder  Inhale 1 puff Daily. (Patient taking differently: Inhale 1 puff Daily As Needed.)   • olmesartan-hydrochlorothiazide (BENICAR HCT) 40-12.5 MG per tablet Take 1 tablet by mouth Daily.   • omeprazole (priLOSEC) 40 MG capsule Take 1 capsule by  mouth Daily.   • SYNTHROID 88 MCG tablet Take 1 tablet by mouth Daily.   • tamsulosin (FLOMAX) 0.4 MG capsule 24 hr capsule TAKE 1 CAPSULE DAILY     No current facility-administered medications on file prior to visit.      He is allergic to lopressor [metoprolol tartrate] and tetanus toxoids..    Outpatient Medications Prior to Visit   Medication Sig Dispense Refill   • albuterol sulfate HFA (Ventolin HFA) 108 (90 Base) MCG/ACT inhaler Inhale 2 puffs Every 4 (Four) Hours As Needed for Wheezing. 3 inhaler 3   • aspirin 81 MG EC tablet Take 1 tablet by mouth Daily.     • atorvastatin (LIPITOR) 40 MG tablet TAKE 1 TABLET DAILY 90 tablet 3   • clopidogrel (PLAVIX) 75 MG tablet Take 1 tablet by mouth Daily. 90 tablet 3   • docusate sodium (COLACE) 50 MG capsule Take  by mouth 2 (Two) Times a Day.     • fenofibrate (TRICOR) 48 MG tablet TAKE 1 TABLET DAILY 90 tablet 3   • Fluticasone Furoate-Vilanterol (BREO ELLIPTA) 100-25 MCG/INH aerosol powder  Inhale 1 puff Daily. (Patient taking differently: Inhale 1 puff Daily As Needed.) 84 puff 2   • olmesartan-hydrochlorothiazide (BENICAR HCT) 40-12.5 MG per tablet Take 1 tablet by mouth Daily. 90 tablet 3   • omeprazole (priLOSEC) 40 MG capsule Take 1 capsule by mouth Daily. 90 capsule 3   • SYNTHROID 88 MCG tablet Take 1 tablet by mouth Daily. 90 tablet 1   • tamsulosin (FLOMAX) 0.4 MG capsule 24 hr capsule TAKE 1 CAPSULE DAILY 90 capsule 3     No facility-administered medications prior to visit.        Patient Active Problem List   Diagnosis   • Essential hypertension   • Hyperlipidemia   • Acquired hypothyroidism   • Type 2 diabetes mellitus without complication, without long-term current use of insulin (CMS/[facility])   • Benign non-nodular prostatic hyperplasia   • Generalized OA   • Uncomplicated asthma   • Dyspnea on exertion   • Angina effort   • S/P CABG (coronary artery bypass graft)   • Angina pectoris (CMS/[facility])   • Coronary artery disease of native artery of native heart  "with stable angina pectoris (CMS/[facility])   • Coronary artery disease involving native coronary artery of native heart with angina pectoris (CMS/[facility]) - followed by Dr. Jay   • CAD (coronary artery disease)   • Follow-up surgery care   • Hypertriglyceridemia   • Screen for colon cancer   • Nephrolithiasis   • Dysphagia   • Class 3 severe obesity due to excess calories with serious comorbidity and body mass index (BMI) of 40.0 to 44.9 in adult (CMS/[facility])   • Chronic venous insufficiency   • GERD (gastroesophageal reflux disease)       Advanced Care Planning:  ACP discussion was held with the patient during this visit. Patient does not have an advance directive, information provided.    Review of Systems    Compared to one year ago, the patient feels his physical health is the same.  Compared to one year ago, the patient feels his mental health is the same.    Reviewed chart for potential of high risk medication in the elderly: yes  Reviewed chart for potential of harmful drug interactions in the elderly:yes    Objective    Blood pressure was not obtained during this telephone visit due to coronavirus pandemic     Vitals:    09/14/20 0815   Weight: 118 kg (260 lb)   Height: 167.6 cm (66\")   PainSc: 0-No pain       Body mass index is 41.97 kg/m².  Discussed the patient's BMI with him. The BMI is above average; BMI management plan is completed.    Physical Exam    Lab Results   Component Value Date    TRIG 176 (H) 08/04/2020    HDL 29 (L) 08/04/2020    LDL 77 08/04/2020    VLDL 35.2 08/04/2020    HGBA1C 6.17 (H) 08/04/2020        Assessment/Plan   Medicare Risks and Personalized Health Plan  CMS Preventative Services Quick Reference  Advance Directive Discussion  Immunizations Discussed/Encouraged (specific immunizations; Influenza, Pneumococcal 23 and Shingrix )  Obesity/Overweight     The above risks/problems have been discussed with the patient.  Pertinent information has been shared with the patient in the After " Visit Summary.  Follow up plans and orders are seen below in the Assessment/Plan Section.    Diagnoses and all orders for this visit:    1. Medicare annual wellness visit, subsequent (Primary)    2. 23-polyvalent pneumococcal polysaccharide vaccine declined    3. Influenza vaccination declined by patient      Follow Up:  Return in about 1 year (around 9/14/2021) for Medicare Wellness.     An After Visit Summary and PPPS were given to the patient.

## 2020-09-14 NOTE — PATIENT INSTRUCTIONS
Medicare Wellness  Personal Prevention Plan of Service     Date of Office Visit:  2020  Encounter Provider:  Patrick Magdaleno MD  Place of Service:  [facility]  Patient Name: Nick Austin  :  1950    As part of the Medicare Wellness portion of your visit today, we are providing you with this personalized preventive plan of services (PPPS). This plan is based upon recommendations of the [place] Preventive Services Task Force (USPSTF) and the Advisory Committee on Immunization Practices (ACIP).    This lists the preventive care services that should be considered, and provides dates of when you are due. Items listed as completed are up-to-date and do not require any further intervention.    Health Maintenance   Topic Date Due   • Pneumococcal Vaccine Once at 65 Years Old  2013   • ZOSTER VACCINE (2 of 2) 2017   • DIABETIC FOOT EXAM  04/10/2018   • MEDICARE ANNUAL WELLNESS  2019   • DIABETIC EYE EXAM  2020   • URINE MICROALBUMIN  2020   • HEMOGLOBIN A1C  2021   • LIPID PANEL  2021   • TDAP/TD VACCINES (2 - Td) 04/10/2027   • COLONOSCOPY  2029   • HEPATITIS C SCREENING  Addressed   • INFLUENZA VACCINE  Discontinued       No orders of the defined types were placed in this encounter.      Return in about 1 year (around 2021) for Medicare Wellness.          Exercising to Lose Weight  Exercise is structured, repetitive physical activity to improve fitness and health. Getting regular exercise is important for everyone. It is especially important if you are overweight. Being overweight increases your risk of heart disease, stroke, diabetes, high blood pressure, and several types of cancer. Reducing your calorie intake and exercising can help you lose weight.  Exercise is usually categorized as moderate or vigorous intensity. To lose weight, most people need to do a certain amount of moderate-intensity or  vigorous-intensity exercise each week.  Moderate-intensity exercise    Moderate-intensity exercise is any activity that gets you moving enough to burn at least three times more energy (calories) than if you were sitting.  Examples of moderate exercise include:  · Walking a mile in 15 minutes.  · Doing light yard work.  · Biking at an easy pace.  Most people should get at least 150 minutes (2 hours and 30 minutes) a week of moderate-intensity exercise to maintain their body weight.  Vigorous-intensity exercise  Vigorous-intensity exercise is any activity that gets you moving enough to burn at least six times more calories than if you were sitting. When you exercise at this intensity, you should be working hard enough that you are not able to carry on a conversation.  Examples of vigorous exercise include:  · Running.  · Playing a team sport, such as football, basketball, and soccer.  · Jumping rope.  Most people should get at least 75 minutes (1 hour and 15 minutes) a week of vigorous-intensity exercise to maintain their body weight.  How can exercise affect me?  When you exercise enough to burn more calories than you eat, you lose weight. Exercise also reduces body fat and builds muscle. The more muscle you have, the more calories you burn. Exercise also:  · Improves mood.  · Reduces stress and tension.  · Improves your overall fitness, flexibility, and endurance.  · Increases bone strength.  The amount of exercise you need to lose weight depends on:  · Your age.  · The type of exercise.  · Any health conditions you have.  · Your overall physical ability.  Talk to your health care provider about how much exercise you need and what types of activities are safe for you.  What actions can I take to lose weight?  Nutrition    · Make changes to your diet as told by your health care provider or diet and nutrition specialist (dietitian). This may include:  ? Eating fewer calories.  ? Eating more protein.  ? Eating less  unhealthy fats.  ? Eating a diet that includes fresh fruits and vegetables, whole grains, low-fat dairy products, and lean protein.  ? Avoiding foods with added fat, salt, and sugar.  · Drink plenty of water while you exercise to prevent dehydration or heat stroke.  Activity  · Choose an activity that you enjoy and set realistic goals. Your health care provider can help you make an exercise plan that works for you.  · Exercise at a moderate or vigorous intensity most days of the week.  ? The intensity of exercise may vary from person to person. You can tell how intense a workout is for you by paying attention to your breathing and heartbeat. Most people will notice their breathing and heartbeat get faster with more intense exercise.  · Do resistance training twice each week, such as:  ? Push-ups.  ? Sit-ups.  ? Lifting weights.  ? Using resistance bands.  · Getting short amounts of exercise can be just as helpful as long structured periods of exercise. If you have trouble finding time to exercise, try to include exercise in your daily routine.  ? Get up, stretch, and walk around every 30 minutes throughout the day.  ? Go for a walk during your lunch break.  ? Park your car farther away from your destination.  ? If you take public transportation, get off one stop early and walk the rest of the way.  ? Make phone calls while standing up and walking around.  ? Take the stairs instead of elevators or escalators.  · Wear comfortable clothes and shoes with good support.  · Do not exercise so much that you hurt yourself, feel dizzy, or get very short of breath.  Where to find more information  · [place] Department of Health and Human Services: www.hhs.gov  · Centers for Disease Control and Prevention (CDC): www.cdc.gov  Contact a health care provider:  · Before starting a new exercise program.  · If you have questions or concerns about your weight.  · If you have a medical problem that keeps you from exercising.  Get help  right away if you have any of the following while exercising:  · Injury.  · Dizziness.  · Difficulty breathing or shortness of breath that does not go away when you stop exercising.  · Chest pain.  · Rapid heartbeat.  Summary  · Being overweight increases your risk of heart disease, stroke, diabetes, high blood pressure, and several types of cancer.  · Losing weight happens when you burn more calories than you eat.  · Reducing the amount of calories you eat in addition to getting regular moderate or vigorous exercise each week helps you lose weight.  This information is not intended to replace advice given to you by your health care provider. Make sure you discuss any questions you have with your health care provider.  Document Released: 01/20/2012 Document Revised: 12/31/2018 Document Reviewed: 12/31/2018  [employer] Patient Education © 2020 [employer]      Calorie Counting for Weight Loss  Calories are units of energy. Your body needs a certain amount of calories from food to keep you going throughout the day. When you eat more calories than your body needs, your body stores the extra calories as fat. When you eat fewer calories than your body needs, your body burns fat to get the energy it needs.  Calorie counting means keeping track of how many calories you eat and drink each day. Calorie counting can be helpful if you need to lose weight. If you make sure to eat fewer calories than your body needs, you should lose weight. Ask your health care provider what a healthy weight is for you.  For calorie counting to work, you will need to eat the right number of calories in a day in order to lose a healthy amount of weight per week. A dietitian can help you determine how many calories you need in a day and will give you suggestions on how to reach your calorie goal.  · A healthy amount of weight to lose per week is usually 1-2 lb (0.5-0.9 kg). This usually means that your daily calorie intake should be reduced by  500-750 calories.  · Eating 1,200 - 1,500 calories per day can help most women lose weight.  · Eating 1,500 - 1,800 calories per day can help most men lose weight.  What is my plan?  My goal is to have __________ calories per day.  If I have this many calories per day, I should lose around __________ pounds per week.  What do I need to know about calorie counting?  In order to meet your daily calorie goal, you will need to:  · Find out how many calories are in each food you would like to eat. Try to do this before you eat.  · Decide how much of the food you plan to eat.  · Write down what you ate and how many calories it had. Doing this is called keeping a food log.  To successfully lose weight, it is important to balance calorie counting with a healthy lifestyle that includes regular activity. Aim for 150 minutes of moderate exercise (such as walking) or 75 minutes of vigorous exercise (such as running) each week.  Where do I find calorie information?    The number of calories in a food can be found on a Nutrition Facts label. If a food does not have a Nutrition Facts label, try to look up the calories online or ask your dietitian for help.  Remember that calories are listed per serving. If you choose to have more than one serving of a food, you will have to multiply the calories per serving by the amount of servings you plan to eat. For example, the label on a package of bread might say that a serving size is 1 slice and that there are 90 calories in a serving. If you eat 1 slice, you will have eaten 90 calories. If you eat 2 slices, you will have eaten 180 calories.  How do I keep a food log?  Immediately after each meal, record the following information in your food log:  · What you ate. Don't forget to include toppings, sauces, and other extras on the food.  · How much you ate. This can be measured in cups, ounces, or number of items.  · How many calories each food and drink had.  · The total number of calories  "in the meal.  Keep your food log near you, such as in a small notebook in your pocket, or use a mobile juarez or website. Some programs will calculate calories for you and show you how many calories you have left for the day to meet your goal.  What are some calorie counting tips?    · Use your calories on foods and drinks that will fill you up and not leave you hungry:  ? Some examples of foods that fill you up are nuts and nut butters, vegetables, lean proteins, and high-fiber foods like whole grains. High-fiber foods are foods with more than 5 g fiber per serving.  ? Drinks such as sodas, specialty coffee drinks, alcohol, and juices have a lot of calories, yet do not fill you up.  · Eat nutritious foods and avoid empty calories. Empty calories are calories you get from foods or beverages that do not have many vitamins or protein, such as candy, sweets, and soda. It is better to have a nutritious high-calorie food (such as an avocado) than a food with few nutrients (such as a bag of chips).  · Know how many calories are in the foods you eat most often. This will help you calculate calorie counts faster.  · Pay attention to calories in drinks. Low-calorie drinks include water and unsweetened drinks.  · Pay attention to nutrition labels for \"low fat\" or \"fat free\" foods. These foods sometimes have the same amount of calories or more calories than the full fat versions. They also often have added sugar, starch, or salt, to make up for flavor that was removed with the fat.  · Find a way of tracking calories that works for you. Get creative. Try different apps or programs if writing down calories does not work for you.  What are some portion control tips?  · Know how many calories are in a serving. This will help you know how many servings of a certain food you can have.  · Use a measuring cup to measure serving sizes. You could also try weighing out portions on a kitchen scale. With time, you will be able to estimate " serving sizes for some foods.  · Take some time to put servings of different foods on your favorite plates, bowls, and cups so you know what a serving looks like.  · Try not to eat straight from a bag or box. Doing this can lead to overeating. Put the amount you would like to eat in a cup or on a plate to make sure you are eating the right portion.  · Use smaller plates, glasses, and bowls to prevent overeating.  · Try not to multitask (for example, watch TV or use your computer) while eating. If it is time to eat, sit down at a table and enjoy your food. This will help you to know when you are full. It will also help you to be aware of what you are eating and how much you are eating.  What are tips for following this plan?  Reading food labels  · Check the calorie count compared to the serving size. The serving size may be smaller than what you are used to eating.  · Check the source of the calories. Make sure the food you are eating is high in vitamins and protein and low in saturated and trans fats.  Shopping  · Read nutrition labels while you shop. This will help you make healthy decisions before you decide to purchase your food.  · Make a grocery list and stick to it.  Cooking  · Try to cook your favorite foods in a healthier way. For example, try baking instead of frying.  · Use low-fat dairy products.  Meal planning  · Use more fruits and vegetables. Half of your plate should be fruits and vegetables.  · Include lean proteins like poultry and fish.  How do I count calories when eating out?  · Ask for smaller portion sizes.  · Consider sharing an entree and sides instead of getting your own entree.  · If you get your own entree, eat only half. Ask for a box at the beginning of your meal and put the rest of your entree in it so you are not tempted to eat it.  · If calories are listed on the menu, choose the lower calorie options.  · Choose dishes that include vegetables, fruits, whole grains, low-fat dairy  "products, and lean protein.  · Choose items that are boiled, broiled, grilled, or steamed. Stay away from items that are buttered, battered, fried, or served with cream sauce. Items labeled \"crispy\" are usually fried, unless stated otherwise.  · Choose water, low-fat milk, unsweetened iced tea, or other drinks without added sugar. If you want an alcoholic beverage, choose a lower calorie option such as a glass of wine or light beer.  · Ask for dressings, sauces, and syrups on the side. These are usually high in calories, so you should limit the amount you eat.  · If you want a salad, choose a garden salad and ask for grilled meats. Avoid extra toppings like wahl, cheese, or fried items. Ask for the dressing on the side, or ask for olive oil and vinegar or lemon to use as dressing.  · Estimate how many servings of a food you are given. For example, a serving of cooked rice is ½ cup or about the size of half a baseball. Knowing serving sizes will help you be aware of how much food you are eating at restaurants. The list below tells you how big or small some common portion sizes are based on everyday objects:  ? 1 oz--4 stacked dice.  ? 3 oz--1 deck of cards.  ? 1 tsp--1 die.  ? 1 Tbsp--½ a ping-pong ball.  ? 2 Tbsp--1 ping-pong ball.  ? ½ cup--½ baseball.  ? 1 cup--1 baseball.  Summary  · Calorie counting means keeping track of how many calories you eat and drink each day. If you eat fewer calories than your body needs, you should lose weight.  · A healthy amount of weight to lose per week is usually 1-2 lb (0.5-0.9 kg). This usually means reducing your daily calorie intake by 500-750 calories.  · The number of calories in a food can be found on a Nutrition Facts label. If a food does not have a Nutrition Facts label, try to look up the calories online or ask your dietitian for help.  · Use your calories on foods and drinks that will fill you up, and not on foods and drinks that will leave you hungry.  · Use smaller " plates, glasses, and bowls to prevent overeating.  This information is not intended to replace advice given to you by your health care provider. Make sure you discuss any questions you have with your health care provider.  Document Released: 12/18/2006 Document Revised: 09/06/2019 Document Reviewed: 11/17/2017  [employer] Patient Education © 2020 [employer]

## 2020-10-28 RX ORDER — LEVOTHYROXINE SODIUM 88 MCG
TABLET ORAL
Qty: 90 TABLET | Refills: 1 | Status: SHIPPED | OUTPATIENT
Start: 2020-10-28 | End: 2021-04-06

## 2020-12-08 ENCOUNTER — OFFICE VISIT (OUTPATIENT)
Dept: GASTROENTEROLOGY | Facility: CLINIC | Age: 70
End: 2020-12-08

## 2020-12-08 VITALS
HEART RATE: 73 BPM | HEIGHT: 66 IN | WEIGHT: 254.4 LBS | DIASTOLIC BLOOD PRESSURE: 86 MMHG | SYSTOLIC BLOOD PRESSURE: 158 MMHG | BODY MASS INDEX: 40.88 KG/M2 | OXYGEN SATURATION: 98 % | TEMPERATURE: 97.4 F

## 2020-12-08 DIAGNOSIS — K59.09 OTHER CONSTIPATION: Primary | ICD-10-CM

## 2020-12-08 PROCEDURE — 99213 OFFICE O/P EST LOW 20 MIN: CPT | Performed by: INTERNAL MEDICINE

## 2020-12-08 RX ORDER — POLYETHYLENE GLYCOL 3350 17 G/17G
17 POWDER, FOR SOLUTION ORAL 2 TIMES DAILY
Qty: 60 PACKET | Refills: 5 | Status: SHIPPED | OUTPATIENT
Start: 2020-12-08 | End: 2021-01-08 | Stop reason: SDDI

## 2021-01-08 ENCOUNTER — OFFICE VISIT (OUTPATIENT)
Dept: GASTROENTEROLOGY | Facility: CLINIC | Age: 71
End: 2021-01-08

## 2021-01-08 VITALS
WEIGHT: 250.2 LBS | DIASTOLIC BLOOD PRESSURE: 76 MMHG | HEIGHT: 66 IN | HEART RATE: 78 BPM | BODY MASS INDEX: 40.21 KG/M2 | SYSTOLIC BLOOD PRESSURE: 122 MMHG

## 2021-01-08 DIAGNOSIS — K59.09 OTHER CONSTIPATION: Primary | ICD-10-CM

## 2021-01-08 DIAGNOSIS — R13.19 ESOPHAGEAL DYSPHAGIA: ICD-10-CM

## 2021-01-08 PROCEDURE — 99212 OFFICE O/P EST SF 10 MIN: CPT | Performed by: INTERNAL MEDICINE

## 2021-01-08 RX ORDER — AMOXICILLIN 250 MG
1 CAPSULE ORAL AS NEEDED
COMMUNITY
End: 2022-05-17

## 2021-01-13 RX ORDER — FENOFIBRATE 48 MG/1
TABLET, COATED ORAL
Qty: 90 TABLET | Refills: 0 | Status: SHIPPED | OUTPATIENT
Start: 2021-01-13 | End: 2021-04-06

## 2021-01-19 RX ORDER — OMEPRAZOLE 40 MG/1
CAPSULE, DELAYED RELEASE ORAL
Qty: 90 CAPSULE | Refills: 3 | Status: SHIPPED | OUTPATIENT
Start: 2021-01-19 | End: 2021-11-29

## 2021-02-09 ENCOUNTER — LAB (OUTPATIENT)
Dept: LAB | Facility: OTHER | Age: 71
End: 2021-02-09

## 2021-02-09 DIAGNOSIS — E78.2 MIXED HYPERLIPIDEMIA: Chronic | ICD-10-CM

## 2021-02-09 DIAGNOSIS — E78.1 HYPERTRIGLYCERIDEMIA: Chronic | ICD-10-CM

## 2021-02-09 DIAGNOSIS — E11.9 TYPE 2 DIABETES MELLITUS WITHOUT COMPLICATION, WITHOUT LONG-TERM CURRENT USE OF INSULIN (HCC): Chronic | ICD-10-CM

## 2021-02-09 DIAGNOSIS — E03.9 ACQUIRED HYPOTHYROIDISM: Chronic | ICD-10-CM

## 2021-02-09 LAB
ALBUMIN SERPL-MCNC: 4.5 G/DL (ref 3.5–5)
ALBUMIN UR-MCNC: <1.2 MG/DL
ALBUMIN/GLOB SERPL: 1.4 G/DL (ref 1.1–1.8)
ALP SERPL-CCNC: 61 U/L (ref 38–126)
ALT SERPL W P-5'-P-CCNC: 44 U/L
ANION GAP SERPL CALCULATED.3IONS-SCNC: 10 MMOL/L (ref 5–15)
ARTICHOKE IGE QN: 86 MG/DL (ref 0–100)
AST SERPL-CCNC: 36 U/L (ref 17–59)
BASOPHILS # BLD AUTO: 0.06 10*3/MM3 (ref 0–0.2)
BASOPHILS NFR BLD AUTO: 0.9 % (ref 0–1.5)
BILIRUB SERPL-MCNC: 0.7 MG/DL (ref 0.2–1.3)
BUN SERPL-MCNC: 17 MG/DL (ref 7–23)
BUN/CREAT SERPL: 15.3 (ref 7–25)
CALCIUM SPEC-SCNC: 9.2 MG/DL (ref 8.4–10.2)
CHLORIDE SERPL-SCNC: 102 MMOL/L (ref 101–112)
CO2 SERPL-SCNC: 28 MMOL/L (ref 22–30)
CREAT SERPL-MCNC: 1.11 MG/DL (ref 0.7–1.3)
CREAT UR-MCNC: 170.5 MG/DL
DEPRECATED RDW RBC AUTO: 48.5 FL (ref 37–54)
EOSINOPHIL # BLD AUTO: 0.26 10*3/MM3 (ref 0–0.4)
EOSINOPHIL NFR BLD AUTO: 3.8 % (ref 0.3–6.2)
ERYTHROCYTE [DISTWIDTH] IN BLOOD BY AUTOMATED COUNT: 14.7 % (ref 12.3–15.4)
GFR SERPL CREATININE-BSD FRML MDRD: 65 ML/MIN/1.73 (ref 42–98)
GLOBULIN UR ELPH-MCNC: 3.2 GM/DL (ref 2.3–3.5)
GLUCOSE SERPL-MCNC: 107 MG/DL (ref 70–99)
HBA1C MFR BLD: 6.14 % (ref 4.8–5.6)
HCT VFR BLD AUTO: 45.8 % (ref 37.5–51)
HGB BLD-MCNC: 15 G/DL (ref 13–17.7)
LYMPHOCYTES # BLD AUTO: 2.49 10*3/MM3 (ref 0.7–3.1)
LYMPHOCYTES NFR BLD AUTO: 36 % (ref 19.6–45.3)
MCH RBC QN AUTO: 30.6 PG (ref 26.6–33)
MCHC RBC AUTO-ENTMCNC: 32.8 G/DL (ref 31.5–35.7)
MCV RBC AUTO: 93.5 FL (ref 79–97)
MICROALBUMIN/CREAT UR: NORMAL MG/G{CREAT}
MONOCYTES # BLD AUTO: 0.45 10*3/MM3 (ref 0.1–0.9)
MONOCYTES NFR BLD AUTO: 6.5 % (ref 5–12)
NEUTROPHILS NFR BLD AUTO: 3.65 10*3/MM3 (ref 1.7–7)
NEUTROPHILS NFR BLD AUTO: 52.8 % (ref 42.7–76)
PLATELET # BLD AUTO: 259 10*3/MM3 (ref 140–450)
PMV BLD AUTO: 10.8 FL (ref 6–12)
POTASSIUM SERPL-SCNC: 3.7 MMOL/L (ref 3.4–5)
PROT SERPL-MCNC: 7.7 G/DL (ref 6.3–8.6)
RBC # BLD AUTO: 4.9 10*6/MM3 (ref 4.14–5.8)
SODIUM SERPL-SCNC: 140 MMOL/L (ref 137–145)
T4 FREE SERPL-MCNC: 1.63 NG/DL (ref 0.93–1.7)
TRIGL SERPL-MCNC: 165 MG/DL
TSH SERPL DL<=0.05 MIU/L-ACNC: 2.78 UIU/ML (ref 0.27–4.2)
WBC # BLD AUTO: 6.91 10*3/MM3 (ref 3.4–10.8)

## 2021-02-09 PROCEDURE — 82043 UR ALBUMIN QUANTITATIVE: CPT | Performed by: INTERNAL MEDICINE

## 2021-02-09 PROCEDURE — 83036 HEMOGLOBIN GLYCOSYLATED A1C: CPT | Performed by: INTERNAL MEDICINE

## 2021-02-09 PROCEDURE — 84443 ASSAY THYROID STIM HORMONE: CPT | Performed by: INTERNAL MEDICINE

## 2021-02-09 PROCEDURE — 80053 COMPREHEN METABOLIC PANEL: CPT | Performed by: INTERNAL MEDICINE

## 2021-02-09 PROCEDURE — 84478 ASSAY OF TRIGLYCERIDES: CPT | Performed by: INTERNAL MEDICINE

## 2021-02-09 PROCEDURE — 36415 COLL VENOUS BLD VENIPUNCTURE: CPT | Performed by: INTERNAL MEDICINE

## 2021-02-09 PROCEDURE — 84439 ASSAY OF FREE THYROXINE: CPT | Performed by: INTERNAL MEDICINE

## 2021-02-09 PROCEDURE — 82570 ASSAY OF URINE CREATININE: CPT | Performed by: INTERNAL MEDICINE

## 2021-02-09 PROCEDURE — 85025 COMPLETE CBC W/AUTO DIFF WBC: CPT | Performed by: INTERNAL MEDICINE

## 2021-02-09 PROCEDURE — 83721 ASSAY OF BLOOD LIPOPROTEIN: CPT | Performed by: INTERNAL MEDICINE

## 2021-02-17 ENCOUNTER — OFFICE VISIT (OUTPATIENT)
Dept: FAMILY MEDICINE CLINIC | Facility: CLINIC | Age: 71
End: 2021-02-17

## 2021-02-17 VITALS
HEART RATE: 67 BPM | SYSTOLIC BLOOD PRESSURE: 147 MMHG | BODY MASS INDEX: 40.18 KG/M2 | HEIGHT: 66 IN | WEIGHT: 250 LBS | DIASTOLIC BLOOD PRESSURE: 92 MMHG

## 2021-02-17 DIAGNOSIS — R13.19 ESOPHAGEAL DYSPHAGIA: Chronic | ICD-10-CM

## 2021-02-17 DIAGNOSIS — E66.01 CLASS 3 SEVERE OBESITY DUE TO EXCESS CALORIES WITH SERIOUS COMORBIDITY AND BODY MASS INDEX (BMI) OF 40.0 TO 44.9 IN ADULT (HCC): Chronic | ICD-10-CM

## 2021-02-17 DIAGNOSIS — N40.0 BENIGN NON-NODULAR PROSTATIC HYPERPLASIA: Chronic | ICD-10-CM

## 2021-02-17 DIAGNOSIS — E03.9 ACQUIRED HYPOTHYROIDISM: Chronic | ICD-10-CM

## 2021-02-17 DIAGNOSIS — E78.5 HYPERLIPIDEMIA ASSOCIATED WITH TYPE 2 DIABETES MELLITUS (HCC): Chronic | ICD-10-CM

## 2021-02-17 DIAGNOSIS — K21.9 GASTROESOPHAGEAL REFLUX DISEASE, UNSPECIFIED WHETHER ESOPHAGITIS PRESENT: Chronic | ICD-10-CM

## 2021-02-17 DIAGNOSIS — E78.1 HYPERTRIGLYCERIDEMIA: Chronic | ICD-10-CM

## 2021-02-17 DIAGNOSIS — E11.69 HYPERLIPIDEMIA ASSOCIATED WITH TYPE 2 DIABETES MELLITUS (HCC): Chronic | ICD-10-CM

## 2021-02-17 DIAGNOSIS — Z12.5 SPECIAL SCREENING FOR MALIGNANT NEOPLASM OF PROSTATE: ICD-10-CM

## 2021-02-17 DIAGNOSIS — E11.9 TYPE 2 DIABETES MELLITUS WITHOUT COMPLICATION, WITHOUT LONG-TERM CURRENT USE OF INSULIN (HCC): Primary | Chronic | ICD-10-CM

## 2021-02-17 DIAGNOSIS — I10 ESSENTIAL HYPERTENSION: Chronic | ICD-10-CM

## 2021-02-17 DIAGNOSIS — F34.1 DYSTHYMIA: Chronic | ICD-10-CM

## 2021-02-17 DIAGNOSIS — I25.119 CORONARY ARTERY DISEASE INVOLVING NATIVE CORONARY ARTERY OF NATIVE HEART WITH ANGINA PECTORIS (HCC): Chronic | ICD-10-CM

## 2021-02-17 PROBLEM — R13.10 DYSPHAGIA: Chronic | Status: ACTIVE | Noted: 2019-09-11

## 2021-02-17 PROCEDURE — G2025 DIS SITE TELE SVCS RHC/FQHC: HCPCS | Performed by: INTERNAL MEDICINE

## 2021-02-17 RX ORDER — ESCITALOPRAM OXALATE 5 MG/1
5 TABLET ORAL DAILY
Qty: 90 TABLET | Refills: 3 | Status: SHIPPED | OUTPATIENT
Start: 2021-02-17 | End: 2021-11-29

## 2021-02-17 NOTE — PROGRESS NOTES
You have chosen to receive care through a telephone visit. Do you consent to use a telephone visit for your medical care today? Yes    Total visit time: 31 minutes.     Chief Complaint  Follow-up (6 month)    Subjective          History of Present Illness     Nick Austin  (Naveed)  receives care today via telephone visit through [facility] for 6-month follow up on chronic medical issues including essential hypertension, hyperlipidemia, CAD, hypothyroidism, and impaired fasting glucose, GERD, and Type 2 diabetes mellitus.  Dr. Jay is his cardiologist who continues to follow patient annually.  Denies chest pain.      He reports increase in emotional episodes and feeling more sentimental since CABG 2017.  He has noticed being tearful when watching sad television shows.  He denies feeling especially depressed, but just more episodes of sadness and feeling down with frequent crying.  I offered starting mild SSRI therapy and he is in agreement.        He reports urinary urgency and nocturia x 1 despite taking Flomax.  I offered urology referral, although, he declines for now, stating he is managing okay.        He had colonoscopy 05/2019 by Dr. Ruiz revealing diverticulosis in sigmoid and descending colon.  Dr. Wood performed upper endoscopy 1/2021 due to dysphagia, resulting in dilation of a Schatzki's ring, with resolution of symptoms.  She instructed him to continue on omeprazole chronically.  Constipation/bloating issues are controlled with stool softener, MiraLAX, and dietary fiber/Citrucel.    With visit last summer, impaired fasting glucose had progressed to Type 2 diabetes.  Patient wanted to delay starting metformin to try dietary changes.  He has been successful in preventing further diabetes progression with  A1c remaining stable at 6.1. He has had some gradual weight loss, although, weight loss efforts have been more difficult during the winter months.    "    He continues to decline immunizations, but may consider COVID vaccine.          Blood pressure is above goal today at 147/92.  He states this is typical, although, it does fluctuate somewhat.      The patient's relevant past medical, surgical, and social history was reviewed in Epic.   Lab results are reviewed with the patient today.  CBC unremarkable  A1c 6.14.  Fasting glucose 107.  Normal renal and liver function.   LDL cholesterol higher at 86, more than likely due to inactivity during the winter months and holiday eating. Triglycerides 165.          Objective   Vital Signs:   /92   Pulse 67   Ht 167.6 cm (66\")   Wt 113 kg (250 lb)   BMI 40.35 kg/m²       Physical Exam   Result Review :     CMP    CMP 8/4/20 2/9/21   Glucose 119 (A) 107 (A)   BUN 18 17   Creatinine 1.14 1.11   eGFR Non African Am 63 65   Sodium 139 140   Potassium 3.8 3.7   Chloride 102 102   Calcium 9.5 9.2   Albumin 4.40 4.50   Total Bilirubin 0.4 0.7   Alkaline Phosphatase 63 61   AST (SGOT) 35 36   ALT (SGPT) 43 44   (A) Abnormal value            CBC w/diff    CBC w/Diff 8/4/20 2/9/21   WBC 6.41 6.91   RBC 4.79 4.90   Hemoglobin 15.1 15.0   Hematocrit 45.3 45.8   MCV 94.6 93.5   MCH 31.5 30.6   MCHC 33.3 32.8   RDW 14.6 14.7   Platelets 255 259   Neutrophil Rel % 58.2 52.8   Lymphocyte Rel % 28.7 36.0   Monocyte Rel % 7.3 6.5   Eosinophil Rel % 5.3 3.8   Basophil Rel % 0.5 0.9           Lipid Panel    Lipid Panel 8/4/20 2/9/21 2/9/21     0717 0717   Total Cholesterol 141 (A)     Triglycerides 176 (A) 165 (A)    HDL Cholesterol 29 (A)     VLDL Cholesterol 35.2     LDL Cholesterol  77  86   LDL/HDL Ratio 2.65     (A) Abnormal value            TSH    TSH 8/4/20 2/9/21   TSH 2.720 2.780           A1C Last 3 Results    HGBA1C Last 3 Results 8/4/20 2/9/21   Hemoglobin A1C 6.17 (A) 6.14 (A)   (A) Abnormal value            PSA    PSA 8/4/20   PSA 0.922           Data reviewed: Consultant notes Dr. Wood, and GI, 1/2021        "   Assessment and Plan    Diagnoses and all orders for this visit:    1. Type 2 diabetes mellitus without complication, without long-term current use of insulin (CMS/[facility]) (Primary)  -     CBC Auto Differential; Future  -     Comprehensive Metabolic Panel; Future  -     Hemoglobin A1c; Future  -     TSH; Future  -     T4, free; Future    2. Hyperlipidemia associated with type 2 diabetes mellitus (CMS/[facility])  -     Lipid Panel; Future    3. Hypertriglyceridemia  -     Lipid Panel; Future    4. Coronary artery disease involving native coronary artery of native heart with angina pectoris (CMS/[facility]) - followed by Dr. Jay  -     Lipid Panel; Future    5. Essential hypertension  -     Comprehensive Metabolic Panel; Future    6. Class 3 severe obesity due to excess calories with serious comorbidity and body mass index (BMI) of 40.0 to 44.9 in adult (CMS/[facility])  -     TSH; Future    7. Acquired hypothyroidism  -     TSH; Future  -     T4, free; Future    8. Gastroesophageal reflux disease, unspecified whether esophagitis present  -     CBC Auto Differential; Future    9. Esophageal dysphagia    10. Dysthymia -started Lexapro 2/2021    11. Benign non-nodular prostatic hyperplasia    12. Special screening for malignant neoplasm of prostate  -     PSA Screen; Future    Other orders  -     escitalopram (LEXAPRO) 5 MG tablet; Take 1 tablet by mouth Daily.  Dispense: 90 tablet; Refill: 3           I sent a prescription for Lexapro 5 mg to take one q.d. to address the worsening increased emotional episodes since CABG 2017.    Continue Flomax for BPH.  I offered referral to urology.  He declines for now, but will let us know if he changes his mind.      Intensify diabetic diet, exercise and weight loss efforts to manage diabetes.   He is congratulated on weight loss and encouraged to set a goal to lose an additional 10 pounds in the next 6 months.     Continue Prilosec for GERD.        Continue the Benicar HCT.  Monitor BP and heart  rate episodically while at rest and notify me if not consistently at goal.       Continue Synthroid 88 mcg one tablet daily.  Hypothyroidism at goal.      Continue the Prilosec for GERD.       Continue the fenofibrate for hypertriglyceridemia and Lipitor for hypercholesterolemia.  Pursue low carbohydrate, low fat diet and increase exercise to help lower cholesterol.    Continue the Plavix and 81 mg aspirin.      Return in six months for routine follow up with fasting labs one week prior.        Scribed for Dr. Magdaleno by [facility].     Follow Up   Return in about 6 months (around 8/17/2021) for Follow up in six months with labs one week prior., Next scheduled follow up - labs 1 week prior.  Patient was given instructions and counseling regarding his condition or for health maintenance advice. Please see specific information pulled into the AVS if appropriate.

## 2021-02-17 NOTE — PATIENT INSTRUCTIONS
Exercising to Lose Weight  Exercise is structured, repetitive physical activity to improve fitness and health. Getting regular exercise is important for everyone. It is especially important if you are overweight. Being overweight increases your risk of heart disease, stroke, diabetes, high blood pressure, and several types of cancer. Reducing your calorie intake and exercising can help you lose weight.  Exercise is usually categorized as moderate or vigorous intensity. To lose weight, most people need to do a certain amount of moderate-intensity or vigorous-intensity exercise each week.  Moderate-intensity exercise    Moderate-intensity exercise is any activity that gets you moving enough to burn at least three times more energy (calories) than if you were sitting.  Examples of moderate exercise include:  · Walking a mile in 15 minutes.  · Doing light yard work.  · Biking at an easy pace.  Most people should get at least 150 minutes (2 hours and 30 minutes) a week of moderate-intensity exercise to maintain their body weight.  Vigorous-intensity exercise  Vigorous-intensity exercise is any activity that gets you moving enough to burn at least six times more calories than if you were sitting. When you exercise at this intensity, you should be working hard enough that you are not able to carry on a conversation.  Examples of vigorous exercise include:  · Running.  · Playing a team sport, such as football, basketball, and soccer.  · Jumping rope.  Most people should get at least 75 minutes (1 hour and 15 minutes) a week of vigorous-intensity exercise to maintain their body weight.  How can exercise affect me?  When you exercise enough to burn more calories than you eat, you lose weight. Exercise also reduces body fat and builds muscle. The more muscle you have, the more calories you burn. Exercise also:  · Improves mood.  · Reduces stress and tension.  · Improves your overall fitness, flexibility, and  endurance.  · Increases bone strength.  The amount of exercise you need to lose weight depends on:  · Your age.  · The type of exercise.  · Any health conditions you have.  · Your overall physical ability.  Talk to your health care provider about how much exercise you need and what types of activities are safe for you.  What actions can I take to lose weight?  Nutrition    · Make changes to your diet as told by your health care provider or diet and nutrition specialist (dietitian). This may include:  ? Eating fewer calories.  ? Eating more protein.  ? Eating less unhealthy fats.  ? Eating a diet that includes fresh fruits and vegetables, whole grains, low-fat dairy products, and lean protein.  ? Avoiding foods with added fat, salt, and sugar.  · Drink plenty of water while you exercise to prevent dehydration or heat stroke.  Activity  · Choose an activity that you enjoy and set realistic goals. Your health care provider can help you make an exercise plan that works for you.  · Exercise at a moderate or vigorous intensity most days of the week.  ? The intensity of exercise may vary from person to person. You can tell how intense a workout is for you by paying attention to your breathing and heartbeat. Most people will notice their breathing and heartbeat get faster with more intense exercise.  · Do resistance training twice each week, such as:  ? Push-ups.  ? Sit-ups.  ? Lifting weights.  ? Using resistance bands.  · Getting short amounts of exercise can be just as helpful as long structured periods of exercise. If you have trouble finding time to exercise, try to include exercise in your daily routine.  ? Get up, stretch, and walk around every 30 minutes throughout the day.  ? Go for a walk during your lunch break.  ? Park your car farther away from your destination.  ? If you take public transportation, get off one stop early and walk the rest of the way.  ? Make phone calls while standing up and walking  around.  ? Take the stairs instead of elevators or escalators.  · Wear comfortable clothes and shoes with good support.  · Do not exercise so much that you hurt yourself, feel dizzy, or get very short of breath.  Where to find more information  · [place] Department of Health and Human Services: www.hhs.gov  · Centers for Disease Control and Prevention (CDC): www.cdc.gov  Contact a health care provider:  · Before starting a new exercise program.  · If you have questions or concerns about your weight.  · If you have a medical problem that keeps you from exercising.  Get help right away if you have any of the following while exercising:  · Injury.  · Dizziness.  · Difficulty breathing or shortness of breath that does not go away when you stop exercising.  · Chest pain.  · Rapid heartbeat.  Summary  · Being overweight increases your risk of heart disease, stroke, diabetes, high blood pressure, and several types of cancer.  · Losing weight happens when you burn more calories than you eat.  · Reducing the amount of calories you eat in addition to getting regular moderate or vigorous exercise each week helps you lose weight.  This information is not intended to replace advice given to you by your health care provider. Make sure you discuss any questions you have with your health care provider.  Document Revised: 12/31/2018 Document Reviewed: 12/31/2018  Elsevier Patient Education © 2020 [employer]      Calorie Counting for Weight Loss  Calories are units of energy. Your body needs a certain amount of calories from food to keep you going throughout the day. When you eat more calories than your body needs, your body stores the extra calories as fat. When you eat fewer calories than your body needs, your body burns fat to get the energy it needs.  Calorie counting means keeping track of how many calories you eat and drink each day. Calorie counting can be helpful if you need to lose weight. If you make sure to eat fewer  calories than your body needs, you should lose weight. Ask your health care provider what a healthy weight is for you.  For calorie counting to work, you will need to eat the right number of calories in a day in order to lose a healthy amount of weight per week. A dietitian can help you determine how many calories you need in a day and will give you suggestions on how to reach your calorie goal.  · A healthy amount of weight to lose per week is usually 1-2 lb (0.5-0.9 kg). This usually means that your daily calorie intake should be reduced by 500-750 calories.  · Eating 1,200 - 1,500 calories per day can help most women lose weight.  · Eating 1,500 - 1,800 calories per day can help most men lose weight.  What is my plan?  My goal is to have __________ calories per day.  If I have this many calories per day, I should lose around __________ pounds per week.  What do I need to know about calorie counting?  In order to meet your daily calorie goal, you will need to:  · Find out how many calories are in each food you would like to eat. Try to do this before you eat.  · Decide how much of the food you plan to eat.  · Write down what you ate and how many calories it had. Doing this is called keeping a food log.  To successfully lose weight, it is important to balance calorie counting with a healthy lifestyle that includes regular activity. Aim for 150 minutes of moderate exercise (such as walking) or 75 minutes of vigorous exercise (such as running) each week.  Where do I find calorie information?    The number of calories in a food can be found on a Nutrition Facts label. If a food does not have a Nutrition Facts label, try to look up the calories online or ask your dietitian for help.  Remember that calories are listed per serving. If you choose to have more than one serving of a food, you will have to multiply the calories per serving by the amount of servings you plan to eat. For example, the label on a package of  "bread might say that a serving size is 1 slice and that there are 90 calories in a serving. If you eat 1 slice, you will have eaten 90 calories. If you eat 2 slices, you will have eaten 180 calories.  How do I keep a food log?  Immediately after each meal, record the following information in your food log:  · What you ate. Don't forget to include toppings, sauces, and other extras on the food.  · How much you ate. This can be measured in cups, ounces, or number of items.  · How many calories each food and drink had.  · The total number of calories in the meal.  Keep your food log near you, such as in a small notebook in your pocket, or use a mobile juarez or website. Some programs will calculate calories for you and show you how many calories you have left for the day to meet your goal.  What are some calorie counting tips?    · Use your calories on foods and drinks that will fill you up and not leave you hungry:  ? Some examples of foods that fill you up are nuts and nut butters, vegetables, lean proteins, and high-fiber foods like whole grains. High-fiber foods are foods with more than 5 g fiber per serving.  ? Drinks such as sodas, specialty coffee drinks, alcohol, and juices have a lot of calories, yet do not fill you up.  · Eat nutritious foods and avoid empty calories. Empty calories are calories you get from foods or beverages that do not have many vitamins or protein, such as candy, sweets, and soda. It is better to have a nutritious high-calorie food (such as an avocado) than a food with few nutrients (such as a bag of chips).  · Know how many calories are in the foods you eat most often. This will help you calculate calorie counts faster.  · Pay attention to calories in drinks. Low-calorie drinks include water and unsweetened drinks.  · Pay attention to nutrition labels for \"low fat\" or \"fat free\" foods. These foods sometimes have the same amount of calories or more calories than the full fat versions. They " also often have added sugar, starch, or salt, to make up for flavor that was removed with the fat.  · Find a way of tracking calories that works for you. Get creative. Try different apps or programs if writing down calories does not work for you.  What are some portion control tips?  · Know how many calories are in a serving. This will help you know how many servings of a certain food you can have.  · Use a measuring cup to measure serving sizes. You could also try weighing out portions on a kitchen scale. With time, you will be able to estimate serving sizes for some foods.  · Take some time to put servings of different foods on your favorite plates, bowls, and cups so you know what a serving looks like.  · Try not to eat straight from a bag or box. Doing this can lead to overeating. Put the amount you would like to eat in a cup or on a plate to make sure you are eating the right portion.  · Use smaller plates, glasses, and bowls to prevent overeating.  · Try not to multitask (for example, watch TV or use your computer) while eating. If it is time to eat, sit down at a table and enjoy your food. This will help you to know when you are full. It will also help you to be aware of what you are eating and how much you are eating.  What are tips for following this plan?  Reading food labels  · Check the calorie count compared to the serving size. The serving size may be smaller than what you are used to eating.  · Check the source of the calories. Make sure the food you are eating is high in vitamins and protein and low in saturated and trans fats.  Shopping  · Read nutrition labels while you shop. This will help you make healthy decisions before you decide to purchase your food.  · Make a grocery list and stick to it.  Cooking  · Try to cook your favorite foods in a healthier way. For example, try baking instead of frying.  · Use low-fat dairy products.  Meal planning  · Use more fruits and vegetables. Half of your  "plate should be fruits and vegetables.  · Include lean proteins like poultry and fish.  How do I count calories when eating out?  · Ask for smaller portion sizes.  · Consider sharing an entree and sides instead of getting your own entree.  · If you get your own entree, eat only half. Ask for a box at the beginning of your meal and put the rest of your entree in it so you are not tempted to eat it.  · If calories are listed on the menu, choose the lower calorie options.  · Choose dishes that include vegetables, fruits, whole grains, low-fat dairy products, and lean protein.  · Choose items that are boiled, broiled, grilled, or steamed. Stay away from items that are buttered, battered, fried, or served with cream sauce. Items labeled \"crispy\" are usually fried, unless stated otherwise.  · Choose water, low-fat milk, unsweetened iced tea, or other drinks without added sugar. If you want an alcoholic beverage, choose a lower calorie option such as a glass of wine or light beer.  · Ask for dressings, sauces, and syrups on the side. These are usually high in calories, so you should limit the amount you eat.  · If you want a salad, choose a garden salad and ask for grilled meats. Avoid extra toppings like wahl, cheese, or fried items. Ask for the dressing on the side, or ask for olive oil and vinegar or lemon to use as dressing.  · Estimate how many servings of a food you are given. For example, a serving of cooked rice is ½ cup or about the size of half a baseball. Knowing serving sizes will help you be aware of how much food you are eating at restaurants. The list below tells you how big or small some common portion sizes are based on everyday objects:  ? 1 oz--4 stacked dice.  ? 3 oz--1 deck of cards.  ? 1 tsp--1 die.  ? 1 Tbsp--½ a ping-pong ball.  ? 2 Tbsp--1 ping-pong ball.  ? ½ cup--½ baseball.  ? 1 cup--1 baseball.  Summary  · Calorie counting means keeping track of how many calories you eat and drink each day. If " you eat fewer calories than your body needs, you should lose weight.  · A healthy amount of weight to lose per week is usually 1-2 lb (0.5-0.9 kg). This usually means reducing your daily calorie intake by 500-750 calories.  · The number of calories in a food can be found on a Nutrition Facts label. If a food does not have a Nutrition Facts label, try to look up the calories online or ask your dietitian for help.  · Use your calories on foods and drinks that will fill you up, and not on foods and drinks that will leave you hungry.  · Use smaller plates, glasses, and bowls to prevent overeating.  This information is not intended to replace advice given to you by your health care provider. Make sure you discuss any questions you have with your health care provider.  Document Revised: 09/06/2019 Document Reviewed: 11/17/2017  Elsevier Patient Education © 2020 [employer]

## 2021-03-23 ENCOUNTER — BULK ORDERING (OUTPATIENT)
Dept: CASE MANAGEMENT | Facility: OTHER | Age: 71
End: 2021-03-23

## 2021-03-23 DIAGNOSIS — Z23 IMMUNIZATION DUE: ICD-10-CM

## 2021-04-06 RX ORDER — FENOFIBRATE 48 MG/1
TABLET, COATED ORAL
Qty: 90 TABLET | Refills: 1 | Status: SHIPPED | OUTPATIENT
Start: 2021-04-06 | End: 2021-09-15

## 2021-04-06 RX ORDER — LEVOTHYROXINE SODIUM 88 MCG
TABLET ORAL
Qty: 90 TABLET | Refills: 1 | Status: SHIPPED | OUTPATIENT
Start: 2021-04-06 | End: 2021-09-15

## 2021-04-06 RX ORDER — OLMESARTAN MEDOXOMIL AND HYDROCHLOROTHIAZIDE 40/12.5 40; 12.5 MG/1; MG/1
TABLET ORAL
Qty: 90 TABLET | Refills: 1 | Status: SHIPPED | OUTPATIENT
Start: 2021-04-06 | End: 2021-09-15

## 2021-06-28 RX ORDER — CLOPIDOGREL BISULFATE 75 MG/1
TABLET ORAL
Qty: 90 TABLET | Refills: 3 | Status: SHIPPED | OUTPATIENT
Start: 2021-06-28 | End: 2022-05-04

## 2021-06-28 RX ORDER — ALBUTEROL SULFATE 90 UG/1
AEROSOL, METERED RESPIRATORY (INHALATION)
Qty: 54 G | Refills: 3 | Status: SHIPPED | OUTPATIENT
Start: 2021-06-28 | End: 2022-05-04

## 2021-06-28 RX ORDER — ATORVASTATIN CALCIUM 40 MG/1
TABLET, FILM COATED ORAL
Qty: 90 TABLET | Refills: 3 | Status: SHIPPED | OUTPATIENT
Start: 2021-06-28 | End: 2022-05-04

## 2021-06-29 RX ORDER — TAMSULOSIN HYDROCHLORIDE 0.4 MG/1
1 CAPSULE ORAL DAILY
Qty: 90 CAPSULE | Refills: 3 | Status: SHIPPED | OUTPATIENT
Start: 2021-06-29 | End: 2022-05-04

## 2021-08-23 ENCOUNTER — LAB (OUTPATIENT)
Dept: LAB | Facility: OTHER | Age: 71
End: 2021-08-23

## 2021-08-23 DIAGNOSIS — E11.69 HYPERLIPIDEMIA ASSOCIATED WITH TYPE 2 DIABETES MELLITUS (HCC): Chronic | ICD-10-CM

## 2021-08-23 DIAGNOSIS — K21.9 GASTROESOPHAGEAL REFLUX DISEASE, UNSPECIFIED WHETHER ESOPHAGITIS PRESENT: Chronic | ICD-10-CM

## 2021-08-23 DIAGNOSIS — E78.5 HYPERLIPIDEMIA ASSOCIATED WITH TYPE 2 DIABETES MELLITUS (HCC): Chronic | ICD-10-CM

## 2021-08-23 DIAGNOSIS — I10 ESSENTIAL HYPERTENSION: Chronic | ICD-10-CM

## 2021-08-23 DIAGNOSIS — E11.9 TYPE 2 DIABETES MELLITUS WITHOUT COMPLICATION, WITHOUT LONG-TERM CURRENT USE OF INSULIN (HCC): Chronic | ICD-10-CM

## 2021-08-23 DIAGNOSIS — Z12.5 SPECIAL SCREENING FOR MALIGNANT NEOPLASM OF PROSTATE: ICD-10-CM

## 2021-08-23 DIAGNOSIS — E78.1 HYPERTRIGLYCERIDEMIA: Chronic | ICD-10-CM

## 2021-08-23 DIAGNOSIS — E03.9 ACQUIRED HYPOTHYROIDISM: Chronic | ICD-10-CM

## 2021-08-23 DIAGNOSIS — I25.119 CORONARY ARTERY DISEASE INVOLVING NATIVE CORONARY ARTERY OF NATIVE HEART WITH ANGINA PECTORIS (HCC): Chronic | ICD-10-CM

## 2021-08-23 DIAGNOSIS — E66.01 CLASS 3 SEVERE OBESITY DUE TO EXCESS CALORIES WITH SERIOUS COMORBIDITY AND BODY MASS INDEX (BMI) OF 40.0 TO 44.9 IN ADULT (HCC): Chronic | ICD-10-CM

## 2021-08-23 LAB
ALBUMIN SERPL-MCNC: 4.5 G/DL (ref 3.5–5)
ALBUMIN/GLOB SERPL: 1.5 G/DL (ref 1.1–1.8)
ALP SERPL-CCNC: 86 U/L (ref 38–126)
ALT SERPL W P-5'-P-CCNC: 37 U/L
ANION GAP SERPL CALCULATED.3IONS-SCNC: 7 MMOL/L (ref 5–15)
AST SERPL-CCNC: 30 U/L (ref 17–59)
BASOPHILS # BLD AUTO: 0.04 10*3/MM3 (ref 0–0.2)
BASOPHILS NFR BLD AUTO: 0.5 % (ref 0–1.5)
BILIRUB SERPL-MCNC: 0.5 MG/DL (ref 0.2–1.3)
BUN SERPL-MCNC: 18 MG/DL (ref 7–23)
BUN/CREAT SERPL: 17.3 (ref 7–25)
CALCIUM SPEC-SCNC: 9.4 MG/DL (ref 8.4–10.2)
CHLORIDE SERPL-SCNC: 107 MMOL/L (ref 101–112)
CHOLEST SERPL-MCNC: 128 MG/DL (ref 150–200)
CO2 SERPL-SCNC: 26 MMOL/L (ref 22–30)
CREAT SERPL-MCNC: 1.04 MG/DL (ref 0.7–1.3)
DEPRECATED RDW RBC AUTO: 49.3 FL (ref 37–54)
EOSINOPHIL # BLD AUTO: 0.35 10*3/MM3 (ref 0–0.4)
EOSINOPHIL NFR BLD AUTO: 4.2 % (ref 0.3–6.2)
ERYTHROCYTE [DISTWIDTH] IN BLOOD BY AUTOMATED COUNT: 14.3 % (ref 12.3–15.4)
GFR SERPL CREATININE-BSD FRML MDRD: 70 ML/MIN/1.73 (ref 42–98)
GLOBULIN UR ELPH-MCNC: 3 GM/DL (ref 2.3–3.5)
GLUCOSE SERPL-MCNC: 112 MG/DL (ref 70–99)
HBA1C MFR BLD: 5.75 % (ref 4.8–5.6)
HCT VFR BLD AUTO: 45.8 % (ref 37.5–51)
HDLC SERPL-MCNC: 27 MG/DL (ref 40–59)
HGB BLD-MCNC: 15.6 G/DL (ref 13–17.7)
LDLC SERPL CALC-MCNC: 63 MG/DL
LDLC/HDLC SERPL: 2.04 {RATIO} (ref 0–3.55)
LYMPHOCYTES # BLD AUTO: 2.33 10*3/MM3 (ref 0.7–3.1)
LYMPHOCYTES NFR BLD AUTO: 28.1 % (ref 19.6–45.3)
MCH RBC QN AUTO: 32.8 PG (ref 26.6–33)
MCHC RBC AUTO-ENTMCNC: 34.1 G/DL (ref 31.5–35.7)
MCV RBC AUTO: 96.4 FL (ref 79–97)
MONOCYTES # BLD AUTO: 0.56 10*3/MM3 (ref 0.1–0.9)
MONOCYTES NFR BLD AUTO: 6.7 % (ref 5–12)
NEUTROPHILS NFR BLD AUTO: 5.02 10*3/MM3 (ref 1.7–7)
NEUTROPHILS NFR BLD AUTO: 60.5 % (ref 42.7–76)
PLATELET # BLD AUTO: 272 10*3/MM3 (ref 140–450)
PMV BLD AUTO: 10.9 FL (ref 6–12)
POTASSIUM SERPL-SCNC: 3.9 MMOL/L (ref 3.4–5)
PROT SERPL-MCNC: 7.5 G/DL (ref 6.3–8.6)
PSA SERPL-MCNC: 1.03 NG/ML (ref 0–4)
RBC # BLD AUTO: 4.75 10*6/MM3 (ref 4.14–5.8)
SODIUM SERPL-SCNC: 140 MMOL/L (ref 137–145)
T4 FREE SERPL-MCNC: 1.36 NG/DL (ref 0.93–1.7)
TRIGL SERPL-MCNC: 230 MG/DL
TSH SERPL DL<=0.05 MIU/L-ACNC: 2.06 UIU/ML (ref 0.27–4.2)
VLDLC SERPL-MCNC: 38 MG/DL (ref 5–40)
WBC # BLD AUTO: 8.3 10*3/MM3 (ref 3.4–10.8)

## 2021-08-23 PROCEDURE — 36415 COLL VENOUS BLD VENIPUNCTURE: CPT | Performed by: INTERNAL MEDICINE

## 2021-08-23 PROCEDURE — 84439 ASSAY OF FREE THYROXINE: CPT | Performed by: INTERNAL MEDICINE

## 2021-08-23 PROCEDURE — 85025 COMPLETE CBC W/AUTO DIFF WBC: CPT | Performed by: INTERNAL MEDICINE

## 2021-08-23 PROCEDURE — 80061 LIPID PANEL: CPT | Performed by: INTERNAL MEDICINE

## 2021-08-23 PROCEDURE — 83036 HEMOGLOBIN GLYCOSYLATED A1C: CPT | Performed by: INTERNAL MEDICINE

## 2021-08-23 PROCEDURE — G0103 PSA SCREENING: HCPCS | Performed by: INTERNAL MEDICINE

## 2021-08-23 PROCEDURE — 84443 ASSAY THYROID STIM HORMONE: CPT | Performed by: INTERNAL MEDICINE

## 2021-08-23 PROCEDURE — 80053 COMPREHEN METABOLIC PANEL: CPT | Performed by: INTERNAL MEDICINE

## 2021-08-23 NOTE — PROGRESS NOTES
"You have chosen to receive care through a telephone visit. Do you consent to use a telephone visit for your medical care today? Yes   Total visit time: 32 min    Chief Complaint  No chief complaint on file.    Subjective          History of Present Illness     Nick Austin ([facility]) receives care today via telephone visit  for 6-month follow up on chronic medical issues including essential hypertension, hyperlipidemia, CAD, hypothyroidism, GERD, and Type 2 diabetes mellitus which is currently diet controlled.      Dr. Jay is his cardiologist who continues to follow patient annually.  Six months ago, I started Lexapro 5 mg daily to address the worsening increased emotional episodes since CABG 2017    He has been following the principles of a diabetic diet and reduced portion sizes.  He reports 14 pounds weight loss since his last visit.  Blood pressure is certainly improved.  His A1c is improved.    He had colonoscopy 05/2019 by Dr. Ruiz revealing diverticulosis in sigmoid and descending colon.  Dr. Wood performed upper endoscopy 1/2021 due to dysphagia, resulting in dilation of a Schatzki's ring, with resolution of symptoms.  He continues on omeprazole chronically.    The patient's relevant past medical, surgical, and social history was reviewed in Epic.   Lab results are reviewed with the patient today.  CBC unremarkable. Fasting glucose 112.  Normal thyroid screen. Cholesterol at goal, although, HDL remains very low at 27.  PSA reveals no evidence of prostate cancer.       Objective   Vital Signs:   /68   Ht 167.6 cm (66\")   Wt 107 kg (236 lb)   BMI 38.09 kg/m²       Physical Exam   Result Review :     CMP    CMP 2/9/21 8/23/21   Glucose 107 (A) 112 (A)   BUN 17 18   Creatinine 1.11 1.04   eGFR Non African Am 65 70   Sodium 140 140   Potassium 3.7 3.9   Chloride 102 107   Calcium 9.2 9.4   Albumin 4.50 4.50   Total Bilirubin 0.7 0.5   Alkaline Phosphatase 61 86   AST (SGOT) 36 30   ALT " (SGPT) 44 37   (A) Abnormal value            CBC w/diff    CBC w/Diff 2/9/21 8/23/21   WBC 6.91 8.30   RBC 4.90 4.75   Hemoglobin 15.0 15.6   Hematocrit 45.8 45.8   MCV 93.5 96.4   MCH 30.6 32.8   MCHC 32.8 34.1   RDW 14.7 14.3   Platelets 259 272   Neutrophil Rel % 52.8 60.5   Lymphocyte Rel % 36.0 28.1   Monocyte Rel % 6.5 6.7   Eosinophil Rel % 3.8 4.2   Basophil Rel % 0.9 0.5           Lipid Panel    Lipid Panel 2/9/21 2/9/21 8/23/21    0717 0717    Total Cholesterol   128 (A)   Triglycerides 165 (A)  230 (A)   HDL Cholesterol   27 (A)   VLDL Cholesterol   38   LDL Cholesterol   86 63   LDL/HDL Ratio   2.04   (A) Abnormal value            A1C Last 3 Results    HGBA1C Last 3 Results 2/9/21 8/23/21   Hemoglobin A1C 6.14 (A) 5.75 (A)   (A) Abnormal value            PSA    PSA 8/23/21   PSA 1.030           Data reviewed: GI studies Upper endoscopy, Dr. Wood          Assessment and Plan    Diagnoses and all orders for this visit:    1. Type 2 diabetes mellitus without complication, without long-term current use of insulin (CMS/[facility]) (Primary)  -     CBC Auto Differential; Future  -     Comprehensive Metabolic Panel; Future  -     Hemoglobin A1c; Future  -     LDL Cholesterol, Direct; Future  -     Microalbumin / Creatinine Urine Ratio - Urine, Clean Catch; Future  -     TSH; Future  -     Vitamin B12; Future    2. Hyperlipidemia associated with type 2 diabetes mellitus (CMS/[facility])  -     LDL Cholesterol, Direct; Future    3. Hypertriglyceridemia  -     Triglycerides; Future    4. Essential hypertension  -     Comprehensive Metabolic Panel; Future    5. Coronary artery disease involving native coronary artery of native heart with angina pectoris (CMS/[facility]) - followed by Dr. Jay  -     LDL Cholesterol, Direct; Future    6. Acquired hypothyroidism  -     TSH; Future  -     T4, free; Future    7. Gastroesophageal reflux disease, unspecified whether esophagitis present    8. Vasculogenic erectile dysfunction,  unspecified vasculogenic erectile dysfunction type    Other orders  -     sildenafil (REVATIO) 20 MG tablet; 2 to 4 tablets p.o. 1 hour prior to intercourse  Dispense: 15 tablet; Refill: 11    Continue to pursue an 1800-calorie ADA diet, exercise, and weight loss plan.  Next major weight goal is under 200 pounds.  His diabetes is currently well controlled with diet control.    Continue the current cardiovascular risk factor modifications including Lipitor and fenofibrate, aspirin and Plavix and Benicar HCT.  LDL is at goal.  Blood pressure is at goal.  Triglycerides are still elevated but should continue to improve with additional weight loss.    Continue current hypertension management as stated above, which is stable.    Continue current dose Synthroid.    Continue the Prilosec for GERD and history of Schatzki's ring that required dilation last winter.  The situation is well controlled currently    We discussed how to use sildenafil to help address erectile dysfunction issues.  Watch for any orthostatic symptoms, as he is also taking Flomax    We encouraged him to get coronavirus booster this fall when it becomes available to him, and he is in agreement.    Return to clinic in 6 months with fasting labs prior.         Follow Up   Return in about 6 months (around 2/26/2022) for Next scheduled follow up - labs 1 week prior.  Patient was given instructions and counseling regarding his condition or for health maintenance advice. Please see specific information pulled into the AVS if appropriate.

## 2021-08-26 ENCOUNTER — OFFICE VISIT (OUTPATIENT)
Dept: FAMILY MEDICINE CLINIC | Facility: CLINIC | Age: 71
End: 2021-08-26

## 2021-08-26 VITALS
HEIGHT: 66 IN | WEIGHT: 236 LBS | BODY MASS INDEX: 37.93 KG/M2 | SYSTOLIC BLOOD PRESSURE: 117 MMHG | DIASTOLIC BLOOD PRESSURE: 68 MMHG

## 2021-08-26 DIAGNOSIS — N52.9 VASCULOGENIC ERECTILE DYSFUNCTION, UNSPECIFIED VASCULOGENIC ERECTILE DYSFUNCTION TYPE: Chronic | ICD-10-CM

## 2021-08-26 DIAGNOSIS — I10 ESSENTIAL HYPERTENSION: Chronic | ICD-10-CM

## 2021-08-26 DIAGNOSIS — K21.9 GASTROESOPHAGEAL REFLUX DISEASE, UNSPECIFIED WHETHER ESOPHAGITIS PRESENT: Chronic | ICD-10-CM

## 2021-08-26 DIAGNOSIS — E78.5 HYPERLIPIDEMIA ASSOCIATED WITH TYPE 2 DIABETES MELLITUS (HCC): Chronic | ICD-10-CM

## 2021-08-26 DIAGNOSIS — E11.9 TYPE 2 DIABETES MELLITUS WITHOUT COMPLICATION, WITHOUT LONG-TERM CURRENT USE OF INSULIN (HCC): Primary | Chronic | ICD-10-CM

## 2021-08-26 DIAGNOSIS — I25.119 CORONARY ARTERY DISEASE INVOLVING NATIVE CORONARY ARTERY OF NATIVE HEART WITH ANGINA PECTORIS (HCC): Chronic | ICD-10-CM

## 2021-08-26 DIAGNOSIS — E78.1 HYPERTRIGLYCERIDEMIA: Chronic | ICD-10-CM

## 2021-08-26 DIAGNOSIS — E11.69 HYPERLIPIDEMIA ASSOCIATED WITH TYPE 2 DIABETES MELLITUS (HCC): Chronic | ICD-10-CM

## 2021-08-26 DIAGNOSIS — E03.9 ACQUIRED HYPOTHYROIDISM: ICD-10-CM

## 2021-08-26 PROBLEM — E66.09 OBESITY DUE TO EXCESS CALORIES WITH SERIOUS COMORBIDITY: Status: ACTIVE | Noted: 2019-10-02

## 2021-08-26 PROCEDURE — G2025 DIS SITE TELE SVCS RHC/FQHC: HCPCS | Performed by: INTERNAL MEDICINE

## 2021-08-26 RX ORDER — SILDENAFIL CITRATE 20 MG/1
TABLET ORAL
Qty: 15 TABLET | Refills: 11 | Status: SHIPPED | OUTPATIENT
Start: 2021-08-26 | End: 2022-05-04

## 2021-08-26 NOTE — PATIENT INSTRUCTIONS
Calorie Counting for Weight Loss  Calories are units of energy. Your body needs a certain number of calories from food to keep going throughout the day. When you eat or drink more calories than your body needs, your body stores the extra calories mostly as fat. When you eat or drink fewer calories than your body needs, your body burns fat to get the energy it needs.  Calorie counting means keeping track of how many calories you eat and drink each day. Calorie counting can be helpful if you need to lose weight. If you eat fewer calories than your body needs, you should lose weight. Ask your health care provider what a healthy weight is for you.  For calorie counting to work, you will need to eat the right number of calories each day to lose a healthy amount of weight per week. A dietitian can help you figure out how many calories you need in a day and will suggest ways to reach your calorie goal.  · A healthy amount of weight to lose each week is usually 1-2 lb (0.5-0.9 kg). This usually means that your daily calorie intake should be reduced by 500-750 calories.  · Eating 1,200-1,500 calories a day can help most women lose weight.  · Eating 1,500-1,800 calories a day can help most men lose weight.  What do I need to know about calorie counting?  Work with your health care provider or dietitian to determine how many calories you should get each day. To meet your daily calorie goal, you will need to:  · Find out how many calories are in each food that you would like to eat. Try to do this before you eat.  · Decide how much of the food you plan to eat.  · Keep a food log. Do this by writing down what you ate and how many calories it had.  To successfully lose weight, it is important to balance calorie counting with a healthy lifestyle that includes regular activity.  Where do I find calorie information?    The number of calories in a food can be found on a Nutrition Facts label. If a food does not have a Nutrition Facts  label, try to look up the calories online or ask your dietitian for help.  Remember that calories are listed per serving. If you choose to have more than one serving of a food, you will have to multiply the calories per serving by the number of servings you plan to eat. For example, the label on a package of bread might say that a serving size is 1 slice and that there are 90 calories in a serving. If you eat 1 slice, you will have eaten 90 calories. If you eat 2 slices, you will have eaten 180 calories.  How do I keep a food log?  After each time that you eat, record the following in your food log as soon as possible:  · What you ate. Be sure to include toppings, sauces, and other extras on the food.  · How much you ate. This can be measured in cups, ounces, or number of items.  · How many calories were in each food and drink.  · The total number of calories in the food you ate.  Keep your food log near you, such as in a pocket-sized notebook or on an juarez or website on your mobile phone. Some programs will calculate calories for you and show you how many calories you have left to meet your daily goal.  What are some portion-control tips?  · Know how many calories are in a serving. This will help you know how many servings you can have of a certain food.  · Use a measuring cup to measure serving sizes. You could also try weighing out portions on a kitchen scale. With time, you will be able to estimate serving sizes for some foods.  · Take time to put servings of different foods on your favorite plates or in your favorite bowls and cups so you know what a serving looks like.  · Try not to eat straight from a food's packaging, such as from a bag or box. Eating straight from the package makes it hard to see how much you are eating and can lead to overeating. Put the amount you would like to eat in a cup or on a plate to make sure you are eating the right portion.  · Use smaller plates, glasses, and bowls for smaller  portions and to prevent overeating.  · Try not to multitask. For example, avoid watching TV or using your computer while eating. If it is time to eat, sit down at a table and enjoy your food. This will help you recognize when you are full. It will also help you be more mindful of what and how much you are eating.  What are tips for following this plan?  Reading food labels  · Check the calorie count compared with the serving size. The serving size may be smaller than what you are used to eating.  · Check the source of the calories. Try to choose foods that are high in protein, fiber, and vitamins, and low in saturated fat, trans fat, and sodium.  Shopping  · Read nutrition labels while you shop. This will help you make healthy decisions about which foods to buy.  · Pay attention to nutrition labels for low-fat or fat-free foods. These foods sometimes have the same number of calories or more calories than the full-fat versions. They also often have added sugar, starch, or salt to make up for flavor that was removed with the fat.  · Make a grocery list of lower-calorie foods and stick to it.  Cooking  · Try to cook your favorite foods in a healthier way. For example, try baking instead of frying.  · Use low-fat dairy products.  Meal planning  · Use more fruits and vegetables. One-half of your plate should be fruits and vegetables.  · Include lean proteins, such as chicken, turkey, and fish.  Lifestyle  Each week, aim to do one of the following:  · 150 minutes of moderate exercise, such as walking.  · 75 minutes of vigorous exercise, such as running.  General information  · Know how many calories are in the foods you eat most often. This will help you calculate calorie counts faster.  · Find a way of tracking calories that works for you. Get creative. Try different apps or programs if writing down calories does not work for you.  What foods should I eat?    · Eat nutritious foods. It is better to have a nutritious,  high-calorie food, such as an avocado, than a food with few nutrients, such as a bag of potato chips.  · Use your calories on foods and drinks that will fill you up and will not leave you hungry soon after eating.  ? Examples of foods that fill you up are nuts and nut butters, vegetables, lean proteins, and high-fiber foods such as whole grains. High-fiber foods are foods with more than 5 g of fiber per serving.  · Pay attention to calories in drinks. Low-calorie drinks include water and unsweetened drinks.  The items listed above may not be a complete list of foods and beverages you can eat. Contact a dietitian for more information.  What foods should I limit?  Limit foods or drinks that are not good sources of vitamins, minerals, or protein or that are high in unhealthy fats. These include:  · Candy.  · Other sweets.  · Sodas, specialty coffee drinks, alcohol, and juice.  The items listed above may not be a complete list of foods and beverages you should avoid. Contact a dietitian for more information.  How do I count calories when eating out?  · Pay attention to portions. Often, portions are much larger when eating out. Try these tips to keep portions smaller:  ? Consider sharing a meal instead of getting your own.  ? If you get your own meal, eat only half of it. Before you start eating, ask for a container and put half of your meal into it.  ? When available, consider ordering smaller portions from the menu instead of full portions.  · Pay attention to your food and drink choices. Knowing the way food is cooked and what is included with the meal can help you eat fewer calories.  ? If calories are listed on the menu, choose the lower-calorie options.  ? Choose dishes that include vegetables, fruits, whole grains, low-fat dairy products, and lean proteins.  ? Choose items that are boiled, broiled, grilled, or steamed. Avoid items that are buttered, battered, fried, or served with cream sauce. Items labeled as  crispy are usually fried, unless stated otherwise.  ? Choose water, low-fat milk, unsweetened iced tea, or other drinks without added sugar. If you want an alcoholic beverage, choose a lower-calorie option, such as a glass of wine or light beer.  ? Ask for dressings, sauces, and syrups on the side. These are usually high in calories, so you should limit the amount you eat.  ? If you want a salad, choose a garden salad and ask for grilled meats. Avoid extra toppings such as wahl, cheese, or fried items. Ask for the dressing on the side, or ask for olive oil and vinegar or lemon to use as dressing.  · Estimate how many servings of a food you are given. Knowing serving sizes will help you be aware of how much food you are eating at restaurants.  Where to find more information  · Centers for Disease Control and Prevention: www.cdc.gov  · [place] Department of Agriculture: myplate.gov  Summary  · Calorie counting means keeping track of how many calories you eat and drink each day. If you eat fewer calories than your body needs, you should lose weight.  · A healthy amount of weight to lose per week is usually 1-2 lb (0.5-0.9 kg). This usually means reducing your daily calorie intake by 500-750 calories.  · The number of calories in a food can be found on a Nutrition Facts label. If a food does not have a Nutrition Facts label, try to look up the calories online or ask your dietitian for help.  · Use smaller plates, glasses, and bowls for smaller portions and to prevent overeating.  · Use your calories on foods and drinks that will fill you up and not leave you hungry shortly after a meal.  This information is not intended to replace advice given to you by your health care provider. Make sure you discuss any questions you have with your health care provider.  Document Revised: 01/28/2021 Document Reviewed: 01/28/2021  [employer] Patient Education © 2021 [employer]      Calorie Counting for Weight Loss  Calories are units of  energy. Your body needs a certain number of calories from food to keep going throughout the day. When you eat or drink more calories than your body needs, your body stores the extra calories mostly as fat. When you eat or drink fewer calories than your body needs, your body burns fat to get the energy it needs.  Calorie counting means keeping track of how many calories you eat and drink each day. Calorie counting can be helpful if you need to lose weight. If you eat fewer calories than your body needs, you should lose weight. Ask your health care provider what a healthy weight is for you.  For calorie counting to work, you will need to eat the right number of calories each day to lose a healthy amount of weight per week. A dietitian can help you figure out how many calories you need in a day and will suggest ways to reach your calorie goal.  · A healthy amount of weight to lose each week is usually 1-2 lb (0.5-0.9 kg). This usually means that your daily calorie intake should be reduced by 500-750 calories.  · Eating 1,200-1,500 calories a day can help most women lose weight.  · Eating 1,500-1,800 calories a day can help most men lose weight.  What do I need to know about calorie counting?  Work with your health care provider or dietitian to determine how many calories you should get each day. To meet your daily calorie goal, you will need to:  · Find out how many calories are in each food that you would like to eat. Try to do this before you eat.  · Decide how much of the food you plan to eat.  · Keep a food log. Do this by writing down what you ate and how many calories it had.  To successfully lose weight, it is important to balance calorie counting with a healthy lifestyle that includes regular activity.  Where do I find calorie information?    The number of calories in a food can be found on a Nutrition Facts label. If a food does not have a Nutrition Facts label, try to look up the calories online or ask your  dietitian for help.  Remember that calories are listed per serving. If you choose to have more than one serving of a food, you will have to multiply the calories per serving by the number of servings you plan to eat. For example, the label on a package of bread might say that a serving size is 1 slice and that there are 90 calories in a serving. If you eat 1 slice, you will have eaten 90 calories. If you eat 2 slices, you will have eaten 180 calories.  How do I keep a food log?  After each time that you eat, record the following in your food log as soon as possible:  · What you ate. Be sure to include toppings, sauces, and other extras on the food.  · How much you ate. This can be measured in cups, ounces, or number of items.  · How many calories were in each food and drink.  · The total number of calories in the food you ate.  Keep your food log near you, such as in a pocket-sized notebook or on an juarez or website on your mobile phone. Some programs will calculate calories for you and show you how many calories you have left to meet your daily goal.  What are some portion-control tips?  · Know how many calories are in a serving. This will help you know how many servings you can have of a certain food.  · Use a measuring cup to measure serving sizes. You could also try weighing out portions on a kitchen scale. With time, you will be able to estimate serving sizes for some foods.  · Take time to put servings of different foods on your favorite plates or in your favorite bowls and cups so you know what a serving looks like.  · Try not to eat straight from a food's packaging, such as from a bag or box. Eating straight from the package makes it hard to see how much you are eating and can lead to overeating. Put the amount you would like to eat in a cup or on a plate to make sure you are eating the right portion.  · Use smaller plates, glasses, and bowls for smaller portions and to prevent overeating.  · Try not to  multitask. For example, avoid watching TV or using your computer while eating. If it is time to eat, sit down at a table and enjoy your food. This will help you recognize when you are full. It will also help you be more mindful of what and how much you are eating.  What are tips for following this plan?  Reading food labels  · Check the calorie count compared with the serving size. The serving size may be smaller than what you are used to eating.  · Check the source of the calories. Try to choose foods that are high in protein, fiber, and vitamins, and low in saturated fat, trans fat, and sodium.  Shopping  · Read nutrition labels while you shop. This will help you make healthy decisions about which foods to buy.  · Pay attention to nutrition labels for low-fat or fat-free foods. These foods sometimes have the same number of calories or more calories than the full-fat versions. They also often have added sugar, starch, or salt to make up for flavor that was removed with the fat.  · Make a grocery list of lower-calorie foods and stick to it.  Cooking  · Try to cook your favorite foods in a healthier way. For example, try baking instead of frying.  · Use low-fat dairy products.  Meal planning  · Use more fruits and vegetables. One-half of your plate should be fruits and vegetables.  · Include lean proteins, such as chicken, turkey, and fish.  Lifestyle  Each week, aim to do one of the following:  · 150 minutes of moderate exercise, such as walking.  · 75 minutes of vigorous exercise, such as running.  General information  · Know how many calories are in the foods you eat most often. This will help you calculate calorie counts faster.  · Find a way of tracking calories that works for you. Get creative. Try different apps or programs if writing down calories does not work for you.  What foods should I eat?    · Eat nutritious foods. It is better to have a nutritious, high-calorie food, such as an avocado, than a food  with few nutrients, such as a bag of potato chips.  · Use your calories on foods and drinks that will fill you up and will not leave you hungry soon after eating.  ? Examples of foods that fill you up are nuts and nut butters, vegetables, lean proteins, and high-fiber foods such as whole grains. High-fiber foods are foods with more than 5 g of fiber per serving.  · Pay attention to calories in drinks. Low-calorie drinks include water and unsweetened drinks.  The items listed above may not be a complete list of foods and beverages you can eat. Contact a dietitian for more information.  What foods should I limit?  Limit foods or drinks that are not good sources of vitamins, minerals, or protein or that are high in unhealthy fats. These include:  · Candy.  · Other sweets.  · Sodas, specialty coffee drinks, alcohol, and juice.  The items listed above may not be a complete list of foods and beverages you should avoid. Contact a dietitian for more information.  How do I count calories when eating out?  · Pay attention to portions. Often, portions are much larger when eating out. Try these tips to keep portions smaller:  ? Consider sharing a meal instead of getting your own.  ? If you get your own meal, eat only half of it. Before you start eating, ask for a container and put half of your meal into it.  ? When available, consider ordering smaller portions from the menu instead of full portions.  · Pay attention to your food and drink choices. Knowing the way food is cooked and what is included with the meal can help you eat fewer calories.  ? If calories are listed on the menu, choose the lower-calorie options.  ? Choose dishes that include vegetables, fruits, whole grains, low-fat dairy products, and lean proteins.  ? Choose items that are boiled, broiled, grilled, or steamed. Avoid items that are buttered, battered, fried, or served with cream sauce. Items labeled as crispy are usually fried, unless stated  otherwise.  ? Choose water, low-fat milk, unsweetened iced tea, or other drinks without added sugar. If you want an alcoholic beverage, choose a lower-calorie option, such as a glass of wine or light beer.  ? Ask for dressings, sauces, and syrups on the side. These are usually high in calories, so you should limit the amount you eat.  ? If you want a salad, choose a garden salad and ask for grilled meats. Avoid extra toppings such as wahl, cheese, or fried items. Ask for the dressing on the side, or ask for olive oil and vinegar or lemon to use as dressing.  · Estimate how many servings of a food you are given. Knowing serving sizes will help you be aware of how much food you are eating at restaurants.  Where to find more information  · Centers for Disease Control and Prevention: www.cdc.gov  · [place] Department of Agriculture: [place].[employer]  Summary  · Calorie counting means keeping track of how many calories you eat and drink each day. If you eat fewer calories than your body needs, you should lose weight.  · A healthy amount of weight to lose per week is usually 1-2 lb (0.5-0.9 kg). This usually means reducing your daily calorie intake by 500-750 calories.  · The number of calories in a food can be found on a Nutrition Facts label. If a food does not have a Nutrition Facts label, try to look up the calories online or ask your dietitian for help.  · Use smaller plates, glasses, and bowls for smaller portions and to prevent overeating.  · Use your calories on foods and drinks that will fill you up and not leave you hungry shortly after a meal.  This information is not intended to replace advice given to you by your health care provider. Make sure you discuss any questions you have with your health care provider.  Document Revised: 01/28/2021 Document Reviewed: 01/28/2021  [employer] Patient Education © 2021 [employer]

## 2021-09-15 RX ORDER — FENOFIBRATE 48 MG/1
TABLET, COATED ORAL
Qty: 90 TABLET | Refills: 1 | Status: SHIPPED | OUTPATIENT
Start: 2021-09-15 | End: 2022-02-14

## 2021-09-15 RX ORDER — OLMESARTAN MEDOXOMIL AND HYDROCHLOROTHIAZIDE 40/12.5 40; 12.5 MG/1; MG/1
TABLET ORAL
Qty: 90 TABLET | Refills: 1 | Status: SHIPPED | OUTPATIENT
Start: 2021-09-15 | End: 2021-12-06

## 2021-09-15 RX ORDER — LEVOTHYROXINE SODIUM 88 MCG
TABLET ORAL
Qty: 90 TABLET | Refills: 1 | Status: SHIPPED | OUTPATIENT
Start: 2021-09-15 | End: 2022-02-14

## 2021-11-29 RX ORDER — OMEPRAZOLE 40 MG/1
CAPSULE, DELAYED RELEASE ORAL
Qty: 90 CAPSULE | Refills: 3 | Status: SHIPPED | OUTPATIENT
Start: 2021-11-29 | End: 2022-04-25 | Stop reason: SDUPTHER

## 2021-11-29 RX ORDER — ESCITALOPRAM OXALATE 5 MG/1
TABLET ORAL
Qty: 90 TABLET | Refills: 3 | Status: SHIPPED | OUTPATIENT
Start: 2021-11-29 | End: 2022-05-04

## 2021-12-06 ENCOUNTER — OFFICE VISIT (OUTPATIENT)
Dept: FAMILY MEDICINE CLINIC | Facility: CLINIC | Age: 71
End: 2021-12-06

## 2021-12-06 VITALS
BODY MASS INDEX: 37.03 KG/M2 | HEIGHT: 66 IN | DIASTOLIC BLOOD PRESSURE: 74 MMHG | SYSTOLIC BLOOD PRESSURE: 134 MMHG | TEMPERATURE: 97.5 F | HEART RATE: 72 BPM | WEIGHT: 230.4 LBS

## 2021-12-06 DIAGNOSIS — J37.0 CHRONIC LARYNGITIS: ICD-10-CM

## 2021-12-06 DIAGNOSIS — Z28.21 23-POLYVALENT PNEUMOCOCCAL POLYSACCHARIDE VACCINE DECLINED: ICD-10-CM

## 2021-12-06 DIAGNOSIS — Z00.00 MEDICARE ANNUAL WELLNESS VISIT, SUBSEQUENT: Primary | ICD-10-CM

## 2021-12-06 DIAGNOSIS — L98.9 SKIN LESION: ICD-10-CM

## 2021-12-06 DIAGNOSIS — R42 DIZZY: ICD-10-CM

## 2021-12-06 DIAGNOSIS — Z28.21 INFLUENZA VACCINATION DECLINED BY PATIENT: ICD-10-CM

## 2021-12-06 DIAGNOSIS — Z23 NEED FOR SHINGLES VACCINE: ICD-10-CM

## 2021-12-06 DIAGNOSIS — I95.1 ORTHOSTATIC HYPOTENSION: ICD-10-CM

## 2021-12-06 PROCEDURE — 1159F MED LIST DOCD IN RCRD: CPT | Performed by: INTERNAL MEDICINE

## 2021-12-06 PROCEDURE — 90471 IMMUNIZATION ADMIN: CPT | Performed by: INTERNAL MEDICINE

## 2021-12-06 PROCEDURE — 99214 OFFICE O/P EST MOD 30 MIN: CPT | Performed by: INTERNAL MEDICINE

## 2021-12-06 PROCEDURE — 1126F AMNT PAIN NOTED NONE PRSNT: CPT | Performed by: INTERNAL MEDICINE

## 2021-12-06 PROCEDURE — G0439 PPPS, SUBSEQ VISIT: HCPCS | Performed by: INTERNAL MEDICINE

## 2021-12-06 RX ORDER — MECLIZINE HYDROCHLORIDE 25 MG/1
25 TABLET ORAL 3 TIMES DAILY PRN
Qty: 20 TABLET | Refills: 0 | Status: SHIPPED | OUTPATIENT
Start: 2021-12-06 | End: 2022-05-04

## 2021-12-06 NOTE — PROGRESS NOTES
The ABCs of the Annual Wellness Visit  Subsequent Medicare Wellness Visit    Chief Complaint   Patient presents with   • Dizziness     x 3 weeks.    • Medicare Wellness-subsequent   • Immunizations     Shingrix given in left deltoid and patient tolerated well. Patient supplied the meds       Subjective    History of Present Illness:  Nick Austin is a 70 y.o. male who presents for a Subsequent Medicare Wellness Visit.    The following portions of the patient's history were reviewed and   updated as appropriate:   He  has a past medical history of Acquired hypothyroidism (10/12/2016), Acute bronchitis, Arthritis, Backache, Chronic pain, Chronic venous insufficiency (10/2/2019), Class 2 severe obesity due to excess calories with serious comorbidity and body mass index (BMI) of 39.0 to 39.9 in adult (HCC) (10/2/2019), Coronary arteriosclerosis, Dysphagia -due to Schatzki's ring dilated 1/2021 (9/11/2019), Essential hypertension, GERD (gastroesophageal reflux disease), History of transfusion, Hyperglycemia, Hyperlipidemia, Hyperlipidemia associated with type 2 diabetes mellitus ([facility]) (2/17/2021), Hypertriglyceridemia (5/1/2019), Hypothyroidism, and Male erectile dysfunction (8/26/2021).  He does not have any pertinent problems on file.  He  has a past surgical history that includes Coronary artery bypass graft; Cholecystectomy; Injection of Medication (02/23/2015); Shoulder surgery (Right); Cardiac catheterization (N/A, 9/7/2017); Leg Surgery (Left); Knee Arthroscopy (Left); Other surgical history (Left); Other surgical history (Right); Coronary artery bypass graft (N/A, 9/14/2017); Cardiac surgery; Colonoscopy (N/A, 5/17/2019); Esophagogastroduodenoscopy (N/A, 9/25/2019); and Upper gastrointestinal endoscopy (09/25/2019).  His family history includes Heart disease in an other family member; Hypertension in an other family member; No Known Problems in his brother, daughter, father, maternal aunt, maternal  grandfather, maternal grandmother, maternal uncle, mother, paternal aunt, paternal grandfather, paternal grandmother, paternal uncle, sister, and son.  He  reports that he has never smoked. He has never used smokeless tobacco. He reports that he does not drink alcohol and does not use drugs.  Current Outpatient Medications   Medication Sig Dispense Refill   • albuterol sulfate  (90 Base) MCG/ACT inhaler USE 2 INHALATIONS ORALLY   EVERY 4 HOURS AS NEEDED FORWHEEZING 54 g 3   • aspirin 81 MG EC tablet Take 1 tablet by mouth Daily.     • atorvastatin (LIPITOR) 40 MG tablet TAKE 1 TABLET DAILY 90 tablet 3   • clopidogrel (PLAVIX) 75 MG tablet TAKE 1 TABLET DAILY 90 tablet 3   • escitalopram (LEXAPRO) 5 MG tablet TAKE 1 TABLET DAILY 90 tablet 3   • fenofibrate (TRICOR) 48 MG tablet TAKE 1 TABLET DAILY 90 tablet 1   • Fluticasone Furoate-Vilanterol (Breo Ellipta) 100-25 MCG/INH inhaler Inhale 1 puff Daily As Needed (SOB). 3 inhaler 3   • omeprazole (priLOSEC) 40 MG capsule TAKE 1 CAPSULE DAILY 90 capsule 3   • sennosides-docusate (senna-docusate sodium) 8.6-50 MG per tablet Take 1 tablet by mouth As Needed for Constipation.     • sildenafil (REVATIO) 20 MG tablet 2 to 4 tablets p.o. 1 hour prior to intercourse 15 tablet 11   • Synthroid 88 MCG tablet TAKE 1 TABLET DAILY 90 tablet 1   • tamsulosin (FLOMAX) 0.4 MG capsule 24 hr capsule Take 1 capsule by mouth Daily. 90 capsule 3   • meclizine (ANTIVERT) 25 MG tablet Take 1 tablet by mouth 3 (Three) Times a Day As Needed for Dizziness. 20 tablet 0   • Zoster Vac Recomb Adjuvanted 50 MCG/0.5ML reconstituted suspension Inject 0.5 mL into the appropriate muscle as directed by prescriber Every 2 (Two) Months. 1 each 1     No current facility-administered medications for this visit.     Current Outpatient Medications on File Prior to Visit   Medication Sig   • albuterol sulfate  (90 Base) MCG/ACT inhaler USE 2 INHALATIONS ORALLY   EVERY 4 HOURS AS NEEDED FORWHEEZING    • aspirin 81 MG EC tablet Take 1 tablet by mouth Daily.   • atorvastatin (LIPITOR) 40 MG tablet TAKE 1 TABLET DAILY   • clopidogrel (PLAVIX) 75 MG tablet TAKE 1 TABLET DAILY   • escitalopram (LEXAPRO) 5 MG tablet TAKE 1 TABLET DAILY   • fenofibrate (TRICOR) 48 MG tablet TAKE 1 TABLET DAILY   • Fluticasone Furoate-Vilanterol (Breo Ellipta) 100-25 MCG/INH inhaler Inhale 1 puff Daily As Needed (SOB).   • omeprazole (priLOSEC) 40 MG capsule TAKE 1 CAPSULE DAILY   • sennosides-docusate (senna-docusate sodium) 8.6-50 MG per tablet Take 1 tablet by mouth As Needed for Constipation.   • sildenafil (REVATIO) 20 MG tablet 2 to 4 tablets p.o. 1 hour prior to intercourse   • Synthroid 88 MCG tablet TAKE 1 TABLET DAILY   • tamsulosin (FLOMAX) 0.4 MG capsule 24 hr capsule Take 1 capsule by mouth Daily.   • [DISCONTINUED] docusate sodium (COLACE) 50 MG capsule Take  by mouth 2 (Two) Times a Day.   • [DISCONTINUED] olmesartan-hydrochlorothiazide (BENICAR HCT) 40-12.5 MG per tablet TAKE 1 TABLET DAILY     No current facility-administered medications on file prior to visit.     He is allergic to lopressor [metoprolol tartrate] and tetanus toxoids..    Compared to one year ago, the patient feels his physical   health is worse.    Compared to one year ago, the patient feels his mental   health is the same.    Recent Hospitalizations:  He was not admitted to the hospital during the last year.       Current Medical Providers:  Patient Care Team:  Patrick Magdaleno MD as PCP - General (Internal Medicine)  Patrick Magdaleno MD as PCP - Internal Medicine (Internal Medicine)  Carlos Wood MD as Consulting Physician (Gastroenterology)    Outpatient Medications Prior to Visit   Medication Sig Dispense Refill   • albuterol sulfate  (90 Base) MCG/ACT inhaler USE 2 INHALATIONS ORALLY   EVERY 4 HOURS AS NEEDED FORWHEEZING 54 g 3   • aspirin 81 MG EC tablet Take 1 tablet by mouth Daily.     • atorvastatin (LIPITOR) 40 MG tablet TAKE 1  TABLET DAILY 90 tablet 3   • clopidogrel (PLAVIX) 75 MG tablet TAKE 1 TABLET DAILY 90 tablet 3   • escitalopram (LEXAPRO) 5 MG tablet TAKE 1 TABLET DAILY 90 tablet 3   • fenofibrate (TRICOR) 48 MG tablet TAKE 1 TABLET DAILY 90 tablet 1   • Fluticasone Furoate-Vilanterol (Breo Ellipta) 100-25 MCG/INH inhaler Inhale 1 puff Daily As Needed (SOB). 3 inhaler 3   • omeprazole (priLOSEC) 40 MG capsule TAKE 1 CAPSULE DAILY 90 capsule 3   • sennosides-docusate (senna-docusate sodium) 8.6-50 MG per tablet Take 1 tablet by mouth As Needed for Constipation.     • sildenafil (REVATIO) 20 MG tablet 2 to 4 tablets p.o. 1 hour prior to intercourse 15 tablet 11   • Synthroid 88 MCG tablet TAKE 1 TABLET DAILY 90 tablet 1   • tamsulosin (FLOMAX) 0.4 MG capsule 24 hr capsule Take 1 capsule by mouth Daily. 90 capsule 3   • docusate sodium (COLACE) 50 MG capsule Take  by mouth 2 (Two) Times a Day.     • olmesartan-hydrochlorothiazide (BENICAR HCT) 40-12.5 MG per tablet TAKE 1 TABLET DAILY 90 tablet 1     No facility-administered medications prior to visit.       No opioid medication identified on active medication list. I have reviewed chart for other potential  high risk medication/s and harmful drug interactions in the elderly.          Aspirin is on active medication list. Aspirin use is indicated based on review of current medical condition/s. Pros and cons of this therapy have been discussed today. Benefits of this medication outweigh potential harm.  Patient has been encouraged to continue taking this medication.  .      Patient Active Problem List   Diagnosis   • Essential hypertension   • Acquired hypothyroidism   • Type 2 diabetes mellitus without complication, without long-term current use of insulin (HCC)   • Benign non-nodular prostatic hyperplasia   • Generalized OA   • Uncomplicated asthma   • Dyspnea on exertion   • Angina effort   • S/P CABG (coronary artery bypass graft)   • Angina pectoris (HCC)   • Coronary artery  "disease of native artery of native heart with stable angina pectoris (HCC)   • Coronary artery disease involving native coronary artery of native heart with angina pectoris (CMS/HCC) - followed by Dr. Jay   • CAD (coronary artery disease)   • Follow-up surgery care   • Hypertriglyceridemia   • Screen for colon cancer   • Nephrolithiasis   • Dysphagia -due to Schatzki's ring dilated 1/2021.  Dr. Wood   • Obesity due to excess calories with serious comorbidity   • Chronic venous insufficiency   • GERD (gastroesophageal reflux disease)   • Hyperlipidemia associated with type 2 diabetes mellitus (HCC)   • Dysthymia -started Lexapro 2/2021   • Male erectile dysfunction     Advance Care Planning  Advance Directive is not on file.  ACP discussion was held with the patient during this visit. Patient does not have an advance directive, information provided.          Objective    Vitals:    12/06/21 1431   BP: 134/74   Pulse: 72   Temp: 97.5 °F (36.4 °C)   TempSrc: Tympanic   Weight: 105 kg (230 lb 6.4 oz)   Height: 167.6 cm (66\")   PainSc: 0-No pain     BMI Readings from Last 1 Encounters:   12/06/21 37.19 kg/m²   BMI is above normal parameters. Recommendations include: exercise counseling and nutrition counseling    Does the patient have evidence of cognitive impairment? No    Physical Exam            HEALTH RISK ASSESSMENT    Smoking Status:  Social History     Tobacco Use   Smoking Status Never Smoker   Smokeless Tobacco Never Used     Alcohol Consumption:  Social History     Substance and Sexual Activity   Alcohol Use No     Fall Risk Screen:    BRIAADI Fall Risk Assessment was completed, and patient is at LOW risk for falls.Assessment completed on:12/6/2021    Depression Screening:  PHQ-2/PHQ-9 Depression Screening 12/6/2021   Little interest or pleasure in doing things 0   Feeling down, depressed, or hopeless 0   Trouble falling or staying asleep, or sleeping too much -   Feeling tired or having little energy - "   Poor appetite or overeating -   Feeling bad about yourself - or that you are a failure or have let yourself or your family down -   Trouble concentrating on things, such as reading the newspaper or watching television -   Moving or speaking so slowly that other people could have noticed. Or the opposite - being so fidgety or restless that you have been moving around a lot more than usual -   Thoughts that you would be better off dead, or of hurting yourself in some way -   Total Score 0   If you checked off any problems, how difficult have these problems made it for you to do your work, take care of things at home, or get along with other people? -       Health Habits and Functional and Cognitive Screening:  Functional & Cognitive Status 12/6/2021   Do you have difficulty preparing food and eating? Yes   Do you have difficulty bathing yourself, getting dressed or grooming yourself? No   Do you have difficulty using the toilet? No   Do you have difficulty moving around from place to place? No   Do you have trouble with steps or getting out of a bed or a chair? No   Current Diet Well Balanced Diet   Dental Exam Up to date   Eye Exam Up to date   Exercise (times per week) 3 times per week   Current Exercises Include Yard Work;House Cleaning   Current Exercise Activities Include -   Do you need help using the phone?  No   Are you deaf or do you have serious difficulty hearing?  No   Do you need help with transportation? No   Do you need help shopping? No   Do you need help preparing meals?  No   Do you need help with housework?  No   Do you need help with laundry? No   Do you need help taking your medications? Yes   Do you need help managing money? No   Do you ever drive or ride in a car without wearing a seat belt? No   Have you felt unusual stress, anger or loneliness in the last month? No   Who do you live with? Spouse   If you need help, do you have trouble finding someone available to you? No   Have you been  bothered in the last four weeks by sexual problems? No   Do you have difficulty concentrating, remembering or making decisions? Yes       Age-appropriate Screening Schedule:  Refer to the list below for future screening recommendations based on patient's age, sex and/or medical conditions. Orders for these recommended tests are listed in the plan section. The patient has been provided with a written plan.    Health Maintenance   Topic Date Due   • DIABETIC FOOT EXAM  04/10/2018   • DIABETIC EYE EXAM  04/01/2020   • URINE MICROALBUMIN  02/09/2022   • HEMOGLOBIN A1C  02/23/2022   • LIPID PANEL  08/23/2022   • ZOSTER VACCINE  Addressed   • INFLUENZA VACCINE  Discontinued              Assessment/Plan   CMS Preventative Services Quick Reference  Risk Factors Identified During Encounter  Immunizations Discussed/Encouraged (specific Immunizations; Influenza, Pneumococcal 23 and Shingrix  The above risks/problems have been discussed with the patient.  He continues to decline influenza and pneumococcal vaccinations.  He agrees to Shingrix vaccination.  He has already received his COVID-19 booster last month  Follow up actions/plans if indicated are seen below in the Assessment/Plan Section.  Pertinent information has been shared with the patient in the After Visit Summary.    Diagnoses and all orders for this visit:    1. Medicare annual wellness visit, subsequent (Primary)    2. Orthostatic hypotension    3. Dizzy    4. Chronic laryngitis  -     Cancel: Ambulatory Referral to ENT (Otolaryngology)    5. Skin lesion  -     Ambulatory Referral to General Surgery    6. Need for shingles vaccine  -     Shingrix Vaccine    7. 23-polyvalent pneumococcal polysaccharide vaccine declined    8. Influenza vaccination declined by patient    Other orders  -     Zoster Vac Recomb Adjuvanted 50 MCG/0.5ML reconstituted suspension; Inject 0.5 mL into the appropriate muscle as directed by prescriber Every 2 (Two) Months.  Dispense: 1 each;  Refill: 1  -     meclizine (ANTIVERT) 25 MG tablet; Take 1 tablet by mouth 3 (Three) Times a Day As Needed for Dizziness.  Dispense: 20 tablet; Refill: 0        Follow Up:   Return for Next scheduled follow up.     An After Visit Summary and PPPS were made available to the patient.        I spent 8 minutes caring for Nick on this date of service. This time includes time spent by me in the following activities:preparing for the visit, performing a medically appropriate examination and/or evaluation , counseling and educating the patient/family/caregiver, ordering medications, tests, or procedures and documenting information in the medical record

## 2021-12-06 NOTE — PROGRESS NOTES
Chief Complaint  Dizziness (x 3 weeks. ), Medicare Wellness-subsequent, and Immunizations (Shingrix given in left deltoid and patient tolerated well. Patient supplied the meds )    Subjective          History of Present Illness     Nick Austin presents to the office reporting episodes of lightheadedness/dizziness, which he has noticed frequently with transferring positions. Episodes are occurring daily.  No unilateral/focal neurological symptoms.  No associated nausea, chest pain, diaphoresis.  Most likely, he is having orthostatic episodes with substantial 33 pound weight loss in the past couple of years.  He has been holding his Benicar HCT for the past 2 days and feels symptoms are somewhat improved.  Denies chest pain.  No syncopal or presyncopal episodes.  He fell backwards in his chair at home today when trying to stand up.  Denies any falls where he hit the floor.  Denies lower extremity edema.  He takes a dietary supplement to suppress appetite about every third day, although, not sure about the name.  He has noticed he is able to eat smaller portion sizes and eats fewer meals.  I congratulated patient on his weight loss and encouraged patient to intensify his weight loss efforts.  I warned him of recidivism, especially in this diabetic patient.  BMI is still far above goal at 37.2.    He has a benign appearing skin polyp just above left ear, easily traumatized by his glasses and when getting a hair cut.  I offered referral to surgeon for excision.  He is in agreement.     He is reporting weakening of the voice the longer he talks.  This has been an issue for the past year without significant change.  Denies dysphagia or GERD.  Denies history of tobacco use. Patient was evaluated by Dr. Wood in 2019 to work up oropharyngeal dysphagia, which improved post dilatation.  EGD was consistent with hiatal hernia and Schatzki's ring.  I offered referral to ENT, although, he would like to hold off for  "now.      Patient also completes subsequent Medicare wellness visit today.  He has been fully vaccinated for COVID including his booster 11/12/2021.  He is due for Pneumovax, influenza, and Shingrix.   He declines Pneumovax and influenza vaccines today, but does agree to Shingrix with his first vaccine given today in the office.  We will plan on giving his 2nd Shingrix dose when he is in the office in March.      Objective   Vital Signs:   /74   Pulse 72   Temp 97.5 °F (36.4 °C) (Tympanic)   Ht 167.6 cm (66\")   Wt 105 kg (230 lb 6.4 oz)   BMI 37.19 kg/m²       Physical Exam  Constitutional:       General: He is not in acute distress.     Appearance: He is well-developed.      Comments: Pleasant male, obese.    HENT:      Head: Normocephalic and atraumatic.      Nose: Nose normal.      Mouth/Throat:      Pharynx: No oropharyngeal exudate.   Eyes:      Pupils: Pupils are equal, round, and reactive to light.   Neck:      Thyroid: No thyromegaly.      Vascular: No JVD.   Cardiovascular:      Rate and Rhythm: Normal rate and regular rhythm.      Heart sounds: Normal heart sounds.   Pulmonary:      Effort: Pulmonary effort is normal. No accessory muscle usage or respiratory distress.      Breath sounds: Normal breath sounds. No wheezing or rales.   Abdominal:      General: Bowel sounds are normal. There is no distension.      Palpations: Abdomen is soft.      Tenderness: There is no abdominal tenderness.      Comments: Obese abdomen.    Musculoskeletal:      Cervical back: Neck supple.   Lymphadenopathy:      Cervical: No cervical adenopathy.   Skin:     Comments: Sun-damaged skin. A benign-appearing skin polyp just above left ear   Neurological:      Mental Status: He is alert and oriented to person, place, and time.      Cranial Nerves: No cranial nerve deficit.   Psychiatric:         Speech: Speech normal.         Behavior: Behavior normal.         Thought Content: Thought content normal.         Judgment: " Judgment normal.          Future Appointments   Date Time Provider Department Center   3/9/2022  8:30 AM [facility]       Result Review :     Common labs    Common Labsle 2/9/21 2/9/21 2/9/21 2/9/21 2/9/21 2/9/21 8/23/21 8/23/21 8/23/21 8/23/21 8/23/21    0717 0717 0717 0717 0717 [id number] [id number]   Glucose  107 (A)      112 (A)      BUN  17      18      Creatinine  1.11      1.04      eGFR Non African Am  65      70      Sodium  140      140      Potassium  3.7      3.9      Chloride  102      107      Calcium  9.2      9.4      Albumin  4.50      4.50      Total Bilirubin  0.7      0.5      Alkaline Phosphatase  61      86      AST (SGOT)  36      30      ALT (SGPT)  44      37      WBC 6.91      8.30       Hemoglobin 15.0      15.6       Hematocrit 45.8      45.8       Platelets 259      272       Total Cholesterol         128 (A)     Triglycerides   165 (A)      230 (A)     HDL Cholesterol         27 (A)     LDL Cholesterol     86     63     Hemoglobin A1C     6.14 (A)     5.75 (A)    Microalbumin, Urine      <1.2        PSA           1.030   (A) Abnormal value                      Assessment and Plan    Diagnoses and all orders for this visit:    1. Medicare annual wellness visit, subsequent (Primary)    2. Orthostatic hypotension    3. Dizzy    4. Chronic laryngitis  -     Cancel: Ambulatory Referral to ENT (Otolaryngology)    5. Skin lesion  -     Ambulatory Referral to General Surgery    6. Need for shingles vaccine  -     Shingrix Vaccine    7. 23-polyvalent pneumococcal polysaccharide vaccine declined    8. Influenza vaccination declined by patient    Other orders  -     Zoster Vac Recomb Adjuvanted 50 MCG/0.5ML reconstituted suspension; Inject 0.5 mL into the appropriate muscle as directed by prescriber Every 2 (Two) Months.  Dispense: 1 each; Refill: 1  -     meclizine (ANTIVERT) 25 MG tablet; Take 1 tablet by mouth 3 (Three) Times a Day As Needed for Dizziness.   Dispense: 20 tablet; Refill: 0      I spent 25 minutes caring for Nick on this date of service. This time includes time spent by me in the following activities:preparing for the visit, reviewing tests, performing a medically appropriate examination and/or evaluation , counseling and educating the patient/family/caregiver, ordering medications, tests, or procedures, referring and communicating with other health care professionals  and documenting information in the medical record.  This does not include time spent on his Medicare AWV today    I recommended patient stop the Benicar HCT for now due to orthostatic symptoms.  Push fluids he has held it for the past 2 days, and his symptoms seem to be improving.  Push fluids whenever he feels orthostatic.  He is congratulated on his weight loss and encouraged to intensify his dietary efforts.  Continue to monitor BP at home and notify me if the orthostatic symptoms do not resolve completely with holding the Benicar.  Meclizine as needed for any dizziness, but may not need it.  Notify me if his symptoms do not fully resolve over the course of the next week.    A prescription is sent for Shingrix to [facility].  Patient brings the first dose of Shingrix to the office today.  After informed verbal consent, patient is given the first dose of Shingrix.  He tolerated well.  We will plan to give 2nd dose of the Shingrix vaccine when he is here in March for routine follow up visit.  He declines flu and Pneumovax vaccines.      Refer to Dr. Sen, general surgery, for evaluation and excision of benign-appearing skin polyp just above left ear.    I offered referral to ENT to address the chronic laryngitis.  He declines for now.       Return  03/09/2022 for routine follow up with fasting labs one week prior or sooner if needed.     Scribed for Dr. Magdaleno by Harriet Gardner [facility].        Follow Up   Return for Next scheduled follow up.  Patient was given  instructions and counseling regarding his condition or for health maintenance advice. Please see specific information pulled into the AVS if appropriate.

## 2022-02-14 RX ORDER — LEVOTHYROXINE SODIUM 88 MCG
TABLET ORAL
Qty: 90 TABLET | Refills: 1 | Status: SHIPPED | OUTPATIENT
Start: 2022-02-14 | End: 2022-05-04

## 2022-02-14 RX ORDER — OLMESARTAN MEDOXOMIL AND HYDROCHLOROTHIAZIDE 40/12.5 40; 12.5 MG/1; MG/1
TABLET ORAL
Qty: 90 TABLET | Refills: 1 | Status: SHIPPED | OUTPATIENT
Start: 2022-02-14 | End: 2022-04-20

## 2022-02-14 RX ORDER — FENOFIBRATE 48 MG/1
TABLET, COATED ORAL
Qty: 90 TABLET | Refills: 1 | Status: SHIPPED | OUTPATIENT
Start: 2022-02-14 | End: 2022-04-20

## 2022-03-09 ENCOUNTER — LAB (OUTPATIENT)
Dept: LAB | Facility: OTHER | Age: 72
End: 2022-03-09

## 2022-03-09 DIAGNOSIS — I25.119 CORONARY ARTERY DISEASE INVOLVING NATIVE CORONARY ARTERY OF NATIVE HEART WITH ANGINA PECTORIS: Chronic | ICD-10-CM

## 2022-03-09 DIAGNOSIS — E11.9 TYPE 2 DIABETES MELLITUS WITHOUT COMPLICATION, WITHOUT LONG-TERM CURRENT USE OF INSULIN: Chronic | ICD-10-CM

## 2022-03-09 DIAGNOSIS — E78.5 HYPERLIPIDEMIA ASSOCIATED WITH TYPE 2 DIABETES MELLITUS: Chronic | ICD-10-CM

## 2022-03-09 DIAGNOSIS — I10 ESSENTIAL HYPERTENSION: Chronic | ICD-10-CM

## 2022-03-09 DIAGNOSIS — E03.9 ACQUIRED HYPOTHYROIDISM: ICD-10-CM

## 2022-03-09 DIAGNOSIS — E11.69 HYPERLIPIDEMIA ASSOCIATED WITH TYPE 2 DIABETES MELLITUS: Chronic | ICD-10-CM

## 2022-03-09 DIAGNOSIS — E78.1 HYPERTRIGLYCERIDEMIA: Chronic | ICD-10-CM

## 2022-03-09 LAB
ALBUMIN SERPL-MCNC: 4.4 G/DL (ref 3.5–5)
ALBUMIN/GLOB SERPL: 1.4 G/DL (ref 1.1–1.8)
ALP SERPL-CCNC: 68 U/L (ref 38–126)
ALT SERPL W P-5'-P-CCNC: 28 U/L
ANION GAP SERPL CALCULATED.3IONS-SCNC: 8 MMOL/L (ref 5–15)
ARTICHOKE IGE QN: 85 MG/DL (ref 0–100)
AST SERPL-CCNC: 29 U/L (ref 17–59)
BASOPHILS # BLD AUTO: 0.07 10*3/MM3 (ref 0–0.2)
BASOPHILS NFR BLD AUTO: 0.9 % (ref 0–1.5)
BILIRUB SERPL-MCNC: 0.5 MG/DL (ref 0.2–1.3)
BUN SERPL-MCNC: 23 MG/DL (ref 7–23)
BUN/CREAT SERPL: 18.5 (ref 7–25)
CALCIUM SPEC-SCNC: 9.1 MG/DL (ref 8.4–10.2)
CHLORIDE SERPL-SCNC: 103 MMOL/L (ref 101–112)
CO2 SERPL-SCNC: 30 MMOL/L (ref 22–30)
CREAT SERPL-MCNC: 1.24 MG/DL (ref 0.7–1.3)
DEPRECATED RDW RBC AUTO: 48 FL (ref 37–54)
EGFRCR SERPLBLD CKD-EPI 2021: 61.4 ML/MIN/1.73
EOSINOPHIL # BLD AUTO: 0.37 10*3/MM3 (ref 0–0.4)
EOSINOPHIL NFR BLD AUTO: 4.8 % (ref 0.3–6.2)
ERYTHROCYTE [DISTWIDTH] IN BLOOD BY AUTOMATED COUNT: 14.1 % (ref 12.3–15.4)
GLOBULIN UR ELPH-MCNC: 3.1 GM/DL (ref 2.3–3.5)
GLUCOSE SERPL-MCNC: 112 MG/DL (ref 70–99)
HBA1C MFR BLD: 5.9 % (ref 4.8–5.6)
HCT VFR BLD AUTO: 46.1 % (ref 37.5–51)
HGB BLD-MCNC: 15.3 G/DL (ref 13–17.7)
LYMPHOCYTES # BLD AUTO: 2.32 10*3/MM3 (ref 0.7–3.1)
LYMPHOCYTES NFR BLD AUTO: 30.2 % (ref 19.6–45.3)
MCH RBC QN AUTO: 31.9 PG (ref 26.6–33)
MCHC RBC AUTO-ENTMCNC: 33.2 G/DL (ref 31.5–35.7)
MCV RBC AUTO: 96 FL (ref 79–97)
MONOCYTES # BLD AUTO: 0.47 10*3/MM3 (ref 0.1–0.9)
MONOCYTES NFR BLD AUTO: 6.1 % (ref 5–12)
NEUTROPHILS NFR BLD AUTO: 4.44 10*3/MM3 (ref 1.7–7)
NEUTROPHILS NFR BLD AUTO: 58 % (ref 42.7–76)
PLATELET # BLD AUTO: 277 10*3/MM3 (ref 140–450)
PMV BLD AUTO: 10.9 FL (ref 6–12)
POTASSIUM SERPL-SCNC: 3.8 MMOL/L (ref 3.4–5)
PROT SERPL-MCNC: 7.5 G/DL (ref 6.3–8.6)
RBC # BLD AUTO: 4.8 10*6/MM3 (ref 4.14–5.8)
SODIUM SERPL-SCNC: 141 MMOL/L (ref 137–145)
T4 FREE SERPL-MCNC: 1.41 NG/DL (ref 0.93–1.7)
TRIGL SERPL-MCNC: 284 MG/DL
TSH SERPL DL<=0.05 MIU/L-ACNC: 2.53 UIU/ML (ref 0.27–4.2)
VIT B12 BLD-MCNC: 887 PG/ML (ref 211–946)
WBC NRBC COR # BLD: 7.67 10*3/MM3 (ref 3.4–10.8)

## 2022-03-09 PROCEDURE — 85025 COMPLETE CBC W/AUTO DIFF WBC: CPT | Performed by: INTERNAL MEDICINE

## 2022-03-09 PROCEDURE — 83721 ASSAY OF BLOOD LIPOPROTEIN: CPT | Performed by: INTERNAL MEDICINE

## 2022-03-09 PROCEDURE — 84439 ASSAY OF FREE THYROXINE: CPT | Performed by: INTERNAL MEDICINE

## 2022-03-09 PROCEDURE — 82607 VITAMIN B-12: CPT | Performed by: INTERNAL MEDICINE

## 2022-03-09 PROCEDURE — 82570 ASSAY OF URINE CREATININE: CPT | Performed by: INTERNAL MEDICINE

## 2022-03-09 PROCEDURE — 80053 COMPREHEN METABOLIC PANEL: CPT | Performed by: INTERNAL MEDICINE

## 2022-03-09 PROCEDURE — 84443 ASSAY THYROID STIM HORMONE: CPT | Performed by: INTERNAL MEDICINE

## 2022-03-09 PROCEDURE — 84478 ASSAY OF TRIGLYCERIDES: CPT | Performed by: INTERNAL MEDICINE

## 2022-03-09 PROCEDURE — 83036 HEMOGLOBIN GLYCOSYLATED A1C: CPT | Performed by: INTERNAL MEDICINE

## 2022-03-09 PROCEDURE — 82043 UR ALBUMIN QUANTITATIVE: CPT | Performed by: INTERNAL MEDICINE

## 2022-03-09 PROCEDURE — 36415 COLL VENOUS BLD VENIPUNCTURE: CPT | Performed by: INTERNAL MEDICINE

## 2022-03-10 LAB
ALBUMIN UR-MCNC: <1.2 MG/DL
CREAT UR-MCNC: 202.7 MG/DL
MICROALBUMIN/CREAT UR: NORMAL MG/G{CREAT}

## 2022-04-20 ENCOUNTER — OFFICE VISIT (OUTPATIENT)
Dept: FAMILY MEDICINE CLINIC | Facility: CLINIC | Age: 72
End: 2022-04-20

## 2022-04-20 VITALS
SYSTOLIC BLOOD PRESSURE: 128 MMHG | HEIGHT: 66 IN | WEIGHT: 231 LBS | TEMPERATURE: 97.5 F | BODY MASS INDEX: 37.12 KG/M2 | DIASTOLIC BLOOD PRESSURE: 72 MMHG | HEART RATE: 74 BPM | OXYGEN SATURATION: 98 %

## 2022-04-20 DIAGNOSIS — R13.10 DYSPHAGIA, UNSPECIFIED TYPE: ICD-10-CM

## 2022-04-20 DIAGNOSIS — E03.9 ACQUIRED HYPOTHYROIDISM: ICD-10-CM

## 2022-04-20 DIAGNOSIS — H69.83 DYSFUNCTION OF BOTH EUSTACHIAN TUBES: Chronic | ICD-10-CM

## 2022-04-20 DIAGNOSIS — E11.9 TYPE 2 DIABETES MELLITUS WITHOUT COMPLICATION, WITHOUT LONG-TERM CURRENT USE OF INSULIN: Primary | Chronic | ICD-10-CM

## 2022-04-20 DIAGNOSIS — E78.1 HYPERTRIGLYCERIDEMIA: Chronic | ICD-10-CM

## 2022-04-20 DIAGNOSIS — I25.119 CORONARY ARTERY DISEASE INVOLVING NATIVE CORONARY ARTERY OF NATIVE HEART WITH ANGINA PECTORIS: Chronic | ICD-10-CM

## 2022-04-20 DIAGNOSIS — Z12.5 SPECIAL SCREENING FOR MALIGNANT NEOPLASM OF PROSTATE: ICD-10-CM

## 2022-04-20 DIAGNOSIS — E11.69 HYPERLIPIDEMIA ASSOCIATED WITH TYPE 2 DIABETES MELLITUS: Chronic | ICD-10-CM

## 2022-04-20 DIAGNOSIS — I10 ESSENTIAL HYPERTENSION: Chronic | ICD-10-CM

## 2022-04-20 DIAGNOSIS — E78.5 HYPERLIPIDEMIA ASSOCIATED WITH TYPE 2 DIABETES MELLITUS: Chronic | ICD-10-CM

## 2022-04-20 DIAGNOSIS — K21.9 GASTROESOPHAGEAL REFLUX DISEASE, UNSPECIFIED WHETHER ESOPHAGITIS PRESENT: Chronic | ICD-10-CM

## 2022-04-20 DIAGNOSIS — Z23 NEED FOR SHINGLES VACCINE: ICD-10-CM

## 2022-04-20 PROBLEM — I25.118 CORONARY ARTERY DISEASE OF NATIVE ARTERY OF NATIVE HEART WITH STABLE ANGINA PECTORIS: Chronic | Status: RESOLVED | Noted: 2017-09-07 | Resolved: 2022-04-20

## 2022-04-20 PROBLEM — I25.118 CORONARY ARTERY DISEASE OF NATIVE ARTERY OF NATIVE HEART WITH STABLE ANGINA PECTORIS: Chronic | Status: ACTIVE | Noted: 2017-09-07

## 2022-04-20 PROCEDURE — 99215 OFFICE O/P EST HI 40 MIN: CPT | Performed by: INTERNAL MEDICINE

## 2022-04-20 PROCEDURE — 90471 IMMUNIZATION ADMIN: CPT | Performed by: INTERNAL MEDICINE

## 2022-04-20 RX ORDER — FLUTICASONE PROPIONATE 50 MCG
1 SPRAY, SUSPENSION (ML) NASAL 2 TIMES DAILY
Qty: 47.4 ML | Refills: 3 | Status: ON HOLD | OUTPATIENT
Start: 2022-04-20 | End: 2022-09-19

## 2022-04-20 RX ORDER — LOSARTAN POTASSIUM 100 MG/1
50 TABLET ORAL 2 TIMES DAILY
COMMUNITY
End: 2022-05-23 | Stop reason: DRUGHIGH

## 2022-04-20 RX ORDER — FENOFIBRATE 48 MG/1
48 TABLET, COATED ORAL DAILY
Qty: 90 TABLET | Refills: 1 | Status: SHIPPED | OUTPATIENT
Start: 2022-04-20 | End: 2022-10-03

## 2022-04-20 NOTE — PROGRESS NOTES
Chief Complaint  Follow-up (labs) and Dizziness (Pt c/o dizziness x 1-2 months, worse with changing positions and turning head from side to side.)    Subjective          History of Present Illness     Nick Austin (ROSANNA) presents to the office  for 6-month follow up on chronic medical issues including essential hypertension, hyperlipidemia, CAD, hypothyroidism, GERD, and Type 2 diabetes mellitus diet controlled and greatly improved with weight loss.       Patient had 1st dose of Shingrix 12/06/2021 and is now due 2nd dose, which he picks up from the pharmacy and brings to the office today to be administered. .     Patient is having recurrence of dysphagia symptoms, most recently getting choked on chicken a couple of nights ago.  He saw gastroenterology in the past for dysphagia likely due to Schatzki's ring, which improved post dilatation 2019.  We will refer back to GI.  He is requesting to be seen locally, in our clinic if possible.  We will refer to Dr. Flores.    In December, we had him stop Benicar HCT due to orthostatic symptoms.  He is now taking losartan 100 mg daily with good results.  His weight is down 32 pounds since in person office visit 2 1/2 years ago prior to COVID pandemic.  He has reduced his daily caloric intake, but still quite a bit of room for improvement in his diet.  BMI is still very high at 37.      He is describing new problems of some eustachian tube dysfunction, increased postnasal drip and possibly mild orthostatic symptoms. He has noticed some popping and pressure of the ears, tinnitus, as well as increased postnasal drip.      I started Lexapro 02/2021 for FINA, although, patient is no longer taking.     With weight loss, patient has been able to delay progression of diabetes and remains diet controlled..    The patient's relevant past medical, surgical, and social history was reviewed in Epic.   Lab results are reviewed with the patient today.  CBC unremarkable. Fasting  "glucose 112.  A1c 5.9.  Renal function declined with creatinine upper limits or normal at 1.24.  Normal liver function. Hypothyroidism at goal with Synthroid 88 mcg daily.   B-12 at goal.  Triglycerides have been trending up, although, patient has inadvertently gotten off of his Tricor.        Objective   Vital Signs:   /72   Pulse 74   Temp 97.5 °F (36.4 °C)   Ht 167.6 cm (66\")   Wt 105 kg (231 lb)   SpO2 98%   BMI 37.28 kg/m²     BMI is above normal parameters. Recommendations: educational material discussed/shared in after visit summary       Physical Exam  Vitals reviewed.   Constitutional:       General: He is not in acute distress.     Appearance: He is well-developed.      Comments: Pleasant male, obese.    HENT:      Head: Normocephalic and atraumatic.      Ears:      Comments: Clear effusions bilaterally, right greater than left.       Nose:      Right Sinus: No maxillary sinus tenderness or frontal sinus tenderness.      Left Sinus: No maxillary sinus tenderness or frontal sinus tenderness.      Mouth/Throat:      Mouth: No oral lesions.      Pharynx: Uvula midline.      Tonsils: No tonsillar exudate.      Comments: Clear postnasal drip.   Eyes:      Conjunctiva/sclera: Conjunctivae normal.      Pupils: Pupils are equal, round, and reactive to light.   Neck:      Thyroid: No thyroid mass or thyromegaly.      Vascular: No carotid bruit or JVD.      Trachea: Trachea normal. No tracheal deviation.   Cardiovascular:      Rate and Rhythm: Normal rate and regular rhythm.  No extrasystoles are present.     Chest Wall: PMI is not displaced.      Heart sounds: Normal heart sounds. No murmur heard.  Pulmonary:      Effort: Pulmonary effort is normal. No accessory muscle usage or respiratory distress.      Breath sounds: Normal breath sounds. No decreased breath sounds, wheezing, rhonchi or rales.   Abdominal:      General: Bowel sounds are normal. There is no distension.      Palpations: Abdomen is soft. "      Tenderness: There is no abdominal tenderness.   Musculoskeletal:      Cervical back: Neck supple.   Lymphadenopathy:      Cervical: No cervical adenopathy.   Skin:     General: Skin is warm and dry.      Findings: No rash.      Nails: There is no clubbing.   Neurological:      Mental Status: He is alert and oriented to person, place, and time.      Cranial Nerves: No cranial nerve deficit.      Coordination: Coordination normal.   Psychiatric:         Speech: Speech normal.         Behavior: Behavior normal.         Thought Content: Thought content normal.         Judgment: Judgment normal.            Result Review :     CMP    CMP 8/23/21 3/9/22   Glucose 112 (A) 112 (A)   BUN 18 23   Creatinine 1.04 1.24   eGFR Non African Am 70    Sodium 140 141   Potassium 3.9 3.8   Chloride 107 103   Calcium 9.4 9.1   Albumin 4.50 4.40   Total Bilirubin 0.5 0.5   Alkaline Phosphatase 86 68   AST (SGOT) 30 29   ALT (SGPT) 37 28   (A) Abnormal value            CBC w/diff    CBC w/Diff 8/23/21 3/9/22   WBC 8.30 7.67   RBC 4.75 4.80   Hemoglobin 15.6 15.3   Hematocrit 45.8 46.1   MCV 96.4 96.0   MCH 32.8 31.9   MCHC 34.1 33.2   RDW 14.3 14.1   Platelets 272 277   Neutrophil Rel % 60.5 58.0   Lymphocyte Rel % 28.1 30.2   Monocyte Rel % 6.7 6.1   Eosinophil Rel % 4.2 4.8   Basophil Rel % 0.5 0.9           Lipid Panel    Lipid Panel 8/23/21 3/9/22 3/9/22     0714 0714   Total Cholesterol 128 (A)     Triglycerides 230 (A) 284 (A)    HDL Cholesterol 27 (A)     VLDL Cholesterol 38     LDL Cholesterol  63  85   LDL/HDL Ratio 2.04     (A) Abnormal value            TSH    TSH 8/23/21 3/9/22   TSH 2.060 2.530           A1C Last 3 Results    HGBA1C Last 3 Results 8/23/21 3/9/22   Hemoglobin A1C 5.75 (A) 5.90 (A)   (A) Abnormal value            PSA    PSA 8/23/21   PSA 1.030           Data reviewed: GI studies Dr. Wood, gastroenterology          Assessment and Plan    Diagnoses and all orders for this visit:    1. Type 2 diabetes  mellitus without complication, without long-term current use of insulin (HCC) (Primary)  -     CBC Auto Differential; Future  -     Comprehensive Metabolic Panel; Future  -     Hemoglobin A1c; Future  -     Lipid Panel; Future  -     TSH; Future  -     T4, free; Future    2. Essential hypertension  -     Comprehensive Metabolic Panel; Future    3. Hypertriglyceridemia  -     Lipid Panel; Future    4. Hyperlipidemia associated with type 2 diabetes mellitus (HCC)  -     Lipid Panel; Future    5. Coronary artery disease involving native coronary artery of native heart with angina pectoris (CMS/HCC) - followed by Dr. Jay  -     Lipid Panel; Future    6. Dysphagia, unspecified type  -     Ambulatory Referral to Gastroenterology  -     TSH; Future    7. Gastroesophageal reflux disease, unspecified whether esophagitis present  -     CBC Auto Differential; Future    8. Need for shingles vaccine  -     Shingrix Vaccine    9. Dysfunction of both eustachian tubes    10. Acquired hypothyroidism  -     TSH; Future  -     T4, free; Future    11. Special screening for malignant neoplasm of prostate  -     PSA Screen; Future    Other orders  -     fenofibrate (TRICOR) 48 MG tablet; Take 1 tablet by mouth Daily.  Dispense: 90 tablet; Refill: 1  -     fluticasone (FLONASE) 50 MCG/ACT nasal spray; 1 spray into the nostril(s) as directed by provider 2 (Two) Times a Day. Administer 1 spray in each nostril twice daily.  Dispense: 47.4 mL; Refill: 3  -     Zoster Vac Recomb Adjuvanted 50 MCG/0.5ML reconstituted suspension; Inject 0.5 mL into the appropriate muscle as directed by prescriber Every 2 (Two) Months.  Dispense: 1 each; Refill: 1         I spent 41 minutes caring for Nick on this date of service. This time includes time spent by me in the following activities:preparing for the visit, reviewing tests, obtaining and/or reviewing a separately obtained history, performing a medically appropriate examination and/or  evaluation , counseling and educating the patient/family/caregiver, ordering medications, tests, or procedures, referring and communicating with other health care professionals  and documenting information in the medical record     His dizziness episodes appear to be related both to orthostatic symptoms as well as eustachian tube dysfunction.  I asked patient to split his dose of losartan 100 mg taking 1/2 tablet b.i.d. to cover the orthostatic symptoms and also sent a prescription for Flonase nasal spray to use one spray each nostril to address the eustachian tube dysfunction.      After informed verbal consent, patient is given 2nd dose of Shingrix. He tolerated well.     Resume fenofibrate 48 mg to help lower triglycerides.  Continue the current cardiovascular risk factor modifications including Lipitor, aspirin and Plavix and losartan. Blood pressure is at goal today in the office.  Continue to monitor BP, although, the wrist cuff is not always accurate. .      He is congratulated on his over 30-pound weight loss and encouraged to continue his weight loss efforts.  Refer to Dr. Flores, GI, for dysphagia in this patient with history of GERD and Schatzki's ring that required dilation in the past.     Congratulated on weight loss and encourage to continue his weight loss efforts.  Diabetes improved and currently diet controlled with the weight loss.     Continue current dose Synthroid. Hypothyroidism at goal.      Recommended OTC hydrocortisone cream for the flaky skin on ear canals.     Return in six months with fasting labs one week prior or sooner if needed.      Scribed for Dr. Magdaleno by Harriet Gardner [facility].     Follow Up   Return in about 6 months (around 10/20/2022) for Follow up in six months with labs one week prior..  Patient was given instructions and counseling regarding his condition or for health maintenance advice. Please see specific information pulled into the AVS if appropriate.

## 2022-04-25 ENCOUNTER — OFFICE VISIT (OUTPATIENT)
Dept: GASTROENTEROLOGY | Facility: CLINIC | Age: 72
End: 2022-04-25

## 2022-04-25 VITALS
BODY MASS INDEX: 37.03 KG/M2 | HEIGHT: 66 IN | SYSTOLIC BLOOD PRESSURE: 135 MMHG | WEIGHT: 230.4 LBS | DIASTOLIC BLOOD PRESSURE: 79 MMHG | HEART RATE: 73 BPM

## 2022-04-25 DIAGNOSIS — R13.19 ESOPHAGEAL DYSPHAGIA: Primary | ICD-10-CM

## 2022-04-25 PROCEDURE — 99213 OFFICE O/P EST LOW 20 MIN: CPT | Performed by: INTERNAL MEDICINE

## 2022-04-25 RX ORDER — DEXTROSE AND SODIUM CHLORIDE 5; .45 G/100ML; G/100ML
30 INJECTION, SOLUTION INTRAVENOUS CONTINUOUS PRN
Status: CANCELLED | OUTPATIENT
Start: 2022-05-06

## 2022-04-25 RX ORDER — OMEPRAZOLE 40 MG/1
40 CAPSULE, DELAYED RELEASE ORAL DAILY
Qty: 90 CAPSULE | Refills: 3 | Status: SHIPPED | OUTPATIENT
Start: 2022-04-25 | End: 2022-12-12
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
## 2022-05-03 ENCOUNTER — LAB (OUTPATIENT)
Dept: LAB | Facility: HOSPITAL | Age: 72
End: 2022-05-03

## 2022-05-03 DIAGNOSIS — R13.19 ESOPHAGEAL DYSPHAGIA: ICD-10-CM

## 2022-05-03 LAB — SARS-COV-2 N GENE RESP QL NAA+PROBE: NOT DETECTED

## 2022-05-03 PROCEDURE — 87635 SARS-COV-2 COVID-19 AMP PRB: CPT

## 2022-05-04 RX ORDER — SILDENAFIL CITRATE 20 MG/1
20 TABLET ORAL AS NEEDED
COMMUNITY
End: 2022-05-17

## 2022-05-04 RX ORDER — LEVOTHYROXINE SODIUM 88 UG/1
88 TABLET ORAL DAILY
COMMUNITY
End: 2022-07-19

## 2022-05-04 RX ORDER — ESCITALOPRAM OXALATE 5 MG/1
5 TABLET ORAL DAILY
COMMUNITY
End: 2022-10-03

## 2022-05-04 RX ORDER — CLOPIDOGREL BISULFATE 75 MG/1
75 TABLET ORAL DAILY
COMMUNITY
End: 2023-01-27

## 2022-05-04 RX ORDER — TAMSULOSIN HYDROCHLORIDE 0.4 MG/1
1 CAPSULE ORAL NIGHTLY
COMMUNITY

## 2022-05-04 RX ORDER — ALBUTEROL SULFATE 90 UG/1
2 AEROSOL, METERED RESPIRATORY (INHALATION) EVERY 4 HOURS PRN
COMMUNITY
End: 2022-10-03

## 2022-05-04 RX ORDER — ATORVASTATIN CALCIUM 40 MG/1
40 TABLET, FILM COATED ORAL NIGHTLY
COMMUNITY
End: 2023-01-27

## 2022-05-04 RX ORDER — CLOPIDOGREL BISULFATE 75 MG/1
TABLET ORAL
Qty: 90 TABLET | Refills: 3 | Status: SHIPPED | OUTPATIENT
Start: 2022-05-04 | End: 2022-05-04

## 2022-05-04 RX ORDER — TAMSULOSIN HYDROCHLORIDE 0.4 MG/1
CAPSULE ORAL
Qty: 90 CAPSULE | Refills: 3 | Status: SHIPPED | OUTPATIENT
Start: 2022-05-04 | End: 2022-05-04

## 2022-05-04 RX ORDER — ATORVASTATIN CALCIUM 40 MG/1
TABLET, FILM COATED ORAL
Qty: 90 TABLET | Refills: 3 | Status: SHIPPED | OUTPATIENT
Start: 2022-05-04 | End: 2022-05-04

## 2022-05-06 ENCOUNTER — ANESTHESIA EVENT (OUTPATIENT)
Dept: GASTROENTEROLOGY | Facility: HOSPITAL | Age: 72
End: 2022-05-06

## 2022-05-06 ENCOUNTER — HOSPITAL ENCOUNTER (OUTPATIENT)
Facility: HOSPITAL | Age: 72
Setting detail: HOSPITAL OUTPATIENT SURGERY
Discharge: HOME | End: 2022-05-06
Attending: INTERNAL MEDICINE | Admitting: INTERNAL MEDICINE

## 2022-05-06 ENCOUNTER — ANESTHESIA (OUTPATIENT)
Dept: GASTROENTEROLOGY | Facility: HOSPITAL | Age: 72
End: 2022-05-06

## 2022-05-06 VITALS
BODY MASS INDEX: 37.35 KG/M2 | RESPIRATION RATE: 16 BRPM | WEIGHT: 232.4 LBS | DIASTOLIC BLOOD PRESSURE: 65 MMHG | TEMPERATURE: 97.9 F | SYSTOLIC BLOOD PRESSURE: 127 MMHG | HEART RATE: 64 BPM | OXYGEN SATURATION: 98 % | HEIGHT: 66 IN

## 2022-05-06 DIAGNOSIS — R13.19 ESOPHAGEAL DYSPHAGIA: ICD-10-CM

## 2022-05-06 LAB — GLUCOSE BLDC GLUCOMTR-MCNC: 86 MG/DL (ref 70–130)

## 2022-05-06 PROCEDURE — 43239 EGD BIOPSY SINGLE/MULTIPLE: CPT | Performed by: INTERNAL MEDICINE

## 2022-05-06 PROCEDURE — 82962 GLUCOSE BLOOD TEST: CPT

## 2022-05-06 PROCEDURE — 88305 TISSUE EXAM BY PATHOLOGIST: CPT

## 2022-05-06 PROCEDURE — 43249 ESOPH EGD DILATION <30 MM: CPT | Performed by: INTERNAL MEDICINE

## 2022-05-06 PROCEDURE — C1726 CATH, BAL DIL, NON-VASCULAR: HCPCS | Performed by: INTERNAL MEDICINE

## 2022-05-06 PROCEDURE — 25010000002 PROPOFOL 10 MG/ML EMULSION: Performed by: NURSE ANESTHETIST, CERTIFIED REGISTERED

## 2022-05-06 RX ORDER — DEXTROSE AND SODIUM CHLORIDE 5; .45 G/100ML; G/100ML
30 INJECTION, SOLUTION INTRAVENOUS CONTINUOUS PRN
Status: DISCONTINUED | OUTPATIENT
Start: 2022-05-06 | End: 2022-05-06 | Stop reason: HOSPADM

## 2022-05-06 RX ORDER — LIDOCAINE HYDROCHLORIDE 20 MG/ML
INJECTION, SOLUTION INTRAVENOUS AS NEEDED
Status: DISCONTINUED | OUTPATIENT
Start: 2022-05-06 | End: 2022-05-06 | Stop reason: SURG

## 2022-05-06 RX ORDER — PROPOFOL 10 MG/ML
VIAL (ML) INTRAVENOUS AS NEEDED
Status: DISCONTINUED | OUTPATIENT
Start: 2022-05-06 | End: 2022-05-06 | Stop reason: SURG

## 2022-05-06 RX ADMIN — PROPOFOL 100 MG: 10 INJECTION, EMULSION INTRAVENOUS at 15:39

## 2022-05-06 RX ADMIN — DEXTROSE AND SODIUM CHLORIDE 30 ML/HR: 5; 450 INJECTION, SOLUTION INTRAVENOUS at 15:31

## 2022-05-06 RX ADMIN — PROPOFOL 50 MG: 10 INJECTION, EMULSION INTRAVENOUS at 15:43

## 2022-05-06 RX ADMIN — LIDOCAINE HYDROCHLORIDE 60 MG: 20 INJECTION, SOLUTION INTRAVENOUS at 15:39

## 2022-05-06 NOTE — ANESTHESIA PREPROCEDURE EVALUATION
Anesthesia Evaluation     Patient summary reviewed and Nursing notes reviewed   no history of anesthetic complications:  NPO Solid Status: > 8 hours  NPO Liquid Status: > 8 hours           Airway   Mallampati: III  TM distance: >3 FB  Neck ROM: full  Possible difficult intubation and Small opening  Dental - normal exam     Pulmonary    (+) asthma,  Cardiovascular     PT is on anticoagulation therapy    (+) hypertension 2 medications or greater, CAD, CABG >6 Months, angina with exertion, hyperlipidemia,       Neuro/Psych  (+) psychiatric history Depression,    GI/Hepatic/Renal/Endo    (+) morbid obesity, GERD well controlled,  diabetes mellitus type 2 well controlled, thyroid problem hypothyroidism    Musculoskeletal     (+) back pain, chronic pain,   Abdominal    Substance History - negative use     OB/GYN          Other   arthritis,                    Anesthesia Plan    ASA 3     MAC     intravenous induction     Anesthetic plan, all risks, benefits, and alternatives have been provided, discussed and informed consent has been obtained with: patient.        CODE STATUS:

## 2022-05-06 NOTE — ANESTHESIA POSTPROCEDURE EVALUATION
Patient: Nick Austin    Procedure Summary     Date: 05/06/22 Room / Location: [facility] ENDOSCOPY 1 / [facility] ENDOSCOPY    Anesthesia Start: 1535 Anesthesia Stop: 1548    Procedure: ESOPHAGOGASTRODUODENOSCOPY (N/A ) Diagnosis:       Esophageal dysphagia      (Esophageal dysphagia [R13.19])    Surgeons: Carlos Wood MD Provider: Rima Andrew CRNA    Anesthesia Type: MAC ASA Status: 3          Anesthesia Type: MAC    Vitals  No vitals data found for the desired time range.          Post Anesthesia Care and Evaluation    Patient location during evaluation: bedside  Patient participation: complete - patient participated  Level of consciousness: sleepy but conscious  Pain score: 0  Pain management: adequate  Airway patency: patent  Anesthetic complications: No anesthetic complications  PONV Status: none  Cardiovascular status: acceptable and hemodynamically stable  Respiratory status: acceptable and room air  Hydration status: acceptable

## 2022-05-09 ENCOUNTER — OFFICE VISIT (OUTPATIENT)
Dept: FAMILY MEDICINE CLINIC | Facility: CLINIC | Age: 72
End: 2022-05-09

## 2022-05-09 VITALS
BODY MASS INDEX: 37.83 KG/M2 | TEMPERATURE: 97.7 F | DIASTOLIC BLOOD PRESSURE: 80 MMHG | SYSTOLIC BLOOD PRESSURE: 146 MMHG | HEIGHT: 66 IN | HEART RATE: 64 BPM | WEIGHT: 235.4 LBS

## 2022-05-09 DIAGNOSIS — R13.19 ESOPHAGEAL DYSPHAGIA: Chronic | ICD-10-CM

## 2022-05-09 DIAGNOSIS — R07.9 CHEST PAIN, UNSPECIFIED TYPE: ICD-10-CM

## 2022-05-09 DIAGNOSIS — I10 ESSENTIAL HYPERTENSION: Primary | Chronic | ICD-10-CM

## 2022-05-09 DIAGNOSIS — R42 DIZZY: ICD-10-CM

## 2022-05-09 DIAGNOSIS — R41.3 SHORT-TERM MEMORY LOSS: ICD-10-CM

## 2022-05-09 PROCEDURE — 99214 OFFICE O/P EST MOD 30 MIN: CPT | Performed by: INTERNAL MEDICINE

## 2022-05-09 RX ORDER — AMLODIPINE BESYLATE 5 MG/1
5 TABLET ORAL DAILY
Qty: 30 TABLET | Refills: 5 | Status: SHIPPED | OUTPATIENT
Start: 2022-05-09 | End: 2022-12-08

## 2022-05-09 NOTE — PROGRESS NOTES
"Chief Complaint  Hypertension (States has been up x 3 weeks. . States he has been taking it at home with systolic 200's and diastolic 88's ) and Dizziness (When he sits or stands )    Subjective          History of Present Illness     Nick Austin presents to the office today reporting elevated BP for the past three weeks with systolic ranging 149-200 with diastolic in the upper 80s with losartan 50 mg b.i.d.  In discussion, he has been monitoring BP with a wrist cuff, which can be inaccurate. He also reports feeling dizzy with position transfers.  I had previously changed Benicar HCT to losartan 50 mg twice daily to address prior complaints of orthostatic symptoms.       He states he can be sitting watching television and feels a chest tightness/pressure episodically.  Denies any chest pain or pressure today, but reports he just \"feels something\" in the chest at times.  Denies feeling anxious.  Dr. Jay is his cardiologist, although, he has not see him for awhile.  I noted a 2/6 systolic murmur on exam today.   BP in the office today is only very slightly above goal and was at goal with his last three visits.  I am quite suspicious that his blood pressure wrist cuff monitor is wildly inaccurate.  I have previously asked him to purchase a good-quality blood pressure cuff for the arm, but he has not done so yet.    Patient states he is noticing some issues with memory loss.  He scores 1 out of 3 on immediate and 3-minute recall with prompts.  Denies known family history of Alzheimer's.  He admits he sometimes has a pause in memory when trying to remember where he is headed when driving.  Denies accidents.  He states his wife takes care of the finances and most business around their home.  He reports she is mentioning his memory deficits frequently, but none of this has been reported to me previously.  He missed the month (April) and year (2021), but knew the correct day of the week.  He struggled to " "remember the name of the current president, but did recall eventually.    Patient had recent upper endoscopy with esophageal dilation by Dr. Wood 04/25/2022 with resolution of dysphagia symptoms.            Objective   Vital Signs:  /80   Pulse 64   Temp 97.7 °F (36.5 °C) (Tympanic)   Ht 167.6 cm (66\")   Wt 107 kg (235 lb 6.4 oz)   BMI 37.99 kg/m²           Physical Exam  Vitals reviewed.   Constitutional:       General: He is not in acute distress.     Appearance: He is well-developed.      Comments: Pleasant male, obese.    HENT:      Head: Normocephalic and atraumatic.      Nose:      Right Sinus: No maxillary sinus tenderness or frontal sinus tenderness.      Left Sinus: No maxillary sinus tenderness or frontal sinus tenderness.      Mouth/Throat:      Mouth: No oral lesions.      Pharynx: Uvula midline.      Tonsils: No tonsillar exudate.   Eyes:      Conjunctiva/sclera: Conjunctivae normal.      Pupils: Pupils are equal, round, and reactive to light.   Neck:      Thyroid: No thyroid mass or thyromegaly.      Vascular: No carotid bruit or JVD.      Trachea: Trachea normal. No tracheal deviation.   Cardiovascular:      Rate and Rhythm: Normal rate and regular rhythm.  No extrasystoles are present.     Chest Wall: PMI is not displaced.      Heart sounds: Normal heart sounds. No murmur heard.     Comments: 2/6 systolic murmur at left sternal border.       Pulmonary:      Effort: Pulmonary effort is normal. No accessory muscle usage or respiratory distress.      Breath sounds: Normal breath sounds. No decreased breath sounds, wheezing, rhonchi or rales.   Abdominal:      General: Bowel sounds are normal. There is no distension.      Palpations: Abdomen is soft.      Tenderness: There is no abdominal tenderness.   Musculoskeletal:      Cervical back: Neck supple.   Lymphadenopathy:      Cervical: No cervical adenopathy.   Skin:     General: Skin is warm and dry.      Findings: No rash.      Nails: There " is no clubbing.   Neurological:      Mental Status: He is alert and oriented to person, place, and time.      Cranial Nerves: No cranial nerve deficit.      Coordination: Coordination normal.   Psychiatric:         Speech: Speech normal.         Behavior: Behavior normal.         Thought Content: Thought content normal.         Judgment: Judgment normal.            Result Review :     Common labs    Common Labsle 8/23/21 8/23/21 8/23/21 8/23/21 8/23/21 3/9/22 3/9/22 3/9/22 3/9/22 3/9/22 3/9/22    0842 0842 0842 0842 0842 0714 0714 0714 0714 0714 0714   Glucose  112 (A)     112 (A)       BUN  18     23       Creatinine  1.04     1.24       eGFR Non African Am  70            Sodium  140     141       Potassium  3.9     3.8       Chloride  107     103       Calcium  9.4     9.1       Albumin  4.50     4.40       Total Bilirubin  0.5     0.5       Alkaline Phosphatase  86     68       AST (SGOT)  30     29       ALT (SGPT)  37     28       WBC 8.30     7.67        Hemoglobin 15.6     15.3        Hematocrit 45.8     46.1        Platelets 272     277        Total Cholesterol   128 (A)           Triglycerides   230 (A)     284 (A)      HDL Cholesterol   27 (A)           LDL Cholesterol    63       85    Hemoglobin A1C    5.75 (A)     5.90 (A)     Microalbumin, Urine           <1.2   PSA     1.030         (A) Abnormal value            Lipid Panel    Lipid Panel 8/23/21 3/9/22 3/9/22     0714 0714   Total Cholesterol 128 (A)     Triglycerides 230 (A) 284 (A)    HDL Cholesterol 27 (A)     VLDL Cholesterol 38     LDL Cholesterol  63  85   LDL/HDL Ratio 2.04     (A) Abnormal value            Data reviewed: GI studies esophageal dilation by Dr. Wood 04/25/2022          Assessment and Plan    Diagnoses and all orders for this visit:    1. Essential hypertension (Primary)  -     Ambulatory Referral to Cardiology    2. Chest pain, unspecified type  -     Ambulatory Referral to Cardiology    3. Dizzy    4. Short-term memory  loss    5. Esophageal dysphagia    Other orders  -     amLODIPine (Norvasc) 5 MG tablet; Take 1 tablet by mouth Daily. For Hypertension  Dispense: 30 tablet; Refill: 5        Future Appointments   Date Time Provider Department [place]   5/17/2022  9:45 AM Carlos Wood MD [facility]   5/23/2022  9:00 AM Patrick Magdaleno MD [facility]   10/27/2022  9:30 AM Patrick Magdaleno MD [facility]        I spent 34 minutes caring for Nick on this date of service. This time includes time spent by me in the following activities:preparing for the visit, reviewing tests, obtaining and/or reviewing a separately obtained history, performing a medically appropriate examination and/or evaluation , counseling and educating the patient/family/caregiver, ordering medications, tests, or procedures, referring and communicating with other health care professionals  and documenting information in the medical record     Continue losartan 50 mg b.i.d.  I sent a prescription to add amlodipine (Norvasc) 5 mg daily. We will refer patient back to Dr. Jay, cardiology, for the chest pressure and elevated BP.  I recommended he purchase an arm BP monitor, which would be more accurate that his wrist cuff.  I asked him to bring in his wrist cuff for evaluation with next visit.   Monitor BP and HR at rest twice daily as well as anytime he is noticing orthostatic symptoms or chest pressure and keep a blood pressure diary to bring back in with his repeat office visit in 2 weeks.  I think it is very possible that his home blood pressure wrist cuff is wildly inaccurate.  Pursue sodium restriction and weight loss.       Return in two weeks for follow up on BP with diary and in October for routine follow up with fasting labs one week prior or sooner if needed.  We will plan on MMSE with patient's visit in two weeks to further evaluate memory loss.  I would like to also obtain CT imaging of the brain with next visit, as he is high risk to  have experienced an ischemic CVA, although there are no focal motor deficits noted    Fall precautions and orthostatic precautions due to his description of orthostatic lightheadedness/dizziness when he first stands.  I am concerned that additional blood pressure medication may worsen this issue.  He reports that he is careful when he first stands to make sure symptoms resolved before ambulating.    Scribed for Dr. Magdaleno by Harriet Gardner [facility].     Follow Up   Return in about 2 weeks (around 5/23/2022), or if symptoms worsen or fail to improve, for Next scheduled follow up.  Patient was given instructions and counseling regarding his condition or for health maintenance advice. Please see specific information pulled into the AVS if appropriate.

## 2022-05-10 LAB — REF LAB TEST METHOD: NORMAL

## 2022-05-17 ENCOUNTER — OFFICE VISIT (OUTPATIENT)
Dept: GASTROENTEROLOGY | Facility: CLINIC | Age: 72
End: 2022-05-17

## 2022-05-17 VITALS
DIASTOLIC BLOOD PRESSURE: 80 MMHG | HEIGHT: 66 IN | WEIGHT: 230.6 LBS | HEART RATE: 70 BPM | SYSTOLIC BLOOD PRESSURE: 155 MMHG | BODY MASS INDEX: 37.06 KG/M2

## 2022-05-17 DIAGNOSIS — R13.19 ESOPHAGEAL DYSPHAGIA: Primary | ICD-10-CM

## 2022-05-17 PROCEDURE — 99213 OFFICE O/P EST LOW 20 MIN: CPT | Performed by: INTERNAL MEDICINE

## 2022-05-17 RX ORDER — AMOXICILLIN 250 MG
2 CAPSULE ORAL DAILY
COMMUNITY

## 2022-05-17 RX ORDER — DEXTROSE AND SODIUM CHLORIDE 5; .45 G/100ML; G/100ML
30 INJECTION, SOLUTION INTRAVENOUS CONTINUOUS PRN
Status: CANCELLED | OUTPATIENT
Start: 2022-06-08

## 2022-05-23 ENCOUNTER — OFFICE VISIT (OUTPATIENT)
Dept: FAMILY MEDICINE CLINIC | Facility: CLINIC | Age: 72
End: 2022-05-23

## 2022-05-23 ENCOUNTER — LAB (OUTPATIENT)
Dept: LAB | Facility: OTHER | Age: 72
End: 2022-05-23

## 2022-05-23 VITALS
SYSTOLIC BLOOD PRESSURE: 126 MMHG | HEART RATE: 64 BPM | TEMPERATURE: 96.7 F | BODY MASS INDEX: 37.45 KG/M2 | DIASTOLIC BLOOD PRESSURE: 68 MMHG | WEIGHT: 233 LBS | HEIGHT: 66 IN

## 2022-05-23 DIAGNOSIS — R07.9 CHEST PAIN, UNSPECIFIED TYPE: ICD-10-CM

## 2022-05-23 DIAGNOSIS — G30.1 LATE ONSET ALZHEIMER'S DEMENTIA WITHOUT BEHAVIORAL DISTURBANCE: ICD-10-CM

## 2022-05-23 DIAGNOSIS — K21.9 GASTROESOPHAGEAL REFLUX DISEASE, UNSPECIFIED WHETHER ESOPHAGITIS PRESENT: Chronic | ICD-10-CM

## 2022-05-23 DIAGNOSIS — F02.80 LATE ONSET ALZHEIMER'S DEMENTIA WITHOUT BEHAVIORAL DISTURBANCE: ICD-10-CM

## 2022-05-23 DIAGNOSIS — I25.119 CORONARY ARTERY DISEASE INVOLVING NATIVE CORONARY ARTERY OF NATIVE HEART WITH ANGINA PECTORIS: Chronic | ICD-10-CM

## 2022-05-23 DIAGNOSIS — R13.19 ESOPHAGEAL DYSPHAGIA: Chronic | ICD-10-CM

## 2022-05-23 DIAGNOSIS — I10 ESSENTIAL HYPERTENSION: Primary | Chronic | ICD-10-CM

## 2022-05-23 DIAGNOSIS — I10 ESSENTIAL HYPERTENSION: Chronic | ICD-10-CM

## 2022-05-23 LAB
ANION GAP SERPL CALCULATED.3IONS-SCNC: 6 MMOL/L (ref 5–15)
BUN SERPL-MCNC: 11 MG/DL (ref 7–23)
BUN/CREAT SERPL: 11.8 (ref 7–25)
CALCIUM SPEC-SCNC: 8.9 MG/DL (ref 8.4–10.2)
CHLORIDE SERPL-SCNC: 108 MMOL/L (ref 101–112)
CO2 SERPL-SCNC: 29 MMOL/L (ref 22–30)
CREAT SERPL-MCNC: 0.93 MG/DL (ref 0.7–1.3)
EGFRCR SERPLBLD CKD-EPI 2021: 86.7 ML/MIN/1.73
GLUCOSE SERPL-MCNC: 110 MG/DL (ref 70–99)
POTASSIUM SERPL-SCNC: 3.8 MMOL/L (ref 3.4–5)
SODIUM SERPL-SCNC: 143 MMOL/L (ref 137–145)

## 2022-05-23 PROCEDURE — 80048 BASIC METABOLIC PNL TOTAL CA: CPT | Performed by: INTERNAL MEDICINE

## 2022-05-23 PROCEDURE — 99215 OFFICE O/P EST HI 40 MIN: CPT | Performed by: INTERNAL MEDICINE

## 2022-05-23 PROCEDURE — 36415 COLL VENOUS BLD VENIPUNCTURE: CPT | Performed by: INTERNAL MEDICINE

## 2022-05-23 RX ORDER — LOSARTAN POTASSIUM 50 MG/1
50 TABLET ORAL 2 TIMES DAILY
Qty: 180 TABLET | Refills: 3 | Status: SHIPPED | OUTPATIENT
Start: 2022-05-23 | End: 2023-01-27

## 2022-05-23 RX ORDER — DONEPEZIL HYDROCHLORIDE 5 MG/1
5 TABLET, FILM COATED ORAL NIGHTLY
Qty: 30 TABLET | Refills: 1 | Status: SHIPPED | OUTPATIENT
Start: 2022-05-23 | End: 2022-06-20 | Stop reason: SDUPTHER

## 2022-05-23 RX ORDER — DONEPEZIL HYDROCHLORIDE 5 MG/1
5 TABLET, FILM COATED ORAL NIGHTLY
Qty: 30 TABLET | Refills: 1 | Status: SHIPPED | OUTPATIENT
Start: 2022-05-23 | End: 2022-05-23 | Stop reason: SDUPTHER

## 2022-05-23 NOTE — PROGRESS NOTES
Chief Complaint  Follow-up (2 week for b/p ), Memory Loss, and Med Refill (Losartan and breaks it in half and would like a 50 mg )    Subjective          History of Present Illness     Nick Austin presents to the office accompanied by his wife for 2-week follow up on multiple issues.  When he was in two weeks ago, he reported elevated BP and chest pain in this patient with CAD, for which I added Norvasc 5 mg daily to losartan 50 mg b.i.d. He was using a wrist cuff, which is frequently inaccurate and found to be approximately 30 points higher than his new arm cuff.  Since he started using an arm cuff, his diary reveals systolic range 115-135 with a couple of exceptions.  BP vastly improved and at goal in the office today at 126/68.  He was also noticing some episodic chest pain and tightness and a 2/6 systolic murmur was noted on exam, for which we referred him back to Dr. Jay, cardiology and appointment has been scheduled for next week (05/312022).  We are pleased his chest pain issues seem to be resolved with improved BP control.  I encouraged him to keep his cardiology follow-up, as he is overdue.    Patient and his wife are noticing gradual memory loss for the past 1-2 years.  This has just recently come to my attention.  He denies family history of Alzheimer's dementia. We checked MMSE this morning with patient scoring 21/30.  He reports leaving a burner on the stove unattended on one occasion.  He is still driving and is the  when he and his wife go somewhere.  His wife has not noticed any difficulty with him operating a vehicle.  Although they are describing a gradual onset of memory loss and he has not had focal motor deficits, he is high risk to for ischemic etiology of dementia, for which I would like to obtain CT imaging of the brain.  He is in agreement.  We discussed dementia extensively this morning.  They are not reporting agitation or other behavioral disturbances.    Patient  "has history of Schatzki's ring and underwent recent upper endoscopy with esophageal dilation of a distal esophageal stricture by Dr. Wood 05/08/2022 with a repeat dilation scheduled for 06/08/22, due to inadequate response.  He continues on omeprazole daily and reports excellent control of GERD symptoms.        Objective   Vital Signs:  /68   Pulse 64   Temp 96.7 °F (35.9 °C) (Tympanic)   Ht 167.6 cm (66\")   Wt 106 kg (233 lb)   BMI 37.61 kg/m²         Physical Exam  Constitutional:       General: He is not in acute distress.     Appearance: He is well-developed.      Comments: Pleasant male, obese.  Accompanied by his wife.    HENT:      Head: Normocephalic and atraumatic.      Nose: Nose normal.      Mouth/Throat:      Pharynx: No oropharyngeal exudate.   Eyes:      Pupils: Pupils are equal, round, and reactive to light.   Neck:      Thyroid: No thyromegaly.      Vascular: No JVD.   Cardiovascular:      Rate and Rhythm: Normal rate and regular rhythm.      Heart sounds: Normal heart sounds.   Pulmonary:      Effort: Pulmonary effort is normal. No accessory muscle usage or respiratory distress.      Breath sounds: Normal breath sounds. No wheezing or rales.   Abdominal:      General: Bowel sounds are normal. There is no distension.      Palpations: Abdomen is soft.      Tenderness: There is no abdominal tenderness.      Comments: Overweight abdomen.    Musculoskeletal:      Cervical back: Neck supple.   Lymphadenopathy:      Cervical: No cervical adenopathy.   Neurological:      Mental Status: He is alert and oriented to person, place, and time.      Cranial Nerves: No cranial nerve deficit.   Psychiatric:         Speech: Speech normal.         Behavior: Behavior normal.         Thought Content: Thought content normal.         Judgment: Judgment normal.            Result Review :     Common labs    Common Labsle 8/23/21 8/23/21 8/23/21 8/23/21 8/23/21 3/9/22 3/9/22 3/9/22 3/9/22 3/9/22 3/9/22 5/23/22 "    0842 0842 0842 0842 0842 0714 0714 0714 0714 0714 0714    Glucose  112 (A)     112 (A)     110 (A)   BUN  18     23     11   Creatinine  1.04     1.24     0.93   eGFR Non African Am  70             Sodium  140     141     143   Potassium  3.9     3.8     3.8   Chloride  107     103     108   Calcium  9.4     9.1     8.9   Albumin  4.50     4.40        Total Bilirubin  0.5     0.5        Alkaline Phosphatase  86     68        AST (SGOT)  30     29        ALT (SGPT)  37     28        WBC 8.30     7.67         Hemoglobin 15.6     15.3         Hematocrit 45.8     46.1         Platelets 272     277         Total Cholesterol   128 (A)            Triglycerides   230 (A)     284 (A)       HDL Cholesterol   27 (A)            LDL Cholesterol    63       85     Hemoglobin A1C    5.75 (A)     5.90 (A)      Microalbumin, Urine           <1.2    PSA     1.030          (A) Abnormal value            Data reviewed: GI studies Esophageal dilation by Dr. Wood 05/08/2022 and MMSE          Assessment and Plan    Diagnoses and all orders for this visit:    1. Essential hypertension (Primary)  -     Basic Metabolic Panel; Future    2. Esophageal dysphagia    3. Gastroesophageal reflux disease, unspecified whether esophagitis present    4. Chest pain, unspecified type    5. Late onset Alzheimer's dementia without behavioral disturbance (HCC)  -     CT Head With & Without Contrast  -     Basic Metabolic Panel; Future    Other orders  -     losartan (Cozaar) 50 MG tablet; Take 1 tablet by mouth 2 (Two) Times a Day.  Dispense: 180 tablet; Refill: 3  -     Discontinue: donepezil (Aricept) 5 MG tablet; Take 1 tablet by mouth Every Night. For memory  Dispense: 30 tablet; Refill: 1  -     donepezil (Aricept) 5 MG tablet; Take 1 tablet by mouth Every Night. For memory  Dispense: 30 tablet; Refill: 1        Future Appointments   Date Time Provider Department Center   5/31/2022  1:00 PM Perla Jay MD [facility] None   6/16/2022  10:45 AM Carlos Wood MD [facility]   10/27/2022  9:30 AM Patrick Magdaleno MD [facility]        I spent 43 minutes caring for Nick on this date of service. This time includes time spent by me in the following activities:preparing for the visit, reviewing tests, obtaining and/or reviewing a separately obtained history, performing a medically appropriate examination and/or evaluation , counseling and educating the patient/family/caregiver, ordering medications, tests, or procedures, documenting information in the medical record, independently interpreting results and communicating that information with the patient/family/caregiver and care coordination with his wife    BP much improved and at goal with adding Norvasc  If patient is noticing frequent orthostatic symptoms, I recommended he decrease the dose of Norvasc by 50%, otherwise continue the current dose of Norvasc 5 mg daily.  Continue current dose of losartan 50 mg twice daily.  Continue monitoring BP with the arm cuff, which is more accurate than the wrist cuff.  Notify me if BP is not consistently at goal.  Chest pain has resolved with improved BP control, although, I recommended he keep his appointment with Dr. Jay next week due to the recent report of chest pain/pressure and his known CAD.  Continue the current cardiovascular risk factor modifications including aspirin, statin, losartan.    I reviewed and interpreted the results of the MMSE and explained the results to the patient and his wife today.  To help address the late onset Alzheimer's dementia, I sent a prescription for Aricept 5 mg q.h.s. for a month.  We will re-evaluate in a month and plan to increase the dose to 10 mg if he tolerates the lower dose.   We discussed ways to help set reminders and safety issues to help lower risk of accidents/injury.   An order is sent for patient to have CT imaging of the brain with and without contrast.  We will check BMP on his way out  today to check renal function due to the contrast.       Keep scheduled appointment with Dr. Wood next month for repeat esophageal dilation in this patient with Schatzki's ring.      Return in 4 weeks for follow up on Alzheimer's dementia and 10/27/2022 for routine follow up with fasting labs one week prior or sooner if needed.     Scribed for Dr. Magdaleno by Harriet Gardner [facility].     Follow Up   Return in 4 weeks (on 6/20/2022) for Next scheduled follow up.  Patient was given instructions and counseling regarding his condition or for health maintenance advice. Please see specific information pulled into the AVS if appropriate.

## 2022-05-23 NOTE — PATIENT INSTRUCTIONS
Exercising to Lose Weight  Exercise is structured, repetitive physical activity to improve fitness and health. Getting regular exercise is important for everyone. It is especially important if you are overweight. Being overweight increases your risk of heart disease, stroke, diabetes, high blood pressure, and several types of cancer. Reducing your calorie intake and exercising can help you lose weight.  Exercise is usually categorized as moderate or vigorous intensity. To lose weight, most people need to do a certain amount of moderate-intensity or vigorous-intensity exercise each week.  Moderate-intensity exercise    Moderate-intensity exercise is any activity that gets you moving enough to burn at least three times more energy (calories) than if you were sitting.  Examples of moderate exercise include:  Walking a mile in 15 minutes.  Doing light yard work.  Biking at an easy pace.  Most people should get at least 150 minutes (2 hours and 30 minutes) a week of moderate-intensity exercise to maintain their body weight.  Vigorous-intensity exercise  Vigorous-intensity exercise is any activity that gets you moving enough to burn at least six times more calories than if you were sitting. When you exercise at this intensity, you should be working hard enough that you are not able to carry on a conversation.  Examples of vigorous exercise include:  Running.  Playing a team sport, such as football, basketball, and soccer.  Jumping rope.  Most people should get at least 75 minutes (1 hour and 15 minutes) a week of vigorous-intensity exercise to maintain their body weight.  How can exercise affect me?  When you exercise enough to burn more calories than you eat, you lose weight. Exercise also reduces body fat and builds muscle. The more muscle you have, the more calories you burn. Exercise also:  Improves mood.  Reduces stress and tension.  Improves your overall fitness, flexibility, and endurance.  Increases bone  strength.  The amount of exercise you need to lose weight depends on:  Your age.  The type of exercise.  Any health conditions you have.  Your overall physical ability.  Talk to your health care provider about how much exercise you need and what types of activities are safe for you.  What actions can I take to lose weight?  Nutrition    Make changes to your diet as told by your health care provider or diet and nutrition specialist (dietitian). This may include:  Eating fewer calories.  Eating more protein.  Eating less unhealthy fats.  Eating a diet that includes fresh fruits and vegetables, whole grains, low-fat dairy products, and lean protein.  Avoiding foods with added fat, salt, and sugar.  Drink plenty of water while you exercise to prevent dehydration or heat stroke.    Activity  Choose an activity that you enjoy and set realistic goals. Your health care provider can help you make an exercise plan that works for you.  Exercise at a moderate or vigorous intensity most days of the week.  The intensity of exercise may vary from person to person. You can tell how intense a workout is for you by paying attention to your breathing and heartbeat. Most people will notice their breathing and heartbeat get faster with more intense exercise.  Do resistance training twice each week, such as:  Push-ups.  Sit-ups.  Lifting weights.  Using resistance bands.  Getting short amounts of exercise can be just as helpful as long structured periods of exercise. If you have trouble finding time to exercise, try to include exercise in your daily routine.  Get up, stretch, and walk around every 30 minutes throughout the day.  Go for a walk during your lunch break.  Park your car farther away from your destination.  If you take public transportation, get off one stop early and walk the rest of the way.  Make phone calls while standing up and walking around.  Take the stairs instead of elevators or escalators.  Wear comfortable clothes  and shoes with good support.  Do not exercise so much that you hurt yourself, feel dizzy, or get very short of breath.  Where to find more information  [place] Department of Health and Human Services: www.hhs.gov  Centers for Disease Control and Prevention (CDC): www.cdc.gov  Contact a health care provider:  Before starting a new exercise program.  If you have questions or concerns about your weight.  If you have a medical problem that keeps you from exercising.  Get help right away if you have any of the following while exercising:  Injury.  Dizziness.  Difficulty breathing or shortness of breath that does not go away when you stop exercising.  Chest pain.  Rapid heartbeat.  Summary  Being overweight increases your risk of heart disease, stroke, diabetes, high blood pressure, and several types of cancer.  Losing weight happens when you burn more calories than you eat.  Reducing the amount of calories you eat in addition to getting regular moderate or vigorous exercise each week helps you lose weight.  This information is not intended to replace advice given to you by your health care provider. Make sure you discuss any questions you have with your health care provider.  Document Revised: 04/15/2021 Document Reviewed: 04/15/2021  [employer] Patient Education © 2021 [employer]  Calorie Counting for Weight Loss  Calories are units of energy. Your body needs a certain number of calories from food to keep going throughout the day. When you eat or drink more calories than your body needs, your body stores the extra calories mostly as fat. When you eat or drink fewer calories than your body needs, your body burns fat to get the energy it needs.  Calorie counting means keeping track of how many calories you eat and drink each day. Calorie counting can be helpful if you need to lose weight. If you eat fewer calories than your body needs, you should lose weight. Ask your health care provider what a healthy weight is for  you.  For calorie counting to work, you will need to eat the right number of calories each day to lose a healthy amount of weight per week. A dietitian can help you figure out how many calories you need in a day and will suggest ways to reach your calorie goal.  A healthy amount of weight to lose each week is usually 1-2 lb (0.5-0.9 kg). This usually means that your daily calorie intake should be reduced by 500-750 calories.  Eating 1,200-1,500 calories a day can help most women lose weight.  Eating 1,500-1,800 calories a day can help most men lose weight.  What do I need to know about calorie counting?  Work with your health care provider or dietitian to determine how many calories you should get each day. To meet your daily calorie goal, you will need to:  Find out how many calories are in each food that you would like to eat. Try to do this before you eat.  Decide how much of the food you plan to eat.  Keep a food log. Do this by writing down what you ate and how many calories it had.  To successfully lose weight, it is important to balance calorie counting with a healthy lifestyle that includes regular activity.  Where do I find calorie information?    The number of calories in a food can be found on a Nutrition Facts label. If a food does not have a Nutrition Facts label, try to look up the calories online or ask your dietitian for help.  Remember that calories are listed per serving. If you choose to have more than one serving of a food, you will have to multiply the calories per serving by the number of servings you plan to eat. For example, the label on a package of bread might say that a serving size is 1 slice and that there are 90 calories in a serving. If you eat 1 slice, you will have eaten 90 calories. If you eat 2 slices, you will have eaten 180 calories.  How do I keep a food log?  After each time that you eat, record the following in your food log as soon as possible:  What you ate. Be sure to  include toppings, sauces, and other extras on the food.  How much you ate. This can be measured in cups, ounces, or number of items.  How many calories were in each food and drink.  The total number of calories in the food you ate.  Keep your food log near you, such as in a pocket-sized notebook or on an juarez or website on your mobile phone. Some programs will calculate calories for you and show you how many calories you have left to meet your daily goal.  What are some portion-control tips?  Know how many calories are in a serving. This will help you know how many servings you can have of a certain food.  Use a measuring cup to measure serving sizes. You could also try weighing out portions on a kitchen scale. With time, you will be able to estimate serving sizes for some foods.  Take time to put servings of different foods on your favorite plates or in your favorite bowls and cups so you know what a serving looks like.  Try not to eat straight from a food's packaging, such as from a bag or box. Eating straight from the package makes it hard to see how much you are eating and can lead to overeating. Put the amount you would like to eat in a cup or on a plate to make sure you are eating the right portion.  Use smaller plates, glasses, and bowls for smaller portions and to prevent overeating.  Try not to multitask. For example, avoid watching TV or using your computer while eating. If it is time to eat, sit down at a table and enjoy your food. This will help you recognize when you are full. It will also help you be more mindful of what and how much you are eating.  What are tips for following this plan?  Reading food labels  Check the calorie count compared with the serving size. The serving size may be smaller than what you are used to eating.  Check the source of the calories. Try to choose foods that are high in protein, fiber, and vitamins, and low in saturated fat, trans fat, and sodium.  Shopping  Read nutrition  labels while you shop. This will help you make healthy decisions about which foods to buy.  Pay attention to nutrition labels for low-fat or fat-free foods. These foods sometimes have the same number of calories or more calories than the full-fat versions. They also often have added sugar, starch, or salt to make up for flavor that was removed with the fat.  Make a grocery list of lower-calorie foods and stick to it.  Cooking  Try to cook your favorite foods in a healthier way. For example, try baking instead of frying.  Use low-fat dairy products.  Meal planning  Use more fruits and vegetables. One-half of your plate should be fruits and vegetables.  Include lean proteins, such as chicken, turkey, and fish.  Lifestyle  Each week, aim to do one of the followin minutes of moderate exercise, such as walking.  75 minutes of vigorous exercise, such as running.  General information  Know how many calories are in the foods you eat most often. This will help you calculate calorie counts faster.  Find a way of tracking calories that works for you. Get creative. Try different apps or programs if writing down calories does not work for you.  What foods should I eat?    Eat nutritious foods. It is better to have a nutritious, high-calorie food, such as an avocado, than a food with few nutrients, such as a bag of potato chips.  Use your calories on foods and drinks that will fill you up and will not leave you hungry soon after eating.  Examples of foods that fill you up are nuts and nut butters, vegetables, lean proteins, and high-fiber foods such as whole grains. High-fiber foods are foods with more than 5 g of fiber per serving.  Pay attention to calories in drinks. Low-calorie drinks include water and unsweetened drinks.  The items listed above may not be a complete list of foods and beverages you can eat. Contact a dietitian for more information.  What foods should I limit?  Limit foods or drinks that are not good  sources of vitamins, minerals, or protein or that are high in unhealthy fats. These include:  Candy.  Other sweets.  Sodas, specialty coffee drinks, alcohol, and juice.  The items listed above may not be a complete list of foods and beverages you should avoid. Contact a dietitian for more information.  How do I count calories when eating out?  Pay attention to portions. Often, portions are much larger when eating out. Try these tips to keep portions smaller:  Consider sharing a meal instead of getting your own.  If you get your own meal, eat only half of it. Before you start eating, ask for a container and put half of your meal into it.  When available, consider ordering smaller portions from the menu instead of full portions.  Pay attention to your food and drink choices. Knowing the way food is cooked and what is included with the meal can help you eat fewer calories.  If calories are listed on the menu, choose the lower-calorie options.  Choose dishes that include vegetables, fruits, whole grains, low-fat dairy products, and lean proteins.  Choose items that are boiled, broiled, grilled, or steamed. Avoid items that are buttered, battered, fried, or served with cream sauce. Items labeled as crispy are usually fried, unless stated otherwise.  Choose water, low-fat milk, unsweetened iced tea, or other drinks without added sugar. If you want an alcoholic beverage, choose a lower-calorie option, such as a glass of wine or light beer.  Ask for dressings, sauces, and syrups on the side. These are usually high in calories, so you should limit the amount you eat.  If you want a salad, choose a garden salad and ask for grilled meats. Avoid extra toppings such as wahl, cheese, or fried items. Ask for the dressing on the side, or ask for olive oil and vinegar or lemon to use as dressing.  Estimate how many servings of a food you are given. Knowing serving sizes will help you be aware of how much food you are eating at  restaurants.  Where to find more information  Centers for Disease Control and Prevention: www.cdc.gov  [place] Department of Agriculture: myplate.gov  Summary  Calorie counting means keeping track of how many calories you eat and drink each day. If you eat fewer calories than your body needs, you should lose weight.  A healthy amount of weight to lose per week is usually 1-2 lb (0.5-0.9 kg). This usually means reducing your daily calorie intake by 500-750 calories.  The number of calories in a food can be found on a Nutrition Facts label. If a food does not have a Nutrition Facts label, try to look up the calories online or ask your dietitian for help.  Use smaller plates, glasses, and bowls for smaller portions and to prevent overeating.  Use your calories on foods and drinks that will fill you up and not leave you hungry shortly after a meal.  This information is not intended to replace advice given to you by your health care provider. Make sure you discuss any questions you have with your health care provider.  Document Revised: 01/28/2021 Document Reviewed: 01/28/2021  [employer] Patient Education © 2021 [employer]
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
## 2022-05-31 ENCOUNTER — OFFICE VISIT (OUTPATIENT)
Dept: CARDIOLOGY | Facility: CLINIC | Age: 72
End: 2022-05-31

## 2022-05-31 VITALS
WEIGHT: 233 LBS | TEMPERATURE: 97.3 F | HEIGHT: 60 IN | DIASTOLIC BLOOD PRESSURE: 72 MMHG | HEART RATE: 72 BPM | BODY MASS INDEX: 45.75 KG/M2 | OXYGEN SATURATION: 96 % | SYSTOLIC BLOOD PRESSURE: 132 MMHG

## 2022-05-31 DIAGNOSIS — I20.9 ANGINA PECTORIS: ICD-10-CM

## 2022-05-31 DIAGNOSIS — I10 ESSENTIAL HYPERTENSION: Primary | ICD-10-CM

## 2022-05-31 DIAGNOSIS — E11.69 HYPERLIPIDEMIA ASSOCIATED WITH TYPE 2 DIABETES MELLITUS: Chronic | ICD-10-CM

## 2022-05-31 DIAGNOSIS — E78.5 HYPERLIPIDEMIA ASSOCIATED WITH TYPE 2 DIABETES MELLITUS: Chronic | ICD-10-CM

## 2022-05-31 DIAGNOSIS — Z95.1 S/P CABG (CORONARY ARTERY BYPASS GRAFT): ICD-10-CM

## 2022-05-31 PROCEDURE — 99204 OFFICE O/P NEW MOD 45 MIN: CPT | Performed by: INTERNAL MEDICINE

## 2022-05-31 PROCEDURE — 93000 ELECTROCARDIOGRAM COMPLETE: CPT | Performed by: INTERNAL MEDICINE

## 2022-05-31 RX ORDER — ISOSORBIDE MONONITRATE 30 MG/1
30 TABLET, EXTENDED RELEASE ORAL EVERY MORNING
Qty: 30 TABLET | Refills: 3 | Status: SHIPPED | OUTPATIENT
Start: 2022-05-31 | End: 2022-07-18 | Stop reason: SDUPTHER
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
## 2022-06-01 LAB
QT INTERVAL: 376 MS
QTC INTERVAL: 411 MS

## 2022-06-02 ENCOUNTER — PREP FOR SURGERY (OUTPATIENT)
Dept: OTHER | Facility: HOSPITAL | Age: 72
End: 2022-06-02

## 2022-06-02 ENCOUNTER — TELEPHONE (OUTPATIENT)
Dept: CARDIOLOGY | Facility: CLINIC | Age: 72
End: 2022-06-02

## 2022-06-02 DIAGNOSIS — I20.8 EXERTIONAL ANGINA: Primary | ICD-10-CM

## 2022-06-02 RX ORDER — SODIUM CHLORIDE 0.9 % (FLUSH) 0.9 %
10 SYRINGE (ML) INJECTION AS NEEDED
Status: CANCELLED | OUTPATIENT
Start: 2022-06-14

## 2022-06-02 RX ORDER — SODIUM CHLORIDE 0.9 % (FLUSH) 0.9 %
3 SYRINGE (ML) INJECTION EVERY 12 HOURS SCHEDULED
Status: CANCELLED | OUTPATIENT
Start: 2022-06-14

## 2022-06-02 RX ORDER — SODIUM CHLORIDE 9 MG/ML
100 INJECTION, SOLUTION INTRAVENOUS CONTINUOUS
Status: CANCELLED | OUTPATIENT
Start: 2022-06-14

## 2022-06-02 NOTE — TELEPHONE ENCOUNTER
Called pt to let him know that his left heart cath is still on the June 14 and he will get a call a day before from same day surgery and will let him know what time to arrive

## 2022-06-08 ENCOUNTER — HOSPITAL ENCOUNTER (OUTPATIENT)
Facility: HOSPITAL | Age: 72
Setting detail: HOSPITAL OUTPATIENT SURGERY
Discharge: HOME | End: 2022-06-08
Attending: INTERNAL MEDICINE | Admitting: INTERNAL MEDICINE

## 2022-06-08 ENCOUNTER — ANESTHESIA (OUTPATIENT)
Dept: GASTROENTEROLOGY | Facility: HOSPITAL | Age: 72
End: 2022-06-08

## 2022-06-08 ENCOUNTER — ANESTHESIA EVENT (OUTPATIENT)
Dept: GASTROENTEROLOGY | Facility: HOSPITAL | Age: 72
End: 2022-06-08

## 2022-06-08 VITALS
HEART RATE: 60 BPM | SYSTOLIC BLOOD PRESSURE: 104 MMHG | HEIGHT: 66 IN | OXYGEN SATURATION: 94 % | RESPIRATION RATE: 16 BRPM | DIASTOLIC BLOOD PRESSURE: 59 MMHG | WEIGHT: 233 LBS | TEMPERATURE: 96.6 F | BODY MASS INDEX: 37.45 KG/M2

## 2022-06-08 DIAGNOSIS — R13.19 ESOPHAGEAL DYSPHAGIA: ICD-10-CM

## 2022-06-08 PROCEDURE — 88305 TISSUE EXAM BY PATHOLOGIST: CPT

## 2022-06-08 PROCEDURE — C1726 CATH, BAL DIL, NON-VASCULAR: HCPCS | Performed by: INTERNAL MEDICINE

## 2022-06-08 PROCEDURE — 25010000002 FENTANYL CITRATE (PF) 50 MCG/ML SOLUTION: Performed by: NURSE ANESTHETIST, CERTIFIED REGISTERED

## 2022-06-08 PROCEDURE — 25010000002 PROPOFOL 10 MG/ML EMULSION: Performed by: NURSE ANESTHETIST, CERTIFIED REGISTERED

## 2022-06-08 PROCEDURE — 43249 ESOPH EGD DILATION <30 MM: CPT | Performed by: INTERNAL MEDICINE

## 2022-06-08 PROCEDURE — 43239 EGD BIOPSY SINGLE/MULTIPLE: CPT | Performed by: INTERNAL MEDICINE

## 2022-06-08 RX ORDER — FENTANYL CITRATE 50 UG/ML
INJECTION, SOLUTION INTRAMUSCULAR; INTRAVENOUS AS NEEDED
Status: DISCONTINUED | OUTPATIENT
Start: 2022-06-08 | End: 2022-06-08 | Stop reason: SURG

## 2022-06-08 RX ORDER — DEXTROSE AND SODIUM CHLORIDE 5; .45 G/100ML; G/100ML
30 INJECTION, SOLUTION INTRAVENOUS CONTINUOUS PRN
Status: DISCONTINUED | OUTPATIENT
Start: 2022-06-08 | End: 2022-06-08 | Stop reason: HOSPADM

## 2022-06-08 RX ORDER — PROPOFOL 10 MG/ML
VIAL (ML) INTRAVENOUS AS NEEDED
Status: DISCONTINUED | OUTPATIENT
Start: 2022-06-08 | End: 2022-06-08 | Stop reason: SURG

## 2022-06-08 RX ADMIN — DEXTROSE AND SODIUM CHLORIDE 30 ML/HR: 5; 450 INJECTION, SOLUTION INTRAVENOUS at 09:09

## 2022-06-08 RX ADMIN — FENTANYL CITRATE 100 MCG: 50 INJECTION INTRAMUSCULAR; INTRAVENOUS at 10:05

## 2022-06-08 RX ADMIN — PROPOFOL 50 MG: 10 INJECTION, EMULSION INTRAVENOUS at 10:05

## 2022-06-08 NOTE — ANESTHESIA PREPROCEDURE EVALUATION
Anesthesia Evaluation     Patient summary reviewed and Nursing notes reviewed   no history of anesthetic complications:  NPO Solid Status: > 8 hours  NPO Liquid Status: > 8 hours           Airway   Mallampati: III  TM distance: >3 FB  Neck ROM: full  Possible difficult intubation and Small opening  Dental - normal exam     Pulmonary    (+) asthma,  Cardiovascular     PT is on anticoagulation therapy    (+) hypertension 2 medications or greater, CAD, CABG >6 Months, angina with exertion, hyperlipidemia,       Neuro/Psych  (+) psychiatric history Depression,    GI/Hepatic/Renal/Endo    (+) morbid obesity, GERD well controlled,  diabetes mellitus type 2 well controlled, thyroid problem hypothyroidism    Musculoskeletal     (+) back pain, chronic pain,   Abdominal    Substance History - negative use     OB/GYN          Other   arthritis,                        Anesthesia Plan    ASA 3     MAC     intravenous induction     Anesthetic plan, risks, benefits, and alternatives have been provided, discussed and informed consent has been obtained with: patient.        CODE STATUS:

## 2022-06-08 NOTE — ANESTHESIA POSTPROCEDURE EVALUATION
Patient: Nick Austin    Procedure Summary     Date: 06/08/22 Room / Location: [facility] ENDOSCOPY 2 / [facility] ENDOSCOPY    Anesthesia Start: 1003 Anesthesia Stop: 1012    Procedure: ESOPHAGOGASTRODUODENOSCOPY (N/A ) Diagnosis:       Esophageal dysphagia      (Esophageal dysphagia [R13.19])    Surgeons: Carlos Wood MD Provider: Marisabel Salinas CRNA    Anesthesia Type: MAC ASA Status: 3          Anesthesia Type: MAC    Vitals  No vitals data found for the desired time range.          Post Anesthesia Care and Evaluation    Patient location during evaluation: PACU  Patient participation: complete - patient participated  Level of consciousness: awake and alert  Pain score: 0  Pain management: adequate    Airway patency: patent  Anesthetic complications: No anesthetic complications  PONV Status: none  Cardiovascular status: acceptable  Respiratory status: acceptable  Hydration status: acceptable

## 2022-06-09 LAB — REF LAB TEST METHOD: NORMAL

## 2022-06-14 ENCOUNTER — HOSPITAL ENCOUNTER (OUTPATIENT)
Facility: HOSPITAL | Age: 72
Setting detail: HOSPITAL OUTPATIENT SURGERY
Discharge: HOME | End: 2022-06-14
Attending: INTERNAL MEDICINE | Admitting: INTERNAL MEDICINE

## 2022-06-14 VITALS
RESPIRATION RATE: 18 BRPM | OXYGEN SATURATION: 95 % | WEIGHT: 230.38 LBS | TEMPERATURE: 97 F | SYSTOLIC BLOOD PRESSURE: 113 MMHG | DIASTOLIC BLOOD PRESSURE: 64 MMHG | BODY MASS INDEX: 37.03 KG/M2 | HEART RATE: 61 BPM | HEIGHT: 66 IN

## 2022-06-14 DIAGNOSIS — I20.8 EXERTIONAL ANGINA: ICD-10-CM

## 2022-06-14 DIAGNOSIS — Z95.1 S/P CABG (CORONARY ARTERY BYPASS GRAFT): Primary | ICD-10-CM

## 2022-06-14 LAB
ALBUMIN SERPL-MCNC: 4 G/DL (ref 3.5–5.2)
ALBUMIN/GLOB SERPL: 1.5 G/DL
ALP SERPL-CCNC: 68 U/L (ref 39–117)
ALT SERPL W P-5'-P-CCNC: 26 U/L (ref 1–41)
ANION GAP SERPL CALCULATED.3IONS-SCNC: 10 MMOL/L (ref 5–15)
AST SERPL-CCNC: 22 U/L (ref 1–40)
BILIRUB SERPL-MCNC: 0.4 MG/DL (ref 0–1.2)
BUN SERPL-MCNC: 10 MG/DL (ref 8–23)
BUN/CREAT SERPL: 10.6 (ref 7–25)
CALCIUM SPEC-SCNC: 9.1 MG/DL (ref 8.6–10.5)
CHLORIDE SERPL-SCNC: 107 MMOL/L (ref 98–107)
CO2 SERPL-SCNC: 24 MMOL/L (ref 22–29)
CREAT SERPL-MCNC: 0.94 MG/DL (ref 0.76–1.27)
DEPRECATED RDW RBC AUTO: 47.2 FL (ref 37–54)
EGFRCR SERPLBLD CKD-EPI 2021: 85.6 ML/MIN/1.73
ERYTHROCYTE [DISTWIDTH] IN BLOOD BY AUTOMATED COUNT: 13.8 % (ref 12.3–15.4)
GLOBULIN UR ELPH-MCNC: 2.7 GM/DL
GLUCOSE SERPL-MCNC: 113 MG/DL (ref 65–99)
HCT VFR BLD AUTO: 41.3 % (ref 37.5–51)
HGB BLD-MCNC: 14.1 G/DL (ref 13–17.7)
INR PPP: 1.06 (ref 0.8–1.2)
MCH RBC QN AUTO: 31.8 PG (ref 26.6–33)
MCHC RBC AUTO-ENTMCNC: 34.1 G/DL (ref 31.5–35.7)
MCV RBC AUTO: 93.2 FL (ref 79–97)
PLATELET # BLD AUTO: 272 10*3/MM3 (ref 140–450)
PMV BLD AUTO: 10.8 FL (ref 6–12)
POTASSIUM SERPL-SCNC: 3.8 MMOL/L (ref 3.5–5.2)
PROT SERPL-MCNC: 6.7 G/DL (ref 6–8.5)
PROTHROMBIN TIME: 13.6 SECONDS (ref 11.1–15.3)
RBC # BLD AUTO: 4.43 10*6/MM3 (ref 4.14–5.8)
SODIUM SERPL-SCNC: 141 MMOL/L (ref 136–145)
WBC NRBC COR # BLD: 7.42 10*3/MM3 (ref 3.4–10.8)

## 2022-06-14 PROCEDURE — 93459 L HRT ART/GRFT ANGIO: CPT | Performed by: INTERNAL MEDICINE

## 2022-06-14 PROCEDURE — 25010000002 HEPARIN (PORCINE) PER 1000 UNITS: Performed by: INTERNAL MEDICINE

## 2022-06-14 PROCEDURE — C1894 INTRO/SHEATH, NON-LASER: HCPCS | Performed by: INTERNAL MEDICINE

## 2022-06-14 PROCEDURE — 25010000002 FENTANYL CITRATE (PF) 50 MCG/ML SOLUTION: Performed by: INTERNAL MEDICINE

## 2022-06-14 PROCEDURE — S0260 H&P FOR SURGERY: HCPCS | Performed by: INTERNAL MEDICINE

## 2022-06-14 PROCEDURE — 85610 PROTHROMBIN TIME: CPT | Performed by: INTERNAL MEDICINE

## 2022-06-14 PROCEDURE — 80053 COMPREHEN METABOLIC PANEL: CPT | Performed by: INTERNAL MEDICINE

## 2022-06-14 PROCEDURE — C1760 CLOSURE DEV, VASC: HCPCS | Performed by: INTERNAL MEDICINE

## 2022-06-14 PROCEDURE — C1769 GUIDE WIRE: HCPCS | Performed by: INTERNAL MEDICINE

## 2022-06-14 PROCEDURE — 25010000002 MIDAZOLAM PER 1 MG: Performed by: INTERNAL MEDICINE

## 2022-06-14 PROCEDURE — 0 IOPAMIDOL PER 1 ML: Performed by: INTERNAL MEDICINE

## 2022-06-14 PROCEDURE — 85027 COMPLETE CBC AUTOMATED: CPT | Performed by: INTERNAL MEDICINE

## 2022-06-14 RX ORDER — LIDOCAINE HYDROCHLORIDE 20 MG/ML
INJECTION, SOLUTION INFILTRATION; PERINEURAL AS NEEDED
Status: DISCONTINUED | OUTPATIENT
Start: 2022-06-14 | End: 2022-06-14 | Stop reason: HOSPADM

## 2022-06-14 RX ORDER — MIDAZOLAM HYDROCHLORIDE 1 MG/ML
INJECTION INTRAMUSCULAR; INTRAVENOUS AS NEEDED
Status: DISCONTINUED | OUTPATIENT
Start: 2022-06-14 | End: 2022-06-14 | Stop reason: HOSPADM

## 2022-06-14 RX ORDER — RANOLAZINE 500 MG/1
500 TABLET, EXTENDED RELEASE ORAL 2 TIMES DAILY
Qty: 60 TABLET | Refills: 3 | Status: SHIPPED | OUTPATIENT
Start: 2022-06-14 | End: 2022-07-18 | Stop reason: SDUPTHER

## 2022-06-14 RX ORDER — SODIUM CHLORIDE 9 MG/ML
150 INJECTION, SOLUTION INTRAVENOUS CONTINUOUS
Status: DISCONTINUED | OUTPATIENT
Start: 2022-06-14 | End: 2022-06-14 | Stop reason: HOSPADM

## 2022-06-14 RX ORDER — ACETAMINOPHEN 325 MG/1
650 TABLET ORAL EVERY 4 HOURS PRN
Status: DISCONTINUED | OUTPATIENT
Start: 2022-06-14 | End: 2022-06-14 | Stop reason: HOSPADM

## 2022-06-14 RX ORDER — SODIUM CHLORIDE 0.9 % (FLUSH) 0.9 %
10 SYRINGE (ML) INJECTION AS NEEDED
Status: DISCONTINUED | OUTPATIENT
Start: 2022-06-14 | End: 2022-06-14 | Stop reason: HOSPADM

## 2022-06-14 RX ORDER — SODIUM CHLORIDE 9 MG/ML
100 INJECTION, SOLUTION INTRAVENOUS CONTINUOUS
Status: DISCONTINUED | OUTPATIENT
Start: 2022-06-14 | End: 2022-06-14

## 2022-06-14 RX ORDER — SODIUM CHLORIDE 0.9 % (FLUSH) 0.9 %
3 SYRINGE (ML) INJECTION EVERY 12 HOURS SCHEDULED
Status: DISCONTINUED | OUTPATIENT
Start: 2022-06-14 | End: 2022-06-14 | Stop reason: HOSPADM

## 2022-06-14 RX ORDER — FENTANYL CITRATE 50 UG/ML
INJECTION, SOLUTION INTRAMUSCULAR; INTRAVENOUS AS NEEDED
Status: DISCONTINUED | OUTPATIENT
Start: 2022-06-14 | End: 2022-06-14 | Stop reason: HOSPADM

## 2022-06-14 RX ORDER — SODIUM CHLORIDE 9 MG/ML
100 INJECTION, SOLUTION INTRAVENOUS CONTINUOUS
Status: DISCONTINUED | OUTPATIENT
Start: 2022-06-14 | End: 2022-06-14 | Stop reason: HOSPADM

## 2022-06-14 RX ADMIN — SODIUM CHLORIDE 100 ML/HR: 9 INJECTION, SOLUTION INTRAVENOUS at 06:49

## 2022-06-14 NOTE — BRIEF OP NOTE
Severe native multivessel CAD.     Patent LIMA to LAD with 70% disease at the touchdown  Patent SVG to Diagonal 2 and RPL  Occluded SVG to Diagonal 1.     Perclose used for hemostasis.   Bedrest 6 hours.     Iv fluids    Pls refer to full report for details.
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
## 2022-06-16 ENCOUNTER — OFFICE VISIT (OUTPATIENT)
Dept: GASTROENTEROLOGY | Facility: CLINIC | Age: 72
End: 2022-06-16

## 2022-06-16 VITALS
HEART RATE: 65 BPM | DIASTOLIC BLOOD PRESSURE: 66 MMHG | BODY MASS INDEX: 37.16 KG/M2 | SYSTOLIC BLOOD PRESSURE: 121 MMHG | WEIGHT: 231.2 LBS | HEIGHT: 66 IN

## 2022-06-16 DIAGNOSIS — R13.19 ESOPHAGEAL DYSPHAGIA: Primary | ICD-10-CM

## 2022-06-16 PROCEDURE — 99213 OFFICE O/P EST LOW 20 MIN: CPT | Performed by: INTERNAL MEDICINE

## 2022-06-17 ENCOUNTER — DOCUMENTATION (OUTPATIENT)
Dept: CARDIAC REHAB | Facility: HOSPITAL | Age: 72
End: 2022-06-17

## 2022-06-20 ENCOUNTER — OFFICE VISIT (OUTPATIENT)
Dept: FAMILY MEDICINE CLINIC | Facility: CLINIC | Age: 72
End: 2022-06-20

## 2022-06-20 VITALS
HEART RATE: 64 BPM | DIASTOLIC BLOOD PRESSURE: 60 MMHG | SYSTOLIC BLOOD PRESSURE: 118 MMHG | HEIGHT: 66 IN | TEMPERATURE: 96.7 F | BODY MASS INDEX: 37.45 KG/M2 | WEIGHT: 233 LBS

## 2022-06-20 DIAGNOSIS — F02.80 LATE ONSET ALZHEIMER'S DEMENTIA WITHOUT BEHAVIORAL DISTURBANCE: Primary | Chronic | ICD-10-CM

## 2022-06-20 DIAGNOSIS — I10 ESSENTIAL HYPERTENSION: Chronic | ICD-10-CM

## 2022-06-20 DIAGNOSIS — I25.119 CORONARY ARTERY DISEASE INVOLVING NATIVE CORONARY ARTERY OF NATIVE HEART WITH ANGINA PECTORIS: Chronic | ICD-10-CM

## 2022-06-20 DIAGNOSIS — K21.9 GASTROESOPHAGEAL REFLUX DISEASE, UNSPECIFIED WHETHER ESOPHAGITIS PRESENT: Chronic | ICD-10-CM

## 2022-06-20 DIAGNOSIS — E66.01 CLASS 2 SEVERE OBESITY DUE TO EXCESS CALORIES WITH SERIOUS COMORBIDITY AND BODY MASS INDEX (BMI) OF 38.0 TO 38.9 IN ADULT: Chronic | ICD-10-CM

## 2022-06-20 DIAGNOSIS — G30.1 LATE ONSET ALZHEIMER'S DEMENTIA WITHOUT BEHAVIORAL DISTURBANCE: Primary | Chronic | ICD-10-CM

## 2022-06-20 PROBLEM — E66.09 OBESITY DUE TO EXCESS CALORIES WITH SERIOUS COMORBIDITY: Chronic | Status: ACTIVE | Noted: 2019-10-02

## 2022-06-20 PROCEDURE — 99214 OFFICE O/P EST MOD 30 MIN: CPT | Performed by: INTERNAL MEDICINE

## 2022-06-20 RX ORDER — DONEPEZIL HYDROCHLORIDE 10 MG/1
10 TABLET, FILM COATED ORAL NIGHTLY
Qty: 90 TABLET | Refills: 3 | Status: SHIPPED | OUTPATIENT
Start: 2022-06-20 | End: 2023-02-22

## 2022-06-20 NOTE — PROGRESS NOTES
Chief Complaint  Follow-up (1 month for memory loss and b/p check )    Subjective        History of Present Illness     Nick Austin presents to the office for 1-month follow up on a few issues including Alzheimer's dementia, for which we started Aricept 5 mg after patient scored 21/30 on MMSE 05/23/2022.  We checked CT of the brain 06/02/2022, which revealed scattered chronic microangiopathic changes.  He continues on statin, fenofibrate, Plavix and aspirin.  Patient is tolerating the low dose Aricept without issues including no diarrhea or appetite suppression.  Weight is stable from his visit one month ago.  We discussed increasing his dose of Aricept with a plan to add Namenda in one month to have patient at maximum medical therapy.          Patient saw Dr. Jay for angina.  Dr. Jay referred patient to Dr. Chavira for heart cath completed 06/14/2022 revealing significant coronary artery disease involving his left circumflex and mid LAD after the touchdown of LIMA, which has severe tortuosity with high risk of LAD straightening/tear if PTCA attempted.  At this point, Dr. Chavira recommended optimization of medical therapy.  However, he will discuss options of high risk PCI of LAD via LIMA and left circumflex with atherectomy if patient continues to have lifestyle limiting angina despite being on optimal medical therapy. Patient denies any anginal chest pain since I intensified his hypertension management, also no orthostatic symptoms currently.  BP at goal today in the office at 118/60. Patient's wife reports his systolic BP has consistently stayed below 140 on current medications.  He now has an appropriate blood pressure cuff at home for monitoring, no longer using the wrist cuff    Patient had esophageal dilation by Dr. Wood 05/27/2022 to address dysphagia related to Schatzki's ring with good results.  He continues on omeprazole.  He denies any dysphagia now.  He has follow up visit with  "Dr. Wood scheduled for September.  He continues [facility] for GERD management.             Objective   Vital Signs:  /60   Pulse 64   Temp 96.7 °F (35.9 °C) (Tympanic)   Ht 166.4 cm (65.5\")   Wt 106 kg (233 lb)   BMI 38.18 kg/m²   Estimated body mass index is 38.18 kg/m² as calculated from the following:    Height as of this encounter: 166.4 cm (65.5\").    Weight as of this encounter: 106 kg (233 lb).        Physical Exam  Vitals reviewed.   Constitutional:       General: He is not in acute distress.     Appearance: He is well-developed.      Comments: Pleasant male, obese.  Accompanied by his wife.     HENT:      Head: Normocephalic and atraumatic.      Nose:      Right Sinus: No maxillary sinus tenderness or frontal sinus tenderness.      Left Sinus: No maxillary sinus tenderness or frontal sinus tenderness.      Mouth/Throat:      Mouth: No oral lesions.      Pharynx: Uvula midline.      Tonsils: No tonsillar exudate.   Eyes:      Conjunctiva/sclera: Conjunctivae normal.      Pupils: Pupils are equal, round, and reactive to light.   Neck:      Thyroid: No thyroid mass or thyromegaly.      Vascular: No carotid bruit or JVD.      Trachea: Trachea normal. No tracheal deviation.   Cardiovascular:      Rate and Rhythm: Normal rate and regular rhythm.  No extrasystoles are present.     Chest Wall: PMI is not displaced.      Heart sounds: Normal heart sounds. No murmur heard.  Pulmonary:      Effort: Pulmonary effort is normal. No accessory muscle usage or respiratory distress.      Breath sounds: Normal breath sounds. No decreased breath sounds, wheezing, rhonchi or rales.   Abdominal:      General: Bowel sounds are normal. There is no distension.      Palpations: Abdomen is soft.      Tenderness: There is no abdominal tenderness.      Comments: Obese abdomen.    Musculoskeletal:      Cervical back: Neck supple.   Lymphadenopathy:      Cervical: No cervical adenopathy.   Skin:     General: Skin is warm and " dry.      Findings: No rash.      Nails: There is no clubbing.   Neurological:      Mental Status: He is alert and oriented to person, place, and time.      Cranial Nerves: No cranial nerve deficit.      Coordination: Coordination normal.   Psychiatric:         Speech: Speech normal.         Behavior: Behavior normal.         Thought Content: Thought content normal.         Judgment: Judgment normal.            Result Review :    Common labs    Common Labsle 3/9/22 3/9/22 3/9/22 3/9/22 3/9/22 3/9/22 5/23/22 6/14/22 6/14/22    0714 0714 0714 0714 0714 0714  0633 0633   Glucose  112 (A)     110 (A)  113 (A)   BUN  23     11  10   Creatinine  1.24     0.93  0.94   Sodium  141     143  141   Potassium  3.8     3.8  3.8   Chloride  103     108  107   Calcium  9.1     8.9  9.1   Albumin  4.40       4.00   Total Bilirubin  0.5       0.4   Alkaline Phosphatase  68       68   AST (SGOT)  29       22   ALT (SGPT)  28       26   WBC 7.67       7.42    Hemoglobin 15.3       14.1    Hematocrit 46.1       41.3    Platelets 277       272    Triglycerides   284 (A)         LDL Cholesterol      85       Hemoglobin A1C    5.90 (A)        Microalbumin, Urine      <1.2      (A) Abnormal value            Data reviewed: Radiologic studies CT of the brain 06/02/2022, Cardiology studies heart cath by Dr. Chavira 06/14/2022 and GI studies esophageal dilation by Dr. Wood 05/27/2022        Future Appointments   Date Time Provider Department Center   7/18/2022  1:30 PM Perla Jay MD [facility] CD MAD None   7/25/2022 11:45 AM Patrick Magdaleno MD [facility] PC POW MAD   9/16/2022 10:15 AM Carlos Wood MD [facility] GE MAD MAD   10/27/2022  9:30 AM Patrick Magdaleno MD W  POW MAD        Assessment and Plan   Diagnoses and all orders for this visit:    1. Late onset Alzheimer's dementia without behavioral disturbance (HCC) (Primary)    2. Essential hypertension    3. Coronary artery disease involving native coronary artery of native heart with  angina pectoris (CMS/[facility]) - followed by Dr. Jay    4. Class 2 severe obesity due to excess calories with serious comorbidity and body mass index (BMI) of 38.0 to 38.9 in adult ([facility])    5. Gastroesophageal reflux disease, unspecified whether esophagitis present    Other orders  -     donepezil (Aricept) 10 MG tablet; Take 1 tablet by mouth Every Night. For memory  Dispense: 90 tablet; Refill: 3           I spent 32 minutes caring for Nick on this date of service. This time includes time spent by me in the following activities:preparing for the visit, reviewing tests, obtaining and/or reviewing a separately obtained history, performing a medically appropriate examination and/or evaluation , counseling and educating the patient/family/caregiver, ordering medications, tests, or procedures and documenting information in the medical record     We will increase the dose of Aricept from 5 mg to 10 mg daily.  We will re-evaluate in one month and plan to add Namenda in one month if patient tolerates the higher dose of Aricept.  I have told him that the higher dose of Aricept will likely provide some appetite suppression, and I encouraged him to consistently eat smaller portions to help accomplish some much-needed weight loss.  Try to maintain physically active and avoid sedentary lifestyle    Continue the medical hypertension/CAD with angina management plan as directed by  Dr. Chavira and Dr. Jay.  Patient will be following with Dr. Jay, cardiologist.  His next appointment is scheduled for July 18th.  Continue current BP medications and monitoring and notify us if not consistently at goal.  Continue the current cardiovascular risk factor modifications including statin and dual antiplatelet therapy    Continue Prilosec and nonpharmacological antireflux lifestyle in this patient with Schatzki's ring.  Pursue weight loss.  Continue follow up appointment with Dr. Wood, GI as she recommends this issue is  improved/stable.      Return in one month for follow up on Alzheimer's and in October for routine follow up with fasting labs one week prior or sooner if needed.     Scribed for Dr. Magdaleno by Lauren Andersenscchidi.     Follow Up   Return in about 1 month (around 7/20/2022).  Patient was given instructions and counseling regarding his condition or for health maintenance advice. Please see specific information pulled into the AVS if appropriate.
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
## 2022-07-18 ENCOUNTER — OFFICE VISIT (OUTPATIENT)
Dept: CARDIOLOGY | Facility: CLINIC | Age: 72
End: 2022-07-18

## 2022-07-18 VITALS
DIASTOLIC BLOOD PRESSURE: 76 MMHG | SYSTOLIC BLOOD PRESSURE: 132 MMHG | TEMPERATURE: 97.1 F | HEIGHT: 66 IN | OXYGEN SATURATION: 98 % | WEIGHT: 233 LBS | BODY MASS INDEX: 37.45 KG/M2

## 2022-07-18 DIAGNOSIS — E11.69 HYPERLIPIDEMIA ASSOCIATED WITH TYPE 2 DIABETES MELLITUS: Chronic | ICD-10-CM

## 2022-07-18 DIAGNOSIS — I25.10 CORONARY ARTERY DISEASE INVOLVING NATIVE CORONARY ARTERY OF NATIVE HEART WITHOUT ANGINA PECTORIS: ICD-10-CM

## 2022-07-18 DIAGNOSIS — I10 ESSENTIAL HYPERTENSION: Chronic | ICD-10-CM

## 2022-07-18 DIAGNOSIS — Z95.1 S/P CABG (CORONARY ARTERY BYPASS GRAFT): Primary | ICD-10-CM

## 2022-07-18 DIAGNOSIS — E78.5 HYPERLIPIDEMIA ASSOCIATED WITH TYPE 2 DIABETES MELLITUS: Chronic | ICD-10-CM

## 2022-07-18 PROCEDURE — 99214 OFFICE O/P EST MOD 30 MIN: CPT | Performed by: INTERNAL MEDICINE

## 2022-07-18 RX ORDER — ISOSORBIDE MONONITRATE 30 MG/1
30 TABLET, EXTENDED RELEASE ORAL EVERY MORNING
Qty: 90 TABLET | Refills: 3 | Status: SHIPPED | OUTPATIENT
Start: 2022-07-18

## 2022-07-18 RX ORDER — RANOLAZINE 500 MG/1
500 TABLET, EXTENDED RELEASE ORAL 2 TIMES DAILY
Qty: 180 TABLET | Refills: 3 | Status: SHIPPED | OUTPATIENT
Start: 2022-07-18
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
## 2022-07-19 RX ORDER — LEVOTHYROXINE SODIUM 88 MCG
TABLET ORAL
Qty: 90 TABLET | Refills: 0 | Status: SHIPPED | OUTPATIENT
Start: 2022-07-19 | End: 2022-10-03

## 2022-07-25 ENCOUNTER — OFFICE VISIT (OUTPATIENT)
Dept: FAMILY MEDICINE CLINIC | Facility: CLINIC | Age: 72
End: 2022-07-25

## 2022-07-25 VITALS
WEIGHT: 232.2 LBS | HEIGHT: 66 IN | BODY MASS INDEX: 37.32 KG/M2 | TEMPERATURE: 96.7 F | SYSTOLIC BLOOD PRESSURE: 134 MMHG | DIASTOLIC BLOOD PRESSURE: 78 MMHG | HEART RATE: 64 BPM

## 2022-07-25 DIAGNOSIS — E66.01 CLASS 2 SEVERE OBESITY DUE TO EXCESS CALORIES WITH SERIOUS COMORBIDITY AND BODY MASS INDEX (BMI) OF 37.0 TO 37.9 IN ADULT: ICD-10-CM

## 2022-07-25 DIAGNOSIS — R25.8 INVOLUNTARY JERKY MOVEMENTS: ICD-10-CM

## 2022-07-25 DIAGNOSIS — F02.80 LATE ONSET ALZHEIMER'S DEMENTIA WITHOUT BEHAVIORAL DISTURBANCE: Primary | Chronic | ICD-10-CM

## 2022-07-25 DIAGNOSIS — G30.1 LATE ONSET ALZHEIMER'S DEMENTIA WITHOUT BEHAVIORAL DISTURBANCE: Primary | Chronic | ICD-10-CM

## 2022-07-25 DIAGNOSIS — I10 ESSENTIAL HYPERTENSION: Chronic | ICD-10-CM

## 2022-07-25 PROCEDURE — 99214 OFFICE O/P EST MOD 30 MIN: CPT | Performed by: INTERNAL MEDICINE

## 2022-07-25 RX ORDER — MEMANTINE HYDROCHLORIDE 10 MG/1
10 TABLET ORAL 2 TIMES DAILY
Qty: 180 TABLET | Refills: 3 | Status: SHIPPED | OUTPATIENT
Start: 2022-07-25

## 2022-07-25 RX ORDER — MEMANTINE HYDROCHLORIDE 5 MG/1
5 TABLET ORAL 2 TIMES DAILY
Qty: 60 TABLET | Refills: 0 | Status: SHIPPED | OUTPATIENT
Start: 2022-07-25 | End: 2022-09-08 | Stop reason: DRUGHIGH

## 2022-07-25 NOTE — PROGRESS NOTES
"Chief Complaint  Follow-up (Alzheimers and medications) and Dizziness (Occasional episodes )    Subjective        History of Present Illness     Nick Austin presents to the office for 1-month follow up on Alzheimer's dementia.  We started Aricept 5 mg after patient scored 21/30 on MMSE 05/23/2022.  We checked CT of the brain 06/02/2022, which revealed scattered chronic microangiopathic changes.  He is on statin and aspirin and Plavix.  We increased his dose of Aricept from 5 mg to 10 mg daily one month ago.  He is tolerating the medication without significant side effects and actually he and his wife feel they have noticed some mild improvement in memory with the Aricept.  He reports occasional left hand jerking and twitching waking him at night, which could be related to side effect from the Aricept, or may not be related.  He agrees to continue to monitor and notify us if this persists or worsens.  He has also noticed some appetite suppression, although, weight is down only 1 pound in the past month.  He is drinking a nutritional supplement daily, sometimes as a meal substitute.  Overall, he is not meeting diet or activity or weight loss goals.  He had some initial diarrhea with the increased dose of Aricept, which has resolved.  Since patient is tolerated the higher dose of Aricept, we will now add Namenda for 2-drug therapy.  We discussed the goal of combination therapy.    His blood pressure is at goal today.  His weight is stable, but still above goal with BMI of 37.5.      Objective   Vital Signs:  /78   Pulse 64   Temp 96.7 °F (35.9 °C) (Tympanic)   Ht 167.6 cm (66\")   Wt 105 kg (232 lb 3.2 oz)   BMI 37.48 kg/m²   Estimated body mass index is 37.48 kg/m² as calculated from the following:    Height as of this encounter: 167.6 cm (66\").    Weight as of this encounter: 105 kg (232 lb 3.2 oz).          Physical Exam  Constitutional:       General: He is not in acute distress.     " Appearance: He is well-developed.      Comments: Pleasant male.  Obese.  Accompanied by his wife.    HENT:      Head: Normocephalic and atraumatic.      Nose: Nose normal.      Mouth/Throat:      Pharynx: No oropharyngeal exudate.   Eyes:      Pupils: Pupils are equal, round, and reactive to light.   Neck:      Thyroid: No thyromegaly.      Vascular: No JVD.   Cardiovascular:      Rate and Rhythm: Normal rate and regular rhythm.      Heart sounds: Normal heart sounds.   Pulmonary:      Effort: Pulmonary effort is normal. No accessory muscle usage or respiratory distress.      Breath sounds: Normal breath sounds. No wheezing or rales.   Abdominal:      General: Bowel sounds are normal. There is no distension.      Palpations: Abdomen is soft.      Tenderness: There is no abdominal tenderness.      Comments: Overweight abdomen.    Musculoskeletal:      Cervical back: Neck supple.   Lymphadenopathy:      Cervical: No cervical adenopathy.   Neurological:      General: No focal deficit present.      Mental Status: He is alert and oriented to person, place, and time. Mental status is at baseline.      Cranial Nerves: No cranial nerve deficit.      Comments: No jerking or tremor noted today   Psychiatric:         Mood and Affect: Mood normal.         Speech: Speech normal.         Behavior: Behavior normal.         Thought Content: Thought content normal.         Judgment: Judgment normal.            Result Review :                Assessment and Plan   Diagnoses and all orders for this visit:    1. Late onset Alzheimer's dementia without behavioral disturbance (HCC) - Moderate (21/30). Started Aricept 5/2022. Started Namenda 7/2022 (Primary)    2. Essential hypertension    3. Class 2 severe obesity due to excess calories with serious comorbidity and body mass index (BMI) of 37.0 to 37.9 in adult (HCC)    4. Involuntary jerky movements    Other orders  -     memantine (Namenda) 10 MG tablet; Take 1 tablet by mouth 2 (Two)  Times a Day.  Dispense: 180 tablet; Refill: 3  -     memantine (Namenda) 5 MG tablet; Take 1 tablet by mouth 2 (Two) Times a Day. For memory  Dispense: 60 tablet; Refill: 0        Future Appointments   Date Time Provider Department [place]   9/16/2022 10:15 AM Carlos Wood MD [facility]   10/27/2022  9:30 AM Patrick Magdaleno MD [facility] P[facility]   7/18/2023 10:00 AM Perla Jay MD [facility] CD MAD None        I spent 31 minutes caring for Nick on this date of service. This time includes time spent by me in the following activities:preparing for the visit, reviewing tests, obtaining and/or reviewing a separately obtained history, performing a medically appropriate examination and/or evaluation , counseling and educating the patient/family/caregiver, ordering medications, tests, or procedures and documenting information in the medical record     Patient is tolerating the higher dose of Aricept well, for which we will progress to 2-drug therapy by adding Namenda.  Prescription is sent to [facility] for Namenda 5 mg b.i.d. with a plan to increase to 10 mg b.i.d. in one month if he is tolerating the 5 mg dose without issues.  Prescription is sent for the higher 10 mg dose of Namenda to mail order pharmacy.  Recommended mind exercises such as Sudoku puzzles/crossword puzzles and setting goals to accomplish small projects.  Encouraged regular social interaction with family and friends.  Continue to monitor the new symptoms of left hand jerking or twitching and notify us if this worsens in intensity or frequency.    Continue current multidrug hypertension management.  Blood pressure is at goal.  We continue to encourage more aggressive weight loss efforts.  Pursue sodium restriction.            Return 10/27/2022 for routine follow up with fasting labs one week prior or sooner if needed.     Scribed for Dr. Magdaleno by Harriet Gardner [facility].     Follow Up   Return if symptoms worsen or fail to  improve, for Next scheduled follow up.  Patient was given instructions and counseling regarding his condition or for health maintenance advice. Please see specific information pulled into the AVS if appropriate.

## 2022-07-27 ENCOUNTER — TELEPHONE (OUTPATIENT)
Dept: FAMILY MEDICINE CLINIC | Facility: CLINIC | Age: 72
End: 2022-07-27

## 2022-07-27 NOTE — TELEPHONE ENCOUNTER
----- Message from Patrick Magdaleno MD sent at 7/27/2022  1:35 PM CDT -----  Regarding: RE: COVID  I have sent a prescription for Paxlovid to [facility] pharmacy.  Have the patient not take his fenofibrate or atorvastatin while taking the Paxlovid.  Have him take his Ranexa only once daily instead of twice daily while on the Paxlovid.  EB  ----- Message -----  From: Titus Ashton RN  Sent: 7/27/2022   8:49 AM CDT  To: Patrick Magdaleno MD  Subject: FW: COVID                                        He was in the office 07/25. Titus  ----- Message -----  From: Carolyne Jackson RegSched Rep  Sent: 7/27/2022   8:13 AM CDT  To: Titus Ashton RN  Subject: COVID                                            TESTED POST FOR COVID     STARTED YESTERDAY    [facility]

## 2022-07-27 NOTE — TELEPHONE ENCOUNTER
I called patient and explained and he requested me to call back and leave the directions on his answering machine and I also did that. TP

## 2022-09-08 ENCOUNTER — OFFICE VISIT (OUTPATIENT)
Dept: FAMILY MEDICINE CLINIC | Facility: CLINIC | Age: 72
End: 2022-09-08

## 2022-09-08 VITALS
WEIGHT: 221 LBS | HEART RATE: 68 BPM | BODY MASS INDEX: 35.52 KG/M2 | HEIGHT: 66 IN | SYSTOLIC BLOOD PRESSURE: 115 MMHG | DIASTOLIC BLOOD PRESSURE: 67 MMHG

## 2022-09-08 DIAGNOSIS — I10 ESSENTIAL HYPERTENSION: Chronic | ICD-10-CM

## 2022-09-08 DIAGNOSIS — R42 VERTIGO: ICD-10-CM

## 2022-09-08 DIAGNOSIS — E11.9 TYPE 2 DIABETES MELLITUS WITHOUT COMPLICATION, WITHOUT LONG-TERM CURRENT USE OF INSULIN: Chronic | ICD-10-CM

## 2022-09-08 DIAGNOSIS — F02.80 LATE ONSET ALZHEIMER'S DEMENTIA WITHOUT BEHAVIORAL DISTURBANCE: Chronic | ICD-10-CM

## 2022-09-08 DIAGNOSIS — G30.1 LATE ONSET ALZHEIMER'S DEMENTIA WITHOUT BEHAVIORAL DISTURBANCE: Chronic | ICD-10-CM

## 2022-09-08 DIAGNOSIS — H69.83 DYSFUNCTION OF BOTH EUSTACHIAN TUBES: Primary | Chronic | ICD-10-CM

## 2022-09-08 DIAGNOSIS — J30.1 SEASONAL ALLERGIC RHINITIS DUE TO POLLEN: ICD-10-CM

## 2022-09-08 PROCEDURE — G2025 DIS SITE TELE SVCS RHC/FQHC: HCPCS | Performed by: INTERNAL MEDICINE

## 2022-09-08 RX ORDER — PHENYLEPHRINE HCL 10 MG/1
TABLET, FILM COATED ORAL
Qty: 36 TABLET | Refills: 0 | Status: ON HOLD | OUTPATIENT
Start: 2022-09-08 | End: 2022-09-19

## 2022-09-08 RX ORDER — PREDNISONE 10 MG/1
10 TABLET ORAL DAILY
Qty: 7 TABLET | Refills: 0 | Status: SHIPPED | OUTPATIENT
Start: 2022-09-08 | End: 2022-09-15

## 2022-09-08 RX ORDER — MECLIZINE HCL 12.5 MG/1
12.5 TABLET ORAL 3 TIMES DAILY PRN
Qty: 25 TABLET | Refills: 0 | Status: ON HOLD | OUTPATIENT
Start: 2022-09-08 | End: 2022-09-19

## 2022-09-08 RX ORDER — FLUTICASONE PROPIONATE 50 MCG
1 SPRAY, SUSPENSION (ML) NASAL 2 TIMES DAILY
Qty: 15.8 ML | Refills: 6 | Status: SHIPPED | OUTPATIENT
Start: 2022-09-08

## 2022-09-08 NOTE — PROGRESS NOTES
"Chief Complaint  Dizziness (Dizziness x approx 1 month. States sx occur while both sitting and standing, worse with head rotation. )    Subjective        Nick Austin [facility]  History of Present Illness  You have chosen to receive care through a telephone visit. Do you consent to use a telephone visit for your medical care today? Yes    Naveed receives a televisit today at his request.  He reports symptoms of eustachian tube dysfunction/dizziness waxing and waning in intensity for the past month.  Some of the symptoms sound like it some occasional vertigo.  He denies any falls.  He does report significant postnasal drip.  He denies ear pain or pressure.    He has sometimes described some orthostatic symptoms, but that does not sound like a very large component of his current symptoms.  Blood pressure at home is well controlled today.  Imdur can increase his risk of orthostatic problems.    We have him on Aricept for dementia.  We added Namenda approximately 6 weeks ago.  See last note for more details.  He seems to be tolerating these medications well.    Objective   Vital Signs:  /67 Comment: per patient  Pulse 68 Comment: per patient  Ht 167.6 cm (66\")   Wt 100 kg (221 lb) Comment: per patient  BMI 35.67 kg/m²   Estimated body mass index is 35.67 kg/m² as calculated from the following:    Height as of this encounter: 167.6 cm (66\").    Weight as of this encounter: 100 kg (221 lb).          Physical Exam   Result Review :    Common labs    Common Labsle 3/9/22 3/9/22 3/9/22 3/9/22 3/9/22 3/9/22 5/23/22 6/14/22 6/14/22    0714 0714 0714 0714 0714 0714  0633 0633   Glucose  112 (A)     110 (A)  113 (A)   BUN  23     11  10   Creatinine  1.24     0.93  0.94   Sodium  141     143  141   Potassium  3.8     3.8  3.8   Chloride  103     108  107   Calcium  9.1     8.9  9.1   Albumin  4.40       4.00   Total Bilirubin  0.5       0.4 "   Alkaline Phosphatase  68       68   AST (SGOT)  29       22   ALT (SGPT)  28       26   WBC 7.67       7.42    Hemoglobin 15.3       14.1    Hematocrit 46.1       41.3    Platelets 277       272    Triglycerides   284 (A)         LDL Cholesterol      85       Hemoglobin A1C    5.90 (A)        Microalbumin, Urine      <1.2      (A) Abnormal value                      Assessment and Plan   Diagnoses and all orders for this visit:    1. Dysfunction of both eustachian tubes (Primary)    2. Seasonal allergic rhinitis due to pollen    3. Vertigo    4. Essential hypertension    5. Type 2 diabetes mellitus without complication, without long-term current use of insulin (HCC)    6. Late onset Alzheimer's dementia without behavioral disturbance (HCC) - Moderate (21/30). Started Aricept 5/2022. Started Namenda 7/2022    Other orders  -     phenylephrine (SUDAFED PE) 10 MG tablet; 1 po 2 times daily prn sinus/nasal congestion or ear pressure  Dispense: 36 tablet; Refill: 0  -     fluticasone (FLONASE) 50 MCG/ACT nasal spray; 1 spray into the nostril(s) as directed by provider 2 (Two) Times a Day. Administer 1 spray in each nostril twice daily.  Dispense: 15.8 mL; Refill: 6  -     predniSONE (DELTASONE) 10 MG tablet; Take 1 tablet by mouth Daily for 7 days.  Dispense: 7 tablet; Refill: 0  -     meclizine (ANTIVERT) 12.5 MG tablet; Take 1 tablet by mouth 3 (Three) Times a Day As Needed for Dizziness.  Dispense: 25 tablet; Refill: 0      Resume Flonase nasal spray twice daily for the symptoms of ETD and seasonal allergies aggravated by fall pollens.  Small amounts of phenylephrine twice daily as needed for congestion.  Prednisone 10 mg daily for 1 week.  Although he is a diabetic, his last A1c was excellent at 5.9.  Try to be compliant with diabetic diet while on the low-dose steroids.    Continue the current doses of blood pressure medications.  If he continues to have symptoms, we would want to consider the possibility that  Imdur and/or Ranexa may be contributing, or his blood pressure may just simply be lower than necessary.    Continue the Aricept and Namenda combination in this patient with dementia.  His MMSE was 21/30, 5/2022.  He is tolerating the medicines well and this condition seems to be stable.       I spent 22 minutes caring for Nick on this date of service. This time includes time spent by me in the following activities:preparing for the visit, performing a medically appropriate examination and/or evaluation , counseling and educating the patient/family/caregiver, ordering medications, tests, or procedures and documenting information in the medical record  Follow Up   Return if symptoms worsen or fail to improve, for Next scheduled follow up - labs 1 week prior.  Patient was given instructions and counseling regarding his condition or for health maintenance advice. Please see specific information pulled into the AVS if appropriate.

## 2022-09-19 ENCOUNTER — HOSPITAL ENCOUNTER (OUTPATIENT)
Facility: HOSPITAL | Age: 72
Setting detail: OBSERVATION
Discharge: HOME | End: 2022-09-21
Attending: INTERNAL MEDICINE | Admitting: INTERNAL MEDICINE

## 2022-09-19 DIAGNOSIS — Z78.9 IMPAIRED MOBILITY AND ADLS: ICD-10-CM

## 2022-09-19 DIAGNOSIS — Z74.09 IMPAIRED MOBILITY AND ADLS: ICD-10-CM

## 2022-09-19 DIAGNOSIS — Z74.09 IMPAIRED FUNCTIONAL MOBILITY, BALANCE, GAIT, AND ENDURANCE: ICD-10-CM

## 2022-09-19 PROBLEM — J38.3 VOCAL CORD MASS: Status: ACTIVE | Noted: 2022-09-19

## 2022-09-19 PROCEDURE — G0379 DIRECT REFER HOSPITAL OBSERV: HCPCS

## 2022-09-19 PROCEDURE — 25010000002 DEXAMETHASONE PER 1 MG: Performed by: OTOLARYNGOLOGY

## 2022-09-19 PROCEDURE — 96365 THER/PROPH/DIAG IV INF INIT: CPT

## 2022-09-19 PROCEDURE — G0378 HOSPITAL OBSERVATION PER HR: HCPCS

## 2022-09-19 PROCEDURE — 31575 DIAGNOSTIC LARYNGOSCOPY: CPT | Performed by: OTOLARYNGOLOGY

## 2022-09-19 PROCEDURE — 96375 TX/PRO/DX INJ NEW DRUG ADDON: CPT

## 2022-09-19 PROCEDURE — 25010000002 CEFTRIAXONE PER 250 MG: Performed by: OTOLARYNGOLOGY

## 2022-09-19 PROCEDURE — 99202 OFFICE O/P NEW SF 15 MIN: CPT | Performed by: OTOLARYNGOLOGY

## 2022-09-19 RX ORDER — LOSARTAN POTASSIUM 50 MG/1
100 TABLET ORAL DAILY
Status: DISCONTINUED | OUTPATIENT
Start: 2022-09-19 | End: 2022-09-21 | Stop reason: HOSPADM

## 2022-09-19 RX ORDER — SODIUM CHLORIDE 0.9 % (FLUSH) 0.9 %
10 SYRINGE (ML) INJECTION EVERY 12 HOURS SCHEDULED
Status: DISCONTINUED | OUTPATIENT
Start: 2022-09-19 | End: 2022-09-21 | Stop reason: HOSPADM

## 2022-09-19 RX ORDER — AMLODIPINE BESYLATE 5 MG/1
5 TABLET ORAL DAILY
Status: DISCONTINUED | OUTPATIENT
Start: 2022-09-19 | End: 2022-09-21 | Stop reason: HOSPADM

## 2022-09-19 RX ORDER — DONEPEZIL HYDROCHLORIDE 10 MG/1
10 TABLET, FILM COATED ORAL NIGHTLY
Status: DISCONTINUED | OUTPATIENT
Start: 2022-09-19 | End: 2022-09-21 | Stop reason: HOSPADM

## 2022-09-19 RX ORDER — NALOXONE HCL 0.4 MG/ML
0.4 VIAL (ML) INJECTION
Status: DISCONTINUED | OUTPATIENT
Start: 2022-09-19 | End: 2022-09-21 | Stop reason: HOSPADM

## 2022-09-19 RX ORDER — LIDOCAINE HYDROCHLORIDE 40 MG/ML
SOLUTION TOPICAL ONCE
Status: COMPLETED | OUTPATIENT
Start: 2022-09-19 | End: 2022-09-19

## 2022-09-19 RX ORDER — LEVOTHYROXINE SODIUM 88 UG/1
88 TABLET ORAL
Status: DISCONTINUED | OUTPATIENT
Start: 2022-09-20 | End: 2022-09-21 | Stop reason: HOSPADM

## 2022-09-19 RX ORDER — BISACODYL 10 MG
10 SUPPOSITORY, RECTAL RECTAL DAILY PRN
Status: DISCONTINUED | OUTPATIENT
Start: 2022-09-19 | End: 2022-09-21 | Stop reason: HOSPADM

## 2022-09-19 RX ORDER — ESCITALOPRAM OXALATE 5 MG/1
5 TABLET ORAL DAILY
Status: DISCONTINUED | OUTPATIENT
Start: 2022-09-19 | End: 2022-09-21 | Stop reason: HOSPADM

## 2022-09-19 RX ORDER — ACETAMINOPHEN 650 MG/1
650 SUPPOSITORY RECTAL EVERY 4 HOURS PRN
Status: DISCONTINUED | OUTPATIENT
Start: 2022-09-19 | End: 2022-09-21 | Stop reason: HOSPADM

## 2022-09-19 RX ORDER — BISACODYL 5 MG/1
5 TABLET, DELAYED RELEASE ORAL DAILY PRN
Status: DISCONTINUED | OUTPATIENT
Start: 2022-09-19 | End: 2022-09-21 | Stop reason: HOSPADM

## 2022-09-19 RX ORDER — ACETAMINOPHEN 325 MG/1
650 TABLET ORAL EVERY 4 HOURS PRN
Status: DISCONTINUED | OUTPATIENT
Start: 2022-09-19 | End: 2022-09-21 | Stop reason: HOSPADM

## 2022-09-19 RX ORDER — SODIUM CHLORIDE 9 MG/ML
75 INJECTION, SOLUTION INTRAVENOUS CONTINUOUS
Status: DISCONTINUED | OUTPATIENT
Start: 2022-09-19 | End: 2022-09-21 | Stop reason: HOSPADM

## 2022-09-19 RX ORDER — AMOXICILLIN 250 MG
2 CAPSULE ORAL 2 TIMES DAILY
Status: DISCONTINUED | OUTPATIENT
Start: 2022-09-19 | End: 2022-09-21 | Stop reason: HOSPADM

## 2022-09-19 RX ORDER — FLUTICASONE PROPIONATE 50 MCG
1 SPRAY, SUSPENSION (ML) NASAL 2 TIMES DAILY
Status: DISCONTINUED | OUTPATIENT
Start: 2022-09-19 | End: 2022-09-21 | Stop reason: HOSPADM

## 2022-09-19 RX ORDER — ACETAMINOPHEN 160 MG/5ML
650 SOLUTION ORAL EVERY 4 HOURS PRN
Status: DISCONTINUED | OUTPATIENT
Start: 2022-09-19 | End: 2022-09-21 | Stop reason: HOSPADM

## 2022-09-19 RX ORDER — RANOLAZINE 500 MG/1
500 TABLET, EXTENDED RELEASE ORAL 2 TIMES DAILY
Status: DISCONTINUED | OUTPATIENT
Start: 2022-09-19 | End: 2022-09-21 | Stop reason: HOSPADM

## 2022-09-19 RX ORDER — ATORVASTATIN CALCIUM 40 MG/1
40 TABLET, FILM COATED ORAL NIGHTLY
Status: DISCONTINUED | OUTPATIENT
Start: 2022-09-19 | End: 2022-09-21 | Stop reason: HOSPADM

## 2022-09-19 RX ORDER — ONDANSETRON 2 MG/ML
4 INJECTION INTRAMUSCULAR; INTRAVENOUS EVERY 6 HOURS PRN
Status: DISCONTINUED | OUTPATIENT
Start: 2022-09-19 | End: 2022-09-21 | Stop reason: HOSPADM

## 2022-09-19 RX ORDER — MEMANTINE HYDROCHLORIDE 5 MG/1
10 TABLET ORAL 2 TIMES DAILY
Status: DISCONTINUED | OUTPATIENT
Start: 2022-09-19 | End: 2022-09-21 | Stop reason: HOSPADM

## 2022-09-19 RX ORDER — SODIUM CHLORIDE 0.9 % (FLUSH) 0.9 %
10 SYRINGE (ML) INJECTION AS NEEDED
Status: DISCONTINUED | OUTPATIENT
Start: 2022-09-19 | End: 2022-09-21 | Stop reason: HOSPADM

## 2022-09-19 RX ORDER — ISOSORBIDE MONONITRATE 30 MG/1
30 TABLET, EXTENDED RELEASE ORAL EVERY MORNING
Status: DISCONTINUED | OUTPATIENT
Start: 2022-09-20 | End: 2022-09-21 | Stop reason: HOSPADM

## 2022-09-19 RX ORDER — DEXAMETHASONE SODIUM PHOSPHATE 4 MG/ML
8 INJECTION, SOLUTION INTRA-ARTICULAR; INTRALESIONAL; INTRAMUSCULAR; INTRAVENOUS; SOFT TISSUE EVERY 8 HOURS
Status: COMPLETED | OUTPATIENT
Start: 2022-09-19 | End: 2022-09-21

## 2022-09-19 RX ORDER — ONDANSETRON 4 MG/1
4 TABLET, FILM COATED ORAL EVERY 6 HOURS PRN
Status: DISCONTINUED | OUTPATIENT
Start: 2022-09-19 | End: 2022-09-21 | Stop reason: HOSPADM

## 2022-09-19 RX ORDER — TAMSULOSIN HYDROCHLORIDE 0.4 MG/1
0.4 CAPSULE ORAL NIGHTLY
Status: DISCONTINUED | OUTPATIENT
Start: 2022-09-19 | End: 2022-09-21 | Stop reason: HOSPADM

## 2022-09-19 RX ORDER — FENOFIBRATE 48 MG/1
48 TABLET, COATED ORAL DAILY
Refills: 1 | Status: DISCONTINUED | OUTPATIENT
Start: 2022-09-19 | End: 2022-09-21 | Stop reason: HOSPADM

## 2022-09-19 RX ORDER — POLYETHYLENE GLYCOL 3350 17 G/17G
17 POWDER, FOR SOLUTION ORAL DAILY PRN
Status: DISCONTINUED | OUTPATIENT
Start: 2022-09-19 | End: 2022-09-21 | Stop reason: HOSPADM

## 2022-09-19 RX ORDER — PANTOPRAZOLE SODIUM 40 MG/1
40 TABLET, DELAYED RELEASE ORAL EVERY MORNING
Refills: 3 | Status: DISCONTINUED | OUTPATIENT
Start: 2022-09-20 | End: 2022-09-21 | Stop reason: HOSPADM

## 2022-09-19 RX ADMIN — FLUTICASONE PROPIONATE 1 SPRAY: 50 SPRAY, METERED NASAL at 20:16

## 2022-09-19 RX ADMIN — MEMANTINE HYDROCHLORIDE 10 MG: 5 TABLET ORAL at 20:15

## 2022-09-19 RX ADMIN — PHENYLEPHRINE HYDROCHLORIDE 2 SPRAY: 0.5 SPRAY NASAL at 17:38

## 2022-09-19 RX ADMIN — LIDOCAINE HYDROCHLORIDE: 40 SOLUTION TOPICAL at 17:38

## 2022-09-19 RX ADMIN — SODIUM CHLORIDE 75 ML/HR: 9 INJECTION, SOLUTION INTRAVENOUS at 16:14

## 2022-09-19 RX ADMIN — DONEPEZIL HYDROCHLORIDE 10 MG: 10 TABLET ORAL at 20:08

## 2022-09-19 RX ADMIN — ATORVASTATIN CALCIUM 40 MG: 40 TABLET, FILM COATED ORAL at 20:09

## 2022-09-19 RX ADMIN — RANOLAZINE 500 MG: 500 TABLET, FILM COATED, EXTENDED RELEASE ORAL at 20:08

## 2022-09-19 RX ADMIN — TAMSULOSIN HYDROCHLORIDE 0.4 MG: 0.4 CAPSULE ORAL at 20:09

## 2022-09-19 RX ADMIN — DOCUSATE SODIUM 50 MG AND SENNOSIDES 8.6 MG 2 TABLET: 8.6; 5 TABLET, FILM COATED ORAL at 20:08

## 2022-09-19 RX ADMIN — DEXAMETHASONE SODIUM PHOSPHATE 8 MG: 4 INJECTION, SOLUTION INTRAMUSCULAR; INTRAVENOUS at 18:47

## 2022-09-19 RX ADMIN — CEFTRIAXONE SODIUM 1 G: 1 INJECTION, POWDER, FOR SOLUTION INTRAMUSCULAR; INTRAVENOUS at 18:47

## 2022-09-19 RX ADMIN — Medication 10 ML: at 20:09

## 2022-09-20 ENCOUNTER — APPOINTMENT (OUTPATIENT)
Dept: CT IMAGING | Facility: HOSPITAL | Age: 72
End: 2022-09-20

## 2022-09-20 LAB
ALBUMIN SERPL-MCNC: 3.8 G/DL (ref 3.5–5.2)
ALBUMIN/GLOB SERPL: 1.3 G/DL
ALP SERPL-CCNC: 55 U/L (ref 39–117)
ALT SERPL W P-5'-P-CCNC: 13 U/L (ref 1–41)
ANION GAP SERPL CALCULATED.3IONS-SCNC: 9 MMOL/L (ref 5–15)
AST SERPL-CCNC: 14 U/L (ref 1–40)
BASOPHILS # BLD AUTO: 0.02 10*3/MM3 (ref 0–0.2)
BASOPHILS NFR BLD AUTO: 0.2 % (ref 0–1.5)
BILIRUB SERPL-MCNC: 0.5 MG/DL (ref 0–1.2)
BUN SERPL-MCNC: 14 MG/DL (ref 8–23)
BUN/CREAT SERPL: 17.5 (ref 7–25)
CALCIUM SPEC-SCNC: 8.6 MG/DL (ref 8.6–10.5)
CHLORIDE SERPL-SCNC: 105 MMOL/L (ref 98–107)
CO2 SERPL-SCNC: 25 MMOL/L (ref 22–29)
CREAT SERPL-MCNC: 0.8 MG/DL (ref 0.76–1.27)
DEPRECATED RDW RBC AUTO: 48.1 FL (ref 37–54)
EGFRCR SERPLBLD CKD-EPI 2021: 93.4 ML/MIN/1.73
EOSINOPHIL # BLD AUTO: 0 10*3/MM3 (ref 0–0.4)
EOSINOPHIL NFR BLD AUTO: 0 % (ref 0.3–6.2)
ERYTHROCYTE [DISTWIDTH] IN BLOOD BY AUTOMATED COUNT: 14.2 % (ref 12.3–15.4)
GLOBULIN UR ELPH-MCNC: 3 GM/DL
GLUCOSE SERPL-MCNC: 135 MG/DL (ref 65–99)
HCT VFR BLD AUTO: 41.5 % (ref 37.5–51)
HGB BLD-MCNC: 14.1 G/DL (ref 13–17.7)
IMM GRANULOCYTES # BLD AUTO: 0.08 10*3/MM3 (ref 0–0.05)
IMM GRANULOCYTES NFR BLD AUTO: 0.7 % (ref 0–0.5)
LYMPHOCYTES # BLD AUTO: 0.9 10*3/MM3 (ref 0.7–3.1)
LYMPHOCYTES NFR BLD AUTO: 7.5 % (ref 19.6–45.3)
MCH RBC QN AUTO: 31.5 PG (ref 26.6–33)
MCHC RBC AUTO-ENTMCNC: 34 G/DL (ref 31.5–35.7)
MCV RBC AUTO: 92.6 FL (ref 79–97)
MONOCYTES # BLD AUTO: 0.1 10*3/MM3 (ref 0.1–0.9)
MONOCYTES NFR BLD AUTO: 0.8 % (ref 5–12)
NEUTROPHILS NFR BLD AUTO: 10.86 10*3/MM3 (ref 1.7–7)
NEUTROPHILS NFR BLD AUTO: 90.8 % (ref 42.7–76)
NRBC BLD AUTO-RTO: 0 /100 WBC (ref 0–0.2)
PLATELET # BLD AUTO: 252 10*3/MM3 (ref 140–450)
PMV BLD AUTO: 11.3 FL (ref 6–12)
POTASSIUM SERPL-SCNC: 3.7 MMOL/L (ref 3.5–5.2)
PROT SERPL-MCNC: 6.8 G/DL (ref 6–8.5)
RBC # BLD AUTO: 4.48 10*6/MM3 (ref 4.14–5.8)
SODIUM SERPL-SCNC: 139 MMOL/L (ref 136–145)
WBC NRBC COR # BLD: 11.96 10*3/MM3 (ref 3.4–10.8)

## 2022-09-20 PROCEDURE — 80053 COMPREHEN METABOLIC PANEL: CPT | Performed by: INTERNAL MEDICINE

## 2022-09-20 PROCEDURE — 96376 TX/PRO/DX INJ SAME DRUG ADON: CPT

## 2022-09-20 PROCEDURE — G0378 HOSPITAL OBSERVATION PER HR: HCPCS

## 2022-09-20 PROCEDURE — 25010000002 CEFTRIAXONE PER 250 MG: Performed by: OTOLARYNGOLOGY

## 2022-09-20 PROCEDURE — 97166 OT EVAL MOD COMPLEX 45 MIN: CPT

## 2022-09-20 PROCEDURE — 85025 COMPLETE CBC W/AUTO DIFF WBC: CPT | Performed by: INTERNAL MEDICINE

## 2022-09-20 PROCEDURE — 97162 PT EVAL MOD COMPLEX 30 MIN: CPT

## 2022-09-20 PROCEDURE — 99212 OFFICE O/P EST SF 10 MIN: CPT | Performed by: OTOLARYNGOLOGY

## 2022-09-20 PROCEDURE — 25010000002 DEXAMETHASONE PER 1 MG: Performed by: OTOLARYNGOLOGY

## 2022-09-20 PROCEDURE — 0 IOPAMIDOL PER 1 ML: Performed by: INTERNAL MEDICINE

## 2022-09-20 PROCEDURE — 71260 CT THORAX DX C+: CPT

## 2022-09-20 RX ORDER — CLOPIDOGREL BISULFATE 75 MG/1
75 TABLET ORAL DAILY
Status: DISCONTINUED | OUTPATIENT
Start: 2022-09-20 | End: 2022-09-21 | Stop reason: HOSPADM

## 2022-09-20 RX ORDER — ASPIRIN 81 MG/1
81 TABLET ORAL DAILY
Status: DISCONTINUED | OUTPATIENT
Start: 2022-09-20 | End: 2022-09-21 | Stop reason: HOSPADM

## 2022-09-20 RX ORDER — NYSTATIN 100000 [USP'U]/G
POWDER TOPICAL EVERY 12 HOURS SCHEDULED
Status: DISCONTINUED | OUTPATIENT
Start: 2022-09-20 | End: 2022-09-21 | Stop reason: HOSPADM

## 2022-09-20 RX ADMIN — ESCITALOPRAM 5 MG: 5 TABLET, FILM COATED ORAL at 09:35

## 2022-09-20 RX ADMIN — DEXAMETHASONE SODIUM PHOSPHATE 8 MG: 4 INJECTION, SOLUTION INTRAMUSCULAR; INTRAVENOUS at 18:20

## 2022-09-20 RX ADMIN — RANOLAZINE 500 MG: 500 TABLET, FILM COATED, EXTENDED RELEASE ORAL at 09:35

## 2022-09-20 RX ADMIN — RANOLAZINE 500 MG: 500 TABLET, FILM COATED, EXTENDED RELEASE ORAL at 21:04

## 2022-09-20 RX ADMIN — MEMANTINE HYDROCHLORIDE 10 MG: 5 TABLET ORAL at 21:04

## 2022-09-20 RX ADMIN — DOCUSATE SODIUM 50 MG AND SENNOSIDES 8.6 MG 2 TABLET: 8.6; 5 TABLET, FILM COATED ORAL at 21:04

## 2022-09-20 RX ADMIN — ATORVASTATIN CALCIUM 40 MG: 40 TABLET, FILM COATED ORAL at 21:04

## 2022-09-20 RX ADMIN — DEXAMETHASONE SODIUM PHOSPHATE 8 MG: 4 INJECTION, SOLUTION INTRAMUSCULAR; INTRAVENOUS at 09:45

## 2022-09-20 RX ADMIN — MEMANTINE HYDROCHLORIDE 10 MG: 5 TABLET ORAL at 09:35

## 2022-09-20 RX ADMIN — PANTOPRAZOLE SODIUM 40 MG: 40 TABLET, DELAYED RELEASE ORAL at 05:56

## 2022-09-20 RX ADMIN — ASPIRIN 81 MG: 81 TABLET, FILM COATED ORAL at 11:45

## 2022-09-20 RX ADMIN — SODIUM CHLORIDE 75 ML/HR: 9 INJECTION, SOLUTION INTRAVENOUS at 09:28

## 2022-09-20 RX ADMIN — DONEPEZIL HYDROCHLORIDE 10 MG: 10 TABLET ORAL at 21:04

## 2022-09-20 RX ADMIN — FENOFIBRATE 48 MG: 48 TABLET ORAL at 09:35

## 2022-09-20 RX ADMIN — IOPAMIDOL 90 ML: 755 INJECTION, SOLUTION INTRAVENOUS at 06:41

## 2022-09-20 RX ADMIN — DOCUSATE SODIUM 50 MG AND SENNOSIDES 8.6 MG 2 TABLET: 8.6; 5 TABLET, FILM COATED ORAL at 09:35

## 2022-09-20 RX ADMIN — CLOPIDOGREL BISULFATE 75 MG: 75 TABLET ORAL at 11:45

## 2022-09-20 RX ADMIN — DEXAMETHASONE SODIUM PHOSPHATE 8 MG: 4 INJECTION, SOLUTION INTRAMUSCULAR; INTRAVENOUS at 01:38

## 2022-09-20 RX ADMIN — ISOSORBIDE MONONITRATE 30 MG: 30 TABLET, EXTENDED RELEASE ORAL at 05:56

## 2022-09-20 RX ADMIN — CEFTRIAXONE SODIUM 1 G: 1 INJECTION, POWDER, FOR SOLUTION INTRAMUSCULAR; INTRAVENOUS at 18:20

## 2022-09-20 RX ADMIN — LEVOTHYROXINE SODIUM 88 MCG: 88 TABLET ORAL at 05:57

## 2022-09-20 RX ADMIN — AMLODIPINE BESYLATE 5 MG: 5 TABLET ORAL at 09:35

## 2022-09-20 RX ADMIN — FLUTICASONE PROPIONATE 1 SPRAY: 50 SPRAY, METERED NASAL at 09:43

## 2022-09-20 RX ADMIN — NYSTATIN: 100000 POWDER TOPICAL at 21:04

## 2022-09-20 RX ADMIN — LOSARTAN POTASSIUM 100 MG: 50 TABLET, FILM COATED ORAL at 09:35

## 2022-09-20 RX ADMIN — FLUTICASONE PROPIONATE 1 SPRAY: 50 SPRAY, METERED NASAL at 21:04

## 2022-09-20 RX ADMIN — TAMSULOSIN HYDROCHLORIDE 0.4 MG: 0.4 CAPSULE ORAL at 21:04

## 2022-09-21 ENCOUNTER — READMISSION MANAGEMENT (OUTPATIENT)
Dept: CALL CENTER | Facility: HOSPITAL | Age: 72
End: 2022-09-21

## 2022-09-21 VITALS
DIASTOLIC BLOOD PRESSURE: 74 MMHG | RESPIRATION RATE: 16 BRPM | HEIGHT: 66 IN | SYSTOLIC BLOOD PRESSURE: 130 MMHG | BODY MASS INDEX: 36.35 KG/M2 | WEIGHT: 226.2 LBS | TEMPERATURE: 97.9 F | HEART RATE: 68 BPM | OXYGEN SATURATION: 94 %

## 2022-09-21 PROCEDURE — 99212 OFFICE O/P EST SF 10 MIN: CPT | Performed by: OTOLARYNGOLOGY

## 2022-09-21 PROCEDURE — 97116 GAIT TRAINING THERAPY: CPT

## 2022-09-21 PROCEDURE — 96376 TX/PRO/DX INJ SAME DRUG ADON: CPT

## 2022-09-21 PROCEDURE — G0378 HOSPITAL OBSERVATION PER HR: HCPCS

## 2022-09-21 PROCEDURE — 97530 THERAPEUTIC ACTIVITIES: CPT

## 2022-09-21 PROCEDURE — 25010000002 DEXAMETHASONE PER 1 MG: Performed by: OTOLARYNGOLOGY

## 2022-09-21 RX ORDER — CEFDINIR 300 MG/1
300 CAPSULE ORAL 2 TIMES DAILY
Qty: 10 CAPSULE | Refills: 0 | Status: SHIPPED | OUTPATIENT
Start: 2022-09-21 | End: 2022-09-21 | Stop reason: SDUPTHER

## 2022-09-21 RX ORDER — PREDNISONE 20 MG/1
40 TABLET ORAL 2 TIMES DAILY
Qty: 12 TABLET | Refills: 0 | Status: SHIPPED | OUTPATIENT
Start: 2022-09-21 | End: 2022-09-24

## 2022-09-21 RX ORDER — CEFDINIR 300 MG/1
300 CAPSULE ORAL 2 TIMES DAILY
Qty: 14 CAPSULE | Refills: 0 | Status: SHIPPED | OUTPATIENT
Start: 2022-09-21 | End: 2022-09-28

## 2022-09-21 RX ORDER — HALOPERIDOL 5 MG/ML
2 INJECTION INTRAMUSCULAR EVERY 6 HOURS PRN
Status: DISCONTINUED | OUTPATIENT
Start: 2022-09-21 | End: 2022-09-21 | Stop reason: HOSPADM

## 2022-09-21 RX ORDER — PREDNISONE 20 MG/1
40 TABLET ORAL DAILY
Qty: 6 TABLET | Refills: 0 | Status: SHIPPED | OUTPATIENT
Start: 2022-09-21 | End: 2022-09-21 | Stop reason: SDUPTHER

## 2022-09-21 RX ORDER — LORAZEPAM 2 MG/ML
1 INJECTION INTRAMUSCULAR EVERY 4 HOURS PRN
Status: DISCONTINUED | OUTPATIENT
Start: 2022-09-21 | End: 2022-09-21 | Stop reason: HOSPADM

## 2022-09-21 RX ADMIN — FLUTICASONE PROPIONATE 1 SPRAY: 50 SPRAY, METERED NASAL at 09:39

## 2022-09-21 RX ADMIN — FENOFIBRATE 48 MG: 48 TABLET ORAL at 09:29

## 2022-09-21 RX ADMIN — DEXAMETHASONE SODIUM PHOSPHATE 8 MG: 4 INJECTION, SOLUTION INTRAMUSCULAR; INTRAVENOUS at 09:45

## 2022-09-21 RX ADMIN — DEXAMETHASONE SODIUM PHOSPHATE 8 MG: 4 INJECTION, SOLUTION INTRAMUSCULAR; INTRAVENOUS at 03:31

## 2022-09-21 RX ADMIN — RANOLAZINE 500 MG: 500 TABLET, FILM COATED, EXTENDED RELEASE ORAL at 09:29

## 2022-09-21 RX ADMIN — MEMANTINE HYDROCHLORIDE 10 MG: 5 TABLET ORAL at 09:28

## 2022-09-21 RX ADMIN — NYSTATIN: 100000 POWDER TOPICAL at 11:28

## 2022-09-21 RX ADMIN — SODIUM CHLORIDE 75 ML/HR: 9 INJECTION, SOLUTION INTRAVENOUS at 03:35

## 2022-09-21 RX ADMIN — ESCITALOPRAM 5 MG: 5 TABLET, FILM COATED ORAL at 09:28

## 2022-09-21 RX ADMIN — LOSARTAN POTASSIUM 100 MG: 50 TABLET, FILM COATED ORAL at 09:28

## 2022-09-21 RX ADMIN — AMLODIPINE BESYLATE 5 MG: 5 TABLET ORAL at 09:37

## 2022-09-21 RX ADMIN — DOCUSATE SODIUM 50 MG AND SENNOSIDES 8.6 MG 2 TABLET: 8.6; 5 TABLET, FILM COATED ORAL at 09:27

## 2022-09-21 RX ADMIN — ASPIRIN 81 MG: 81 TABLET, FILM COATED ORAL at 09:29

## 2022-09-21 RX ADMIN — CLOPIDOGREL BISULFATE 75 MG: 75 TABLET ORAL at 09:36

## 2022-09-22 ENCOUNTER — TRANSITIONAL CARE MANAGEMENT TELEPHONE ENCOUNTER (OUTPATIENT)
Dept: CALL CENTER | Facility: HOSPITAL | Age: 72
End: 2022-09-22

## 2022-09-22 NOTE — OUTREACH NOTE
Prep Survey    Flowsheet Row Responses   [facility] patient discharged from? [place]   Is LACE score < 7 ? No   Emergency Room discharge w/ pulse ox? No   Eligibility [facility]   Date of Admission 09/19/22   Date of Discharge 09/21/22   Discharge Disposition Home or Self Care   Discharge diagnosis   Acute supraglottitis   Does the patient have one of the following disease processes/diagnoses(primary or secondary)? Other   Does the patient have Home health ordered? No   Is there a DME ordered? No   Prep survey completed? Yes          MIKE YANG - Registered Nurse

## 2022-09-22 NOTE — OUTREACH NOTE
Call Center TCM Note    Flowsheet Row Responses   [facility] patient discharged from? [place]   Does the patient have one of the following disease processes/diagnoses(primary or secondary)? Other   TCM attempt successful? Yes   Call start time 1246   Call end time 1248   Discharge diagnosis   Acute supraglottitis   Person spoke with today (if not patient) and relationship Pt handed phone to wife   Meds reviewed with patient/caregiver? Yes   Is the patient having any side effects they believe may be caused by any medication additions or changes? No   Does the patient have all medications ordered at discharge? Yes   Is the patient taking all medications as directed (includes completed medication regime)? Yes   Comments HOSP DC FU 9/30/22 @ 10 am.    Has home health visited the patient within 72 hours of discharge? N/A   Psychosocial issues? No   Did the patient receive a copy of their discharge instructions? Yes   Nursing interventions Reviewed instructions with patient   What is the patient's perception of their health status since discharge? Improving   Is the patient/caregiver able to teach back signs and symptoms related to disease process for when to call PCP? Yes   Is the patient/caregiver able to teach back signs and symptoms related to disease process for when to call 911? Yes   Is the patient/caregiver able to teach back the hierarchy of who to call/visit for symptoms/problems? PCP, Specialist, Home health nurse, Urgent Care, ED, 911 Yes   TCM call completed? Yes   Wrap up additional comments Wife reports Pt doing well.           Cinthia Morton RN    9/22/2022, 12:48 CDT

## 2022-09-28 ENCOUNTER — OFFICE VISIT (OUTPATIENT)
Dept: OTOLARYNGOLOGY | Facility: CLINIC | Age: 72
End: 2022-09-28

## 2022-09-28 VITALS — OXYGEN SATURATION: 97 % | BODY MASS INDEX: 36 KG/M2 | HEIGHT: 66 IN | WEIGHT: 224 LBS

## 2022-09-28 DIAGNOSIS — J04.30 SUPRAGLOTTITIS WITHOUT AIRWAY OBSTRUCTION: Primary | ICD-10-CM

## 2022-09-28 DIAGNOSIS — J37.0 CHRONIC LARYNGITIS: ICD-10-CM

## 2022-09-28 PROCEDURE — 99213 OFFICE O/P EST LOW 20 MIN: CPT | Performed by: OTOLARYNGOLOGY

## 2022-09-28 PROCEDURE — 31575 DIAGNOSTIC LARYNGOSCOPY: CPT | Performed by: OTOLARYNGOLOGY

## 2022-09-28 NOTE — PROGRESS NOTES
Subjective   Nick Austin is a 71 y.o. male.       History of Present Illness   Patient was recently hospitalized with what appeared to be acute supraglottitis.  Responded to antibiotics and steroids.  Has Alzheimer's so cannot clearly articulate how much he is improved but nonetheless both he and his wife state that he is eating and swallowing without difficulty and not having any trouble breathing.      The following portions of the patient's history were reviewed and updated as appropriate: allergies, current medications, past family history, past medical history, past social history, past surgical history and problem list.     reports that he has never smoked. He has never used smokeless tobacco. He reports that he does not drink alcohol and does not use drugs.   Patient is not a tobacco user and has not been counseled for use of tobacco products      Review of Systems        Objective   Physical Exam  General: Male in no acute distress.  He is pleasant and cooperative.  No stridor or hot potato voice.  Nares no discharge  Oral cavity no lesions  Pharynx no erythema or exudate  Neck no adenopathy or mass    Procedure Note    Pre-operative Diagnosis: Patient presents with:  Sore Throat      Post-operative Diagnosis: Resolved acute supraglottic infection; chronic laryngitis    Anesthesia: Topical with Xylocaine and Fausto-Synephrine    Endoscopy Type:  Flexible Laryngoscopy    Procedure Details:    The patient was placed in the sitting position.  After topical anesthesia and decongestion, the 4 mm laryngoscope was passed.  The nasal cavities, nasopharynx, oropharynx, hypopharynx, and larynx were all examined.  Vocal cords were examined during respiration and phonation.  The following findings were noted:    Findings: Previously noted nasal findings were confirmed. Nasopharynx without mass, hypopharynx and larynx without evidence of neoplasm. Vocal cord mobility intact.  The previously noted marked edema of  the left area epiglottic fold and arytenoid has resolved.  There is mild chronic appearing edema and erythema of the laryngeal structures consistent with chronic laryngitis.    Condition:  Stable.  Patient tolerated procedure well.    Complications:  None         Assessment and Plan   Diagnoses and all orders for this visit:    1. Supraglottitis without airway obstruction (Primary)    2. Chronic laryngitis             Plan: Reassurance that his acute issues have resolved.  No further treatment needed from my standpoint.  May follow-up with me as needed.

## 2022-09-29 ENCOUNTER — READMISSION MANAGEMENT (OUTPATIENT)
Dept: CALL CENTER | Facility: HOSPITAL | Age: 72
End: 2022-09-29

## 2022-09-29 NOTE — OUTREACH NOTE
Medical Week 2 Survey    Flowsheet Row Responses   [facility] patient discharged from? [place]   Does the patient have one of the following disease processes/diagnoses(primary or secondary)? Other   Week 2 attempt successful? No   Unsuccessful attempts Attempt 1          SAY Lennon Registered Nurse

## 2022-09-30 ENCOUNTER — OFFICE VISIT (OUTPATIENT)
Dept: FAMILY MEDICINE CLINIC | Facility: CLINIC | Age: 72
End: 2022-09-30

## 2022-09-30 VITALS
DIASTOLIC BLOOD PRESSURE: 88 MMHG | WEIGHT: 222 LBS | SYSTOLIC BLOOD PRESSURE: 138 MMHG | OXYGEN SATURATION: 98 % | HEIGHT: 66 IN | BODY MASS INDEX: 35.68 KG/M2 | TEMPERATURE: 98.7 F | HEART RATE: 72 BPM

## 2022-09-30 DIAGNOSIS — R13.12 OROPHARYNGEAL DYSPHAGIA: ICD-10-CM

## 2022-09-30 DIAGNOSIS — G30.1 LATE ONSET ALZHEIMER'S DEMENTIA WITHOUT BEHAVIORAL DISTURBANCE: ICD-10-CM

## 2022-09-30 DIAGNOSIS — Z09 HOSPITAL DISCHARGE FOLLOW-UP: Primary | ICD-10-CM

## 2022-09-30 DIAGNOSIS — E11.9 TYPE 2 DIABETES MELLITUS WITHOUT COMPLICATION, WITHOUT LONG-TERM CURRENT USE OF INSULIN: Chronic | ICD-10-CM

## 2022-09-30 DIAGNOSIS — F02.80 LATE ONSET ALZHEIMER'S DEMENTIA WITHOUT BEHAVIORAL DISTURBANCE: ICD-10-CM

## 2022-09-30 DIAGNOSIS — H69.83 DYSFUNCTION OF BOTH EUSTACHIAN TUBES: Chronic | ICD-10-CM

## 2022-09-30 DIAGNOSIS — I10 ESSENTIAL HYPERTENSION: ICD-10-CM

## 2022-09-30 PROCEDURE — 99214 OFFICE O/P EST MOD 30 MIN: CPT | Performed by: INTERNAL MEDICINE

## 2022-09-30 NOTE — PROGRESS NOTES
"Chief Complaint  Hospital Follow Up Visit (Pt was admitted to [facility] 9/19 to 9/21. Pt was having dysphagia and was dx with vocal cord mass. Pt was started on abx and steroids which he has finished and notes improvement in his sx. )    Subjective        Nick Austin presents to [facility] [place]  History of Present Illness    Naveed is here for hospital follow-up.  He was hospitalized in [place] for worsening acute dysphagia.  He was initially felt that he had a vocal cord mass, but Dr. Ramirez saw him in consult and found him to have supraglottitis, which responded to IV antibiotics and steroids.  The steroids aggravated his glycemic control and, in this patient with Alzheimer's dementia exacerbated some delirium while hospitalized.  He is now off antibiotics and steroids and mental status has returned to baseline at home.  He continues on Namenda and Aricept.    Blood pressure is marginal today.  Weight is stable, but above goal with BMI 35.8.    He continues to experience some eustachian tube dysfunction.  He has chronic rhinitis and chronic postnasal drip.  He reports these issues have improved with nasal steroids, but not totally resolved.    Objective   Vital Signs:  /88   Pulse 72   Temp 98.7 °F (37.1 °C)   Ht 167.6 cm (66\")   Wt 101 kg (222 lb)   SpO2 98%   BMI 35.83 kg/m²   Estimated body mass index is 35.83 kg/m² as calculated from the following:    Height as of this encounter: 167.6 cm (66\").    Weight as of this encounter: 101 kg (222 lb).          Physical Exam  Constitutional:       General: He is not in acute distress.     Appearance: He is well-developed.      Comments: Very pleasant male.  Obese.  Accompanied by his wife.  Evidence of dementia without agitation    HENT:      Head: Normocephalic and atraumatic.      Nose: Nose normal.      Mouth/Throat:      Pharynx: No oropharyngeal exudate.   Eyes:      " Pupils: Pupils are equal, round, and reactive to light.   Neck:      Thyroid: No thyromegaly.      Vascular: No JVD.   Cardiovascular:      Rate and Rhythm: Normal rate and regular rhythm.      Heart sounds: Normal heart sounds.   Pulmonary:      Effort: Pulmonary effort is normal. No accessory muscle usage or respiratory distress.      Breath sounds: Normal breath sounds. No wheezing or rales.   Abdominal:      General: Bowel sounds are normal. There is no distension.      Palpations: Abdomen is soft.      Tenderness: There is no abdominal tenderness.      Comments: Overweight abdomen.    Musculoskeletal:      Cervical back: Neck supple.   Lymphadenopathy:      Cervical: No cervical adenopathy.   Neurological:      General: No focal deficit present.      Mental Status: He is alert and oriented to person, place, and time. Mental status is at baseline.      Cranial Nerves: No cranial nerve deficit.      Comments: No jerking or tremor noted today   Psychiatric:         Mood and Affect: Mood normal.         Speech: Speech normal.         Behavior: Behavior normal.         Thought Content: Thought content normal.         Judgment: Judgment normal.        Result Review :    Common labs    Common Labs 6/14/22 6/14/22 9/19/22 9/20/22 9/20/22    0633 0633  0527 0527   Glucose  113 (A)   135 (A)   Glucose   108     BUN  10 12  14   Creatinine  0.94 1.1  0.80   Sodium  141 142  139   Potassium  3.8 3.5  3.7   Chloride  107 106  105   Calcium  9.1 8.9  8.6   Albumin  4.00 3.8  3.80   Total Bilirubin  0.4 0.80  0.5   Alkaline Phosphatase  68 61  55   AST (SGOT)  22 15  14   ALT (SGPT)  26 24  13   WBC 7.42   11.96 (A)    Hemoglobin 14.1   14.1    Hematocrit 41.3   41.5    Platelets 272   252    (A) Abnormal value            Data reviewed: Recent hospitalization notes Hospitalist notes and discharge summary          Assessment and Plan   Diagnoses and all orders for this visit:    1. Hospital discharge follow-up (Primary)    2.  Essential hypertension    3. Oropharyngeal dysphagia    4. Dysfunction of both eustachian tubes    5. Late onset Alzheimer's dementia without behavioral disturbance (HCC) - Moderate (21/30). Started Aricept 5/2022. Started Namenda 7/2022    6. Type 2 diabetes mellitus without complication, without long-term current use of insulin (HCC)    He feels that his swallowing is back to baseline.  The dysphagia due to supraglottitis has resolved.    Continue the Flonase nasal spray twice daily to address the chronic rhinitis, chronic postnasal drip, eustachian tube dysfunction.  These issues are improving, but not resolved.    Blood pressure is marginally controlled today with current doses of losartan and Norvasc and Imdur.  Pursue sodium restriction and weight loss.  Monitor blood pressure at home.    Continue the Namenda and Aricept for Alzheimer's dementia.  I told him and his wife that these meds may be contributing to his sensation of dizziness/disequilibrium.  We will continue to monitor.    We continue to encourage greater compliance with diabetic diet and weight loss.  He is diet controlled thus far.  We will be repeating A1c with his labs next month.    Return to clinic in approximately 4 weeks with fasting labs prior.         Follow Up   Return for Next scheduled follow up - labs 1 week prior.  Patient was given instructions and counseling regarding his condition or for health maintenance advice. Please see specific information pulled into the AVS if appropriate.

## 2022-10-03 RX ORDER — LEVOTHYROXINE SODIUM 88 MCG
TABLET ORAL
Qty: 90 TABLET | Refills: 3 | Status: SHIPPED | OUTPATIENT
Start: 2022-10-03

## 2022-10-03 RX ORDER — ESCITALOPRAM OXALATE 5 MG/1
TABLET ORAL
Qty: 90 TABLET | Refills: 3 | Status: SHIPPED | OUTPATIENT
Start: 2022-10-03

## 2022-10-03 RX ORDER — FENOFIBRATE 48 MG/1
TABLET, COATED ORAL
Qty: 90 TABLET | Refills: 3 | Status: SHIPPED | OUTPATIENT
Start: 2022-10-03

## 2022-10-03 RX ORDER — ALBUTEROL SULFATE 90 UG/1
AEROSOL, METERED RESPIRATORY (INHALATION)
Qty: 54 G | Refills: 3 | Status: SHIPPED | OUTPATIENT
Start: 2022-10-03

## 2022-10-05 ENCOUNTER — READMISSION MANAGEMENT (OUTPATIENT)
Dept: CALL CENTER | Facility: HOSPITAL | Age: 72
End: 2022-10-05

## 2022-10-05 NOTE — OUTREACH NOTE
Medical Week 2 Survey    Flowsheet Row Responses   [facility] patient discharged from? [place]   Does the patient have one of the following disease processes/diagnoses(primary or secondary)? Other   Week 2 attempt successful? Yes   Call start time 1227   Discharge diagnosis   Acute supraglottitis   Call end time 1228   Meds reviewed with patient/caregiver? Yes   Is the patient having any side effects they believe may be caused by any medication additions or changes? No   Does the patient have all medications ordered at discharge? Yes   Is the patient taking all medications as directed (includes completed medication regime)? Yes   Does the patient have a primary care provider?  Yes   Does the patient have an appointment with their PCP within 7 days of discharge? Yes   Has the patient kept scheduled appointments due by today? Yes   Has home health visited the patient within 72 hours of discharge? N/A   Psychosocial issues? No   What is the patient's perception of their health status since discharge? Improving   Is the patient/caregiver able to teach back the hierarchy of who to call/visit for symptoms/problems? PCP, Specialist, Home health nurse, Urgent Care, ED, 911 Yes   Week 2 Call Completed? Yes          FLORIAN Lennon Registered Nurse

## 2022-10-18 ENCOUNTER — LAB (OUTPATIENT)
Dept: LAB | Facility: OTHER | Age: 72
End: 2022-10-18

## 2022-10-18 DIAGNOSIS — E11.9 TYPE 2 DIABETES MELLITUS WITHOUT COMPLICATION, WITHOUT LONG-TERM CURRENT USE OF INSULIN: Chronic | ICD-10-CM

## 2022-10-18 DIAGNOSIS — E11.69 HYPERLIPIDEMIA ASSOCIATED WITH TYPE 2 DIABETES MELLITUS: Chronic | ICD-10-CM

## 2022-10-18 DIAGNOSIS — E78.5 HYPERLIPIDEMIA ASSOCIATED WITH TYPE 2 DIABETES MELLITUS: Chronic | ICD-10-CM

## 2022-10-18 DIAGNOSIS — E03.9 ACQUIRED HYPOTHYROIDISM: ICD-10-CM

## 2022-10-18 DIAGNOSIS — K21.9 GASTROESOPHAGEAL REFLUX DISEASE, UNSPECIFIED WHETHER ESOPHAGITIS PRESENT: Chronic | ICD-10-CM

## 2022-10-18 DIAGNOSIS — R13.10 DYSPHAGIA, UNSPECIFIED TYPE: ICD-10-CM

## 2022-10-18 DIAGNOSIS — I10 ESSENTIAL HYPERTENSION: Chronic | ICD-10-CM

## 2022-10-18 DIAGNOSIS — Z12.5 SPECIAL SCREENING FOR MALIGNANT NEOPLASM OF PROSTATE: ICD-10-CM

## 2022-10-18 DIAGNOSIS — I25.119 CORONARY ARTERY DISEASE INVOLVING NATIVE CORONARY ARTERY OF NATIVE HEART WITH ANGINA PECTORIS: Chronic | ICD-10-CM

## 2022-10-18 DIAGNOSIS — E78.1 HYPERTRIGLYCERIDEMIA: Chronic | ICD-10-CM

## 2022-10-18 LAB
ALBUMIN SERPL-MCNC: 4.2 G/DL (ref 3.5–5)
ALBUMIN/GLOB SERPL: 1.6 G/DL (ref 1.1–1.8)
ALP SERPL-CCNC: 74 U/L (ref 38–126)
ALT SERPL W P-5'-P-CCNC: 19 U/L
ANION GAP SERPL CALCULATED.3IONS-SCNC: 5 MMOL/L (ref 5–15)
AST SERPL-CCNC: 23 U/L (ref 17–59)
BASOPHILS # BLD AUTO: 0.03 10*3/MM3 (ref 0–0.2)
BASOPHILS NFR BLD AUTO: 0.5 % (ref 0–1.5)
BILIRUB SERPL-MCNC: 0.5 MG/DL (ref 0.2–1.3)
BUN SERPL-MCNC: 13 MG/DL (ref 7–23)
BUN/CREAT SERPL: 11.1 (ref 7–25)
CALCIUM SPEC-SCNC: 9 MG/DL (ref 8.4–10.2)
CHLORIDE SERPL-SCNC: 105 MMOL/L (ref 101–112)
CHOLEST SERPL-MCNC: 147 MG/DL (ref 150–200)
CO2 SERPL-SCNC: 31 MMOL/L (ref 22–30)
CREAT SERPL-MCNC: 1.17 MG/DL (ref 0.7–1.3)
DEPRECATED RDW RBC AUTO: 50.6 FL (ref 37–54)
EGFRCR SERPLBLD CKD-EPI 2021: 65.8 ML/MIN/1.73
EOSINOPHIL # BLD AUTO: 0.34 10*3/MM3 (ref 0–0.4)
EOSINOPHIL NFR BLD AUTO: 6 % (ref 0.3–6.2)
ERYTHROCYTE [DISTWIDTH] IN BLOOD BY AUTOMATED COUNT: 14.7 % (ref 12.3–15.4)
GLOBULIN UR ELPH-MCNC: 2.7 GM/DL (ref 2.3–3.5)
GLUCOSE SERPL-MCNC: 105 MG/DL (ref 70–99)
HBA1C MFR BLD: 5.6 % (ref 4.8–5.6)
HCT VFR BLD AUTO: 45 % (ref 37.5–51)
HDLC SERPL-MCNC: 33 MG/DL (ref 40–59)
HGB BLD-MCNC: 14.3 G/DL (ref 13–17.7)
LDLC SERPL CALC-MCNC: 86 MG/DL
LDLC/HDLC SERPL: 2.5 {RATIO} (ref 0–3.55)
LYMPHOCYTES # BLD AUTO: 1.57 10*3/MM3 (ref 0.7–3.1)
LYMPHOCYTES NFR BLD AUTO: 27.5 % (ref 19.6–45.3)
MCH RBC QN AUTO: 30.6 PG (ref 26.6–33)
MCHC RBC AUTO-ENTMCNC: 31.8 G/DL (ref 31.5–35.7)
MCV RBC AUTO: 96.4 FL (ref 79–97)
MONOCYTES # BLD AUTO: 0.48 10*3/MM3 (ref 0.1–0.9)
MONOCYTES NFR BLD AUTO: 8.4 % (ref 5–12)
NEUTROPHILS NFR BLD AUTO: 3.28 10*3/MM3 (ref 1.7–7)
NEUTROPHILS NFR BLD AUTO: 57.6 % (ref 42.7–76)
PLATELET # BLD AUTO: 314 10*3/MM3 (ref 140–450)
PMV BLD AUTO: 10.6 FL (ref 6–12)
POTASSIUM SERPL-SCNC: 3.9 MMOL/L (ref 3.4–5)
PROT SERPL-MCNC: 6.9 G/DL (ref 6.3–8.6)
PSA SERPL-MCNC: 1.46 NG/ML (ref 0–4)
RBC # BLD AUTO: 4.67 10*6/MM3 (ref 4.14–5.8)
SODIUM SERPL-SCNC: 141 MMOL/L (ref 137–145)
T4 FREE SERPL-MCNC: 1.23 NG/DL (ref 0.93–1.7)
TRIGL SERPL-MCNC: 157 MG/DL
TSH SERPL DL<=0.05 MIU/L-ACNC: 3.37 UIU/ML (ref 0.27–4.2)
VLDLC SERPL-MCNC: 28 MG/DL (ref 5–40)
WBC NRBC COR # BLD: 5.7 10*3/MM3 (ref 3.4–10.8)

## 2022-10-18 PROCEDURE — 84439 ASSAY OF FREE THYROXINE: CPT | Performed by: INTERNAL MEDICINE

## 2022-10-18 PROCEDURE — 80061 LIPID PANEL: CPT | Performed by: INTERNAL MEDICINE

## 2022-10-18 PROCEDURE — G0103 PSA SCREENING: HCPCS | Performed by: INTERNAL MEDICINE

## 2022-10-18 PROCEDURE — 84443 ASSAY THYROID STIM HORMONE: CPT | Performed by: INTERNAL MEDICINE

## 2022-10-18 PROCEDURE — 36415 COLL VENOUS BLD VENIPUNCTURE: CPT | Performed by: INTERNAL MEDICINE

## 2022-10-18 PROCEDURE — 80053 COMPREHEN METABOLIC PANEL: CPT | Performed by: INTERNAL MEDICINE

## 2022-10-18 PROCEDURE — 85025 COMPLETE CBC W/AUTO DIFF WBC: CPT | Performed by: INTERNAL MEDICINE

## 2022-10-18 PROCEDURE — 83036 HEMOGLOBIN GLYCOSYLATED A1C: CPT | Performed by: INTERNAL MEDICINE

## 2022-10-24 NOTE — PROGRESS NOTES
"Chief Complaint  Follow-up (6 month f/u )    Subjective        History of Present Illness     Nick Austin (ROSANNA) presents to the office for 6-month follow up on chronic medical issues including essential hypertension, hyperlipidemia, CAD, hypothyroidism, GERD, and Type 2 diabetes mellitus diet controlled and greatly improved with weight loss.  He continues on Namenda and Aricept for Alzheimer's dementia.  His wife, Veronika, is with him today.       Patient was hospitalized at [facility] earlier this month for worsening acute dysphagia.  It was initially felt that he had a vocal cord mass, but Dr. Ramirez saw him in consult and found him to have supraglottitis, which responded to IV antibiotics and steroids.    Other than some persistent hoarseness, these issues are back to baseline.     Blood pressure is well controlled today.  Weight is down 9 pounds in the past 6 months.  BMI is still far above goal at 35.8, but improving.  He describes being more sedentary.    Last spring, I asked patient to resume fenofibrate. Triglycerides reduced almost 50% at 157.  HDL remains low at 33 with LDL above ideal goal at 86 in this patient with CAD     The patient's relevant past medical, surgical, and social history was reviewed in Epic.   Lab results are reviewed with the patient today. CBC unremarkable.  Fasting glucose 105.  A1c 5.6.  Renal function declined, although, creatinine upper limits normal at 1.18.  Normal thyroid screen.        Objective   Vital Signs:  /78   Pulse 72   Temp 97.5 °F (36.4 °C)   Ht 167.6 cm (66\")   Wt 101 kg (222 lb)   SpO2 99%   BMI 35.83 kg/m²   Estimated body mass index is 35.83 kg/m² as calculated from the following:    Height as of this encounter: 167.6 cm (66\").    Weight as of this encounter: 101 kg (222 lb).          Physical Exam  Vitals reviewed.   Constitutional:       General: He is not in acute distress.     Appearance: He is well-developed. He is obese. "      Comments: Very pleasant.  Evidence of some dementia without agitation.  Accompanied by his wife, Veronika MAYES:      Head: Normocephalic and atraumatic.      Nose:      Right Sinus: No maxillary sinus tenderness or frontal sinus tenderness.      Left Sinus: No maxillary sinus tenderness or frontal sinus tenderness.      Mouth/Throat:      Mouth: No oral lesions.      Pharynx: Uvula midline.      Tonsils: No tonsillar exudate.   Eyes:      Conjunctiva/sclera: Conjunctivae normal.      Pupils: Pupils are equal, round, and reactive to light.   Neck:      Thyroid: No thyroid mass or thyromegaly.      Vascular: No carotid bruit or JVD.      Trachea: Trachea normal. No tracheal deviation.   Cardiovascular:      Rate and Rhythm: Normal rate and regular rhythm.  No extrasystoles are present.     Chest Wall: PMI is not displaced.      Heart sounds: Normal heart sounds. No murmur heard.  Pulmonary:      Effort: Pulmonary effort is normal. No accessory muscle usage or respiratory distress.      Breath sounds: Normal breath sounds. No decreased breath sounds, wheezing, rhonchi or rales.   Abdominal:      General: Bowel sounds are normal. There is no distension.      Palpations: Abdomen is soft.      Tenderness: There is no abdominal tenderness.      Comments: Obese abdomen   Musculoskeletal:      Cervical back: Neck supple.   Lymphadenopathy:      Cervical: No cervical adenopathy.   Skin:     General: Skin is warm and dry.      Findings: No rash.      Nails: There is no clubbing.   Neurological:      Mental Status: He is alert and oriented to person, place, and time. Mental status is at baseline.      Cranial Nerves: No cranial nerve deficit.      Coordination: Coordination normal.      Comments: Dementia without agitation.   Psychiatric:         Speech: Speech normal.         Behavior: Behavior normal.         Thought Content: Thought content normal.         Judgment: Judgment normal.            Result Review :    CMP    CMP  9/19/22 9/20/22 10/18/22   Glucose  135 (A) 105 (A)   Glucose 108     BUN 12 14 13   Creatinine 1.1 0.80 1.17   Sodium 142 139 141   Potassium 3.5 3.7 3.9   Chloride 106 105 105   Calcium 8.9 8.6 9.0   Albumin 3.8 3.80 4.20   Total Bilirubin 0.80 0.5 0.5   Alkaline Phosphatase 61 55 74   AST (SGOT) 15 14 23   ALT (SGPT) 24 13 19   (A) Abnormal value            CBC w/diff    CBC w/Diff 6/14/22 9/20/22 10/18/22   WBC 7.42 11.96 (A) 5.70   RBC 4.43 4.48 4.67   Hemoglobin 14.1 14.1 14.3   Hematocrit 41.3 41.5 45.0   MCV 93.2 92.6 96.4   MCH 31.8 31.5 30.6   MCHC 34.1 34.0 31.8   RDW 13.8 14.2 14.7   Platelets 272 252 314   Neutrophil Rel %  90.8 (A) 57.6   Immature Granulocyte Rel %  0.7 (A)    Lymphocyte Rel %  7.5 (A) 27.5   Monocyte Rel %  0.8 (A) 8.4   Eosinophil Rel %  0.0 (A) 6.0   Basophil Rel %  0.2 0.5   (A) Abnormal value            Lipid Panel    Lipid Panel 3/9/22 3/9/22 10/18/22    0714 0714    Total Cholesterol   147 (A)   Triglycerides 284 (A)  157 (A)   HDL Cholesterol   33 (A)   VLDL Cholesterol   28   LDL Cholesterol   85 86   LDL/HDL Ratio   2.50   (A) Abnormal value            TSH    TSH 3/9/22 10/18/22   TSH 2.530 3.370           A1C Last 3 Results    HGBA1C Last 3 Results 3/9/22 10/18/22   Hemoglobin A1C 5.90 (A) 5.60   (A) Abnormal value            PSA    PSA 10/18/22   PSA 1.460                     Assessment and Plan   Diagnoses and all orders for this visit:    1. Type 2 diabetes mellitus without complication, without long-term current use of insulin (HCC) (Primary)  -     CBC Auto Differential; Future  -     Comprehensive Metabolic Panel; Future  -     Hemoglobin A1c; Future  -     LDL Cholesterol, Direct; Future  -     Triglycerides; Future  -     TSH; Future  -     T4, free; Future  -     Microalbumin / Creatinine Urine Ratio - Urine, Clean Catch; Future  -     Vitamin B12; Future    2. Essential hypertension  -     CBC Auto Differential; Future  -     Comprehensive Metabolic Panel;  Future    3. Hyperlipidemia associated with type 2 diabetes mellitus (HCC)  -     LDL Cholesterol, Direct; Future    4. Hypertriglyceridemia  -     Triglycerides; Future    5. Coronary artery disease involving native coronary artery of native heart with angina pectoris (CMS/HCC) - followed by Dr. Jay  -     LDL Cholesterol, Direct; Future    6. Class 2 severe obesity due to excess calories with serious comorbidity and body mass index (BMI) of 37.0 to 37.9 in adult (HCC)  -     Comprehensive Metabolic Panel; Future    7. Acquired hypothyroidism  -     TSH; Future  -     T4, free; Future    8. Late onset Alzheimer's dementia without behavioral disturbance (HCC) - Moderate (21/30). Started Aricept 5/2022. Started Namenda 7/2022    9. Gastroesophageal reflux disease, unspecified whether esophagitis present  -     CBC Auto Differential; Future    We praised him for the improvement in his diabetic control through diet and weight loss.  I am concerned about his sedentary behavior, especially in the winter.  Continue to monitor weight closely at home and follow a diabetic diet.    Blood pressure is well controlled with current multidrug therapy.  He describes some mild orthostatic symptoms at times, likely due to the Imdur prescribed by cardiology.  Orthostatic precautions reviewed.  Fall precautions reviewed.    Continue Lipitor 40 mg daily.  Continue fenofibrate for the hyper triglyceridemia.  LDL goal is ideally less than 70 and we reviewed that with the patient and his wife.  Continue diet and weight loss efforts.  We might consider adding some Zetia in the future.    Continue the current CAD risk factor modifications including statin, losartan, aspirin.  Continue to follow with cardiology as they recommend.    Continue current dose Synthroid.  Hypothyroidism at goal.    Continue the combination of Aricept and Namenda to slow progression of Alzheimer's disease.  Continue the low-dose Lexapro for some mood issues  related to this, which are much improved    Continue omeprazole for GERD in this patient with history of dysphagia and esophageal stricture requiring dilation.  He reports symptoms are very well controlled currently.    Return to clinic in 6 months with fasting labs prior.                    Follow Up   Return in about 6 months (around 4/27/2023) for Next scheduled follow up - labs 1 week prior.  Patient was given instructions and counseling regarding his condition or for health maintenance advice. Please see specific information pulled into the AVS if appropriate.

## 2022-10-27 ENCOUNTER — OFFICE VISIT (OUTPATIENT)
Dept: FAMILY MEDICINE CLINIC | Facility: CLINIC | Age: 72
End: 2022-10-27

## 2022-10-27 VITALS
TEMPERATURE: 97.5 F | HEART RATE: 72 BPM | BODY MASS INDEX: 35.68 KG/M2 | HEIGHT: 66 IN | DIASTOLIC BLOOD PRESSURE: 78 MMHG | SYSTOLIC BLOOD PRESSURE: 124 MMHG | OXYGEN SATURATION: 99 % | WEIGHT: 222 LBS

## 2022-10-27 DIAGNOSIS — E03.9 ACQUIRED HYPOTHYROIDISM: Chronic | ICD-10-CM

## 2022-10-27 DIAGNOSIS — I10 ESSENTIAL HYPERTENSION: Chronic | ICD-10-CM

## 2022-10-27 DIAGNOSIS — E78.1 HYPERTRIGLYCERIDEMIA: Chronic | ICD-10-CM

## 2022-10-27 DIAGNOSIS — E66.01 CLASS 2 SEVERE OBESITY DUE TO EXCESS CALORIES WITH SERIOUS COMORBIDITY AND BODY MASS INDEX (BMI) OF 37.0 TO 37.9 IN ADULT: Chronic | ICD-10-CM

## 2022-10-27 DIAGNOSIS — F02.80 LATE ONSET ALZHEIMER'S DEMENTIA WITHOUT BEHAVIORAL DISTURBANCE: Chronic | ICD-10-CM

## 2022-10-27 DIAGNOSIS — I25.119 CORONARY ARTERY DISEASE INVOLVING NATIVE CORONARY ARTERY OF NATIVE HEART WITH ANGINA PECTORIS: Chronic | ICD-10-CM

## 2022-10-27 DIAGNOSIS — G30.1 LATE ONSET ALZHEIMER'S DEMENTIA WITHOUT BEHAVIORAL DISTURBANCE: Chronic | ICD-10-CM

## 2022-10-27 DIAGNOSIS — K21.9 GASTROESOPHAGEAL REFLUX DISEASE, UNSPECIFIED WHETHER ESOPHAGITIS PRESENT: Chronic | ICD-10-CM

## 2022-10-27 DIAGNOSIS — E78.5 HYPERLIPIDEMIA ASSOCIATED WITH TYPE 2 DIABETES MELLITUS: Chronic | ICD-10-CM

## 2022-10-27 DIAGNOSIS — E11.9 TYPE 2 DIABETES MELLITUS WITHOUT COMPLICATION, WITHOUT LONG-TERM CURRENT USE OF INSULIN: Primary | Chronic | ICD-10-CM

## 2022-10-27 DIAGNOSIS — E11.69 HYPERLIPIDEMIA ASSOCIATED WITH TYPE 2 DIABETES MELLITUS: Chronic | ICD-10-CM

## 2022-10-27 PROCEDURE — 99214 OFFICE O/P EST MOD 30 MIN: CPT | Performed by: INTERNAL MEDICINE

## 2022-10-27 NOTE — PATIENT INSTRUCTIONS
Calorie Counting for Weight Loss  Calories are units of energy. Your body needs a certain number of calories from food to keep going throughout the day. When you eat or drink more calories than your body needs, your body stores the extra calories mostly as fat. When you eat or drink fewer calories than your body needs, your body burns fat to get the energy it needs.  Calorie counting means keeping track of how many calories you eat and drink each day. Calorie counting can be helpful if you need to lose weight. If you eat fewer calories than your body needs, you should lose weight. Ask your health care provider what a healthy weight is for you.  For calorie counting to work, you will need to eat the right number of calories each day to lose a healthy amount of weight per week. A dietitian can help you figure out how many calories you need in a day and will suggest ways to reach your calorie goal.  A healthy amount of weight to lose each week is usually 1-2 lb (0.5-0.9 kg). This usually means that your daily calorie intake should be reduced by 500-750 calories.  Eating 1,200-1,500 calories a day can help most women lose weight.  Eating 1,500-1,800 calories a day can help most men lose weight.  What do I need to know about calorie counting?  Work with your health care provider or dietitian to determine how many calories you should get each day. To meet your daily calorie goal, you will need to:  Find out how many calories are in each food that you would like to eat. Try to do this before you eat.  Decide how much of the food you plan to eat.  Keep a food log. Do this by writing down what you ate and how many calories it had.  To successfully lose weight, it is important to balance calorie counting with a healthy lifestyle that includes regular activity.  Where do I find calorie information?  The number of calories in a food can be found on a Nutrition Facts label. If a food does not have a Nutrition Facts label, try to  look up the calories online or ask your dietitian for help.  Remember that calories are listed per serving. If you choose to have more than one serving of a food, you will have to multiply the calories per serving by the number of servings you plan to eat. For example, the label on a package of bread might say that a serving size is 1 slice and that there are 90 calories in a serving. If you eat 1 slice, you will have eaten 90 calories. If you eat 2 slices, you will have eaten 180 calories.  How do I keep a food log?  After each time that you eat, record the following in your food log as soon as possible:  What you ate. Be sure to include toppings, sauces, and other extras on the food.  How much you ate. This can be measured in cups, ounces, or number of items.  How many calories were in each food and drink.  The total number of calories in the food you ate.  Keep your food log near you, such as in a pocket-sized notebook or on an juarez or website on your mobile phone. Some programs will calculate calories for you and show you how many calories you have left to meet your daily goal.  What are some portion-control tips?  Know how many calories are in a serving. This will help you know how many servings you can have of a certain food.  Use a measuring cup to measure serving sizes. You could also try weighing out portions on a kitchen scale. With time, you will be able to estimate serving sizes for some foods.  Take time to put servings of different foods on your favorite plates or in your favorite bowls and cups so you know what a serving looks like.  Try not to eat straight from a food's packaging, such as from a bag or box. Eating straight from the package makes it hard to see how much you are eating and can lead to overeating. Put the amount you would like to eat in a cup or on a plate to make sure you are eating the right portion.  Use smaller plates, glasses, and bowls for smaller portions and to prevent  overeating.  Try not to multitask. For example, avoid watching TV or using your computer while eating. If it is time to eat, sit down at a table and enjoy your food. This will help you recognize when you are full. It will also help you be more mindful of what and how much you are eating.  What are tips for following this plan?  Reading food labels  Check the calorie count compared with the serving size. The serving size may be smaller than what you are used to eating.  Check the source of the calories. Try to choose foods that are high in protein, fiber, and vitamins, and low in saturated fat, trans fat, and sodium.  Shopping  Read nutrition labels while you shop. This will help you make healthy decisions about which foods to buy.  Pay attention to nutrition labels for low-fat or fat-free foods. These foods sometimes have the same number of calories or more calories than the full-fat versions. They also often have added sugar, starch, or salt to make up for flavor that was removed with the fat.  Make a grocery list of lower-calorie foods and stick to it.  Cooking  Try to cook your favorite foods in a healthier way. For example, try baking instead of frying.  Use low-fat dairy products.  Meal planning  Use more fruits and vegetables. One-half of your plate should be fruits and vegetables.  Include lean proteins, such as chicken, turkey, and fish.  Lifestyle  Each week, aim to do one of the followin minutes of moderate exercise, such as walking.  75 minutes of vigorous exercise, such as running.  General information  Know how many calories are in the foods you eat most often. This will help you calculate calorie counts faster.  Find a way of tracking calories that works for you. Get creative. Try different apps or programs if writing down calories does not work for you.  What foods should I eat?    Eat nutritious foods. It is better to have a nutritious, high-calorie food, such as an avocado, than a food with  few nutrients, such as a bag of potato chips.  Use your calories on foods and drinks that will fill you up and will not leave you hungry soon after eating.  Examples of foods that fill you up are nuts and nut butters, vegetables, lean proteins, and high-fiber foods such as whole grains. High-fiber foods are foods with more than 5 g of fiber per serving.  Pay attention to calories in drinks. Low-calorie drinks include water and unsweetened drinks.  The items listed above may not be a complete list of foods and beverages you can eat. Contact a dietitian for more information.  What foods should I limit?  Limit foods or drinks that are not good sources of vitamins, minerals, or protein or that are high in unhealthy fats. These include:  Candy.  Other sweets.  Sodas, specialty coffee drinks, alcohol, and juice.  The items listed above may not be a complete list of foods and beverages you should avoid. Contact a dietitian for more information.  How do I count calories when eating out?  Pay attention to portions. Often, portions are much larger when eating out. Try these tips to keep portions smaller:  Consider sharing a meal instead of getting your own.  If you get your own meal, eat only half of it. Before you start eating, ask for a container and put half of your meal into it.  When available, consider ordering smaller portions from the menu instead of full portions.  Pay attention to your food and drink choices. Knowing the way food is cooked and what is included with the meal can help you eat fewer calories.  If calories are listed on the menu, choose the lower-calorie options.  Choose dishes that include vegetables, fruits, whole grains, low-fat dairy products, and lean proteins.  Choose items that are boiled, broiled, grilled, or steamed. Avoid items that are buttered, battered, fried, or served with cream sauce. Items labeled as crispy are usually fried, unless stated otherwise.  Choose water, low-fat milk,  unsweetened iced tea, or other drinks without added sugar. If you want an alcoholic beverage, choose a lower-calorie option, such as a glass of wine or light beer.  Ask for dressings, sauces, and syrups on the side. These are usually high in calories, so you should limit the amount you eat.  If you want a salad, choose a garden salad and ask for grilled meats. Avoid extra toppings such as wahl, cheese, or fried items. Ask for the dressing on the side, or ask for olive oil and vinegar or lemon to use as dressing.  Estimate how many servings of a food you are given. Knowing serving sizes will help you be aware of how much food you are eating at restaurants.  Where to find more information  Centers for Disease Control and Prevention: www.cdc.gov  [place] Department of Agriculture: myplate.gov  Summary  Calorie counting means keeping track of how many calories you eat and drink each day. If you eat fewer calories than your body needs, you should lose weight.  A healthy amount of weight to lose per week is usually 1-2 lb (0.5-0.9 kg). This usually means reducing your daily calorie intake by 500-750 calories.  The number of calories in a food can be found on a Nutrition Facts label. If a food does not have a Nutrition Facts label, try to look up the calories online or ask your dietitian for help.  Use smaller plates, glasses, and bowls for smaller portions and to prevent overeating.  Use your calories on foods and drinks that will fill you up and not leave you hungry shortly after a meal.  This information is not intended to replace advice given to you by your health care provider. Make sure you discuss any questions you have with your health care provider.  Document Revised: 01/28/2021 Document Reviewed: 01/28/2021  [employer]vier Patient Education © 2022 [employer]

## 2022-12-08 DIAGNOSIS — I10 ESSENTIAL HYPERTENSION: Primary | Chronic | ICD-10-CM

## 2022-12-08 RX ORDER — AMLODIPINE BESYLATE 5 MG/1
5 TABLET ORAL DAILY
Qty: 30 TABLET | Refills: 5 | Status: SHIPPED | OUTPATIENT
Start: 2022-12-08

## 2022-12-11 DIAGNOSIS — K21.9 GASTROESOPHAGEAL REFLUX DISEASE WITHOUT ESOPHAGITIS: Primary | Chronic | ICD-10-CM

## 2022-12-12 RX ORDER — OMEPRAZOLE 40 MG/1
CAPSULE, DELAYED RELEASE ORAL
Qty: 90 CAPSULE | Refills: 3 | Status: SHIPPED | OUTPATIENT
Start: 2022-12-12

## 2023-01-27 RX ORDER — ATORVASTATIN CALCIUM 40 MG/1
TABLET, FILM COATED ORAL
Qty: 90 TABLET | Refills: 3 | Status: SHIPPED | OUTPATIENT
Start: 2023-01-27

## 2023-01-27 RX ORDER — CLOPIDOGREL BISULFATE 75 MG/1
TABLET ORAL
Qty: 90 TABLET | Refills: 3 | Status: SHIPPED | OUTPATIENT
Start: 2023-01-27

## 2023-01-27 RX ORDER — LOSARTAN POTASSIUM 50 MG/1
TABLET ORAL
Qty: 180 TABLET | Refills: 3 | Status: SHIPPED | OUTPATIENT
Start: 2023-01-27

## 2023-02-06 ENCOUNTER — OFFICE VISIT (OUTPATIENT)
Dept: FAMILY MEDICINE CLINIC | Facility: CLINIC | Age: 73
End: 2023-02-06
Payer: MEDICARE

## 2023-02-06 VITALS — HEIGHT: 66 IN | BODY MASS INDEX: 35.68 KG/M2 | WEIGHT: 222 LBS

## 2023-02-06 DIAGNOSIS — Z00.00 MEDICARE ANNUAL WELLNESS VISIT, SUBSEQUENT: Primary | ICD-10-CM

## 2023-02-06 DIAGNOSIS — Z23 NEED FOR PNEUMOCOCCAL VACCINE: ICD-10-CM

## 2023-02-06 DIAGNOSIS — Z23 NEED FOR COVID-19 VACCINE: ICD-10-CM

## 2023-02-06 PROCEDURE — G0439 PPPS, SUBSEQ VISIT: HCPCS | Performed by: INTERNAL MEDICINE

## 2023-02-06 PROCEDURE — 1125F AMNT PAIN NOTED PAIN PRSNT: CPT | Performed by: INTERNAL MEDICINE

## 2023-02-06 PROCEDURE — 1126F AMNT PAIN NOTED NONE PRSNT: CPT | Performed by: INTERNAL MEDICINE

## 2023-02-06 PROCEDURE — 1160F RVW MEDS BY RX/DR IN RCRD: CPT | Performed by: INTERNAL MEDICINE

## 2023-02-06 PROCEDURE — 1159F MED LIST DOCD IN RCRD: CPT | Performed by: INTERNAL MEDICINE

## 2023-02-06 PROCEDURE — 1170F FXNL STATUS ASSESSED: CPT | Performed by: INTERNAL MEDICINE

## 2023-02-06 NOTE — PATIENT INSTRUCTIONS
Medicare Wellness  Personal Prevention Plan of Service     Date of Office Visit:    Encounter Provider:  Patrick Magdaleno MD  Place of Service:  [facility]  Patient Name: Nick Austin  :  1950    As part of the Medicare Wellness portion of your visit today, we are providing you with this personalized preventive plan of services (PPPS). This plan is based upon recommendations of the [place] Preventive Services Task Force (USPSTF) and the Advisory Committee on Immunization Practices (ACIP).    This lists the preventive care services that should be considered, and provides dates of when you are due. Items listed as completed are up-to-date and do not require any further intervention.    Health Maintenance   Topic Date Due    Pneumococcal Vaccine 65+ (1 - PCV) Never done    DIABETIC FOOT EXAM  04/10/2018    DIABETIC EYE EXAM  2020    COVID-19 Vaccine (4 - Booster for Pfizer series) 2022    ANNUAL WELLNESS VISIT  2022    URINE MICROALBUMIN  2023    HEMOGLOBIN A1C  2023    LIPID PANEL  10/18/2023    COLORECTAL CANCER SCREENING  2029    HEPATITIS C SCREENING  Addressed    ZOSTER VACCINE  Addressed    INFLUENZA VACCINE  Discontinued       No orders of the defined types were placed in this encounter.      Return in about 1 year (around 2024) for Medicare Wellness.        Exercising to Lose Weight  Getting regular exercise is important for everyone. It is especially important if you are overweight. Being overweight increases your risk of heart disease, stroke, diabetes, high blood pressure, and several types of cancer. Exercising, and reducing the calories you consume, can help you lose weight and improve fitness and health.  Exercise can be moderate or vigorous intensity. To lose weight, most people need to do a certain amount of moderate or vigorous-intensity exercise each week.  How can exercise affect  me?  You lose weight when you exercise enough to burn more calories than you eat. Exercise also reduces body fat and builds muscle. The more muscle you have, the more calories you burn. Exercise also:  Improves mood.  Reduces stress and tension.  Improves your overall fitness, flexibility, and endurance.  Increases bone strength.  Moderate-intensity exercise  Moderate-intensity exercise is any activity that gets you moving enough to burn at least three times more energy (calories) than if you were sitting.  Examples of moderate exercise include:  Walking a mile in 15 minutes.  Doing light yard work.  Biking at an easy pace.  Most people should get at least 150 minutes of moderate-intensity exercise a week to maintain their body weight.  Vigorous-intensity exercise  Vigorous-intensity exercise is any activity that gets you moving enough to burn at least six times more calories than if you were sitting. When you exercise at this intensity, you should be working hard enough that you are not able to carry on a conversation.  Examples of vigorous exercise include:  Running.  Playing a team sport, such as football, basketball, and soccer.  Jumping rope.  Most people should get at least 75 minutes a week of vigorous exercise to maintain their body weight.  What actions can I take to lose weight?  The amount of exercise you need to lose weight depends on:  Your age.  The type of exercise.  Any health conditions you have.  Your overall physical ability.  Talk to your health care provider about how much exercise you need and what types of activities are safe for you.  Nutrition    Make changes to your diet as told by your health care provider or diet and nutrition specialist (dietitian). This may include:  Eating fewer calories.  Eating more protein.  Eating less unhealthy fats.  Eating a diet that includes fresh fruits and vegetables, whole grains, low-fat dairy products, and lean protein.  Avoiding foods with added fat,  salt, and sugar.  Drink plenty of water while you exercise to prevent dehydration or heat stroke.  Activity  Choose an activity that you enjoy and set realistic goals. Your health care provider can help you make an exercise plan that works for you.  Exercise at a moderate or vigorous intensity most days of the week.  The intensity of exercise may vary from person to person. You can tell how intense a workout is for you by paying attention to your breathing and heartbeat. Most people will notice their breathing and heartbeat get faster with more intense exercise.  Do resistance training twice each week, such as:  Push-ups.  Sit-ups.  Lifting weights.  Using resistance bands.  Getting short amounts of exercise can be just as helpful as long, structured periods of exercise. If you have trouble finding time to exercise, try doing these things as part of your daily routine:  Get up, stretch, and walk around every 30 minutes throughout the day.  Go for a walk during your lunch break.  Park your car farther away from your destination.  If you take public transportation, get off one stop early and walk the rest of the way.  Make phone calls while standing up and walking around.  Take the stairs instead of elevators or escalators.  Wear comfortable clothes and shoes with good support.  Do not exercise so much that you hurt yourself, feel dizzy, or get very short of breath.  Where to find more information  [place] Department of Health and Human Services: www.hhs.gov  Centers for Disease Control and Prevention: www.cdc.gov  Contact a health care provider:  Before starting a new exercise program.  If you have questions or concerns about your weight.  If you have a medical problem that keeps you from exercising.  Get help right away if:  You have any of the following while exercising:  Injury.  Dizziness.  Difficulty breathing or shortness of breath that does not go away when you stop exercising.  Chest pain.  Rapid  heartbeat.  These symptoms may represent a serious problem that is an emergency. Do not wait to see if the symptoms will go away. Get medical help right away. Call your local emergency services (911 in the [place] Do not drive yourself to the hospital.  Summary  Getting regular exercise is especially important if you are overweight.  Being overweight increases your risk of heart disease, stroke, diabetes, high blood pressure, and several types of cancer.  Losing weight happens when you burn more calories than you eat.  Reducing the amount of calories you eat, and getting regular moderate or vigorous exercise each week, helps you lose weight.  This information is not intended to replace advice given to you by your health care provider. Make sure you discuss any questions you have with your health care provider.  Document Revised: 02/13/2022 Document Reviewed: 02/13/2022  [employer] Patient Education © 2022 [employer]  Calorie Counting for Weight Loss  Calories are units of energy. Your body needs a certain number of calories from food to keep going throughout the day. When you eat or drink more calories than your body needs, your body stores the extra calories mostly as fat. When you eat or drink fewer calories than your body needs, your body burns fat to get the energy it needs.  Calorie counting means keeping track of how many calories you eat and drink each day. Calorie counting can be helpful if you need to lose weight. If you eat fewer calories than your body needs, you should lose weight. Ask your health care provider what a healthy weight is for you.  For calorie counting to work, you will need to eat the right number of calories each day to lose a healthy amount of weight per week. A dietitian can help you figure out how many calories you need in a day and will suggest ways to reach your calorie goal.  A healthy amount of weight to lose each week is usually 1-2 lb (0.5-0.9 kg). This usually means that your  daily calorie intake should be reduced by 500-750 calories.  Eating 1,200-1,500 calories a day can help most women lose weight.  Eating 1,500-1,800 calories a day can help most men lose weight.  What do I need to know about calorie counting?  Work with your health care provider or dietitian to determine how many calories you should get each day. To meet your daily calorie goal, you will need to:  Find out how many calories are in each food that you would like to eat. Try to do this before you eat.  Decide how much of the food you plan to eat.  Keep a food log. Do this by writing down what you ate and how many calories it had.  To successfully lose weight, it is important to balance calorie counting with a healthy lifestyle that includes regular activity.  Where do I find calorie information?  The number of calories in a food can be found on a Nutrition Facts label. If a food does not have a Nutrition Facts label, try to look up the calories online or ask your dietitian for help.  Remember that calories are listed per serving. If you choose to have more than one serving of a food, you will have to multiply the calories per serving by the number of servings you plan to eat. For example, the label on a package of bread might say that a serving size is 1 slice and that there are 90 calories in a serving. If you eat 1 slice, you will have eaten 90 calories. If you eat 2 slices, you will have eaten 180 calories.  How do I keep a food log?  After each time that you eat, record the following in your food log as soon as possible:  What you ate. Be sure to include toppings, sauces, and other extras on the food.  How much you ate. This can be measured in cups, ounces, or number of items.  How many calories were in each food and drink.  The total number of calories in the food you ate.  Keep your food log near you, such as in a pocket-sized notebook or on an juarez or website on your mobile phone. Some programs will calculate  calories for you and show you how many calories you have left to meet your daily goal.  What are some portion-control tips?  Know how many calories are in a serving. This will help you know how many servings you can have of a certain food.  Use a measuring cup to measure serving sizes. You could also try weighing out portions on a kitchen scale. With time, you will be able to estimate serving sizes for some foods.  Take time to put servings of different foods on your favorite plates or in your favorite bowls and cups so you know what a serving looks like.  Try not to eat straight from a food's packaging, such as from a bag or box. Eating straight from the package makes it hard to see how much you are eating and can lead to overeating. Put the amount you would like to eat in a cup or on a plate to make sure you are eating the right portion.  Use smaller plates, glasses, and bowls for smaller portions and to prevent overeating.  Try not to multitask. For example, avoid watching TV or using your computer while eating. If it is time to eat, sit down at a table and enjoy your food. This will help you recognize when you are full. It will also help you be more mindful of what and how much you are eating.  What are tips for following this plan?  Reading food labels  Check the calorie count compared with the serving size. The serving size may be smaller than what you are used to eating.  Check the source of the calories. Try to choose foods that are high in protein, fiber, and vitamins, and low in saturated fat, trans fat, and sodium.  Shopping  Read nutrition labels while you shop. This will help you make healthy decisions about which foods to buy.  Pay attention to nutrition labels for low-fat or fat-free foods. These foods sometimes have the same number of calories or more calories than the full-fat versions. They also often have added sugar, starch, or salt to make up for flavor that was removed with the fat.  Make a  grocery list of lower-calorie foods and stick to it.  Cooking  Try to cook your favorite foods in a healthier way. For example, try baking instead of frying.  Use low-fat dairy products.  Meal planning  Use more fruits and vegetables. One-half of your plate should be fruits and vegetables.  Include lean proteins, such as chicken, turkey, and fish.  Lifestyle  Each week, aim to do one of the followin minutes of moderate exercise, such as walking.  75 minutes of vigorous exercise, such as running.  General information  Know how many calories are in the foods you eat most often. This will help you calculate calorie counts faster.  Find a way of tracking calories that works for you. Get creative. Try different apps or programs if writing down calories does not work for you.  What foods should I eat?    Eat nutritious foods. It is better to have a nutritious, high-calorie food, such as an avocado, than a food with few nutrients, such as a bag of potato chips.  Use your calories on foods and drinks that will fill you up and will not leave you hungry soon after eating.  Examples of foods that fill you up are nuts and nut butters, vegetables, lean proteins, and high-fiber foods such as whole grains. High-fiber foods are foods with more than 5 g of fiber per serving.  Pay attention to calories in drinks. Low-calorie drinks include water and unsweetened drinks.  The items listed above may not be a complete list of foods and beverages you can eat. Contact a dietitian for more information.  What foods should I limit?  Limit foods or drinks that are not good sources of vitamins, minerals, or protein or that are high in unhealthy fats. These include:  Candy.  Other sweets.  Sodas, specialty coffee drinks, alcohol, and juice.  The items listed above may not be a complete list of foods and beverages you should avoid. Contact a dietitian for more information.  How do I count calories when eating out?  Pay attention to  portions. Often, portions are much larger when eating out. Try these tips to keep portions smaller:  Consider sharing a meal instead of getting your own.  If you get your own meal, eat only half of it. Before you start eating, ask for a container and put half of your meal into it.  When available, consider ordering smaller portions from the menu instead of full portions.  Pay attention to your food and drink choices. Knowing the way food is cooked and what is included with the meal can help you eat fewer calories.  If calories are listed on the menu, choose the lower-calorie options.  Choose dishes that include vegetables, fruits, whole grains, low-fat dairy products, and lean proteins.  Choose items that are boiled, broiled, grilled, or steamed. Avoid items that are buttered, battered, fried, or served with cream sauce. Items labeled as crispy are usually fried, unless stated otherwise.  Choose water, low-fat milk, unsweetened iced tea, or other drinks without added sugar. If you want an alcoholic beverage, choose a lower-calorie option, such as a glass of wine or light beer.  Ask for dressings, sauces, and syrups on the side. These are usually high in calories, so you should limit the amount you eat.  If you want a salad, choose a garden salad and ask for grilled meats. Avoid extra toppings such as wahl, cheese, or fried items. Ask for the dressing on the side, or ask for olive oil and vinegar or lemon to use as dressing.  Estimate how many servings of a food you are given. Knowing serving sizes will help you be aware of how much food you are eating at restaurants.  Where to find more information  Centers for Disease Control and Prevention: www.cdc.gov  [place] Department of Agriculture: myplate.gov  Summary  Calorie counting means keeping track of how many calories you eat and drink each day. If you eat fewer calories than your body needs, you should lose weight.  A healthy amount of weight to lose per week is  usually 1-2 lb (0.5-0.9 kg). This usually means reducing your daily calorie intake by 500-750 calories.  The number of calories in a food can be found on a Nutrition Facts label. If a food does not have a Nutrition Facts label, try to look up the calories online or ask your dietitian for help.  Use smaller plates, glasses, and bowls for smaller portions and to prevent overeating.  Use your calories on foods and drinks that will fill you up and not leave you hungry shortly after a meal.  This information is not intended to replace advice given to you by your health care provider. Make sure you discuss any questions you have with your health care provider.  Document Revised: 01/28/2021 Document Reviewed: 01/28/2021  [employer] Patient Education © 2022 [employer]

## 2023-02-06 NOTE — PROGRESS NOTES
You have chosen to receive care through a telephone visit. Do you consent to use a telephone visit for your medical care today? Yes  I was located in my office and he was located at his home at time of the televisit today    The ABCs of the Annual Wellness Visit  Subsequent Medicare Wellness Visit    Subjective      Nick Austin is a 72 y.o. male who presents for a Subsequent Medicare Wellness Visit.    The following portions of the patient's history were reviewed and   updated as appropriate: allergies, current medications, past family history, past medical history, past social history, past surgical history and problem list.    Compared to one year ago, the patient feels his physical   health is the same.    Compared to one year ago, the patient feels his mental   health is the same.    Recent Hospitalizations:  This patient has had a [facility] admission record on file within the last 365 days.    Current Medical Providers:  Patient Care Team:  Patrick Magdaleno MD as PCP - General (Internal Medicine)  Patrick Magdaleno MD as PCP - Internal Medicine (Internal Medicine)  Carlos Wood MD as Consulting Physician (Gastroenterology)    Outpatient Medications Prior to Visit   Medication Sig Dispense Refill   • albuterol sulfate  (90 Base) MCG/ACT inhaler USE 2 INHALATIONS ORALLY   EVERY 4 HOURS AS NEEDED FORWHEEZING 54 g 3   • amLODIPine (NORVASC) 5 MG tablet TAKE 1 TABLET BY MOUTH DAILY. FOR HYPERTENSION 30 tablet 5   • aspirin 81 MG EC tablet Take 1 tablet by mouth Daily.     • atorvastatin (LIPITOR) 40 MG tablet TAKE 1 TABLET DAILY 90 tablet 3   • clopidogrel (PLAVIX) 75 MG tablet TAKE 1 TABLET DAILY 90 tablet 3   • donepezil (Aricept) 10 MG tablet Take 1 tablet by mouth Every Night. For memory 90 tablet 3   • escitalopram (LEXAPRO) 5 MG tablet TAKE 1 TABLET DAILY 90 tablet 3   • fenofibrate (TRICOR) 48 MG tablet TAKE 1 TABLET DAILY 90 tablet 3   • fluticasone (FLONASE) 50 MCG/ACT nasal spray  1 spray into the nostril(s) as directed by provider 2 (Two) Times a Day. Administer 1 spray in each nostril twice daily. 15.8 mL 6   • Fluticasone Furoate-Vilanterol (Breo Ellipta) 100-25 MCG/INH inhaler Inhale 1 puff Daily As Needed (SOB). 3 inhaler 3   • isosorbide mononitrate (IMDUR) 30 MG 24 hr tablet Take 1 tablet by mouth Every Morning. 90 tablet 3   • losartan (COZAAR) 50 MG tablet TAKE 1 TABLET TWICE A DAY. THIS REPLACES THE 100MG    TABLET 180 tablet 3   • memantine (Namenda) 10 MG tablet Take 1 tablet by mouth 2 (Two) Times a Day. 180 tablet 3   • omeprazole (priLOSEC) 40 MG capsule TAKE 1 CAPSULE DAILY 90 capsule 3   • ranolazine (Ranexa) 500 MG 12 hr tablet Take 1 tablet by mouth 2 (Two) Times a Day. (Patient taking differently: Take 500 mg by mouth 2 (Two) Times a Day. ER) 180 tablet 3   • sennosides-docusate (PERICOLACE) 8.6-50 MG per tablet Take 2 tablets by mouth Daily.     • Synthroid 88 MCG tablet TAKE 1 TABLET DAILY 90 tablet 3   • tamsulosin (FLOMAX) 0.4 MG capsule 24 hr capsule Take 1 capsule by mouth Every Night.       No facility-administered medications prior to visit.       No opioid medication identified on active medication list. I have reviewed chart for other potential  high risk medication/s and harmful drug interactions in the elderly.          Aspirin is on active medication list. Aspirin use is indicated based on review of current medical condition/s. Pros and cons of this therapy have been discussed today. Benefits of this medication outweigh potential harm.  Patient has been encouraged to continue taking this medication.  .      Patient Active Problem List   Diagnosis   • Essential hypertension   • Acquired hypothyroidism   • Type 2 diabetes mellitus without complication, without long-term current use of insulin (HCC)   • Benign non-nodular prostatic hyperplasia   • Generalized OA   • Uncomplicated asthma   • Dyspnea on exertion   • Angina effort   • S/P CABG (coronary artery  "bypass graft)   • Angina pectoris (HCC)   • Coronary artery disease involving native coronary artery of native heart with angina pectoris (CMS/HCC) - followed by Dr. Jay   • CAD (coronary artery disease)   • Follow-up surgery care   • Hypertriglyceridemia   • Screen for colon cancer   • Nephrolithiasis   • Dysphagia -due to Schatzki's ring dilated 1/2021, and due to distal esophageal stricture addressed with dilation 5/2022 and repeat 6/2022. Dr. Wood   • Obesity due to excess calories with serious comorbidity   • Chronic venous insufficiency   • GERD (gastroesophageal reflux disease)   • Hyperlipidemia associated with type 2 diabetes mellitus (HCC)   • Dysthymia -started Lexapro 2/2021   • Male erectile dysfunction   • Dysfunction of both eustachian tubes   • Late onset Alzheimer's dementia without behavioral disturbance (HCC) - Moderate (21/30). Started Aricept 5/2022. Started Namenda 7/2022   • Vocal cord mass     Advance Care Planning  Advance Directive is not on file.  ACP discussion was held with the patient during this visit. Patient does not have an advance directive, information provided.     Objective    Vitals:    02/06/23 0837   Weight: 101 kg (222 lb)   Height: 167.6 cm (66\")   PainSc: 0-No pain     Estimated body mass index is 35.83 kg/m² as calculated from the following:    Height as of this encounter: 167.6 cm (66\").    Weight as of this encounter: 101 kg (222 lb).    Class 2 Severe Obesity (BMI >=35 and <=39.9). Obesity-related health conditions include the following: hypertension, coronary heart disease, diabetes mellitus, dyslipidemias, GERD and osteoarthritis. Obesity is unchanged. BMI is is above average; BMI management plan is completed. We discussed portion control and increasing exercise.      Does the patient have evidence of cognitive impairment?   Yes: Continue current medications.            HEALTH RISK ASSESSMENT    Smoking Status:  Social History     Tobacco Use   Smoking Status " Never   • Passive exposure: Past   Smokeless Tobacco Never     Alcohol Consumption:  Social History     Substance and Sexual Activity   Alcohol Use No     Fall Risk Screen:    BRIAADI Fall Risk Assessment was completed, and patient is at LOW risk for falls.Assessment completed on:2/6/2023    Depression Screening:  PHQ-2/PHQ-9 Depression Screening 2/6/2023   Little Interest or Pleasure in Doing Things 0-->not at all   Feeling Down, Depressed or Hopeless 0-->not at all   PHQ-9: Brief Depression Severity Measure Score 0       Health Habits and Functional and Cognitive Screening:  Functional & Cognitive Status 2/6/2023   Do you have difficulty preparing food and eating? No   Do you have difficulty bathing yourself, getting dressed or grooming yourself? No   Do you have difficulty using the toilet? No   Do you have difficulty moving around from place to place? No   Do you have trouble with steps or getting out of a bed or a chair? No   Current Diet Frequent Junk Food   Dental Exam Up to date   Eye Exam Up to date   Exercise (times per week) 3 times per week   Current Exercises Include Yard Work;House Cleaning   Current Exercise Activities Include -   Do you need help using the phone?  No   Are you deaf or do you have serious difficulty hearing?  Yes   Do you need help with transportation? No   Do you need help shopping? No   Do you need help preparing meals?  No   Do you need help with housework?  No   Do you need help with laundry? No   Do you need help taking your medications? Yes   Do you need help managing money? No   Do you ever drive or ride in a car without wearing a seat belt? No   Have you felt unusual stress, anger or loneliness in the last month? No   Who do you live with? Spouse   If you need help, do you have trouble finding someone available to you? No   Have you been bothered in the last four weeks by sexual problems? No   Do you have difficulty concentrating, remembering or making decisions? Yes        Age-appropriate Screening Schedule:  Refer to the list below for future screening recommendations based on patient's age, sex and/or medical conditions. Orders for these recommended tests are listed in the plan section. The patient has been provided with a written plan.    Health Maintenance   Topic Date Due   • DIABETIC FOOT EXAM  04/10/2018   • DIABETIC EYE EXAM  04/01/2020   • URINE MICROALBUMIN  03/09/2023   • HEMOGLOBIN A1C  04/18/2023   • LIPID PANEL  10/18/2023   • ZOSTER VACCINE  Addressed   • INFLUENZA VACCINE  Discontinued                CMS Preventative Services Quick Reference  Risk Factors Identified During Encounter:    Immunizations Discussed/Encouraged: Prevnar 20 (Pneumococcal 20-valent conjugate) and COVID19     He is encouraged to obtain Prevnar 20 and bivalent COVID-19 booster soon.  Continue Aricept and Namenda for Alzheimer's dementia.  He is up to date on cancer screenings.    He reports a diabetic eye exam at [facility] optometrist last year.  We will get copy of the report  Pursue diet and exercise to help achieve weight loss.     The above risks/problems have been discussed with the patient.  Pertinent information has been shared with the patient in the After Visit Summary.    Diagnoses and all orders for this visit:    1. Medicare annual wellness visit, subsequent (Primary)    2. Need for pneumococcal vaccine    3. Need for COVID-19 vaccine        Follow Up:   Next Medicare Wellness visit to be scheduled in 1 year.      An After Visit Summary and PPPS were made available to the patient.

## 2023-02-22 RX ORDER — DONEPEZIL HYDROCHLORIDE 10 MG/1
TABLET, FILM COATED ORAL
Qty: 90 TABLET | Refills: 3 | Status: SHIPPED | OUTPATIENT
Start: 2023-02-22

## 2023-04-18 ENCOUNTER — LAB (OUTPATIENT)
Dept: LAB | Facility: OTHER | Age: 73
End: 2023-04-18
Payer: COMMERCIAL

## 2023-04-18 DIAGNOSIS — E66.01 CLASS 2 SEVERE OBESITY DUE TO EXCESS CALORIES WITH SERIOUS COMORBIDITY AND BODY MASS INDEX (BMI) OF 37.0 TO 37.9 IN ADULT: Chronic | ICD-10-CM

## 2023-04-18 DIAGNOSIS — K21.9 GASTROESOPHAGEAL REFLUX DISEASE, UNSPECIFIED WHETHER ESOPHAGITIS PRESENT: Chronic | ICD-10-CM

## 2023-04-18 DIAGNOSIS — E78.1 HYPERTRIGLYCERIDEMIA: Chronic | ICD-10-CM

## 2023-04-18 DIAGNOSIS — E03.9 ACQUIRED HYPOTHYROIDISM: Chronic | ICD-10-CM

## 2023-04-18 DIAGNOSIS — I25.119 CORONARY ARTERY DISEASE INVOLVING NATIVE CORONARY ARTERY OF NATIVE HEART WITH ANGINA PECTORIS: Chronic | ICD-10-CM

## 2023-04-18 DIAGNOSIS — E78.5 HYPERLIPIDEMIA ASSOCIATED WITH TYPE 2 DIABETES MELLITUS: Chronic | ICD-10-CM

## 2023-04-18 DIAGNOSIS — E11.69 HYPERLIPIDEMIA ASSOCIATED WITH TYPE 2 DIABETES MELLITUS: Chronic | ICD-10-CM

## 2023-04-18 DIAGNOSIS — I10 ESSENTIAL HYPERTENSION: Chronic | ICD-10-CM

## 2023-04-18 DIAGNOSIS — E11.9 TYPE 2 DIABETES MELLITUS WITHOUT COMPLICATION, WITHOUT LONG-TERM CURRENT USE OF INSULIN: Chronic | ICD-10-CM

## 2023-04-18 LAB
ALBUMIN SERPL-MCNC: 4.4 G/DL (ref 3.5–5)
ALBUMIN UR-MCNC: <1.2 MG/DL
ALBUMIN/GLOB SERPL: 1.5 G/DL (ref 1.1–1.8)
ALP SERPL-CCNC: 64 U/L (ref 38–126)
ALT SERPL W P-5'-P-CCNC: 20 U/L
ANION GAP SERPL CALCULATED.3IONS-SCNC: 9 MMOL/L (ref 5–15)
ARTICHOKE IGE QN: 84 MG/DL (ref 0–100)
AST SERPL-CCNC: 22 U/L (ref 17–59)
BASOPHILS # BLD AUTO: 0.02 10*3/MM3 (ref 0–0.2)
BASOPHILS NFR BLD AUTO: 0.3 % (ref 0–1.5)
BILIRUB SERPL-MCNC: 0.5 MG/DL (ref 0.2–1.3)
BUN SERPL-MCNC: 15 MG/DL (ref 7–23)
BUN/CREAT SERPL: 15.5 (ref 7–25)
CALCIUM SPEC-SCNC: 9.4 MG/DL (ref 8.4–10.2)
CHLORIDE SERPL-SCNC: 103 MMOL/L (ref 101–112)
CO2 SERPL-SCNC: 29 MMOL/L (ref 22–30)
CREAT SERPL-MCNC: 0.97 MG/DL (ref 0.7–1.3)
CREAT UR-MCNC: 106.7 MG/DL
DEPRECATED RDW RBC AUTO: 51.1 FL (ref 37–54)
EGFRCR SERPLBLD CKD-EPI 2021: 81.9 ML/MIN/1.73
EOSINOPHIL # BLD AUTO: 0.3 10*3/MM3 (ref 0–0.4)
EOSINOPHIL NFR BLD AUTO: 4.6 % (ref 0.3–6.2)
ERYTHROCYTE [DISTWIDTH] IN BLOOD BY AUTOMATED COUNT: 14.5 % (ref 12.3–15.4)
GLOBULIN UR ELPH-MCNC: 2.9 GM/DL (ref 2.3–3.5)
GLUCOSE SERPL-MCNC: 107 MG/DL (ref 70–99)
HBA1C MFR BLD: 5.6 % (ref 4.8–5.6)
HCT VFR BLD AUTO: 46.4 % (ref 37.5–51)
HGB BLD-MCNC: 15.3 G/DL (ref 13–17.7)
LYMPHOCYTES # BLD AUTO: 2.05 10*3/MM3 (ref 0.7–3.1)
LYMPHOCYTES NFR BLD AUTO: 31.4 % (ref 19.6–45.3)
MCH RBC QN AUTO: 32.1 PG (ref 26.6–33)
MCHC RBC AUTO-ENTMCNC: 33 G/DL (ref 31.5–35.7)
MCV RBC AUTO: 97.5 FL (ref 79–97)
MICROALBUMIN/CREAT UR: NORMAL MG/G{CREAT}
MONOCYTES # BLD AUTO: 0.45 10*3/MM3 (ref 0.1–0.9)
MONOCYTES NFR BLD AUTO: 6.9 % (ref 5–12)
NEUTROPHILS NFR BLD AUTO: 3.71 10*3/MM3 (ref 1.7–7)
NEUTROPHILS NFR BLD AUTO: 56.8 % (ref 42.7–76)
PLATELET # BLD AUTO: 248 10*3/MM3 (ref 140–450)
PMV BLD AUTO: 10.9 FL (ref 6–12)
POTASSIUM SERPL-SCNC: 4 MMOL/L (ref 3.4–5)
PROT SERPL-MCNC: 7.3 G/DL (ref 6.3–8.6)
RBC # BLD AUTO: 4.76 10*6/MM3 (ref 4.14–5.8)
SODIUM SERPL-SCNC: 141 MMOL/L (ref 137–145)
T4 FREE SERPL-MCNC: 1.31 NG/DL (ref 0.93–1.7)
TRIGL SERPL-MCNC: 147 MG/DL
TSH SERPL DL<=0.05 MIU/L-ACNC: 1.96 UIU/ML (ref 0.27–4.2)
VIT B12 BLD-MCNC: 711 PG/ML (ref 211–946)
WBC NRBC COR # BLD: 6.53 10*3/MM3 (ref 3.4–10.8)

## 2023-04-18 PROCEDURE — 85025 COMPLETE CBC W/AUTO DIFF WBC: CPT | Performed by: INTERNAL MEDICINE

## 2023-04-18 PROCEDURE — 36415 COLL VENOUS BLD VENIPUNCTURE: CPT | Performed by: INTERNAL MEDICINE

## 2023-04-18 PROCEDURE — 83036 HEMOGLOBIN GLYCOSYLATED A1C: CPT | Performed by: INTERNAL MEDICINE

## 2023-04-18 PROCEDURE — 84478 ASSAY OF TRIGLYCERIDES: CPT | Performed by: INTERNAL MEDICINE

## 2023-04-18 PROCEDURE — 84443 ASSAY THYROID STIM HORMONE: CPT | Performed by: INTERNAL MEDICINE

## 2023-04-18 PROCEDURE — 80053 COMPREHEN METABOLIC PANEL: CPT | Performed by: INTERNAL MEDICINE

## 2023-04-18 PROCEDURE — 83721 ASSAY OF BLOOD LIPOPROTEIN: CPT | Performed by: INTERNAL MEDICINE

## 2023-04-18 PROCEDURE — 82607 VITAMIN B-12: CPT | Performed by: INTERNAL MEDICINE

## 2023-04-18 PROCEDURE — 82043 UR ALBUMIN QUANTITATIVE: CPT | Performed by: INTERNAL MEDICINE

## 2023-04-18 PROCEDURE — 84439 ASSAY OF FREE THYROXINE: CPT | Performed by: INTERNAL MEDICINE

## 2023-04-18 PROCEDURE — 82570 ASSAY OF URINE CREATININE: CPT | Performed by: INTERNAL MEDICINE

## 2023-04-25 ENCOUNTER — OFFICE VISIT (OUTPATIENT)
Dept: FAMILY MEDICINE CLINIC | Facility: CLINIC | Age: 73
End: 2023-04-25
Payer: MEDICARE

## 2023-04-25 VITALS
SYSTOLIC BLOOD PRESSURE: 138 MMHG | DIASTOLIC BLOOD PRESSURE: 72 MMHG | WEIGHT: 219.6 LBS | BODY MASS INDEX: 35.29 KG/M2 | HEIGHT: 66 IN | HEART RATE: 69 BPM | TEMPERATURE: 97.8 F | OXYGEN SATURATION: 96 %

## 2023-04-25 DIAGNOSIS — I10 ESSENTIAL HYPERTENSION: Chronic | ICD-10-CM

## 2023-04-25 DIAGNOSIS — R13.19 ESOPHAGEAL DYSPHAGIA: Chronic | ICD-10-CM

## 2023-04-25 DIAGNOSIS — E03.9 ACQUIRED HYPOTHYROIDISM: Chronic | ICD-10-CM

## 2023-04-25 DIAGNOSIS — E78.5 HYPERLIPIDEMIA ASSOCIATED WITH TYPE 2 DIABETES MELLITUS: Chronic | ICD-10-CM

## 2023-04-25 DIAGNOSIS — E11.69 HYPERLIPIDEMIA ASSOCIATED WITH TYPE 2 DIABETES MELLITUS: Chronic | ICD-10-CM

## 2023-04-25 DIAGNOSIS — H69.83 DYSFUNCTION OF BOTH EUSTACHIAN TUBES: Chronic | ICD-10-CM

## 2023-04-25 DIAGNOSIS — I95.1 ORTHOSTATIC HYPOTENSION: ICD-10-CM

## 2023-04-25 DIAGNOSIS — K21.9 GASTROESOPHAGEAL REFLUX DISEASE, UNSPECIFIED WHETHER ESOPHAGITIS PRESENT: Chronic | ICD-10-CM

## 2023-04-25 DIAGNOSIS — Z12.5 SPECIAL SCREENING FOR MALIGNANT NEOPLASM OF PROSTATE: ICD-10-CM

## 2023-04-25 DIAGNOSIS — E11.9 TYPE 2 DIABETES MELLITUS WITHOUT COMPLICATION, WITHOUT LONG-TERM CURRENT USE OF INSULIN: Primary | Chronic | ICD-10-CM

## 2023-04-25 DIAGNOSIS — J30.1 SEASONAL ALLERGIC RHINITIS DUE TO POLLEN: ICD-10-CM

## 2023-04-25 DIAGNOSIS — E78.1 HYPERTRIGLYCERIDEMIA: Chronic | ICD-10-CM

## 2023-04-25 DIAGNOSIS — R42 VERTIGO: ICD-10-CM

## 2023-04-25 DIAGNOSIS — E66.01 CLASS 2 SEVERE OBESITY DUE TO EXCESS CALORIES WITH SERIOUS COMORBIDITY AND BODY MASS INDEX (BMI) OF 37.0 TO 37.9 IN ADULT: Chronic | ICD-10-CM

## 2023-04-25 DIAGNOSIS — N40.0 BENIGN NON-NODULAR PROSTATIC HYPERPLASIA: Chronic | ICD-10-CM

## 2023-04-25 DIAGNOSIS — I25.119 CORONARY ARTERY DISEASE INVOLVING NATIVE CORONARY ARTERY OF NATIVE HEART WITH ANGINA PECTORIS: Chronic | ICD-10-CM

## 2023-04-25 NOTE — PATIENT INSTRUCTIONS
Exercising to Lose Weight  Getting regular exercise is important for everyone. It is especially important if you are overweight. Being overweight increases your risk of heart disease, stroke, diabetes, high blood pressure, and several types of cancer. Exercising, and reducing the calories you consume, can help you lose weight and improve fitness and health.  Exercise can be moderate or vigorous intensity. To lose weight, most people need to do a certain amount of moderate or vigorous-intensity exercise each week.  How can exercise affect me?  You lose weight when you exercise enough to burn more calories than you eat. Exercise also reduces body fat and builds muscle. The more muscle you have, the more calories you burn. Exercise also:  Improves mood.  Reduces stress and tension.  Improves your overall fitness, flexibility, and endurance.  Increases bone strength.  Moderate-intensity exercise  Moderate-intensity exercise is any activity that gets you moving enough to burn at least three times more energy (calories) than if you were sitting.  Examples of moderate exercise include:  Walking a mile in 15 minutes.  Doing light yard work.  Biking at an easy pace.  Most people should get at least 150 minutes of moderate-intensity exercise a week to maintain their body weight.  Vigorous-intensity exercise  Vigorous-intensity exercise is any activity that gets you moving enough to burn at least six times more calories than if you were sitting. When you exercise at this intensity, you should be working hard enough that you are not able to carry on a conversation.  Examples of vigorous exercise include:  Running.  Playing a team sport, such as football, basketball, and soccer.  Jumping rope.  Most people should get at least 75 minutes a week of vigorous exercise to maintain their body weight.  What actions can I take to lose weight?  The amount of exercise you need to lose weight depends on:  Your age.  The type of  exercise.  Any health conditions you have.  Your overall physical ability.  Talk to your health care provider about how much exercise you need and what types of activities are safe for you.  Nutrition    Make changes to your diet as told by your health care provider or diet and nutrition specialist (dietitian). This may include:  Eating fewer calories.  Eating more protein.  Eating less unhealthy fats.  Eating a diet that includes fresh fruits and vegetables, whole grains, low-fat dairy products, and lean protein.  Avoiding foods with added fat, salt, and sugar.  Drink plenty of water while you exercise to prevent dehydration or heat stroke.  Activity  Choose an activity that you enjoy and set realistic goals. Your health care provider can help you make an exercise plan that works for you.  Exercise at a moderate or vigorous intensity most days of the week.  The intensity of exercise may vary from person to person. You can tell how intense a workout is for you by paying attention to your breathing and heartbeat. Most people will notice their breathing and heartbeat get faster with more intense exercise.  Do resistance training twice each week, such as:  Push-ups.  Sit-ups.  Lifting weights.  Using resistance bands.  Getting short amounts of exercise can be just as helpful as long, structured periods of exercise. If you have trouble finding time to exercise, try doing these things as part of your daily routine:  Get up, stretch, and walk around every 30 minutes throughout the day.  Go for a walk during your lunch break.  Park your car farther away from your destination.  If you take public transportation, get off one stop early and walk the rest of the way.  Make phone calls while standing up and walking around.  Take the stairs instead of elevators or escalators.  Wear comfortable clothes and shoes with good support.  Do not exercise so much that you hurt yourself, feel dizzy, or get very short of breath.  Where to  find more information  [place] Department of Health and Human Services: www.hhs.gov  Centers for Disease Control and Prevention: www.cdc.gov  Contact a health care provider:  Before starting a new exercise program.  If you have questions or concerns about your weight.  If you have a medical problem that keeps you from exercising.  Get help right away if:  You have any of the following while exercising:  Injury.  Dizziness.  Difficulty breathing or shortness of breath that does not go away when you stop exercising.  Chest pain.  Rapid heartbeat.  These symptoms may represent a serious problem that is an emergency. Do not wait to see if the symptoms will go away. Get medical help right away. Call your local emergency services (911 in the [place] Do not drive yourself to the hospital.  Summary  Getting regular exercise is especially important if you are overweight.  Being overweight increases your risk of heart disease, stroke, diabetes, high blood pressure, and several types of cancer.  Losing weight happens when you burn more calories than you eat.  Reducing the amount of calories you eat, and getting regular moderate or vigorous exercise each week, helps you lose weight.  This information is not intended to replace advice given to you by your health care provider. Make sure you discuss any questions you have with your health care provider.  Document Revised: 02/13/2022 Document Reviewed: 02/13/2022  [employer]vier Patient Education © 2022 [employer]  Calorie Counting for Weight Loss  Calories are units of energy. Your body needs a certain number of calories from food to keep going throughout the day. When you eat or drink more calories than your body needs, your body stores the extra calories mostly as fat. When you eat or drink fewer calories than your body needs, your body burns fat to get the energy it needs.  Calorie counting means keeping track of how many calories you eat and drink each day. Calorie counting can be  helpful if you need to lose weight. If you eat fewer calories than your body needs, you should lose weight. Ask your health care provider what a healthy weight is for you.  For calorie counting to work, you will need to eat the right number of calories each day to lose a healthy amount of weight per week. A dietitian can help you figure out how many calories you need in a day and will suggest ways to reach your calorie goal.  A healthy amount of weight to lose each week is usually 1-2 lb (0.5-0.9 kg). This usually means that your daily calorie intake should be reduced by 500-750 calories.  Eating 1,200-1,500 calories a day can help most women lose weight.  Eating 1,500-1,800 calories a day can help most men lose weight.  What do I need to know about calorie counting?  Work with your health care provider or dietitian to determine how many calories you should get each day. To meet your daily calorie goal, you will need to:  Find out how many calories are in each food that you would like to eat. Try to do this before you eat.  Decide how much of the food you plan to eat.  Keep a food log. Do this by writing down what you ate and how many calories it had.  To successfully lose weight, it is important to balance calorie counting with a healthy lifestyle that includes regular activity.  Where do I find calorie information?  The number of calories in a food can be found on a Nutrition Facts label. If a food does not have a Nutrition Facts label, try to look up the calories online or ask your dietitian for help.  Remember that calories are listed per serving. If you choose to have more than one serving of a food, you will have to multiply the calories per serving by the number of servings you plan to eat. For example, the label on a package of bread might say that a serving size is 1 slice and that there are 90 calories in a serving. If you eat 1 slice, you will have eaten 90 calories. If you eat 2 slices, you will have  eaten 180 calories.  How do I keep a food log?  After each time that you eat, record the following in your food log as soon as possible:  What you ate. Be sure to include toppings, sauces, and other extras on the food.  How much you ate. This can be measured in cups, ounces, or number of items.  How many calories were in each food and drink.  The total number of calories in the food you ate.  Keep your food log near you, such as in a pocket-sized notebook or on an juarez or website on your mobile phone. Some programs will calculate calories for you and show you how many calories you have left to meet your daily goal.  What are some portion-control tips?  Know how many calories are in a serving. This will help you know how many servings you can have of a certain food.  Use a measuring cup to measure serving sizes. You could also try weighing out portions on a kitchen scale. With time, you will be able to estimate serving sizes for some foods.  Take time to put servings of different foods on your favorite plates or in your favorite bowls and cups so you know what a serving looks like.  Try not to eat straight from a food's packaging, such as from a bag or box. Eating straight from the package makes it hard to see how much you are eating and can lead to overeating. Put the amount you would like to eat in a cup or on a plate to make sure you are eating the right portion.  Use smaller plates, glasses, and bowls for smaller portions and to prevent overeating.  Try not to multitask. For example, avoid watching TV or using your computer while eating. If it is time to eat, sit down at a table and enjoy your food. This will help you recognize when you are full. It will also help you be more mindful of what and how much you are eating.  What are tips for following this plan?  Reading food labels  Check the calorie count compared with the serving size. The serving size may be smaller than what you are used to eating.  Check the  source of the calories. Try to choose foods that are high in protein, fiber, and vitamins, and low in saturated fat, trans fat, and sodium.  Shopping  Read nutrition labels while you shop. This will help you make healthy decisions about which foods to buy.  Pay attention to nutrition labels for low-fat or fat-free foods. These foods sometimes have the same number of calories or more calories than the full-fat versions. They also often have added sugar, starch, or salt to make up for flavor that was removed with the fat.  Make a grocery list of lower-calorie foods and stick to it.  Cooking  Try to cook your favorite foods in a healthier way. For example, try baking instead of frying.  Use low-fat dairy products.  Meal planning  Use more fruits and vegetables. One-half of your plate should be fruits and vegetables.  Include lean proteins, such as chicken, turkey, and fish.  Lifestyle  Each week, aim to do one of the followin minutes of moderate exercise, such as walking.  75 minutes of vigorous exercise, such as running.  General information  Know how many calories are in the foods you eat most often. This will help you calculate calorie counts faster.  Find a way of tracking calories that works for you. Get creative. Try different apps or programs if writing down calories does not work for you.  What foods should I eat?    Eat nutritious foods. It is better to have a nutritious, high-calorie food, such as an avocado, than a food with few nutrients, such as a bag of potato chips.  Use your calories on foods and drinks that will fill you up and will not leave you hungry soon after eating.  Examples of foods that fill you up are nuts and nut butters, vegetables, lean proteins, and high-fiber foods such as whole grains. High-fiber foods are foods with more than 5 g of fiber per serving.  Pay attention to calories in drinks. Low-calorie drinks include water and unsweetened drinks.  The items listed above may not be  a complete list of foods and beverages you can eat. Contact a dietitian for more information.  What foods should I limit?  Limit foods or drinks that are not good sources of vitamins, minerals, or protein or that are high in unhealthy fats. These include:  Candy.  Other sweets.  Sodas, specialty coffee drinks, alcohol, and juice.  The items listed above may not be a complete list of foods and beverages you should avoid. Contact a dietitian for more information.  How do I count calories when eating out?  Pay attention to portions. Often, portions are much larger when eating out. Try these tips to keep portions smaller:  Consider sharing a meal instead of getting your own.  If you get your own meal, eat only half of it. Before you start eating, ask for a container and put half of your meal into it.  When available, consider ordering smaller portions from the menu instead of full portions.  Pay attention to your food and drink choices. Knowing the way food is cooked and what is included with the meal can help you eat fewer calories.  If calories are listed on the menu, choose the lower-calorie options.  Choose dishes that include vegetables, fruits, whole grains, low-fat dairy products, and lean proteins.  Choose items that are boiled, broiled, grilled, or steamed. Avoid items that are buttered, battered, fried, or served with cream sauce. Items labeled as crispy are usually fried, unless stated otherwise.  Choose water, low-fat milk, unsweetened iced tea, or other drinks without added sugar. If you want an alcoholic beverage, choose a lower-calorie option, such as a glass of wine or light beer.  Ask for dressings, sauces, and syrups on the side. These are usually high in calories, so you should limit the amount you eat.  If you want a salad, choose a garden salad and ask for grilled meats. Avoid extra toppings such as wahl, cheese, or fried items. Ask for the dressing on the side, or ask for olive oil and vinegar or  lemon to use as dressing.  Estimate how many servings of a food you are given. Knowing serving sizes will help you be aware of how much food you are eating at restaurants.  Where to find more information  Centers for Disease Control and Prevention: www.cdc.gov  [place] Department of Agriculture: myplate.gov  Summary  Calorie counting means keeping track of how many calories you eat and drink each day. If you eat fewer calories than your body needs, you should lose weight.  A healthy amount of weight to lose per week is usually 1-2 lb (0.5-0.9 kg). This usually means reducing your daily calorie intake by 500-750 calories.  The number of calories in a food can be found on a Nutrition Facts label. If a food does not have a Nutrition Facts label, try to look up the calories online or ask your dietitian for help.  Use smaller plates, glasses, and bowls for smaller portions and to prevent overeating.  Use your calories on foods and drinks that will fill you up and not leave you hungry shortly after a meal.  This information is not intended to replace advice given to you by your health care provider. Make sure you discuss any questions you have with your health care provider.  Document Revised: 01/28/2021 Document Reviewed: 01/28/2021  [employer]vier Patient Education © 2022 [employer]

## 2023-04-25 NOTE — PROGRESS NOTES
"Chief Complaint  Follow-up (6 month), Dizziness (X several months when going from sitting to standing position ), and urinary control (States frequently  and difficulty holding it at times )    Subjective        History of Present Illness     Nick Austin presents to to the office for 6-month follow up on chronic medical issues including essential hypertension, hyperlipidemia, CAD, hypothyroidism, GERD, and Type 2 diabetes mellitus diet controlled.  He feels memory issues have not progressed with the combination of Aricept and Namenda.   He is in the [facility] of establishing with [facility] to [facility] and has an upcoming appointment to be evaluated for hearing aids.      He has a history of dysphagia secondary to Schartzki's ring with prior esophageal dilation GERD symptoms adequately controlled with Prilosec and OTC antacids.  He still has occasional difficulty swallowing pills.     Patient is describing orthostatic symptoms versus ETD versus vertigo symptoms present through the winter months and continues to persist this spring, although, denies worsening. BP marginal at 138/72.   He has a history of eustachian tube dysfunction and has not been using his Flonase routinely.  The episodes of dizziness are worse with transferring from seated to standing position. I recommended ENT evaluation. He is in agreement.  He has a follow up appointment with Dr. Jay, cardiologist, in July.  I asked him to mention the episodes to him as well as the symptoms could be side effects of combination of isosorbide and Ranexa.        Weight is down 3 pounds in the past six months.  Diabetes is stable with A1c 5.6.      Lab results are reviewed with the patient today.  CBC unremarkable.   Fasting glucose 107.  CBC unremarkable.  Vitamin B-12 at goal.   Normal thyroid screen.   LDL cholesterol acceptable with Lipitor.       Objective   Vital Signs:  /72   Pulse 69   Temp 97.8 °F (36.6 °C)   Ht 167.6 cm (66\")   " "Wt 99.6 kg (219 lb 9.6 oz)   SpO2 96%   BMI 35.44 kg/m²   Estimated body mass index is 35.44 kg/m² as calculated from the following:    Height as of this encounter: 167.6 cm (66\").    Weight as of this encounter: 99.6 kg (219 lb 9.6 oz).             Physical Exam  Vitals reviewed.   Constitutional:       General: He is not in acute distress.     Appearance: He is well-developed.      Comments: Very pleasant. Obese.  Evidence of some dementia without agitation   HENT:      Head: Normocephalic and atraumatic.      Ears:      Comments: Small clear effusion bilaterally, left greater than right.           Nose:      Right Sinus: No maxillary sinus tenderness or frontal sinus tenderness.      Left Sinus: No maxillary sinus tenderness or frontal sinus tenderness.      Mouth/Throat:      Mouth: No oral lesions.      Pharynx: Uvula midline.      Tonsils: No tonsillar exudate.      Comments: Increased postnasal drainage.   Eyes:      Conjunctiva/sclera: Conjunctivae normal.      Pupils: Pupils are equal, round, and reactive to light.   Neck:      Thyroid: No thyroid mass or thyromegaly.      Vascular: No carotid bruit or JVD.      Trachea: Trachea normal. No tracheal deviation.   Cardiovascular:      Rate and Rhythm: Normal rate and regular rhythm.  No extrasystoles are present.     Chest Wall: PMI is not displaced.      Heart sounds: Normal heart sounds. No murmur heard.  Pulmonary:      Effort: Pulmonary effort is normal. No accessory muscle usage or respiratory distress.      Breath sounds: Normal breath sounds. No decreased breath sounds, wheezing, rhonchi or rales.   Abdominal:      General: Bowel sounds are normal. There is no distension.      Palpations: Abdomen is soft.      Tenderness: There is no abdominal tenderness.   Musculoskeletal:      Cervical back: Neck supple.   Feet:      Comments: Pulses 1-2+ bilateral ear canals.   Lymphadenopathy:      Cervical: No cervical adenopathy.   Skin:     General: Skin is " warm and dry.      Findings: No rash.      Nails: There is no clubbing.   Neurological:      Mental Status: He is alert and oriented to person, place, and time.      Cranial Nerves: No cranial nerve deficit.      Coordination: Coordination normal.   Psychiatric:         Speech: Speech normal.         Behavior: Behavior normal.         Thought Content: Thought content normal.         Judgment: Judgment normal.      Comments: Dementia without agitation.            Result Review :    CMP        9/20/2022    05:27 10/18/2022    07:13 4/18/2023    07:09   CMP   Glucose 135   105   107     BUN 14   13   15     Creatinine 0.80   1.17   0.97     EGFR 93.4   65.8   81.9     Sodium 139   141   141     Potassium 3.7   3.9   4.0     Chloride 105   105   103     Calcium 8.6   9.0   9.4     Total Protein 6.8   6.9   7.3     Albumin 3.80   4.20   4.4     Globulin 3.0   2.7   2.9     Total Bilirubin 0.5   0.5   0.5     Alkaline Phosphatase 55   74   64     AST (SGOT) 14   23   22     ALT (SGPT) 13   19   20     Albumin/Globulin Ratio 1.3   1.6   1.5     BUN/Creatinine Ratio 17.5   11.1   15.5     Anion Gap 9.0   5.0   9.0       CBC w/diff        9/20/2022    05:27 10/18/2022    07:13 4/18/2023    07:09   CBC w/Diff   WBC 11.96   5.70   6.53     RBC 4.48   4.67   4.76     Hemoglobin 14.1   14.3   15.3     Hematocrit 41.5   45.0   46.4     MCV 92.6   96.4   97.5     MCH 31.5   30.6   32.1     MCHC 34.0   31.8   33.0     RDW 14.2   14.7   14.5     Platelets 252   314   248     Neutrophil Rel % 90.8   57.6   56.8     Immature Granulocyte Rel % 0.7       Lymphocyte Rel % 7.5   27.5   31.4     Monocyte Rel % 0.8   8.4   6.9     Eosinophil Rel % 0.0   6.0   4.6     Basophil Rel % 0.2   0.5   0.3       Lipid Panel        10/18/2022    07:13 4/18/2023    07:09   Lipid Panel   Total Cholesterol 147      Triglycerides 157   147     HDL Cholesterol 33      VLDL Cholesterol 28      LDL Cholesterol  86   84     LDL/HDL Ratio 2.50        TSH         10/18/2022    07:13 4/18/2023    07:09   TSH   TSH 3.370   1.960       A1C Last 3 Results        10/18/2022    07:13 4/18/2023    07:09   HGBA1C Last 3 Results   Hemoglobin A1C 5.60   5.60       PSA        10/18/2022    07:13   PSA   PSA 1.460                    Assessment and Plan   Diagnoses and all orders for this visit:    1. Type 2 diabetes mellitus without complication, without long-term current use of insulin (Primary)  -     CBC Auto Differential; Future  -     Comprehensive Metabolic Panel; Future  -     Hemoglobin A1c; Future  -     Lipid Panel; Future  -     TSH; Future    2. Hyperlipidemia associated with type 2 diabetes mellitus  -     Lipid Panel; Future    3. Hypertriglyceridemia  -     Lipid Panel; Future    4. Essential hypertension  -     Comprehensive Metabolic Panel; Future    5. Coronary artery disease involving native coronary artery of native heart with angina pectoris (CMS/HCC) - followed by Dr. Jay  -     Lipid Panel; Future    6. Class 2 severe obesity due to excess calories with serious comorbidity and body mass index (BMI) of 37.0 to 37.9 in adult  -     Comprehensive Metabolic Panel; Future    7. Acquired hypothyroidism  -     TSH; Future  -     T4, free; Future    8. Gastroesophageal reflux disease, unspecified whether esophagitis present    9. Esophageal dysphagia  -     Ambulatory Referral to ENT (Otolaryngology)    10. Vertigo  -     Ambulatory Referral to ENT (Otolaryngology)    11. Dysfunction of both eustachian tubes  -     Ambulatory Referral to ENT (Otolaryngology)    12. Orthostatic hypotension  -     Ambulatory Referral to ENT (Otolaryngology)    13. Seasonal allergic rhinitis due to pollen  -     Ambulatory Referral to ENT (Otolaryngology)    14. Special screening for malignant neoplasm of prostate  -     PSA Screen; Future           I spent 45 minutes caring for Nick on this date of service. This time includes time spent by me in the following  activities:preparing for the visit, reviewing tests, performing a medically appropriate examination and/or evaluation , counseling and educating the patient/family/caregiver, ordering medications, tests, or procedures, referring and communicating with other health care professionals , documenting information in the medical record and care coordination.  I provided written instructions outlining today's medication changes for him to share with his wife when he gets home.    Refer to Dr. Shraddha Li, ENT, for evaluation of his dizzy/lightheaded symptoms.  Differential diagnosis includes any combination of orthostatic symptoms versus vertigo symptoms as well as eustachian tube dysfunction. Resume the Flonase nasal spray to use one spray each nostril b.i.d. he could probably tolerate a nondrowsy antihistamine during peak allergy symptoms without aggravating BPH issues, but avoid sedating antihistamine such as Benadryl due to his dementia.  Continue Flomax for BPH, stable.  I did not prescribe any meclizine due to dementia and BPH.  We need to try to minimize medications with anticholinergic side effects.    Diabetes excellent control.  Continue diet and exercise to achieve additional weight loss, which will help delay progression of IFG.     Continue the current CAD risk factor modifications including statin, losartan, aspirin.  Continue to follow with cardiology as they recommend.      Continue current dose Synthroid.  Hypothyroidism at goal.     Continue the combination of Aricept and Namenda to slow progression of Alzheimer's disease.  Continue the low-dose Lexapro.  He feels that his cognition has improved a little since being on these medicines.  His wife is not here today to corroborate.    Continue Flomax for BPH.     Return in 6 months for routine follow up with fasting labs one week prior or sooner if needed.     Scribed for Dr. Magdaleno by Harriet Gardner [facility].     Follow Up   Return in about 6 months (around  10/25/2023) for Follow up in six months with labs one week prior..  Patient was given instructions and counseling regarding his condition or for health maintenance advice. Please see specific information pulled into the AVS if appropriate.

## 2023-05-08 RX ORDER — TAMSULOSIN HYDROCHLORIDE 0.4 MG/1
CAPSULE ORAL
Qty: 90 CAPSULE | Refills: 3 | Status: SHIPPED | OUTPATIENT
Start: 2023-05-08

## 2023-05-08 RX ORDER — ISOSORBIDE MONONITRATE 30 MG/1
TABLET, EXTENDED RELEASE ORAL
Qty: 90 TABLET | Refills: 3 | Status: SHIPPED | OUTPATIENT
Start: 2023-05-08

## 2023-05-08 RX ORDER — RANOLAZINE 500 MG/1
TABLET, EXTENDED RELEASE ORAL
Qty: 180 TABLET | Refills: 3 | Status: SHIPPED | OUTPATIENT
Start: 2023-05-08

## 2023-05-08 RX ORDER — MEMANTINE HYDROCHLORIDE 10 MG/1
TABLET ORAL
Qty: 180 TABLET | Refills: 3 | Status: SHIPPED | OUTPATIENT
Start: 2023-05-08

## 2023-05-09 DIAGNOSIS — R42 VERTIGO: Primary | ICD-10-CM

## 2023-05-09 DIAGNOSIS — H69.83 DYSFUNCTION OF BOTH EUSTACHIAN TUBES: ICD-10-CM

## 2023-05-17 ENCOUNTER — OFFICE VISIT (OUTPATIENT)
Dept: OTOLARYNGOLOGY | Facility: CLINIC | Age: 73
End: 2023-05-17
Payer: COMMERCIAL

## 2023-05-17 ENCOUNTER — CLINICAL SUPPORT (OUTPATIENT)
Dept: AUDIOLOGY | Facility: CLINIC | Age: 73
End: 2023-05-17
Payer: COMMERCIAL

## 2023-05-17 VITALS — WEIGHT: 224.4 LBS | HEIGHT: 66 IN | OXYGEN SATURATION: 97 % | HEART RATE: 74 BPM | BODY MASS INDEX: 36.07 KG/M2

## 2023-05-17 DIAGNOSIS — Z77.122 HISTORY OF EXPOSURE TO NOISE: ICD-10-CM

## 2023-05-17 DIAGNOSIS — R42 DISEQUILIBRIUM: ICD-10-CM

## 2023-05-17 DIAGNOSIS — H90.3 SENSORINEURAL HEARING LOSS (SNHL) OF BOTH EARS: Primary | ICD-10-CM

## 2023-05-17 DIAGNOSIS — R13.19 ESOPHAGEAL DYSPHAGIA: ICD-10-CM

## 2023-05-17 DIAGNOSIS — H93.13 TINNITUS OF BOTH EARS: ICD-10-CM

## 2023-05-17 DIAGNOSIS — R42 DIZZINESS: ICD-10-CM

## 2023-05-17 DIAGNOSIS — T17.908A ASPIRATION INTO AIRWAY, INITIAL ENCOUNTER: ICD-10-CM

## 2023-05-17 PROCEDURE — 92567 TYMPANOMETRY: CPT | Performed by: AUDIOLOGIST

## 2023-05-17 PROCEDURE — 92557 COMPREHENSIVE HEARING TEST: CPT | Performed by: AUDIOLOGIST
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
## 2023-05-17 NOTE — PROGRESS NOTES
STANDARD AUDIOMETRIC EVALUATION      Name:  Nikc Austin  :  1950  Age:  72 y.o.  Date of Evaluation:  2023      HISTORY    Reason for visit:  Nick Austin is seen today for a hearing test at the request of Dr. Shraddha Li and Dr. Patrick Magdaleno.  Patient reports a history of vertigo and eustachian tube dysfunction.  He states he has trouble hearing, and he hears ringing in both ears.  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      EVALUATION    See Audiogram    RESULTS        Otoscopy and Tympanometry 226 Hz :  Right Ear:  Otoscopy:  Clear ear canal          Tympanometry:  Middle ear function within normal limits    Left Ear:   Otoscopy:  Clear ear canal        Tympanometry:  Middle ear function within normal limits    Test technique:  Standard Audiometry     Pure Tone Audiometry:   Patient responded to pure tones at 10-65 dB for 250-8000 Hz in both ears.      Speech Audiometry:        Right Ear:  Speech Reception Threshold (SRT) was obtained at 15 dBHL                 Speech Discrimination scores were 60% in quiet when words were presented at 55 dBHL       Left Ear:  Speech Reception Threshold (SRT) was obtained at 20 dBHL                 Speech Discrimination scores were 76% in quiet when words were presented at 60 dBHL    Reliability:   good    IMPRESSIONS:  1.  Tympanometry results are consistent with Middle ear function within normal limits in both ears.  2.  Pure tone results are consistent with within normal limits to moderately severe precipitous, high frequency sensorineural hearing loss for both ears.       RECOMMENDATIONS:  Patient is seeing the Ear Nose and Throat physician immediately following this examination.  It was a pleasure seeing Nick Austin in Audiology today.  We would be happy to do further testing or discuss these test as necessary.          This document has been electronically signed by  Noa Landis, MS GAYLE-A on May 17, 2023 16:11 CDT       Noa Landis, MS GAYLE-A  Licensed Audiologist

## 2023-05-24 ENCOUNTER — HOSPITAL ENCOUNTER (OUTPATIENT)
Dept: GENERAL RADIOLOGY | Facility: HOSPITAL | Age: 73
Discharge: HOME | End: 2023-05-24
Admitting: OTOLARYNGOLOGY
Payer: MEDICARE

## 2023-05-24 DIAGNOSIS — R13.19 ESOPHAGEAL DYSPHAGIA: ICD-10-CM

## 2023-05-24 DIAGNOSIS — T17.908A ASPIRATION INTO AIRWAY, INITIAL ENCOUNTER: ICD-10-CM

## 2023-05-24 PROCEDURE — 92611 MOTION FLUOROSCOPY/SWALLOW: CPT | Performed by: SPEECH-LANGUAGE PATHOLOGIST

## 2023-05-24 PROCEDURE — 74230 X-RAY XM SWLNG FUNCJ C+: CPT

## 2023-05-24 PROCEDURE — 63710000001 BARIUM SULFATE 96 % RECONSTITUTED SUSPENSION: Performed by: OTOLARYNGOLOGY

## 2023-05-24 PROCEDURE — A9270 NON-COVERED ITEM OR SERVICE: HCPCS | Performed by: OTOLARYNGOLOGY

## 2023-05-24 RX ADMIN — BARIUM SULFATE 15 ML: 960 POWDER, FOR SUSPENSION ORAL at 09:22
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
## 2023-06-01 ENCOUNTER — OFFICE VISIT (OUTPATIENT)
Dept: OTOLARYNGOLOGY | Facility: CLINIC | Age: 73
End: 2023-06-01

## 2023-06-01 VITALS
WEIGHT: 224 LBS | HEART RATE: 78 BPM | DIASTOLIC BLOOD PRESSURE: 65 MMHG | BODY MASS INDEX: 36 KG/M2 | SYSTOLIC BLOOD PRESSURE: 102 MMHG | HEIGHT: 66 IN | OXYGEN SATURATION: 98 %

## 2023-06-01 DIAGNOSIS — R25.3 TONGUE FASCICULATION: ICD-10-CM

## 2023-06-01 DIAGNOSIS — R13.12 OROPHARYNGEAL DYSPHAGIA: Primary | ICD-10-CM
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
## 2023-07-18 PROBLEM — R42 DIZZINESS: Status: ACTIVE | Noted: 2023-07-18

## 2023-07-24 RX ORDER — ALBUTEROL SULFATE 90 UG/1
AEROSOL, METERED RESPIRATORY (INHALATION)
Qty: 54 G | Refills: 3 | Status: SHIPPED | OUTPATIENT
Start: 2023-07-24

## 2023-07-24 RX ORDER — ESCITALOPRAM OXALATE 5 MG/1
TABLET ORAL
Qty: 90 TABLET | Refills: 3 | Status: SHIPPED | OUTPATIENT
Start: 2023-07-24

## 2023-07-24 RX ORDER — LEVOTHYROXINE SODIUM 88 MCG
TABLET ORAL
Qty: 90 TABLET | Refills: 3 | Status: SHIPPED | OUTPATIENT
Start: 2023-07-24

## 2023-07-24 RX ORDER — FENOFIBRATE 48 MG/1
TABLET, COATED ORAL
Qty: 90 TABLET | Refills: 3 | Status: SHIPPED | OUTPATIENT
Start: 2023-07-24

## 2023-07-31 RX ORDER — FLUTICASONE PROPIONATE 50 MCG
SPRAY, SUSPENSION (ML) NASAL
Qty: 32 G | Refills: 3 | Status: SHIPPED | OUTPATIENT
Start: 2023-07-31

## 2023-08-02 ENCOUNTER — TELEPHONE (OUTPATIENT)
Dept: CARDIOLOGY | Facility: CLINIC | Age: 73
End: 2023-08-02
Payer: COMMERCIAL

## (undated) DEVICE — MODEL AT P65, P/N 701554-001KIT CONTENTS: HAND CONTROLLER, 3-WAY HIGH-PRESSURE STOPCOCK WITH ROTATING END AND PREMIUM HIGH-PRESSURE TUBING: Brand: ANGIOTOUCH® KIT

## (undated) DEVICE — BLAKE SILICONE DRAIN, 24 FR ROUND, HUBLESS: Brand: BLAKE

## (undated) DEVICE — PTA BALLOON DILATATION CATHETER: Brand: STERLING™

## (undated) DEVICE — PK CATH LAB 60

## (undated) DEVICE — CATH DIAG EXPO M/ PK 6FR FL4/FR4 PIG 3PK

## (undated) DEVICE — 3M™ IOBAN™ 2 ANTIMICROBIAL INCISE DRAPE 6651EZ: Brand: IOBAN™ 2

## (undated) DEVICE — CONN TBG Y 6 IN 1 LF STRL

## (undated) DEVICE — SUT ETHIB 3/0 SH DA 36IN X522H

## (undated) DEVICE — KT INTRO MINISTICK MAX W/GW PALLADIUM ECHO 4F 21G 7CM

## (undated) DEVICE — INTRO SHEATH ART/FEM ENGAGE .038 6F12CM

## (undated) DEVICE — SUT NUROLON 2 0 CT1 18IN CR8 C522D

## (undated) DEVICE — ESOPHAGEAL BALLOON DILATATION CATHETER: Brand: CRE FIXED WIRE

## (undated) DEVICE — INTRO SHEATH ULTIMUM ACT 5F

## (undated) DEVICE — SPNG DRN AMD EXCILON 6PLY 4X4IN PK/2

## (undated) DEVICE — GW PERIPH GUIDERIGHT STD/J/TP PTFE/PCOAT SS .035IN 3MM 150CM

## (undated) DEVICE — SKIN AFFIX SURG ADHESIVE 72/CS 0.55ML: Brand: MEDLINE

## (undated) DEVICE — INTENDED FOR TISSUE SEPARATION, AND OTHER PROCEDURES THAT REQUIRE A SHARP SURGICAL BLADE TO PUNCTURE OR CUT.: Brand: BARD-PARKER ® CARBON RIB-BACK BLADES

## (undated) DEVICE — DOUBLE MALE LL ADAPTER SPECIAL ADAPTER TO CONVERT A FEMALE FITTING TO MALE LL: Brand: DOUBLE MALE LL ADAPTER

## (undated) DEVICE — 28 FR STRAIGHT – SILICONE CATHETER: Brand: SILICONE THORACIC CATHETERS

## (undated) DEVICE — Device

## (undated) DEVICE — DEV INFL BALN BIG60 W/GAUGE 60ML

## (undated) DEVICE — CANN VESL DLP 1WY BLNT/TP 3MM

## (undated) DEVICE — MARKR SKIN W/RULR AND LBL

## (undated) DEVICE — CONTAINER,SPECIMEN,OR STERILE,4OZ: Brand: MEDLINE

## (undated) DEVICE — CANN SMPL SOFTECH BIFLO ETCO2 A/M 7FT

## (undated) DEVICE — ELECTRODE,RT,STRESS,FOAM,50PK: Brand: MEDLINE

## (undated) DEVICE — SUT PROLENE CARDIO C1D 6/0 24IN 8726H

## (undated) DEVICE — SINGLE-USE BIOPSY FORCEPS: Brand: RADIAL JAW 4

## (undated) DEVICE — SUT VIC 3/0 SH 27IN J416H

## (undated) DEVICE — PK OPN HEART 60

## (undated) DEVICE — GLV SURG SENSICARE ALOE LF PF SZ7.5 GRN

## (undated) DEVICE — GLV SURG SENSICARE GREEN W/ALOE PF LF 6 STRL

## (undated) DEVICE — GW PERIPH GUIDERIGHT STD/J/TP PTFE/PCOAT SS 0.038IN 5X150CM

## (undated) DEVICE — ST CVR PROB PULLUP ULTRASND 5X48IN

## (undated) DEVICE — I-KNIFE CUTTING INSTRUMENT 15 DEGREE: Brand: I-KNIFE

## (undated) DEVICE — Device: Brand: LEVEL 1

## (undated) DEVICE — MODEL BT2000 P/N 700287-012KIT CONTENTS: MANIFOLD WITH SALINE AND CONTRAST PORTS, SALINE TUBING WITH SPIKE AND HAND SYRINGE, TRANSDUCER: Brand: BT2000 AUTOMATED MANIFOLD KIT

## (undated) DEVICE — ADHS LIQ MASTISOL 2/3ML

## (undated) DEVICE — SYR LL TP 10ML STRL

## (undated) DEVICE — GOWN,PREVENTION PLUS,XLARGE,STERILE: Brand: MEDLINE

## (undated) DEVICE — PRESSURE MONITORING LINES PRESSURE OPAQUE PE TUBING TUBING 4FT. LINE W/MALE LUER LOCK CONNECTORS ON EACH END: Brand: PRESSURE MONITORING LINES

## (undated) DEVICE — SUT ETHIBOND 2/0 SH1 36IN X763H

## (undated) DEVICE — GOWN,AURORA,NOREINF,RAGLAN,XL,STERILE: Brand: MEDLINE

## (undated) DEVICE — TEMP PACING WIRE: Brand: MYO/WIRE

## (undated) DEVICE — ST PERFUS M/

## (undated) DEVICE — SUT SILK 0 TIES 18IN A186H BX36

## (undated) DEVICE — GLV SURG SENSICARE GREEN W/ALOE PF LF 6.5 STRL

## (undated) DEVICE — BITEBLOCK ENDO W/STRAP 60F A/ LF DISP

## (undated) DEVICE — GLV SURG SENSICARE GREEN W/ALOE PF LF 7 STRL

## (undated) DEVICE — ANGIO-SEAL EVOLUTION VASCULAR CLOSURE DEVICE: Brand: ANGIO-SEAL

## (undated) DEVICE — PRESSURE MONITORING INJECTION LINE 6FT. M/F: Brand: PRESSURE MONITORING INJECTION LINE

## (undated) DEVICE — SYS PUMP PREVENA125 INCSN MNGT PP/DRSNG 45ML 1P/U

## (undated) DEVICE — SUT PROLENE 7-0 BV175-7 24IN DB ETH8766H

## (undated) DEVICE — SUT PDS 2 0 CT1 27IN CLR Z259H

## (undated) DEVICE — SPNG LAP 18X18IN LF STRL PK/5

## (undated) DEVICE — SUT PROLN 3/0 SH D/A 36IN 8522H

## (undated) DEVICE — KT INTRO MINISTICK MAX W/GW NITNL/TUNG ECHO 4F 21G 7CM

## (undated) DEVICE — SUT SILK 0 FSL 18IN 678G

## (undated) DEVICE — BLD SCLPL BEAVR MINI STR 2BVL 180D LF

## (undated) DEVICE — STERILE POLYISOPRENE POWDER-FREE SURGICAL GLOVES WITH EMOLLIENT COATING: Brand: PROTEXIS

## (undated) DEVICE — CATH IV JELCO 20GAX1 1/4IN

## (undated) DEVICE — GAUZE,SPONGE,4"X4",16PLY,XRAY,STRL,LF: Brand: MEDLINE

## (undated) DEVICE — PAD GRND REM POLYHESIVE A/ DISP

## (undated) DEVICE — ROTATING SURGICAL PUNCHES, 1 PER POUCH: Brand: A&E MEDICAL / ROTATING SURGICAL PUNCHES

## (undated) DEVICE — SAFESECURE,SECUREMENT,FOLEY CATH,STERILE: Brand: MEDLINE

## (undated) DEVICE — CATH DIAG EXPO .056 IM 6F 100CM

## (undated) DEVICE — A2000 MULTI-USE SYRINGE KIT, P/N 701277-003KIT CONTENTS: 100ML CONTRAST RESERVOIR AND TUBING WITH CONTRAST SPIKE AND CLAMP: Brand: A2000 MULTI-USE SYRINGE KIT

## (undated) DEVICE — SHEET,DRAPE,53X77,STERILE: Brand: MEDLINE

## (undated) DEVICE — NDL HYPO PRECISIONGLIDE/REG 18G 1IN PNK

## (undated) DEVICE — 3M™ STERI-STRIP™ REINFORCED ADHESIVE SKIN CLOSURES, R1548, 1 IN X 5 IN (25 MM X 125 MM), 4 STRIPS/ENVELOPE: Brand: 3M™ STERI-STRIP™

## (undated) DEVICE — SUT MONOCRYL 4/0 PS2 27IN Y426H ETY426H

## (undated) DEVICE — SUT PROLN 4/0 RB1 D/A 36IN 8557H

## (undated) DEVICE — SUT SILK 1 LBYRNTH TIES 30IN A307H

## (undated) DEVICE — SPNG GZ WOVN 4X4IN 12PLY 10/BX STRL

## (undated) DEVICE — ACROBAT-I VACUUM POSITIONER SYSTEM: Brand: ACROBAT-I POSITIONER

## (undated) DEVICE — ADHESIVE ISLAND DRESSING: Brand: TELFA

## (undated) DEVICE — PERCLOSE PROGLIDE™ SUTURE-MEDIATED CLOSURE SYSTEM: Brand: PERCLOSE PROGLIDE™

## (undated) DEVICE — ESOPHAGEAL/PYLORIC/COLONIC WIREGUIDED BALLOON DILATATION CATHETER: Brand: CRE WIREGUIDED

## (undated) DEVICE — GOWN,NON-REINFORCED,4XL: Brand: MEDLINE

## (undated) DEVICE — CATH DIAG EXPO .052 MPA2 6F 100CM

## (undated) DEVICE — ST TOURNI COMPL A/ 7IN

## (undated) DEVICE — UNDYED BRAIDED (POLYGLACTIN 910), SYNTHETIC ABSORBABLE SUTURE: Brand: COATED VICRYL

## (undated) DEVICE — TP SXN YANKR BLB TIP W/TBG 10F LF STRL

## (undated) DEVICE — X-DRAPE ABS 12"X17" .25MM LEAD EQUIV STERILE X-RAY SHIELD 10/BOX: Brand: X-DRAPE

## (undated) DEVICE — DECANTER: Brand: UNBRANDED

## (undated) DEVICE — ARTERIAL NEEDLE: Brand: UNBRANDED

## (undated) DEVICE — GLV SURG TRIUMPH LT PF LTX 6 STRL

## (undated) DEVICE — SUT ETHIBOND RB1 3/0 36IN X558H

## (undated) DEVICE — GW PERIPH GUIDERIGHT STD/EXCHNG/J/TIP SS 0.035IN 5X260CM